# Patient Record
Sex: FEMALE | Race: WHITE | NOT HISPANIC OR LATINO | Employment: OTHER | URBAN - METROPOLITAN AREA
[De-identification: names, ages, dates, MRNs, and addresses within clinical notes are randomized per-mention and may not be internally consistent; named-entity substitution may affect disease eponyms.]

---

## 2017-01-16 ENCOUNTER — PROCEDURE VISIT (OUTPATIENT)
Dept: OPHTHALMOLOGY | Facility: CLINIC | Age: 77
End: 2017-01-16
Payer: MEDICARE

## 2017-01-16 VITALS — SYSTOLIC BLOOD PRESSURE: 144 MMHG | HEART RATE: 56 BPM | DIASTOLIC BLOOD PRESSURE: 72 MMHG

## 2017-01-16 DIAGNOSIS — H35.3110 NONEXUDATIVE AGE-RELATED MACULAR DEGENERATION OF RIGHT EYE: ICD-10-CM

## 2017-01-16 DIAGNOSIS — H35.3221 EXUDATIVE AGE-RELATED MACULAR DEGENERATION OF LEFT EYE WITH ACTIVE CHOROIDAL NEOVASCULARIZATION: Primary | ICD-10-CM

## 2017-01-16 DIAGNOSIS — H35.3220 EXUDATIVE AGE-RELATED MACULAR DEGENERATION OF LEFT EYE: ICD-10-CM

## 2017-01-16 PROCEDURE — 99499 UNLISTED E&M SERVICE: CPT | Mod: S$GLB,,, | Performed by: OPHTHALMOLOGY

## 2017-01-16 PROCEDURE — 67028 INJECTION EYE DRUG: CPT | Mod: LT,S$GLB,, | Performed by: OPHTHALMOLOGY

## 2017-01-16 PROCEDURE — 92014 COMPRE OPH EXAM EST PT 1/>: CPT | Mod: 25,S$GLB,, | Performed by: OPHTHALMOLOGY

## 2017-01-16 PROCEDURE — 92134 CPTRZ OPH DX IMG PST SGM RTA: CPT | Mod: S$GLB,,, | Performed by: OPHTHALMOLOGY

## 2017-01-16 RX ADMIN — Medication 1.25 MG: at 04:01

## 2017-01-16 NOTE — PATIENT INSTRUCTIONS

## 2017-01-16 NOTE — MR AVS SNAPSHOT
[facility]  [address]  [facility] [address]  Phone: [phone]  Fax: [phone]                  Serenity Epps   2017 2:50 PM   Procedure visit    Description:  Female : 1940   Provider:  NATHANIEL Lopez MD   Department:  [place] [facility]           Reason for Visit     Eye Problem           Diagnoses this Visit        Comments    Exudative age-related macular degeneration of left eye with active choroidal neovascularization    -  Primary     Exudative age-related macular degeneration of left eye         Nonexudative age-related macular degeneration of right eye                To Do List           Future Appointments        Provider Department Dept Phone    2/10/2017 8:00 AM Hua Andersen MD [facility] [phone]    2017 8:15 AM Aaron Hammonds DPM [facility] [phone]    2017 8:00 AM Estelle Mello MD [facility] [phone]      Goals (5 Years of Data)              Today    10/17/16    9/21/16    Blood Pressure < 140/90   144/72  123/57  122/74      Follow-Up and Disposition     Return in about 7 weeks (around 3/6/2017).      [facility]     [facility] Nurse Care [facility] - 24/7 Assistance  Registered nurses in the [facility] provide clinical advisement, health education, appointment booking, and other advisory services.  Call for this free service at [phone].             Medications           Message regarding Medications     Verify the changes and/or additions to your medication regime listed below are the same as discussed with your clinician today.  If any of these changes or additions are incorrect, please notify your healthcare provider.        These medications were administered today        Dose Freq    bevacizumab (AVASTIN) 2.5 mg/0.10 mL 1.25 mg 1.25 mg Clinic/HOD 1 time    Sig: Inject 0.05 mLs (1.25 mg total) into the eye one time.    Class: Normal     Route: Intraocular    bevacizumab (AVASTIN) 2.5 mg/0.10 mL 1.25 mg 1.25 mg Clinic/HOD 1 time    Sig: Inject 0.05 mLs (1.25 mg total) into the eye one time.    Class: Normal    Route: Intraocular           Verify that the below list of medications is an accurate representation of the medications you are currently taking.  If none reported, the list may be blank. If incorrect, please contact your healthcare provider. Carry this list with you in case of emergency.           Current Medications     aspirin (ECOTRIN) 81 MG EC tablet Take 81 mg by mouth once daily.      biotin 1 mg tablet Take 1,000 mcg by mouth once daily.    bisacodyl (DULCOLAX) 5 mg EC tablet Take 5 mg by mouth once daily.    BOOSTRIX TDAP 2.5-8-5 Lf-mcg-Lf/0.5mL Syrg injection     calcium carbonate (OS-BLAKE) 600 mg (1,500 mg) Tab Take 600 mg by mouth 2 (two) times daily with meals.    dorzolamide-timolol 2-0.5% (COSOPT) 22.3-6.8 mg/mL ophthalmic solution Place 1 drop into both eyes 2 (two) times daily.    FLUZONE HIGH-DOSE 2016-17, PF, 180 mcg/0.5 mL Syrg     gabapentin (NEURONTIN) 100 MG capsule Take 4 capsules (400 mg total) by mouth 3 (three) times daily.    lorazepam (ATIVAN) 0.5 MG tablet TAKE ONE TABLET BY MOUTH EVERY 12 HOURS AS NEEDED FOR ANXIETY    methylPREDNISolone (MEDROL DOSEPACK) 4 mg tablet use as directed    metoprolol succinate (TOPROL-XL) 25 MG 24 hr tablet Take 1 tablet (25 mg total) by mouth 2 (two) times daily.    naproxen sodium (ANAPROX) 220 MG tablet Take 220 mg by mouth 2 (two) times daily with meals.    VIT C/E/ZN/COPPR/LUTEIN/ZEAXAN (PRESERVISION AREDS 2 ORAL) Take by mouth.           Clinical Reference Information           Vital Signs - Last Recorded  Most recent update: 1/16/2017  2:57 PM by Madison Avila    BP Pulse                (!) 144/72 (BP Location: Right arm, Patient Position: Sitting, BP Method: Automatic) (!) 56          Blood Pressure          Most Recent Value    BP  (!)  144/72      Allergies as of  1/16/2017     Clindamycin      Immunizations Administered on Date of Encounter - 1/16/2017     None      Orders Placed During Today's Visit      Normal Orders This Visit    Posterior Segment OCT Retina-Both eyes     Prior Authorization Order     Prior Authorization Order     Future Labs/Procedures Expected by Expires    Posterior Segment OCT Retina-Both eyes  As directed 1/16/2018

## 2017-01-16 NOTE — PROGRESS NOTES
OCT - slight increase in SRF OS  Drusen OD      A/P    1. Wet AMD OS  S/p Avastin OS x 8, Eylea x 12    Persistent SRF OS - improving  With RPE tear OS    Avastin OS     Will likely need maintenance, alternating    7/15 - slight increase at 12 weeks keep 8-10    Continue alternating Avastin/Eylea    2. Dry AMD OD  AREDS/AG    3. PCIOL OU  CTR OS - but saw 20/30 with correction, in spite of sub RPE fibrosis      4. Glc Suspect OU   Good IOP control on Timolol    5. Floaters OU      7 weeks OCT      Risks, benefits, and alternatives to treatment discussed in detail with the patient.  The patient voiced understanding and wished to proceed with the procedure    Injection Procedure Note:  Diagnosis: Wet AMD OS    Topical Proparacaine and Betadine.  Inject Avastin OS at 6:00 @ 3.5-4mm posterior to limbus  Post Operative Dx: Same  Complications: None  Follow up as above.

## 2017-01-30 ENCOUNTER — PATIENT MESSAGE (OUTPATIENT)
Dept: OPHTHALMOLOGY | Facility: CLINIC | Age: 77
End: 2017-01-30

## 2017-02-10 ENCOUNTER — OFFICE VISIT (OUTPATIENT)
Dept: FAMILY MEDICINE | Facility: CLINIC | Age: 77
End: 2017-02-10
Payer: MEDICARE

## 2017-02-10 VITALS
SYSTOLIC BLOOD PRESSURE: 124 MMHG | BODY MASS INDEX: 24.8 KG/M2 | WEIGHT: 154.31 LBS | RESPIRATION RATE: 16 BRPM | DIASTOLIC BLOOD PRESSURE: 82 MMHG | HEIGHT: 66 IN | HEART RATE: 60 BPM

## 2017-02-10 DIAGNOSIS — L30.9 DERMATITIS: Primary | ICD-10-CM

## 2017-02-10 DIAGNOSIS — I10 ESSENTIAL HYPERTENSION: ICD-10-CM

## 2017-02-10 PROCEDURE — 1160F RVW MEDS BY RX/DR IN RCRD: CPT | Mod: S$GLB,,, | Performed by: FAMILY MEDICINE

## 2017-02-10 PROCEDURE — 1157F ADVNC CARE PLAN IN RCRD: CPT | Mod: S$GLB,,, | Performed by: FAMILY MEDICINE

## 2017-02-10 PROCEDURE — 99999 PR PBB SHADOW E&M-EST. PATIENT-LVL III: CPT | Mod: PBBFAC,,, | Performed by: FAMILY MEDICINE

## 2017-02-10 PROCEDURE — 99213 OFFICE O/P EST LOW 20 MIN: CPT | Mod: 25,S$GLB,, | Performed by: FAMILY MEDICINE

## 2017-02-10 PROCEDURE — 1159F MED LIST DOCD IN RCRD: CPT | Mod: S$GLB,,, | Performed by: FAMILY MEDICINE

## 2017-02-10 PROCEDURE — 3079F DIAST BP 80-89 MM HG: CPT | Mod: S$GLB,,, | Performed by: FAMILY MEDICINE

## 2017-02-10 PROCEDURE — 90732 PPSV23 VACC 2 YRS+ SUBQ/IM: CPT | Mod: S$GLB,,, | Performed by: FAMILY MEDICINE

## 2017-02-10 PROCEDURE — 3074F SYST BP LT 130 MM HG: CPT | Mod: S$GLB,,, | Performed by: FAMILY MEDICINE

## 2017-02-10 PROCEDURE — 1126F AMNT PAIN NOTED NONE PRSNT: CPT | Mod: S$GLB,,, | Performed by: FAMILY MEDICINE

## 2017-02-10 PROCEDURE — G0009 ADMIN PNEUMOCOCCAL VACCINE: HCPCS | Mod: S$GLB,,, | Performed by: FAMILY MEDICINE

## 2017-02-10 PROCEDURE — 99499 UNLISTED E&M SERVICE: CPT | Mod: S$GLB,,, | Performed by: FAMILY MEDICINE

## 2017-02-10 RX ORDER — TRIAMCINOLONE ACETONIDE 1 MG/G
CREAM TOPICAL 2 TIMES DAILY
Qty: 1 TUBE | Refills: 0 | Status: SHIPPED | OUTPATIENT
Start: 2017-02-10 | End: 2017-02-19 | Stop reason: SDUPTHER

## 2017-02-10 NOTE — MR AVS SNAPSHOT
[facility]  [address]  [place] [address]  Phone: [phone]  Fax: [phone]                  Serenity Epps   2/10/2017 8:00 AM   Office Visit    Description:  Female : 1940   Provider:  Hua Andersen MD   Department:  [facility]           Reason for Visit     Hypertension           Diagnoses this Visit        Comments    Dermatitis    -  Primary     Essential hypertension                To Do List           Future Appointments        Provider Department Dept Phone    3/6/2017 7:40 AM NATHANIEL Lopez MD [facility] [phone]    2017 8:15 AM Aaron Hammonds DPM [facility] [phone]    2017 8:00 AM Estelle Mello MD [facility] [phone]    2017 7:30 AM Hua Andersen MD [facility] [phone]      Goals (5 Years of Data)              Today    1/16/17    10/17/16    Blood Pressure < 140/90   124/82  144/72  123/57      Follow-Up and Disposition     Return in about 6 months (around 8/10/2017).       These Medications        Disp Refills Start End    triamcinolone acetonide 0.1% (KENALOG) 0.1 % cream 1 Tube 0 2/10/2017 2017    Apply topically 2 (two) times daily. - Topical (Top)    Pharmacy: [facility] [place]  [address]  Ph #: [phone]         [facility]     [facility] On Call Nurse Care Line -  Assistance  Registered nurses in the [facility] provide clinical advisement, health education, appointment booking, and other advisory services.  Call for this free service at [phone].             Medications           Message regarding Medications     Verify the changes and/or additions to your medication regime listed below are the same as discussed with your clinician today.  If any of these changes or additions are incorrect, please notify your healthcare provider.        START taking these  "NEW medications        Refills    triamcinolone acetonide 0.1% (KENALOG) 0.1 % cream 0    Sig: Apply topically 2 (two) times daily.    Class: Normal    Route: Topical (Top)      STOP taking these medications     FLUZONE HIGH-DOSE 2016-17, PF, 180 mcg/0.5 mL Syrg     BOOSTRIX TDAP 2.5-8-5 Lf-mcg-Lf/0.5mL Syrg injection            Verify that the below list of medications is an accurate representation of the medications you are currently taking.  If none reported, the list may be blank. If incorrect, please contact your healthcare provider. Carry this list with you in case of emergency.           Current Medications     aspirin (ECOTRIN) 81 MG EC tablet Take 81 mg by mouth once daily.      biotin 1 mg tablet Take 1,000 mcg by mouth once daily.    bisacodyl (DULCOLAX) 5 mg EC tablet Take 5 mg by mouth once daily.    calcium carbonate (OS-BLAKE) 600 mg (1,500 mg) Tab Take 600 mg by mouth 2 (two) times daily with meals.    dorzolamide-timolol 2-0.5% (COSOPT) 22.3-6.8 mg/mL ophthalmic solution Place 1 drop into both eyes 2 (two) times daily.    gabapentin (NEURONTIN) 100 MG capsule Take 4 capsules (400 mg total) by mouth 3 (three) times daily.    lorazepam (ATIVAN) 0.5 MG tablet TAKE ONE TABLET BY MOUTH EVERY 12 HOURS AS NEEDED FOR ANXIETY    methylPREDNISolone (MEDROL DOSEPACK) 4 mg tablet use as directed    metoprolol succinate (TOPROL-XL) 25 MG 24 hr tablet Take 1 tablet (25 mg total) by mouth 2 (two) times daily.    naproxen sodium (ANAPROX) 220 MG tablet Take 220 mg by mouth 2 (two) times daily with meals.    VIT C/E/ZN/COPPR/LUTEIN/ZEAXAN (PRESERVISION AREDS 2 ORAL) Take by mouth.    triamcinolone acetonide 0.1% (KENALOG) 0.1 % cream Apply topically 2 (two) times daily.           Clinical Reference Information           Your Vitals Were     BP Pulse Resp Height Weight BMI    124/82 (BP Location: Left arm, Patient Position: Sitting, BP Method: Manual) 60 16 5' 6" (1.676 m) 70 kg (154 lb 5.2 oz) 24.91 kg/m2      Blood " Pressure          Most Recent Value    BP  124/82      Allergies as of 2/10/2017     Clindamycin      Immunizations Administered on Date of Encounter - 2/10/2017     Name Date Dose VIS Date Route    Pneumococcal Polysaccharide - 23 Valent  Incomplete 0.5 mL 4/24/2015 Intramuscular      Orders Placed During Today's Visit      Normal Orders This Visit    Pneumococcal Polysaccharide Vaccine (23 Valent) (SQ/IM)       Language Assistance Services     ATTENTION: Language assistance services are available, free of charge. Please call [phone].      ATENCIÓN: Si habla laisha, tiene a collazo disposición servicios gratuitos de asistencia lingüística. Llame al [phone].     CHÚ Ý: N?u b?n nói Ti?ng Vi?t, có các d?ch v? h? tr? ngôn ng? mi?n phí dành cho b?n. G?i s? [phone].         [facility] complies with applicable Federal civil rights laws and does not discriminate on the basis of race, color, national origin, age, disability, or sex.

## 2017-02-10 NOTE — PROGRESS NOTES
Subjective:       Patient ID: Serenity Epps is a 76 y.o. female.    Chief Complaint: Hypertension (follow up; hm due: pneumovax)    Hypertension   This is a chronic problem. The problem is controlled. Pertinent negatives include no chest pain, headaches, orthopnea, palpitations, peripheral edema, PND or shortness of breath. Past treatments include beta blockers. The current treatment provides significant improvement. There are no compliance problems.      Review of Systems   Constitutional: Negative for activity change and unexpected weight change.   HENT: Negative for hearing loss, rhinorrhea and trouble swallowing.    Eyes: Negative for discharge and visual disturbance.   Respiratory: Negative for chest tightness, shortness of breath and wheezing.    Cardiovascular: Negative for chest pain, palpitations, orthopnea and PND.   Gastrointestinal: Negative for blood in stool, constipation, diarrhea and vomiting.   Endocrine: Negative for polydipsia and polyuria.   Genitourinary: Negative for difficulty urinating, dysuria, hematuria and menstrual problem.   Musculoskeletal: Negative for arthralgias and joint swelling.   Skin: Positive for rash.   Neurological: Negative for weakness and headaches.   Psychiatric/Behavioral: Negative for confusion and dysphoric mood.       Objective:      Physical Exam   Constitutional: She is oriented to person, place, and time. She appears well-developed and well-nourished.   Neurological: She is alert and oriented to person, place, and time.   Skin:   Rash on posterior neck with redness, pruritic   Vitals reviewed.      Assessment:       1. Dermatitis    2. Essential hypertension        Plan:       Dermatitis  -     triamcinolone acetonide 0.1% (KENALOG) 0.1 % cream; Apply topically 2 (two) times daily.  Dispense: 1 Tube; Refill: 0    Essential hypertension  - Continue current therapy  - Serial blood pressure monitoring  - Diet and exercise education.  - Return in about 6 months (around  8/10/2017).

## 2017-02-19 DIAGNOSIS — L30.9 DERMATITIS: ICD-10-CM

## 2017-02-20 RX ORDER — TRIAMCINOLONE ACETONIDE 1 MG/G
CREAM TOPICAL
Qty: 15 G | Refills: 3 | Status: SHIPPED | OUTPATIENT
Start: 2017-02-20 | End: 2017-03-14 | Stop reason: SDUPTHER

## 2017-02-20 RX ORDER — GABAPENTIN 100 MG/1
400 CAPSULE ORAL 3 TIMES DAILY
Qty: 180 CAPSULE | Refills: 2 | Status: SHIPPED | OUTPATIENT
Start: 2017-02-20 | End: 2017-04-07 | Stop reason: SDUPTHER

## 2017-03-06 ENCOUNTER — PROCEDURE VISIT (OUTPATIENT)
Dept: OPHTHALMOLOGY | Facility: CLINIC | Age: 77
End: 2017-03-06
Attending: OPHTHALMOLOGY
Payer: MEDICARE

## 2017-03-06 VITALS — DIASTOLIC BLOOD PRESSURE: 67 MMHG | SYSTOLIC BLOOD PRESSURE: 116 MMHG | HEART RATE: 55 BPM

## 2017-03-06 DIAGNOSIS — H35.3110 NONEXUDATIVE AGE-RELATED MACULAR DEGENERATION OF RIGHT EYE: ICD-10-CM

## 2017-03-06 DIAGNOSIS — H35.3221 EXUDATIVE AGE-RELATED MACULAR DEGENERATION OF LEFT EYE WITH ACTIVE CHOROIDAL NEOVASCULARIZATION: Primary | ICD-10-CM

## 2017-03-06 PROCEDURE — 67028 INJECTION EYE DRUG: CPT | Mod: LT,S$GLB,, | Performed by: OPHTHALMOLOGY

## 2017-03-06 PROCEDURE — 92134 CPTRZ OPH DX IMG PST SGM RTA: CPT | Mod: LT,S$GLB,, | Performed by: OPHTHALMOLOGY

## 2017-03-06 PROCEDURE — 92014 COMPRE OPH EXAM EST PT 1/>: CPT | Mod: 25,S$GLB,, | Performed by: OPHTHALMOLOGY

## 2017-03-06 PROCEDURE — 99499 UNLISTED E&M SERVICE: CPT | Mod: S$GLB,,, | Performed by: OPHTHALMOLOGY

## 2017-03-06 NOTE — MR AVS SNAPSHOT
[facility]  [address]  [facility] [address]  Phone: [phone]  Fax: [phone]                  Serenity Epps   3/6/2017 7:40 AM   Procedure visit    Description:  Female : 1940   Provider:  NATHANIEL Lopez MD   Department:  [place] [facility]           Reason for Visit     Eye Problem           Diagnoses this Visit        Comments    Exudative age-related macular degeneration of left eye with active choroidal neovascularization    -  Primary     Nonexudative age-related macular degeneration of right eye                To Do List           Future Appointments        Provider Department Dept Phone    2017 8:15 AM Aaron Hammonds DPM [facility] [phone]    2017 8:00 AM Estelle Mello MD [facility] [phone]    2017 7:30 AM Hua Andersen MD [facility] [phone]      Goals (5 Years of Data)              Today    2/10/17    1/16/17    Blood Pressure < 140/90   116/67  116/67  116/67      Follow-Up and Disposition     Return in about 6 weeks (around 2017).      [facility]     [facility] Nurse Care Line - 24/7 Assistance  Registered nurses in the [facility] provide clinical advisement, health education, appointment booking, and other advisory services.  Call for this free service at [phone].             Medications           Message regarding Medications     Verify the changes and/or additions to your medication regime listed below are the same as discussed with your clinician today.  If any of these changes or additions are incorrect, please notify your healthcare provider.        These medications were administered today        Dose Freq    aflibercept Soln 2 mg 2 mg Clinic/HOD 1 time    Si.05 mLs (2 mg total) by Intravitreal route one time.    Class: Normal    Route: Intravitreal           Verify that the below list of medications is an accurate  representation of the medications you are currently taking.  If none reported, the list may be blank. If incorrect, please contact your healthcare provider. Carry this list with you in case of emergency.           Current Medications     aspirin (ECOTRIN) 81 MG EC tablet Take 81 mg by mouth once daily.      biotin 1 mg tablet Take 1,000 mcg by mouth once daily.    bisacodyl (DULCOLAX) 5 mg EC tablet Take 5 mg by mouth once daily.    calcium carbonate (OS-BLAKE) 600 mg (1,500 mg) Tab Take 600 mg by mouth 2 (two) times daily with meals.    dorzolamide-timolol 2-0.5% (COSOPT) 22.3-6.8 mg/mL ophthalmic solution Place 1 drop into both eyes 2 (two) times daily.    gabapentin (NEURONTIN) 100 MG capsule Take 4 capsules (400 mg total) by mouth 3 (three) times daily.    lorazepam (ATIVAN) 0.5 MG tablet TAKE ONE TABLET BY MOUTH EVERY 12 HOURS AS NEEDED FOR ANXIETY    methylPREDNISolone (MEDROL DOSEPACK) 4 mg tablet use as directed    metoprolol succinate (TOPROL-XL) 25 MG 24 hr tablet Take 1 tablet (25 mg total) by mouth 2 (two) times daily.    naproxen sodium (ANAPROX) 220 MG tablet Take 220 mg by mouth 2 (two) times daily with meals.    triamcinolone acetonide 0.1% (KENALOG) 0.1 % cream APPLY TOPICALLY 2 TIMES DAILY    VIT C/E/ZN/COPPR/LUTEIN/ZEAXAN (PRESERVISION AREDS 2 ORAL) Take by mouth.           Clinical Reference Information           Your Vitals Were     BP Pulse                116/67 (BP Location: Left arm, Patient Position: Sitting, BP Method: Automatic) 55          Blood Pressure          Most Recent Value    BP  116/67      Allergies as of 3/6/2017     Clindamycin      Immunizations Administered on Date of Encounter - 3/6/2017     None      Orders Placed During Today's Visit      Normal Orders This Visit    Posterior Segment OCT Retina-Both eyes     Prior Authorization Order     Future Labs/Procedures Expected by Expires    Posterior Segment OCT Retina-Both eyes  As directed 3/6/2018      Language Assistance  Services     ATTENTION: Language assistance services are available, free of charge. Please call [phone].      ATENCIÓN: Si habla laisha, tiene a collazo disposición servicios gratuitos de asistencia lingüística. Llame al [phone].     CHÚ Ý: N?u b?n nói Ti?ng Vi?t, có các d?ch v? h? tr? ngôn ng? mi?n phí dành cho b?n. G?i s? [phone].         [facility] complies with applicable Federal civil rights laws and does not discriminate on the basis of race, color, national origin, age, disability, or sex.

## 2017-03-06 NOTE — PATIENT INSTRUCTIONS

## 2017-03-06 NOTE — PROGRESS NOTES
OCT - slight increase in SRF OS  Drusen OD      A/P    1. Wet AMD OS  S/p Avastin OS x 9, Eylea x 12    Persistent SRF OS - improving  With RPE tear OS    Eylea OS     Will likely need maintenance, alternating  Some increase 2 weeks after Avastin, may consider Eylea monotherapy if recurs    7/15 - slight increase at 12 weeks keep 8-10    Continue alternating Avastin/Eylea    2. Dry AMD OD  AREDS/AG    3. PCIOL OU  CTR OS - but saw 20/30 with correction, in spite of sub RPE fibrosis      4. Glc Suspect OU   Good IOP control on Timolol    5. Floaters OU      6-7 weeks OCT      Risks, benefits, and alternatives to treatment discussed in detail with the patient.  The patient voiced understanding and wished to proceed with the procedure    Injection Procedure Note:  Diagnosis: Wet AMD OS    Topical Proparacaine and Betadine.  Inject Eylea OS at 6:00 @ 3.5-4mm posterior to limbus  Post Operative Dx: Same  Complications: None  Follow up as above.

## 2017-03-14 DIAGNOSIS — L30.9 DERMATITIS: ICD-10-CM

## 2017-03-14 RX ORDER — TRIAMCINOLONE ACETONIDE 1 MG/G
CREAM TOPICAL 2 TIMES DAILY
Qty: 15 G | Refills: 3 | Status: SHIPPED | OUTPATIENT
Start: 2017-03-14 | End: 2017-07-06

## 2017-03-30 RX ORDER — LORAZEPAM 0.5 MG/1
TABLET ORAL
Qty: 40 TABLET | Refills: 0 | OUTPATIENT
Start: 2017-03-30

## 2017-03-30 RX ORDER — LORAZEPAM 0.5 MG/1
0.5 TABLET ORAL EVERY 12 HOURS PRN
Qty: 40 TABLET | Refills: 3 | Status: SHIPPED | OUTPATIENT
Start: 2017-03-30 | End: 2017-08-08 | Stop reason: SDUPTHER

## 2017-04-06 ENCOUNTER — OFFICE VISIT (OUTPATIENT)
Dept: PODIATRY | Facility: CLINIC | Age: 77
End: 2017-04-06
Payer: MEDICARE

## 2017-04-06 ENCOUNTER — PATIENT MESSAGE (OUTPATIENT)
Dept: PODIATRY | Facility: CLINIC | Age: 77
End: 2017-04-06

## 2017-04-06 VITALS — HEIGHT: 66 IN | WEIGHT: 154.75 LBS | BODY MASS INDEX: 24.87 KG/M2

## 2017-04-06 DIAGNOSIS — B35.1 ONYCHOMYCOSIS DUE TO DERMATOPHYTE: ICD-10-CM

## 2017-04-06 DIAGNOSIS — G60.9 IDIOPATHIC PERIPHERAL NEUROPATHY: Primary | ICD-10-CM

## 2017-04-06 DIAGNOSIS — M21.6X9 EQUINUS DEFORMITY OF FOOT: ICD-10-CM

## 2017-04-06 PROCEDURE — 1159F MED LIST DOCD IN RCRD: CPT | Mod: S$GLB,,,

## 2017-04-06 PROCEDURE — 99213 OFFICE O/P EST LOW 20 MIN: CPT | Mod: 25,S$GLB,,

## 2017-04-06 PROCEDURE — 11721 DEBRIDE NAIL 6 OR MORE: CPT | Mod: Q9,S$GLB,,

## 2017-04-06 PROCEDURE — 1160F RVW MEDS BY RX/DR IN RCRD: CPT | Mod: S$GLB,,,

## 2017-04-06 PROCEDURE — 99499 UNLISTED E&M SERVICE: CPT | Mod: S$GLB,,,

## 2017-04-06 PROCEDURE — 1125F AMNT PAIN NOTED PAIN PRSNT: CPT | Mod: S$GLB,,,

## 2017-04-06 PROCEDURE — 1157F ADVNC CARE PLAN IN RCRD: CPT | Mod: S$GLB,,,

## 2017-04-06 PROCEDURE — 99999 PR PBB SHADOW E&M-EST. PATIENT-LVL II: CPT | Mod: PBBFAC,,,

## 2017-04-06 NOTE — MR AVS SNAPSHOT
[place] [facility]  [address]  [facility] [address]  Phone: [phone]                  Serenity Epps   2017 8:15 AM   Office Visit    Description:  Female : 1940   Provider:  Aaron Hammonds DPM   Department:  [facility]           Reason for Visit     Follow-up                To Do List           Future Appointments        Provider Department Dept Phone    2017 7:50 AM NATHANIEL Lopez MD [facility] [phone]    2017 8:00 AM Estelle Mello MD [facility] [phone]    2017 8:30 AM Aaron Hammonds DPM [facility] [phone]    2017 7:30 AM Hua Andersen MD [facility] [phone]      Goals (5 Years of Data)              3/6/17    2/10/17    1/16/17    Blood Pressure < 140/90   116/67  116/67  116/67      [facility] On Call     [facility] On Call Nurse Care Line -  Assistance  Unless otherwise directed by your provider, please contact [facility], our nurse care line that is available for  assistance.     Registered nurses in the [facility] provide: appointment scheduling, clinical advisement, health education, and other advisory services.  Call: [phone] (toll free)               Medications           Message regarding Medications     Verify the changes and/or additions to your medication regime listed below are the same as discussed with your clinician today.  If any of these changes or additions are incorrect, please notify your healthcare provider.             Verify that the below list of medications is an accurate representation of the medications you are currently taking.  If none reported, the list may be blank. If incorrect, please contact your healthcare provider. Carry this list with you in case of emergency.           Current Medications     aspirin (ECOTRIN) 81 MG EC tablet Take 81 mg by mouth once daily.      biotin 1 mg tablet Take  "1,000 mcg by mouth once daily.    bisacodyl (DULCOLAX) 5 mg EC tablet Take 5 mg by mouth once daily.    calcium carbonate (OS-BLAKE) 600 mg (1,500 mg) Tab Take 600 mg by mouth 2 (two) times daily with meals.    dorzolamide-timolol 2-0.5% (COSOPT) 22.3-6.8 mg/mL ophthalmic solution Place 1 drop into both eyes 2 (two) times daily.    gabapentin (NEURONTIN) 100 MG capsule Take 4 capsules (400 mg total) by mouth 3 (three) times daily.    lorazepam (ATIVAN) 0.5 MG tablet Take 1 tablet (0.5 mg total) by mouth every 12 (twelve) hours as needed.    methylPREDNISolone (MEDROL DOSEPACK) 4 mg tablet use as directed    metoprolol succinate (TOPROL-XL) 25 MG 24 hr tablet Take 1 tablet (25 mg total) by mouth 2 (two) times daily.    naproxen sodium (ANAPROX) 220 MG tablet Take 220 mg by mouth 2 (two) times daily with meals.    triamcinolone acetonide 0.1% (KENALOG) 0.1 % cream Apply topically 2 (two) times daily.    VIT C/E/ZN/COPPR/LUTEIN/ZEAXAN (PRESERVISION AREDS 2 ORAL) Take by mouth.           Clinical Reference Information           Your Vitals Were     Height Weight BMI          5' 6" (1.676 m) 70.2 kg (154 lb 12.2 oz) 24.98 kg/m2        Allergies as of 4/6/2017     Clindamycin      Immunizations Administered on Date of Encounter - 4/6/2017     None      Language Assistance Services     ATTENTION: Language assistance services are available, free of charge. Please call [phone].      ATENCIÓN: Si mra interiano, tiene a collazo disposición servicios gratuitos de asistencia lingüística. Llame al [phone].     ROSS Ý: N?u b?n nói Ti?ng Vi?t, có các d?ch v? h? tr? ngôn ng? mi?n phí dành cho b?n. G?i s? [phone].         [place] [facility] complies with applicable Federal civil rights laws and does not discriminate on the basis of race, color, national origin, age, disability, or sex.        "

## 2017-04-07 RX ORDER — GABAPENTIN 100 MG/1
400 CAPSULE ORAL 3 TIMES DAILY
Qty: 180 CAPSULE | Refills: 2 | Status: SHIPPED | OUTPATIENT
Start: 2017-04-07 | End: 2017-06-26 | Stop reason: SDUPTHER

## 2017-04-07 NOTE — PROGRESS NOTES
Subjective:       Patient ID: Serenity Epps is a 76 y.o. female.    Chief Complaint: Follow-up (4 month f/u )    HPI  Serenity is a 76 y.o. female who presents to the clinic for evaluation and treatment of high risk feet. Serenity has a past medical history of Anxiety; Arthritis; Cataract - Both Eyes; CKD (chronic kidney disease), stage II (9/21/2016); DVT (deep venous thrombosis); Exudative age-related macular degeneration of left eye (2/20/2014); Glaucoma; Hypertension; Left foot pain; Macular degeneration; Osteopenia; Rhinitis, chronic; and Strabismus. The patient's chief complaint is long, thick toenails. This patient has documented high risk feet requiring routine maintenance secondary to peripheral neuropathy.    PCP: Hua Andersen MD    Date Last Seen by PCP: 2/10/17    Current shoe gear:  Affected Foot: Tennis shoes     Unaffected Foot: Tennis shoes      Review of Systems  ROS:  Constitution: Negative for chills, fever, weakness and malaise/fatigue.   HEENT: Negative for headaches.   Cardiovascular: Negative for chest pain and claudication.   Respiratory: Negative for cough and shortness of breath.   Musculoskeletal: Positive for foot pain.  Negative for muscle cramps and muscle weakness.   Gastrointestinal: Negative for nausea and vomiting.   Neurological: Positive for numbness and paresthesias.   Dermatological: Negative for wound.        Objective:      Physical Exam  Constitutional:   Patient is oriented to person, place, and time. Vital signs are normal. Appears well-developed and well-nourished.     Vascular:   Dorsalis pedis pulses are 2+ on the right side, and 2+ on the left side.   Posterior tibial pulses are 2+ on the right side, and 2+ on the left side.   - digital hair growth, capillary fill time to all toes <3 seconds, toes are cool to touch  + swelling feet and ankles    Skin/Dermatological:   Skin is thin, warm, shiny and atrophic. No cyanosis or clubbing. No rashes noted. No open wounds.   All ten  toenails yellow discolored, thickened 3 mm, elongated 3 mm with subungual debris and tenderness.  Porokeratosis -    Musculoskeletal:   Mild bunions, hammertoes and bunionettes observed.  Decreased range of motion of bilateral midtarsal, subtalar joints, ankle joint dorsiflexion is restricted bilaterally. Muscle strength to tibialis anterior, extensor hallucis longus, extensor digitorum longus, peroneal muscles, flexor hallucis/digotorum longus, posterior tibial and gastrosoleal complex is 5/5.    Neurological:   Positive deficits to sharp/dull, light touch or vibratory sensation bilateral feet           Assessment:       1. Idiopathic peripheral neuropathy    2. Equinus deformity of foot    3. Onychomycosis due to dermatophyte        Plan:       Shoe inspection. Foot Education. Patient instructed on proper foot hygeine. We discussed wearing proper shoe gear, daily foot inspections, never walking without protective shoe gear, never putting sharp instruments to feet.  We also discussed padding and shoes with high toe boxes for  foot deformities.    - With patient's permission, all ten toenails were aggressively reduced and debrided  to their soft tissue attachment mechanically with nail nipper, removing all offending nail and debris.   Patient relates relief following the procedure. Patient will continue to monitor the areas daily, inspect her feet, wear protective shoe gear when ambulatory, moisturizer to maintain skin integrity and follow in this office in approximately 4 months, sooner p.r.n.

## 2017-04-17 ENCOUNTER — PROCEDURE VISIT (OUTPATIENT)
Dept: OPHTHALMOLOGY | Facility: CLINIC | Age: 77
End: 2017-04-17
Payer: MEDICARE

## 2017-04-17 VITALS — SYSTOLIC BLOOD PRESSURE: 140 MMHG | DIASTOLIC BLOOD PRESSURE: 71 MMHG | HEART RATE: 61 BPM

## 2017-04-17 DIAGNOSIS — H35.3110 NONEXUDATIVE AGE-RELATED MACULAR DEGENERATION OF RIGHT EYE: ICD-10-CM

## 2017-04-17 DIAGNOSIS — H35.3221 EXUDATIVE AGE-RELATED MACULAR DEGENERATION OF LEFT EYE WITH ACTIVE CHOROIDAL NEOVASCULARIZATION: Primary | ICD-10-CM

## 2017-04-17 PROCEDURE — 99499 UNLISTED E&M SERVICE: CPT | Mod: S$GLB,,, | Performed by: OPHTHALMOLOGY

## 2017-04-17 PROCEDURE — 92134 CPTRZ OPH DX IMG PST SGM RTA: CPT | Mod: LT,S$GLB,, | Performed by: OPHTHALMOLOGY

## 2017-04-17 PROCEDURE — 67028 INJECTION EYE DRUG: CPT | Mod: LT,S$GLB,, | Performed by: OPHTHALMOLOGY

## 2017-04-17 PROCEDURE — 92014 COMPRE OPH EXAM EST PT 1/>: CPT | Mod: 25,S$GLB,, | Performed by: OPHTHALMOLOGY

## 2017-04-17 RX ORDER — AMOXICILLIN 500 MG/1
CAPSULE ORAL
COMMUNITY
Start: 2017-04-12 | End: 2017-07-06 | Stop reason: ALTCHOICE

## 2017-04-17 RX ADMIN — Medication 1.25 MG: at 09:04

## 2017-04-17 NOTE — PROGRESS NOTES
OCT - slight increase in SRF OS  Drusen OD      A/P    1. Wet AMD OS  S/p Avastin OS x 9, Eylea x 13    Persistent SRF OS - improving  With RPE tear OS    Avastin OS     Will likely need maintenance, alternating  Some increase 2 weeks after Avastin, may consider Eylea monotherapy if recurs    7/15 - slight increase at 12 weeks keep 8-10    Continue alternating Avastin/Eylea    2. Dry AMD OD  AREDS/AG    3. PCIOL OU  CTR OS - but saw 20/30 with correction, in spite of sub RPE fibrosis      4. Glc Suspect OU   Good IOP control on Timolol    5. Floaters OU      6-7 weeks OCT      Risks, benefits, and alternatives to treatment discussed in detail with the patient.  The patient voiced understanding and wished to proceed with the procedure    Injection Procedure Note:  Diagnosis: Wet AMD OS    Topical Proparacaine and Betadine.  Inject Avastin OS at 6:00 @ 3.5-4mm posterior to limbus  Post Operative Dx: Same  Complications: None  Follow up as above.

## 2017-04-17 NOTE — PATIENT INSTRUCTIONS

## 2017-04-17 NOTE — MR AVS SNAPSHOT
[facility]  [address]  [facility] [address]  Phone: [phone]  Fax: [phone]                  Serenity Epps   2017 7:50 AM   Procedure visit    Description:  Female : 1940   Provider:  NATHANIEL Lopez MD   Department:  [place] [facility]           Reason for Visit     Macular Degeneration           Diagnoses this Visit        Comments    Exudative age-related macular degeneration of left eye with active choroidal neovascularization    -  Primary     Nonexudative age-related macular degeneration of right eye                To Do List           Future Appointments        Provider Department Dept Phone    2017 8:00 AM Estelle Mello MD [facility] [phone]    2017 8:30 AM Aaron Hammonds DPM [facility] [phone]    2017 7:30 AM Hua Andersen MD [facility] [phone]      Goals (5 Years of Data)              Today    3/6/17    2/10/17    Blood Pressure < 140/90   140/71  140/71  140/71      Follow-Up and Disposition     Return in about 6 weeks (around 2017).      [facility]     [facility] Care [facility] -  Assistance  Unless otherwise directed by your provider, please contact [facility], our nurse care line that is available for  assistance.     Registered nurses in the [facility] provide: appointment scheduling, clinical advisement, health education, and other advisory services.  Call: [phone] (toll free)               Medications           Message regarding Medications     Verify the changes and/or additions to your medication regime listed below are the same as discussed with your clinician today.  If any of these changes or additions are incorrect, please notify your healthcare provider.        These medications were administered today        Dose Freq    bevacizumab (AVASTIN) 2.5 mg/0.10 mL 1.25 mg 1.25 mg Clinic/HOD 1 time     Sig: Inject 0.05 mLs (1.25 mg total) into the eye one time.    Class: Normal    Route: Intraocular           Verify that the below list of medications is an accurate representation of the medications you are currently taking.  If none reported, the list may be blank. If incorrect, please contact your healthcare provider. Carry this list with you in case of emergency.           Current Medications     amoxicillin (AMOXIL) 500 MG capsule     aspirin (ECOTRIN) 81 MG EC tablet Take 81 mg by mouth once daily.      biotin 1 mg tablet Take 1,000 mcg by mouth once daily.    bisacodyl (DULCOLAX) 5 mg EC tablet Take 5 mg by mouth once daily.    calcium carbonate (OS-BLAKE) 600 mg (1,500 mg) Tab Take 600 mg by mouth 2 (two) times daily with meals.    dorzolamide-timolol 2-0.5% (COSOPT) 22.3-6.8 mg/mL ophthalmic solution Place 1 drop into both eyes 2 (two) times daily.    gabapentin (NEURONTIN) 100 MG capsule Take 4 capsules (400 mg total) by mouth 3 (three) times daily.    lorazepam (ATIVAN) 0.5 MG tablet Take 1 tablet (0.5 mg total) by mouth every 12 (twelve) hours as needed.    methylPREDNISolone (MEDROL DOSEPACK) 4 mg tablet use as directed    metoprolol succinate (TOPROL-XL) 25 MG 24 hr tablet Take 1 tablet (25 mg total) by mouth 2 (two) times daily.    naproxen sodium (ANAPROX) 220 MG tablet Take 220 mg by mouth 2 (two) times daily with meals.    triamcinolone acetonide 0.1% (KENALOG) 0.1 % cream Apply topically 2 (two) times daily.    VIT C/E/ZN/COPPR/LUTEIN/ZEAXAN (PRESERVISION AREDS 2 ORAL) Take by mouth.           Clinical Reference Information           Your Vitals Were     BP Pulse                140/71 (BP Location: Right arm, Patient Position: Sitting, BP Method: Automatic) 61          Blood Pressure          Most Recent Value    BP  (!)  140/71      Allergies as of 4/17/2017     Clindamycin      Immunizations Administered on Date of Encounter - 4/17/2017     None      Orders Placed During Today's Visit      Normal  Orders This Visit    Posterior Segment OCT Retina-Both eyes     Prior Authorization Order     Future Labs/Procedures Expected by Expires    Posterior Segment OCT Retina-Both eyes  As directed 4/17/2018      Language Assistance Services     ATTENTION: Language assistance services are available, free of charge. Please call [phone].      ATENCIÓN: Si mar interiano, tiene a collazo disposición servicios gratuitos de asistencia lingüística. Llame al [phone].     CHÚ Ý: N?u b?n nói Ti?ng Vi?t, có các d?ch v? h? tr? ngôn ng? mi?n phí dành cho b?n. G?i s? [phone].         [facility] complies with applicable Federal civil rights laws and does not discriminate on the basis of race, color, national origin, age, disability, or sex.

## 2017-04-18 ENCOUNTER — OFFICE VISIT (OUTPATIENT)
Dept: OPHTHALMOLOGY | Facility: CLINIC | Age: 77
End: 2017-04-18
Payer: MEDICARE

## 2017-04-18 DIAGNOSIS — Z96.1 PSEUDOPHAKIA OF BOTH EYES: ICD-10-CM

## 2017-04-18 DIAGNOSIS — H35.3221 EXUDATIVE AGE-RELATED MACULAR DEGENERATION OF LEFT EYE WITH ACTIVE CHOROIDAL NEOVASCULARIZATION: ICD-10-CM

## 2017-04-18 DIAGNOSIS — H40.003 GLAUCOMA SUSPECT, BILATERAL: Primary | ICD-10-CM

## 2017-04-18 DIAGNOSIS — H35.3110 NONEXUDATIVE AGE-RELATED MACULAR DEGENERATION OF RIGHT EYE: ICD-10-CM

## 2017-04-18 PROCEDURE — 99499 UNLISTED E&M SERVICE: CPT | Mod: S$GLB,,, | Performed by: OPHTHALMOLOGY

## 2017-04-18 PROCEDURE — 99999 PR PBB SHADOW E&M-EST. PATIENT-LVL III: CPT | Mod: PBBFAC,,, | Performed by: OPHTHALMOLOGY

## 2017-04-18 PROCEDURE — 92012 INTRM OPH EXAM EST PATIENT: CPT | Mod: S$GLB,,, | Performed by: OPHTHALMOLOGY

## 2017-04-18 NOTE — PROGRESS NOTES
HPI     Glaucoma    Additional comments: 4 month iop ck           Comments   DLS: 12/13/16    Pt states can not see when she goes from light to dark or dark to light.   Saw Dr Lopez yesterday for avastin OS injection yesterday.    Gtts: Cosopt BID OU       Last edited by Cheryle Quintana on 4/18/2017  8:11 AM. (History)            Assessment /Plan     For exam results, see Encounter Report.    Glaucoma suspect, bilateral  -     [place] Retina Tomography (HRT) - OU - Both Eyes; Future  -     [place] Visual Field - OU - Extended - Both Eyes; Future    Exudative age-related macular degeneration of left eye with active choroidal neovascularization    Nonexudative age-related macular degeneration of right eye    Pseudophakia of both eyes            1. Glaucoma, suspect  IOP seems stable low/mid teens. Switched to Cosopt BID OU 7/8/15 when she appeared to have possible progression on clinical exam and last HRT OD. Last OS HRT with possible progression as well. Also had inferior NFL heme present on prior DFE OD post-op. CCT WNL. HVF;s now likely affected by ARMD. Continue Cosopt BID OU started 7/8/15.      F/u 4 months for IOP check with HVF and HRT and DFE.     Macular hemorrhage OS F/u Dr. Lopez as scheduled. Central elevation appears stable, has not gone down much   2. Pseudophakia OU s/p Phaco/PCIOL OS with CTR for zonular dehiscence and Triescence.   s/p Phaco/PCIOL OD. Doing well.   3. Blepharitis  Ok to resume lid hygiene, continue artificial tears prn.   4. Hyperopia with astigmatism and presbyopia  MRx given prior visit   5. Allergic conjunctivitis Discussed cool compresses/AT's/Zaditor on prior visit.

## 2017-04-18 NOTE — MR AVS SNAPSHOT
[facility]  [address]  [place] [address]  Phone: [phone]  Fax: [phone]                  Serenity Epps   2017 8:00 AM   Office Visit    Description:  Female : 1940   Provider:  Estelle Mello MD   Department:  [place] [facility]           Reason for Visit     Glaucoma           Diagnoses this Visit        Comments    Glaucoma suspect, bilateral    -  Primary     Exudative age-related macular degeneration of left eye with active choroidal neovascularization         Nonexudative age-related macular degeneration of right eye         Pseudophakia of both eyes                To Do List           Future Appointments        Provider Department Dept Phone    2017 7:40 AM NATHANIEL Lopez MD [facility] [phone]    2017 8:30 AM Aaron Hammonds DPM [facility] [phone]    2017 7:30 AM Hua Andersen MD [facility] [phone]      Goals (5 Years of Data)              4/17/17    3/6/17    2/10/17    Blood Pressure < 140/90   140/71  140/71  140/71      Follow-Up and Disposition     Return in about 4 months (around 2017) for HVF, HRT, DFE, IOP check.      [facility]     [facility] Care [facility] - 24/ Assistance  Unless otherwise directed by your provider, please contact [facility], our nurse care line that is available for  assistance.     Registered nurses in the [facility] provide: appointment scheduling, clinical advisement, health education, and other advisory services.  Call: [phone] (toll free)               Medications           Message regarding Medications     Verify the changes and/or additions to your medication regime listed below are the same as discussed with your clinician today.  If any of these changes or additions are incorrect, please notify your healthcare provider.             Verify that the below list of medications  is an accurate representation of the medications you are currently taking.  If none reported, the list may be blank. If incorrect, please contact your healthcare provider. Carry this list with you in case of emergency.           Current Medications     amoxicillin (AMOXIL) 500 MG capsule     aspirin (ECOTRIN) 81 MG EC tablet Take 81 mg by mouth once daily.      biotin 1 mg tablet Take 1,000 mcg by mouth once daily.    bisacodyl (DULCOLAX) 5 mg EC tablet Take 5 mg by mouth once daily.    calcium carbonate (OS-BLAKE) 600 mg (1,500 mg) Tab Take 600 mg by mouth 2 (two) times daily with meals.    dorzolamide-timolol 2-0.5% (COSOPT) 22.3-6.8 mg/mL ophthalmic solution Place 1 drop into both eyes 2 (two) times daily.    gabapentin (NEURONTIN) 100 MG capsule Take 4 capsules (400 mg total) by mouth 3 (three) times daily.    lorazepam (ATIVAN) 0.5 MG tablet Take 1 tablet (0.5 mg total) by mouth every 12 (twelve) hours as needed.    methylPREDNISolone (MEDROL DOSEPACK) 4 mg tablet use as directed    metoprolol succinate (TOPROL-XL) 25 MG 24 hr tablet Take 1 tablet (25 mg total) by mouth 2 (two) times daily.    naproxen sodium (ANAPROX) 220 MG tablet Take 220 mg by mouth 2 (two) times daily with meals.    triamcinolone acetonide 0.1% (KENALOG) 0.1 % cream Apply topically 2 (two) times daily.    VIT C/E/ZN/COPPR/LUTEIN/ZEAXAN (PRESERVISION AREDS 2 ORAL) Take by mouth.           Clinical Reference Information           Allergies as of 4/18/2017     Clindamycin      Immunizations Administered on Date of Encounter - 4/18/2017     None      Orders Placed During Today's Visit     Future Labs/Procedures Expected by Expires    [place] Retina Tomography (HRT) - OU - Both Eyes  As directed 4/18/2018    [place] Visual Field - OU - Extended - Both Eyes  As directed 4/18/2018      Instructions      The doctor plans to dilate your eyes at your next visit. This will cause you to be sensitive to bright light, and may blur your vision. The  effects may last a few hours. If you do not feel comfortable driving with your eyes dilated, please have someone drive you to your appointment.          Language Assistance Services     ATTENTION: Language assistance services are available, free of charge. Please call [phone].      ATENCIÓN: Si mar interiano, tiene a collazo disposición servicios gratuitos de asistencia lingüística. Llame al [phone].     CHÚ Ý: N?u b?n nói Ti?ng Vi?t, có các d?ch v? h? tr? ngôn ng? mi?n phí dành cho b?n. G?i s? [phone].         [facility] complies with applicable Federal civil rights laws and does not discriminate on the basis of race, color, national origin, age, disability, or sex.

## 2017-05-02 RX ORDER — METOPROLOL SUCCINATE 25 MG/1
25 TABLET, EXTENDED RELEASE ORAL 2 TIMES DAILY
Qty: 60 TABLET | Refills: 11 | Status: SHIPPED | OUTPATIENT
Start: 2017-05-02 | End: 2018-05-08 | Stop reason: SDUPTHER

## 2017-05-11 ENCOUNTER — PROCEDURE VISIT (OUTPATIENT)
Dept: OPHTHALMOLOGY | Facility: CLINIC | Age: 77
End: 2017-05-11
Payer: MEDICARE

## 2017-05-11 DIAGNOSIS — D23.10 PAPILLOMA OF EYELID, LEFT: Primary | ICD-10-CM

## 2017-05-11 PROCEDURE — 92012 INTRM OPH EXAM EST PATIENT: CPT | Mod: 25,S$GLB,, | Performed by: OPHTHALMOLOGY

## 2017-05-11 PROCEDURE — 67840 REMOVE EYELID LESION: CPT | Mod: E2,S$GLB,, | Performed by: OPHTHALMOLOGY

## 2017-05-11 RX ORDER — ERYTHROMYCIN 5 MG/G
OINTMENT OPHTHALMIC
Qty: 3.5 G | Refills: 0 | Status: SHIPPED | OUTPATIENT
Start: 2017-05-11 | End: 2017-05-11 | Stop reason: SDUPTHER

## 2017-05-11 RX ORDER — ERYTHROMYCIN 5 MG/G
OINTMENT OPHTHALMIC
Qty: 3.5 G | Refills: 0 | Status: SHIPPED | OUTPATIENT
Start: 2017-05-11 | End: 2017-07-06

## 2017-05-11 NOTE — PROGRESS NOTES
HPI     Eye Problem    Additional comments: proc skin tag removal OS//           Comments   proc skin tag removal OS//     taking pts va today.  va 20/400 previously Jessica 20/40       Last edited by Jane Velásquez on 5/11/2017  2:15 PM. (History)            Assessment /Plan     For exam results, see Encounter Report.    Papilloma of eyelid, left    Other orders  -     Discontinue: erythromycin (ROMYCIN) ophthalmic ointment; Apply to affected area left lower eyelid BID until healed.  Dispense: 3.5 g; Refill: 0  -     erythromycin (ROMYCIN) ophthalmic ointment; Apply to affected area left lower eyelid BID until healed.  Dispense: 3.5 g; Refill: 0        Removed today. EES BID.        1. Glaucoma, suspect  IOP seems stable low/mid teens. Switched to Cosopt BID OU 7/8/15 when she appeared to have possible progression on clinical exam and last HRT OD. Last OS HRT with possible progression as well. Also had inferior NFL heme present on prior DFE OD post-op. CCT WNL. HVF;s now likely affected by ARMD. Continue Cosopt BID OU started 7/8/15.      F/u as scheduled for IOP check with HVF and HRT and DFE.     Macular hemorrhage OS F/u Dr. Lopez as scheduled. Central elevation appears stable, has not gone down much   2. Pseudophakia OU s/p Phaco/PCIOL OS with CTR for zonular dehiscence and Triescence.   s/p Phaco/PCIOL OD. Doing well.   3. Blepharitis  Ok to resume lid hygiene, continue artificial tears prn.   4. Hyperopia with astigmatism and presbyopia  MRx given prior visit   5. Allergic conjunctivitis Discussed cool compresses/AT's/Zaditor on prior visit.

## 2017-05-11 NOTE — Clinical Note
Mrs. Epps is complaining of worsening metamorphopsia OS on Amsler grid and worsening of vision since her last injection, starting about a week after. Clinical exam looks stable, no pain, no signs of RD/etc. If you want to see her back any sooner, let me know.

## 2017-05-11 NOTE — MR AVS SNAPSHOT
[place] [facility] [address] [place] [address]  Phone: [phone]  Fax: [phone]                  Serenity Epps   2017 2:15 PM   Procedure visit    Description:  Female : 1940   Provider:  Estelle Mello MD   Department:  [place] [facility]           Reason for Visit     Eye Problem           Diagnoses this Visit        Comments    Papilloma of eyelid, left    -  Primary            To Do List           Future Appointments        Provider Department Dept Phone    2017 7:40 AM NATHANIEL Lopez MD [facility] [phone]    2017 8:30 AM Aaron Hammonds DPM [facility] [phone]    2017 7:30 AM Hua Andersen MD [facility] [phone]    2017 8:00 AM VISUAL GRIGSBY [facility] [phone]    2017 8:30 AM Estelle Mello MD [facility] [phone]      Goals (5 Years of Data)              4/17/17    3/6/17    2/10/17    Blood Pressure < 140/90   140/71  140/71  140/71      Follow-Up and Disposition     Return for as previously scheduled.       These Medications        Disp Refills Start End    erythromycin (ROMYCIN) ophthalmic ointment 3.5 g 0 2017     Apply to affected area left lower eyelid BID until healed.    Pharmacy: [facility] [place] - [address] Ph #: [phone]         [facility]     [facility] Nurse Care [facility] -  Assistance  Unless otherwise directed by your provider, please contact [facility], our nurse care line that is available for  assistance.     Registered nurses in the [facility] provide: appointment scheduling, clinical advisement, health education, and other advisory services.  Call: [phone] (toll free)               Medications           Message regarding Medications     Verify the changes and/or additions to your medication  regime listed below are the same as discussed with your clinician today.  If any of these changes or additions are incorrect, please notify your healthcare provider.        START taking these NEW medications        Refills    erythromycin (ROMYCIN) ophthalmic ointment 0    Sig: Apply to affected area left lower eyelid BID until healed.    Class: Normal           Verify that the below list of medications is an accurate representation of the medications you are currently taking.  If none reported, the list may be blank. If incorrect, please contact your healthcare provider. Carry this list with you in case of emergency.           Current Medications     amoxicillin (AMOXIL) 500 MG capsule     biotin 1 mg tablet Take 1,000 mcg by mouth once daily.    bisacodyl (DULCOLAX) 5 mg EC tablet Take 5 mg by mouth once daily.    calcium carbonate (OS-BLAKE) 600 mg (1,500 mg) Tab Take 600 mg by mouth 2 (two) times daily with meals.    dorzolamide-timolol 2-0.5% (COSOPT) 22.3-6.8 mg/mL ophthalmic solution Place 1 drop into both eyes 2 (two) times daily.    gabapentin (NEURONTIN) 100 MG capsule Take 4 capsules (400 mg total) by mouth 3 (three) times daily.    lorazepam (ATIVAN) 0.5 MG tablet Take 1 tablet (0.5 mg total) by mouth every 12 (twelve) hours as needed.    metoprolol succinate (TOPROL-XL) 25 MG 24 hr tablet Take 1 tablet (25 mg total) by mouth 2 (two) times daily.    naproxen sodium (ANAPROX) 220 MG tablet Take 220 mg by mouth 2 (two) times daily with meals.    triamcinolone acetonide 0.1% (KENALOG) 0.1 % cream Apply topically 2 (two) times daily.    VIT C/E/ZN/COPPR/LUTEIN/ZEAXAN (PRESERVISION AREDS 2 ORAL) Take by mouth.    aspirin (ECOTRIN) 81 MG EC tablet Take 81 mg by mouth once daily.      erythromycin (ROMYCIN) ophthalmic ointment Apply to affected area left lower eyelid BID until healed.    methylPREDNISolone (MEDROL DOSEPACK) 4 mg tablet use as directed           Clinical Reference Information           Allergies  as of 5/11/2017     Clindamycin      Immunizations Administered on Date of Encounter - 5/11/2017     None      Language Assistance Services     ATTENTION: Language assistance services are available, free of charge. Please call [phone].      ATENCIÓN: Si mar interiano, tiene a collazo disposición servicios gratuitos de asistencia lingüística. Llame al [phone].     CHÚ Ý: N?u b?n nói Ti?ng Vi?t, có các d?ch v? h? tr? ngôn ng? mi?n phí dành cho b?n. G?i s? [phone].         [facility] complies with applicable Federal civil rights laws and does not discriminate on the basis of race, color, national origin, age, disability, or sex.

## 2017-05-29 ENCOUNTER — PROCEDURE VISIT (OUTPATIENT)
Dept: OPHTHALMOLOGY | Facility: CLINIC | Age: 77
End: 2017-05-29
Payer: MEDICARE

## 2017-05-29 VITALS — SYSTOLIC BLOOD PRESSURE: 139 MMHG | HEART RATE: 60 BPM | DIASTOLIC BLOOD PRESSURE: 74 MMHG

## 2017-05-29 DIAGNOSIS — H35.3221 EXUDATIVE AGE-RELATED MACULAR DEGENERATION OF LEFT EYE WITH ACTIVE CHOROIDAL NEOVASCULARIZATION: Primary | ICD-10-CM

## 2017-05-29 DIAGNOSIS — H35.3110 NONEXUDATIVE AGE-RELATED MACULAR DEGENERATION OF RIGHT EYE: ICD-10-CM

## 2017-05-29 PROCEDURE — 99499 UNLISTED E&M SERVICE: CPT | Mod: S$GLB,,, | Performed by: OPHTHALMOLOGY

## 2017-05-29 PROCEDURE — 67028 INJECTION EYE DRUG: CPT | Mod: LT,S$GLB,, | Performed by: OPHTHALMOLOGY

## 2017-05-29 PROCEDURE — 92134 CPTRZ OPH DX IMG PST SGM RTA: CPT | Mod: S$GLB,,, | Performed by: OPHTHALMOLOGY

## 2017-05-29 PROCEDURE — 92014 COMPRE OPH EXAM EST PT 1/>: CPT | Mod: 25,S$GLB,, | Performed by: OPHTHALMOLOGY

## 2017-05-29 RX ADMIN — Medication 1.25 MG: at 08:05

## 2017-05-29 NOTE — PATIENT INSTRUCTIONS

## 2017-05-29 NOTE — PROGRESS NOTES
OCT - slight increase in SRF OS  Drusen OD      A/P    1. Wet AMD OS  S/p Avastin OS x 9, Eylea x 13    Persistent SRF OS - improving  With RPE tear OS  Central fibrosis with atrophy - poor central potential  Resume extension    Avastin OS      2. Dry AMD OD  AREDS/AG    3. PCIOL OU  CTR OS - but saw 20/30 with correction, in spite of sub RPE fibrosis      4. Glc Suspect OU   Good IOP control on Timolol    5. Floaters OU      8 weeks OCT      Risks, benefits, and alternatives to treatment discussed in detail with the patient.  The patient voiced understanding and wished to proceed with the procedure    Injection Procedure Note:  Diagnosis: Wet AMD OS    Topical Proparacaine and Betadine.  Inject Avastin OS at 6:00 @ 3.5-4mm posterior to limbus  Post Operative Dx: Same  Complications: None  Follow up as above.

## 2017-06-25 ENCOUNTER — PATIENT MESSAGE (OUTPATIENT)
Dept: PODIATRY | Facility: CLINIC | Age: 77
End: 2017-06-25

## 2017-06-26 RX ORDER — GABAPENTIN 100 MG/1
400 CAPSULE ORAL 3 TIMES DAILY
Qty: 180 CAPSULE | Refills: 2 | Status: SHIPPED | OUTPATIENT
Start: 2017-06-26 | End: 2017-08-14 | Stop reason: SDUPTHER

## 2017-07-03 ENCOUNTER — PATIENT MESSAGE (OUTPATIENT)
Dept: FAMILY MEDICINE | Facility: CLINIC | Age: 77
End: 2017-07-03

## 2017-07-03 DIAGNOSIS — Z13.220 ENCOUNTER FOR LIPID SCREENING FOR CARDIOVASCULAR DISEASE: ICD-10-CM

## 2017-07-03 DIAGNOSIS — Z13.6 ENCOUNTER FOR LIPID SCREENING FOR CARDIOVASCULAR DISEASE: ICD-10-CM

## 2017-07-03 DIAGNOSIS — Z00.00 ROUTINE HEALTH MAINTENANCE: Primary | ICD-10-CM

## 2017-07-05 ENCOUNTER — TELEPHONE (OUTPATIENT)
Dept: FAMILY MEDICINE | Facility: CLINIC | Age: 77
End: 2017-07-05

## 2017-07-06 ENCOUNTER — OFFICE VISIT (OUTPATIENT)
Dept: PODIATRY | Facility: CLINIC | Age: 77
End: 2017-07-06
Payer: MEDICARE

## 2017-07-06 VITALS — BODY MASS INDEX: 25.05 KG/M2 | WEIGHT: 155.88 LBS | HEIGHT: 66 IN

## 2017-07-06 DIAGNOSIS — M21.6X9 EQUINUS DEFORMITY OF FOOT: ICD-10-CM

## 2017-07-06 DIAGNOSIS — G60.9 IDIOPATHIC PERIPHERAL NEUROPATHY: Primary | ICD-10-CM

## 2017-07-06 DIAGNOSIS — B35.1 ONYCHOMYCOSIS DUE TO DERMATOPHYTE: ICD-10-CM

## 2017-07-06 DIAGNOSIS — N18.2 CKD (CHRONIC KIDNEY DISEASE), STAGE II: ICD-10-CM

## 2017-07-06 PROCEDURE — 99499 UNLISTED E&M SERVICE: CPT | Mod: S$GLB,,,

## 2017-07-06 PROCEDURE — 11721 DEBRIDE NAIL 6 OR MORE: CPT | Mod: Q9,S$GLB,,

## 2017-07-06 PROCEDURE — 99999 PR PBB SHADOW E&M-EST. PATIENT-LVL III: CPT | Mod: PBBFAC,,,

## 2017-07-06 NOTE — PROGRESS NOTES
Subjective:       Patient ID: Serenity Epps is a 76 y.o. female.    Chief Complaint: Follow-up (3 mo re-ck); Nail Care; Foot Problem (tingling - both feet); and Other (PCP:  Dr Andersen  2/10/17)    HPI  Serenity is a 76 y.o. female who presents to the clinic for evaluation and treatment of high risk feet. Serenity has a past medical history of Anxiety; Arthritis; Cataract - Both Eyes; CKD (chronic kidney disease), stage II (9/21/2016); DVT (deep venous thrombosis); Exudative age-related macular degeneration of left eye (2/20/2014); Glaucoma; Hypertension; Left foot pain; Macular degeneration; Osteopenia; Rhinitis, chronic; and Strabismus. The patient's chief complaint is long, thick toenails. This patient has documented high risk feet requiring routine maintenance secondary to peripheral neuropathy.  She states the neurontin has really helped her neuropathy pain.    PCP: Hua Andersen MD    Date Last Seen by PCP: 2/10/17    Current shoe gear:  Affected Foot: Tennis shoes     Unaffected Foot: Tennis shoes      Review of Systems  ROS:  Constitution: Negative for chills, fever, weakness and malaise/fatigue.   HEENT: Negative for headaches.   Cardiovascular: Negative for chest pain and claudication.   Respiratory: Negative for cough and shortness of breath.   Musculoskeletal: Positive for foot pain.  Negative for muscle cramps and muscle weakness.   Gastrointestinal: Negative for nausea and vomiting.   Neurological: Positive for numbness and paresthesias.   Dermatological: Negative for wound.        Objective:      Physical Exam  Constitutional:   Patient is oriented to person, place, and time. Vital signs are normal. Appears well-developed and well-nourished.     Vascular:   Dorsalis pedis pulses are 2+ on the right side, and 2+ on the left side.   Posterior tibial pulses are 2+ on the right side, and 2+ on the left side.   - digital hair growth, capillary fill time to all toes <3 seconds, toes are cool to touch  + swelling  feet and ankles    Skin/Dermatological:   Skin is thin, warm, shiny and atrophic. No cyanosis or clubbing. No rashes noted. No open wounds.   All ten toenails yellow discolored, thickened 3 mm, elongated 3 mm with subungual debris and tenderness.  Porokeratosis -    Musculoskeletal:   Mild bunions, hammertoes and bunionettes observed.  Decreased range of motion of bilateral midtarsal, subtalar joints, ankle joint dorsiflexion is restricted bilaterally. Muscle strength to tibialis anterior, extensor hallucis longus, extensor digitorum longus, peroneal muscles, flexor hallucis/digotorum longus, posterior tibial and gastrosoleal complex is 5/5.    Neurological:   Positive deficits to sharp/dull, light touch or vibratory sensation bilateral feet           Assessment:       1. Idiopathic peripheral neuropathy    2. CKD (chronic kidney disease), stage II    3. Onychomycosis due to dermatophyte    4. Equinus deformity of foot        Plan:       Shoe inspection. Foot Education. Patient instructed on proper foot hygeine. We discussed wearing proper shoe gear, daily foot inspections, never walking without protective shoe gear, never putting sharp instruments to feet.  We also discussed padding and shoes with high toe boxes for  foot deformities.    - With patient's permission, all ten toenails were aggressively reduced and debrided  to their soft tissue attachment mechanically with nail nipper, removing all offending nail and debris.   Patient relates relief following the procedure. Patient will continue to monitor the areas daily, inspect her feet, wear protective shoe gear when ambulatory, moisturizer to maintain skin integrity and follow in this office in approximately 4 months, sooner p.r.n.

## 2017-07-31 ENCOUNTER — PROCEDURE VISIT (OUTPATIENT)
Dept: OPHTHALMOLOGY | Facility: CLINIC | Age: 77
End: 2017-07-31
Payer: MEDICARE

## 2017-07-31 VITALS — SYSTOLIC BLOOD PRESSURE: 167 MMHG | DIASTOLIC BLOOD PRESSURE: 73 MMHG | HEART RATE: 58 BPM

## 2017-07-31 DIAGNOSIS — H35.3221 EXUDATIVE AGE-RELATED MACULAR DEGENERATION OF LEFT EYE WITH ACTIVE CHOROIDAL NEOVASCULARIZATION: Primary | ICD-10-CM

## 2017-07-31 DIAGNOSIS — H35.3110 NONEXUDATIVE AGE-RELATED MACULAR DEGENERATION OF RIGHT EYE: ICD-10-CM

## 2017-07-31 PROCEDURE — 92014 COMPRE OPH EXAM EST PT 1/>: CPT | Mod: 25,S$GLB,, | Performed by: OPHTHALMOLOGY

## 2017-07-31 PROCEDURE — 99499 UNLISTED E&M SERVICE: CPT | Mod: S$GLB,,, | Performed by: OPHTHALMOLOGY

## 2017-07-31 PROCEDURE — 67028 INJECTION EYE DRUG: CPT | Mod: LT,S$GLB,, | Performed by: OPHTHALMOLOGY

## 2017-07-31 PROCEDURE — 92134 CPTRZ OPH DX IMG PST SGM RTA: CPT | Mod: S$GLB,,, | Performed by: OPHTHALMOLOGY

## 2017-07-31 RX ORDER — NAPROXEN SODIUM 220 MG
220 CAPSULE ORAL
COMMUNITY
End: 2018-10-01

## 2017-07-31 NOTE — PROGRESS NOTES
HPI     8wk check // OCT // Avastin   DLS 05/29/2017 Dr. Lopez    Pt sts no change since last visit denies pain     (-)Flashes (-)Floaters  (-)Photophobia  (-)Glare    OCT - slight increase in SRF OS  Drusen OD      A/P    1. Wet AMD OS  S/p Avastin OS x 10, Eylea x 13    Persistent SRF OS - improving  With RPE tear OS  Central fibrosis with atrophy - poor central potential  Resume extension    Eylea OS      2. Dry AMD OD  AREDS/AG    3. PCIOL OU  CTR OS - but saw 20/30 with correction, in spite of sub RPE fibrosis      4. Glc Suspect OU   Good IOP control on Timolol    5. Floaters OU      10 weeks OCT      Risks, benefits, and alternatives to treatment discussed in detail with the patient.  The patient voiced understanding and wished to proceed with the procedure    Injection Procedure Note:  Diagnosis: Wet AMD OS    Topical Proparacaine and Betadine.  Inject Avastin OS at 6:00 @ 3.5-4mm posterior to limbus  Post Operative Dx: Same  Complications: None  Follow up as above.

## 2017-07-31 NOTE — PATIENT INSTRUCTIONS
POST INTRAVITREAL INJECTION PATIENT INSTRUCTIONS   Below are some guidelines for what to expect after your treatment and additional care instructions.   * you may experience mild discomfort in your eye after the treatment. Your eye usually feels better within 24 to 48 hours after the procedure.   * you have been given drops that numb the surface of your eye.   DO NOT rub or touch your eye, DO NOT wear contacts until numbness goes away.   * you may experience small spots that appear in your field of vision, these are usually caused by an air bubble or from the medication. It taked a few hours or days for these to go away.   * use of sunglasses will help reduce light sensitivity and glare.   * DO NOT swim or put sink water ( tap water ) or swin for at least 24 hours after the injection   * If you have a gritty, burning, irritating or stinging feeling in the injected eye. This may be a result of the antiseptic used. Use artifical tears or eye lubricant ( from over- the- counter from your local pharmacy ). If you have some at home already please check the expiration date, so not to use anything contaminated in your eye. A cool pack over your eye brow above the injected eye may also relieve discomfort.   Call us right away or go to the Emergency Department if you have a dramatic decrease in vision or extreme pain in the eye.   [facility] [phone]

## 2017-08-07 ENCOUNTER — LAB VISIT (OUTPATIENT)
Dept: LAB | Facility: HOSPITAL | Age: 77
End: 2017-08-07
Attending: FAMILY MEDICINE
Payer: MEDICARE

## 2017-08-07 DIAGNOSIS — M25.551 RIGHT HIP PAIN: ICD-10-CM

## 2017-08-07 DIAGNOSIS — M25.561 RIGHT KNEE PAIN, UNSPECIFIED CHRONICITY: Primary | ICD-10-CM

## 2017-08-07 DIAGNOSIS — Z00.00 ROUTINE HEALTH MAINTENANCE: ICD-10-CM

## 2017-08-07 DIAGNOSIS — Z13.6 ENCOUNTER FOR LIPID SCREENING FOR CARDIOVASCULAR DISEASE: ICD-10-CM

## 2017-08-07 DIAGNOSIS — Z13.220 ENCOUNTER FOR LIPID SCREENING FOR CARDIOVASCULAR DISEASE: ICD-10-CM

## 2017-08-07 LAB
ALBUMIN SERPL BCP-MCNC: 3.6 G/DL
ALP SERPL-CCNC: 63 U/L
ALT SERPL W/O P-5'-P-CCNC: 12 U/L
ANION GAP SERPL CALC-SCNC: 6 MMOL/L
AST SERPL-CCNC: 15 U/L
BILIRUB SERPL-MCNC: 0.6 MG/DL
BUN SERPL-MCNC: 17 MG/DL
CALCIUM SERPL-MCNC: 9.1 MG/DL
CHLORIDE SERPL-SCNC: 103 MMOL/L
CHOLEST/HDLC SERPL: 3 {RATIO}
CO2 SERPL-SCNC: 28 MMOL/L
CREAT SERPL-MCNC: 0.8 MG/DL
EST. GFR  (AFRICAN AMERICAN): >60 ML/MIN/1.73 M^2
EST. GFR  (NON AFRICAN AMERICAN): >60 ML/MIN/1.73 M^2
GLUCOSE SERPL-MCNC: 89 MG/DL
HDL/CHOLESTEROL RATIO: 33 %
HDLC SERPL-MCNC: 212 MG/DL
HDLC SERPL-MCNC: 70 MG/DL
LDLC SERPL CALC-MCNC: 105 MG/DL
NONHDLC SERPL-MCNC: 142 MG/DL
POTASSIUM SERPL-SCNC: 4.3 MMOL/L
PROT SERPL-MCNC: 6.8 G/DL
SODIUM SERPL-SCNC: 137 MMOL/L
TRIGL SERPL-MCNC: 185 MG/DL

## 2017-08-07 PROCEDURE — 36415 COLL VENOUS BLD VENIPUNCTURE: CPT | Mod: PO

## 2017-08-07 PROCEDURE — 80061 LIPID PANEL: CPT

## 2017-08-07 PROCEDURE — 80053 COMPREHEN METABOLIC PANEL: CPT

## 2017-08-09 ENCOUNTER — OFFICE VISIT (OUTPATIENT)
Dept: ORTHOPEDICS | Facility: CLINIC | Age: 77
End: 2017-08-09
Payer: MEDICARE

## 2017-08-09 ENCOUNTER — HOSPITAL ENCOUNTER (OUTPATIENT)
Dept: RADIOLOGY | Facility: HOSPITAL | Age: 77
Discharge: HOME | End: 2017-08-09
Attending: ORTHOPAEDIC SURGERY
Payer: MEDICARE

## 2017-08-09 VITALS — BODY MASS INDEX: 24.91 KG/M2 | HEIGHT: 66 IN | WEIGHT: 155 LBS

## 2017-08-09 DIAGNOSIS — M25.561 RIGHT KNEE PAIN, UNSPECIFIED CHRONICITY: ICD-10-CM

## 2017-08-09 DIAGNOSIS — M25.551 RIGHT HIP PAIN: ICD-10-CM

## 2017-08-09 DIAGNOSIS — M25.561 RIGHT KNEE PAIN, UNSPECIFIED CHRONICITY: Primary | ICD-10-CM

## 2017-08-09 DIAGNOSIS — M17.11 PRIMARY OSTEOARTHRITIS OF RIGHT KNEE: ICD-10-CM

## 2017-08-09 PROCEDURE — 1157F ADVNC CARE PLAN IN RCRD: CPT | Mod: S$GLB,,, | Performed by: ORTHOPAEDIC SURGERY

## 2017-08-09 PROCEDURE — 20610 DRAIN/INJ JOINT/BURSA W/O US: CPT | Mod: RT,S$GLB,, | Performed by: ORTHOPAEDIC SURGERY

## 2017-08-09 PROCEDURE — 99999 PR PBB SHADOW E&M-EST. PATIENT-LVL II: CPT | Mod: PBBFAC,,, | Performed by: ORTHOPAEDIC SURGERY

## 2017-08-09 PROCEDURE — 73560 X-RAY EXAM OF KNEE 1 OR 2: CPT | Mod: 26,59,LT, | Performed by: RADIOLOGY

## 2017-08-09 PROCEDURE — 99214 OFFICE O/P EST MOD 30 MIN: CPT | Mod: 25,S$GLB,, | Performed by: ORTHOPAEDIC SURGERY

## 2017-08-09 PROCEDURE — 3008F BODY MASS INDEX DOCD: CPT | Mod: S$GLB,,, | Performed by: ORTHOPAEDIC SURGERY

## 2017-08-09 PROCEDURE — 1159F MED LIST DOCD IN RCRD: CPT | Mod: S$GLB,,, | Performed by: ORTHOPAEDIC SURGERY

## 2017-08-09 PROCEDURE — 73562 X-RAY EXAM OF KNEE 3: CPT | Mod: 26,RT,, | Performed by: RADIOLOGY

## 2017-08-09 PROCEDURE — 1125F AMNT PAIN NOTED PAIN PRSNT: CPT | Mod: S$GLB,,, | Performed by: ORTHOPAEDIC SURGERY

## 2017-08-09 PROCEDURE — 73502 X-RAY EXAM HIP UNI 2-3 VIEWS: CPT | Mod: 26,RT,, | Performed by: RADIOLOGY

## 2017-08-09 RX ORDER — TRIAMCINOLONE ACETONIDE 40 MG/ML
40 INJECTION, SUSPENSION INTRA-ARTICULAR; INTRAMUSCULAR
Status: DISCONTINUED | OUTPATIENT
Start: 2017-08-09 | End: 2017-08-09 | Stop reason: HOSPADM

## 2017-08-09 RX ADMIN — TRIAMCINOLONE ACETONIDE 40 MG: 40 INJECTION, SUSPENSION INTRA-ARTICULAR; INTRAMUSCULAR at 08:08

## 2017-08-09 NOTE — PROGRESS NOTES
76 years old complaining of right-sided knee pain that has gotten progressively worse for the past 6 months time.  Has a history of a left-sided knee replacement 2015 which she is satisfied with.    Exam shows tenderness the joint line without signs of infection    X-rays show arthritic changes of the knee    Assessment right knee arthrosis    Plan Kenalog Injection, physical therapy    Further History  Aching pain  Worse with activity  Relieved with rest  No other associated symptoms  No other radiation    Further Exam  Alert and oriented  Pleasant  Contralateral limb has appropriate range of motion for age and condition  Contralateral limb has appropriate strength for age and condition  Contralateral limb has appropriate stability  for age and condition  No adenopathy  Pulses are appropriate for current condition  Skin is intact        Chief Complaint    Chief Complaint   Patient presents with    Right Knee - Pain    Right Hip - Pain    Lower Back - Pain       HPI  Serenity Epps is a 76 y.o.  female who presents with       Past Medical History  Past Medical History:   Diagnosis Date    Anxiety     Arthritis     Cataract - Both Eyes     resolved with surgery    CKD (chronic kidney disease), stage II 9/21/2016    DVT (deep venous thrombosis)     left leg, after childbirth    Exudative age-related macular degeneration of left eye 2/20/2014    Glaucoma     Hypertension     Left foot pain     chronic    Macular degeneration     bimonthly intraoccular injections    Osteopenia     Rhinitis, chronic     Strabismus     as child       Past Surgical History  Past Surgical History:   Procedure Laterality Date    APPENDECTOMY      age 38    CATARACT EXTRACTION W/ INTRAOCULAR LENS  IMPLANT, BILATERAL Bilateral 2016    COLONOSCOPY  9/26/2006  Shane    Diverticulosis.   Internal small hemorrhoids were found.     EYE SURGERY Bilateral     Phaco with IOL (cataract extraction), rigth:sn60wf 22.0 d//    FOOT  HARDWARE REMOVAL Left 06/01/2016    Dr. Hammonds    FOOT SURGERY Left 2014    ERG and open reduction tallo tarsal dislocation (hyprocure implant)    PARTIAL HYSTERECTOMY      age 38    PIP JOINT FUSION Right 06/01/2016    2nd-4th PIP joints of right foot; Dr. 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  Patulous lower esophageal sphincter.   Gastric mucosal atrophy.   PARAG Test  Negative.     VEIN LIGATION  1964    BLE       Medications  Current Outpatient Prescriptions   Medication Sig    aspirin (ECOTRIN) 81 MG EC tablet Take 81 mg by mouth once daily.      biotin 1 mg tablet Take 1,000 mcg by mouth once daily.    bisacodyl (DULCOLAX) 5 mg EC tablet Take 5 mg by mouth once daily.    calcium carbonate (OS-BLAKE) 600 mg (1,500 mg) Tab Take 600 mg by mouth 2 (two) times daily with meals.    dorzolamide-timolol 2-0.5% (COSOPT) 22.3-6.8 mg/mL ophthalmic solution Place 1 drop into both eyes 2 (two) times daily.    gabapentin (NEURONTIN) 100 MG capsule Take 4 capsules (400 mg total) by mouth 3 (three) times daily.    lorazepam (ATIVAN) 0.5 MG tablet Take 1 tablet (0.5 mg total) by mouth every 12 (twelve) hours as needed.    metoprolol succinate (TOPROL-XL) 25 MG 24 hr tablet Take 1 tablet (25 mg total) by mouth 2 (two) times daily.    naproxen sodium (ALEVE) 220 mg Cap Take by mouth.    VIT C/E/ZN/COPPR/LUTEIN/ZEAXAN (PRESERVISION AREDS 2 ORAL) Take by mouth.     Current Facility-Administered Medications   Medication    bevacizumab (AVASTIN) 2.5 mg/0.10 mL 1.25 mg       Allergies  Review of patient's allergies indicates:   Allergen Reactions    Clindamycin Diarrhea       Family History  Family History   Problem Relation Age of Onset    No Known Problems Brother     Breast cancer Daughter     Coronary artery disease Mother 78    Glaucoma Mother     No  Known Problems Maternal Grandmother     No Known Problems Maternal Grandfather     No Known Problems Paternal Grandmother     No Known Problems Paternal Grandfather     Glaucoma Brother     Ankylosing spondylitis Brother     Diabetes Brother     Crohn's disease Brother     Amblyopia Neg Hx     Blindness Neg Hx     Cataracts Neg Hx     Hypertension Neg Hx     Macular degeneration Neg Hx     Retinal detachment Neg Hx     Strabismus Neg Hx     Stroke Neg Hx     Thyroid disease Neg Hx        Social History  Social History     Social History    Marital status:      Spouse name: N/A    Number of children: N/A    Years of education: N/A     Occupational History    Not on file.     Social History Main Topics    Smoking status: Former Smoker     Packs/day: 2.00     Years: 35.00     Types: Cigarettes     Quit date: 1/1/2002    Smokeless tobacco: Never Used    Alcohol use 8.4 oz/week     14 Glasses of wine per week      Comment: 2-3 glasses per night    Drug use:      Types: Benzodiazepines    Sexual activity: Not on file     Other Topics Concern    Not on file     Social History Narrative    No narrative on file               Review of Systems     Constitutional: Negative    HENT: Negative  Eyes: Negative  Respiratory: Negative  Cardiovascular: Negative  Musculoskeletal: HPI  Skin: Negative  Neurological: Negative  Hematological: Negative  Endocrine: Negative                 Physical Exam    There were no vitals filed for this visit.  Body mass index is 25.02 kg/m².  Physical Examination:     General appearance -  well appearing, and in no distress  Mental status - awake  Neck - supple  Chest -  symmetric air entry  Heart - normal rate   Abdomen - soft      Assessment     1. Right knee pain, unspecified chronicity    2. Primary osteoarthritis of right knee    3. Right hip pain          Plan

## 2017-08-09 NOTE — PROCEDURES
Large Joint Aspiration/Injection  Date/Time: 8/9/2017 8:56 AM  Performed by: JOSUE YANCEY  Authorized by: JOSUE YANCEY.     Consent Done?:  Yes (Verbal)  Indications:  Pain  Timeout: Prior to procedure the correct patient, procedure, and site was verified      Location:  Knee  Site:  R knee  Prep: Patient was prepped and draped in usual sterile fashion    Ultrasonic Guidance for needle placement: No  Needle size:  21 G  Approach:  Anterolateral  Medications:  40 mg triamcinolone acetonide 40 mg/mL  Patient tolerance:  Patient tolerated the procedure well with no immediate complications

## 2017-08-10 RX ORDER — LORAZEPAM 0.5 MG/1
0.5 TABLET ORAL EVERY 12 HOURS PRN
Qty: 40 TABLET | Refills: 3 | Status: SHIPPED | OUTPATIENT
Start: 2017-08-10 | End: 2017-12-27 | Stop reason: SDUPTHER

## 2017-08-10 RX ORDER — LORAZEPAM 0.5 MG/1
TABLET ORAL
Qty: 40 TABLET | Refills: 3 | OUTPATIENT
Start: 2017-08-10

## 2017-08-14 ENCOUNTER — LAB VISIT (OUTPATIENT)
Dept: LAB | Facility: HOSPITAL | Age: 77
End: 2017-08-14
Attending: FAMILY MEDICINE
Payer: MEDICARE

## 2017-08-14 ENCOUNTER — OFFICE VISIT (OUTPATIENT)
Dept: FAMILY MEDICINE | Facility: CLINIC | Age: 77
End: 2017-08-14
Payer: MEDICARE

## 2017-08-14 VITALS
SYSTOLIC BLOOD PRESSURE: 130 MMHG | WEIGHT: 152.75 LBS | BODY MASS INDEX: 24.55 KG/M2 | RESPIRATION RATE: 16 BRPM | HEIGHT: 66 IN | DIASTOLIC BLOOD PRESSURE: 82 MMHG | HEART RATE: 66 BPM

## 2017-08-14 DIAGNOSIS — H35.30 MACULAR DEGENERATION: ICD-10-CM

## 2017-08-14 DIAGNOSIS — M17.11 PRIMARY OSTEOARTHRITIS OF RIGHT KNEE: ICD-10-CM

## 2017-08-14 DIAGNOSIS — Z00.00 ROUTINE HEALTH MAINTENANCE: Primary | ICD-10-CM

## 2017-08-14 DIAGNOSIS — I10 ESSENTIAL HYPERTENSION: ICD-10-CM

## 2017-08-14 DIAGNOSIS — F32.1 MODERATE SINGLE CURRENT EPISODE OF MAJOR DEPRESSIVE DISORDER: ICD-10-CM

## 2017-08-14 LAB
CRP SERPL-MCNC: 1 MG/L
RHEUMATOID FACT SERPL-ACNC: <10 IU/ML

## 2017-08-14 PROCEDURE — 99999 PR PBB SHADOW E&M-EST. PATIENT-LVL III: CPT | Mod: PBBFAC,,, | Performed by: FAMILY MEDICINE

## 2017-08-14 PROCEDURE — 86235 NUCLEAR ANTIGEN ANTIBODY: CPT | Mod: 59

## 2017-08-14 PROCEDURE — 86431 RHEUMATOID FACTOR QUANT: CPT

## 2017-08-14 PROCEDURE — 36415 COLL VENOUS BLD VENIPUNCTURE: CPT | Mod: PO

## 2017-08-14 PROCEDURE — 86039 ANTINUCLEAR ANTIBODIES (ANA): CPT

## 2017-08-14 PROCEDURE — 86038 ANTINUCLEAR ANTIBODIES: CPT

## 2017-08-14 PROCEDURE — 86140 C-REACTIVE PROTEIN: CPT

## 2017-08-14 PROCEDURE — 99397 PER PM REEVAL EST PAT 65+ YR: CPT | Mod: S$GLB,,, | Performed by: FAMILY MEDICINE

## 2017-08-14 RX ORDER — GABAPENTIN 400 MG/1
400 CAPSULE ORAL 3 TIMES DAILY
Qty: 90 CAPSULE | Refills: 11 | Status: SHIPPED | OUTPATIENT
Start: 2017-08-14 | End: 2018-08-27 | Stop reason: SDUPTHER

## 2017-08-14 RX ORDER — DULOXETIN HYDROCHLORIDE 30 MG/1
30 CAPSULE, DELAYED RELEASE ORAL DAILY
Qty: 30 CAPSULE | Refills: 11 | Status: SHIPPED | OUTPATIENT
Start: 2017-08-14 | End: 2017-09-18 | Stop reason: SDUPTHER

## 2017-08-14 NOTE — PROGRESS NOTES
GILA Epps is a 76 y.o. female with multiple medical diagnoses as listed in the medical history and problem list that presents for Hypertension (follow up; hm due: flu)  .      Hypertension   Pertinent negatives include no chest pain, headaches, neck pain or palpitations.       PAST MEDICAL HISTORY:  Past Medical History:   Diagnosis Date    Anxiety     Arthritis     Cataract - Both Eyes     resolved with surgery    CKD (chronic kidney disease), stage II 9/21/2016    DVT (deep venous thrombosis)     left leg, after childbirth    Exudative age-related macular degeneration of left eye 2/20/2014    Glaucoma     Hypertension     Left foot pain     chronic    Macular degeneration     bimonthly intraoccular injections    Osteopenia     Rhinitis, chronic     Strabismus     as child       PAST SURGICAL HISTORY:  Past Surgical History:   Procedure Laterality Date    APPENDECTOMY      age 38    CATARACT EXTRACTION W/ INTRAOCULAR LENS  IMPLANT, BILATERAL Bilateral 2016    COLONOSCOPY  9/26/2006  [facility]ane    Diverticulosis.   Internal small hemorrhoids were found.     EYE SURGERY Bilateral     Phaco with IOL (cataract extraction), rigth:sn60wf 22.0 d//    FOOT HARDWARE REMOVAL Left 06/01/2016    Dr. Hammonds    FOOT SURGERY Left 2014    ERG and open reduction tallo tarsal dislocation (hyprocure implant)    PARTIAL HYSTERECTOMY      age 38    PIP JOINT FUSION Right 06/01/2016    2nd-4th PIP joints of right foot; Dr. 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  Patulous lower esophageal sphincter.   Gastric mucosal atrophy.   PARAG Test  Negative.     VEIN LIGATION  1964    BLE       SOCIAL HISTORY:  Social History     Social History    Marital status:      Spouse name: N/A    Number of children: N/A    Years of education: N/A     Occupational  History    Not on file.     Social History Main Topics    Smoking status: Former Smoker     Packs/day: 2.00     Years: 35.00     Types: Cigarettes     Quit date: 1/1/2002    Smokeless tobacco: Never Used    Alcohol use 8.4 oz/week     14 Glasses of wine per week      Comment: 2-3 glasses per night    Drug use:      Types: Benzodiazepines    Sexual activity: Not on file     Other Topics Concern    Not on file     Social History Narrative    No narrative on file       FAMILY HISTORY:  Family History   Problem Relation Age of Onset    No Known Problems Brother     Breast cancer Daughter     Coronary artery disease Mother 78    Glaucoma Mother     No Known Problems Maternal Grandmother     No Known Problems Maternal Grandfather     No Known Problems Paternal Grandmother     No Known Problems Paternal Grandfather     Glaucoma Brother     Ankylosing spondylitis Brother     Diabetes Brother     Crohn's disease Brother     Amblyopia Neg Hx     Blindness Neg Hx     Cataracts Neg Hx     Hypertension Neg Hx     Macular degeneration Neg Hx     Retinal detachment Neg Hx     Strabismus Neg Hx     Stroke Neg Hx     Thyroid disease Neg Hx        ALLERGIES AND MEDICATIONS: updated and reviewed.  Review of patient's allergies indicates:   Allergen Reactions    Clindamycin Diarrhea     Current Outpatient Prescriptions   Medication Sig Dispense Refill    aspirin (ECOTRIN) 81 MG EC tablet Take 81 mg by mouth once daily.        biotin 1 mg tablet Take 1,000 mcg by mouth once daily.      bisacodyl (DULCOLAX) 5 mg EC tablet Take 5 mg by mouth once daily.      calcium carbonate (OS-BLAKE) 600 mg (1,500 mg) Tab Take 600 mg by mouth 2 (two) times daily with meals.      dorzolamide-timolol 2-0.5% (COSOPT) 22.3-6.8 mg/mL ophthalmic solution Place 1 drop into both eyes 2 (two) times daily. 30 mL 3    gabapentin (NEURONTIN) 400 MG capsule Take 1 capsule (400 mg total) by mouth 3 (three) times daily. 90 capsule 11  "   lorazepam (ATIVAN) 0.5 MG tablet Take 1 tablet (0.5 mg total) by mouth every 12 (twelve) hours as needed. 40 tablet 3    metoprolol succinate (TOPROL-XL) 25 MG 24 hr tablet Take 1 tablet (25 mg total) by mouth 2 (two) times daily. 60 tablet 11    naproxen sodium (ALEVE) 220 mg Cap Take by mouth.      VIT C/E/ZN/COPPR/LUTEIN/ZEAXAN (PRESERVISION AREDS 2 ORAL) Take by mouth.      duloxetine (CYMBALTA) 30 MG capsule Take 1 capsule (30 mg total) by mouth once daily. 30 capsule 11     Current Facility-Administered Medications   Medication Dose Route Frequency Provider Last Rate Last Dose    bevacizumab (AVASTIN) 2.5 mg/0.10 mL 1.25 mg  1.25 mg Intraocular 1 time in Clinic/HOD NATHANIEL Lopez MD           ROS  Review of Systems   Constitutional: Positive for activity change. Negative for unexpected weight change.   HENT: Negative for hearing loss, rhinorrhea and trouble swallowing.    Eyes: Positive for visual disturbance (macular degeneration, seeing Dr. Ryan). Negative for discharge.   Respiratory: Negative for chest tightness and wheezing.    Cardiovascular: Negative for chest pain and palpitations.   Gastrointestinal: Positive for constipation. Negative for blood in stool, diarrhea and vomiting.   Endocrine: Negative for polydipsia and polyuria.   Genitourinary: Negative for difficulty urinating, dysuria, hematuria and menstrual problem.   Musculoskeletal: Positive for arthralgias (in right knee) and joint swelling. Negative for neck pain.   Neurological: Positive for weakness. Negative for headaches.   Psychiatric/Behavioral: Positive for dysphoric mood (related to multiple issues). Negative for confusion.       Physical Exam  Vitals:    08/14/17 0725   BP: 130/82   Pulse: 66   Resp: 16    Body mass index is 24.66 kg/m².  Weight: 69.3 kg (152 lb 12.5 oz)   Height: 5' 6" (167.6 cm)     Physical Exam   Constitutional: She is oriented to person, place, and time. She appears well-developed and " well-nourished.   HENT:   Head: Normocephalic and atraumatic.   Right Ear: External ear normal.   Left Ear: External ear normal.   Nose: Nose normal.   Mouth/Throat: Oropharynx is clear and moist.   Eyes: Conjunctivae and EOM are normal. Pupils are equal, round, and reactive to light. No scleral icterus.   Neck: Normal range of motion. Neck supple. No JVD present. No thyromegaly present.   Cardiovascular: Normal rate, regular rhythm and normal heart sounds.  Exam reveals no gallop and no friction rub.    No murmur heard.  Pulmonary/Chest: Effort normal and breath sounds normal. She has no wheezes. She has no rales.   Abdominal: Soft. Bowel sounds are normal. She exhibits no distension and no mass. There is no tenderness. There is no rebound and no guarding.   Musculoskeletal: Normal range of motion. She exhibits no edema.   Lymphadenopathy:     She has no cervical adenopathy.   Neurological: She is alert and oriented to person, place, and time. She has normal strength. No cranial nerve deficit or sensory deficit.   Skin: Skin is warm and dry. No rash noted.   Vitals reviewed.    Results for orders placed or performed in visit on 08/07/17   Lipid panel   Result Value Ref Range    Cholesterol 212 (H) 120 - 199 mg/dL    Triglycerides 185 (H) 30 - 150 mg/dL    HDL 70 40 - 75 mg/dL    LDL Cholesterol 105.0 63.0 - 159.0 mg/dL    HDL/Chol Ratio 33.0 20.0 - 50.0 %    Total Cholesterol/HDL Ratio 3.0 2.0 - 5.0    Non-HDL Cholesterol 142 mg/dL   Comprehensive metabolic panel   Result Value Ref Range    Sodium 137 136 - 145 mmol/L    Potassium 4.3 3.5 - 5.1 mmol/L    Chloride 103 95 - 110 mmol/L    CO2 28 23 - 29 mmol/L    Glucose 89 70 - 110 mg/dL    BUN, Bld 17 8 - 23 mg/dL    Creatinine 0.8 0.5 - 1.4 mg/dL    Calcium 9.1 8.7 - 10.5 mg/dL    Total Protein 6.8 6.0 - 8.4 g/dL    Albumin 3.6 3.5 - 5.2 g/dL    Total Bilirubin 0.6 0.1 - 1.0 mg/dL    Alkaline Phosphatase 63 55 - 135 U/L    AST 15 10 - 40 U/L    ALT 12 10 - 44 U/L     Anion Gap 6 (L) 8 - 16 mmol/L    eGFR if African American >60.0 >60 mL/min/1.73 m^2    eGFR if non African American >60.0 >60 mL/min/1.73 m^2       Health Maintenance       Date Due Completion Date    Influenza Vaccine 08/01/2017 9/21/2016    DEXA SCAN 08/16/2019 8/16/2016    Lipid Panel 08/07/2022 8/7/2017    TETANUS VACCINE 09/21/2026 9/21/2016          Assessment & Plan    Routine health maintenance  - Health maintenance reviewed  - Diet and exercise education.    Essential hypertension  - Continue current therapy  - Serial blood pressure monitoring  - Diet and exercise education.    Macular degeneration  - Continue current therapy  - Continue Ophthalmology    Primary osteoarthritis of right knee  -     DEANDRE; Future; Expected date: 08/14/2017  -     C-reactive protein; Future; Expected date: 08/14/2017  -     Rheumatoid factor; Future; Expected date: 08/14/2017    Moderate single current episode of major depressive disorder  -     duloxetine (CYMBALTA) 30 MG capsule; Take 1 capsule (30 mg total) by mouth once daily.  Dispense: 30 capsule; Refill: 11  - Return in about 4 weeks (around 9/11/2017).    Other orders  -     gabapentin (NEURONTIN) 400 MG capsule; Take 1 capsule (400 mg total) by mouth 3 (three) times daily.  Dispense: 90 capsule; Refill: 11        Return in about 4 weeks (around 9/11/2017).

## 2017-08-14 NOTE — PATIENT INSTRUCTIONS
For constipation, stop Dulcolax.  Start Colace 100mg twice a day  Add fiber supplement, Benefiber, 1tbsp in 8oz of water daily  For persistent constipation.  OK to take prune juice and if still needed Miralax.

## 2017-08-15 LAB
ANA SER QL IF: POSITIVE
ANA TITR SER IF: NORMAL {TITER}

## 2017-08-17 LAB
ANTI SM ANTIBODY: 0.69 EU
ANTI SM/RNP ANTIBODY: 0.84 EU
ANTI-SM INTERPRETATION: NEGATIVE
ANTI-SM/RNP INTERPRETATION: NEGATIVE
ANTI-SSA ANTIBODY: 1.74 EU
ANTI-SSA INTERPRETATION: NEGATIVE
ANTI-SSB ANTIBODY: 0.15 EU
ANTI-SSB INTERPRETATION: NEGATIVE
DSDNA AB SER-ACNC: NORMAL [IU]/ML

## 2017-08-22 ENCOUNTER — OFFICE VISIT (OUTPATIENT)
Dept: OPHTHALMOLOGY | Facility: CLINIC | Age: 77
End: 2017-08-22
Attending: OPHTHALMOLOGY
Payer: MEDICARE

## 2017-08-22 DIAGNOSIS — H40.003 GLAUCOMA SUSPECT, BILATERAL: ICD-10-CM

## 2017-08-22 DIAGNOSIS — H40.003 GLAUCOMA SUSPECT, BILATERAL: Primary | ICD-10-CM

## 2017-08-22 DIAGNOSIS — D23.10 PAPILLOMA OF EYELID, LEFT: ICD-10-CM

## 2017-08-22 DIAGNOSIS — H52.7 REFRACTIVE ERROR: ICD-10-CM

## 2017-08-22 DIAGNOSIS — Z96.1 PSEUDOPHAKIA OF BOTH EYES: ICD-10-CM

## 2017-08-22 DIAGNOSIS — H35.3110 NONEXUDATIVE AGE-RELATED MACULAR DEGENERATION OF RIGHT EYE: ICD-10-CM

## 2017-08-22 DIAGNOSIS — H35.3221 EXUDATIVE AGE-RELATED MACULAR DEGENERATION OF LEFT EYE WITH ACTIVE CHOROIDAL NEOVASCULARIZATION: ICD-10-CM

## 2017-08-22 PROCEDURE — 92014 COMPRE OPH EXAM EST PT 1/>: CPT | Mod: S$GLB,,, | Performed by: OPHTHALMOLOGY

## 2017-08-22 PROCEDURE — 99999 PR PBB SHADOW E&M-EST. PATIENT-LVL III: CPT | Mod: PBBFAC,,, | Performed by: OPHTHALMOLOGY

## 2017-08-22 PROCEDURE — 92083 EXTENDED VISUAL FIELD XM: CPT | Mod: S$GLB,,, | Performed by: OPHTHALMOLOGY

## 2017-08-22 PROCEDURE — 92134 CPTRZ OPH DX IMG PST SGM RTA: CPT | Mod: S$GLB,,, | Performed by: OPHTHALMOLOGY

## 2017-08-22 RX ORDER — BRIMONIDINE TARTRATE 2 MG/ML
1 SOLUTION/ DROPS OPHTHALMIC 2 TIMES DAILY
Qty: 10 ML | Refills: 11 | Status: SHIPPED | OUTPATIENT
Start: 2017-08-22 | End: 2018-10-07 | Stop reason: SDUPTHER

## 2017-08-22 NOTE — PROGRESS NOTES
HPI     Glaucoma    Additional comments: 4 month iop ch// vf review with dilated HRT//    cosopt BID OU.//           Comments   4 month iop ch// vf review with dilated HRT//  cosopt BID OU.//   pt states va decreasing OS last time during Jessica visit       Last edited by Jane Velásquez on 8/22/2017  9:23 AM. (History)        ROS     Positive for: Cardiovascular (HTN - controlleld), Eyes (denies history of   surgery or trauma; injections OS for wet ARMD)    Negative for: Gastrointestinal, Neurological (denies   seizure/tremor/restless legs), Genitourinary (denies flomax),   Musculoskeletal, HENT, Endocrine (denies DM), Respiratory (denies SOB),   Allergic/Imm    Last edited by Estelle Mello MD on 8/22/2017  9:07 AM.   (History)        Assessment /Plan     For exam results, see Encounter Report.    Glaucoma suspect, bilateral  -     Posterior Segment OCT Optic Nerve- Both eyes; Future    Exudative age-related macular degeneration of left eye with active choroidal neovascularization    Nonexudative age-related macular degeneration of right eye    Papilloma of eyelid, left    Pseudophakia of both eyes    Refractive error    Other orders  -     brimonidine 0.2% (ALPHAGAN) 0.2 % Drop; Place 1 drop into both eyes 2 (two) times daily.  Dispense: 10 mL; Refill: 11            1. Glaucoma, suspect  IOP seems stable low/mid teens. Switched to Cosopt BID OU 7/8/15 when she appeared to have possible progression on clinical exam and HRT OD. Today's HRT with possible progression, seems to fluctuate OU. Also had inferior NFL heme present on prior DFE OD post-op. CCT WNL. HVF's now likely affected by ARMD. Continue Cosopt BID OU started 7/8/15. Add Brimonidine BID OU today 8/22/17    F/u 3 months for IOP check with OCT optic nerve.    Macular hemorrhage OS F/u Dr. Lopez as scheduled. Central elevation appears stable, has not gone down much   2. Pseudophakia OU s/p Phaco/PCIOL OS with CTR for zonular dehiscence  and Triescence.   s/p Phaco/PCIOL OD. Doing well.   3. Blepharitis  Ok to resume lid hygiene, continue artificial tears prn.   4. Hyperopia with astigmatism and presbyopia  MRx given prior visit   5. Allergic conjunctivitis Discussed cool compresses/AT's/Zaditor on prior visit.

## 2017-08-29 ENCOUNTER — OFFICE VISIT (OUTPATIENT)
Dept: FAMILY MEDICINE | Facility: CLINIC | Age: 77
End: 2017-08-29
Payer: MEDICARE

## 2017-08-29 VITALS
WEIGHT: 154.13 LBS | SYSTOLIC BLOOD PRESSURE: 147 MMHG | BODY MASS INDEX: 24.77 KG/M2 | HEIGHT: 66 IN | HEART RATE: 58 BPM | DIASTOLIC BLOOD PRESSURE: 66 MMHG

## 2017-08-29 DIAGNOSIS — I51.89 VENTRICULAR DIASTOLIC DYSFUNCTION DETERMINED BY ECHOCARDIOGRAPHY: ICD-10-CM

## 2017-08-29 DIAGNOSIS — H40.003 GLAUCOMA SUSPECT OF BOTH EYES: ICD-10-CM

## 2017-08-29 DIAGNOSIS — M62.462 GASTROCNEMIUS EQUINUS, LEFT: ICD-10-CM

## 2017-08-29 DIAGNOSIS — D21.9 FIBROMA: ICD-10-CM

## 2017-08-29 DIAGNOSIS — M21.6X9 EQUINUS DEFORMITY OF FOOT: ICD-10-CM

## 2017-08-29 DIAGNOSIS — Z00.00 ENCOUNTER FOR PREVENTIVE HEALTH EXAMINATION: Primary | ICD-10-CM

## 2017-08-29 DIAGNOSIS — M17.10 PRIMARY OSTEOARTHRITIS OF KNEE, UNSPECIFIED LATERALITY: ICD-10-CM

## 2017-08-29 DIAGNOSIS — Z96.652 STATUS POST TOTAL LEFT KNEE REPLACEMENT: ICD-10-CM

## 2017-08-29 DIAGNOSIS — G60.9 IDIOPATHIC PERIPHERAL NEUROPATHY: ICD-10-CM

## 2017-08-29 DIAGNOSIS — G57.82 NEUROPATHY OF LEFT SURAL NERVE: ICD-10-CM

## 2017-08-29 DIAGNOSIS — B35.1 ONYCHOMYCOSIS DUE TO DERMATOPHYTE: ICD-10-CM

## 2017-08-29 DIAGNOSIS — H52.4 HYPEROPIA WITH ASTIGMATISM AND PRESBYOPIA, UNSPECIFIED LATERALITY: ICD-10-CM

## 2017-08-29 DIAGNOSIS — M85.80 OSTEOPENIA, UNSPECIFIED LOCATION: ICD-10-CM

## 2017-08-29 DIAGNOSIS — I10 ESSENTIAL HYPERTENSION: ICD-10-CM

## 2017-08-29 DIAGNOSIS — H52.209 HYPEROPIA WITH ASTIGMATISM AND PRESBYOPIA, UNSPECIFIED LATERALITY: ICD-10-CM

## 2017-08-29 DIAGNOSIS — H52.00 HYPEROPIA WITH ASTIGMATISM AND PRESBYOPIA, UNSPECIFIED LATERALITY: ICD-10-CM

## 2017-08-29 DIAGNOSIS — N18.2 CKD (CHRONIC KIDNEY DISEASE), STAGE II: ICD-10-CM

## 2017-08-29 DIAGNOSIS — H35.3221 EXUDATIVE AGE-RELATED MACULAR DEGENERATION OF LEFT EYE WITH ACTIVE CHOROIDAL NEOVASCULARIZATION: ICD-10-CM

## 2017-08-29 DIAGNOSIS — H35.3110 NONEXUDATIVE AGE-RELATED MACULAR DEGENERATION OF RIGHT EYE: ICD-10-CM

## 2017-08-29 DIAGNOSIS — F41.9 ANXIETY: ICD-10-CM

## 2017-08-29 PROCEDURE — G0439 PPPS, SUBSEQ VISIT: HCPCS | Mod: S$GLB,,, | Performed by: NURSE PRACTITIONER

## 2017-08-29 PROCEDURE — 99499 UNLISTED E&M SERVICE: CPT | Mod: S$GLB,,, | Performed by: NURSE PRACTITIONER

## 2017-08-29 PROCEDURE — 99999 PR PBB SHADOW E&M-EST. PATIENT-LVL IV: CPT | Mod: PBBFAC,,, | Performed by: NURSE PRACTITIONER

## 2017-08-29 NOTE — PROGRESS NOTES
"Serenity Epps presented for a  Medicare AWV and comprehensive Health Risk Assessment today. The following components were reviewed and updated:    · Medical history  · Family History  · Social history  · Allergies and Current Medications  · Health Risk Assessment  · Health Maintenance  · Care Team     Review of Systems   Constitutional: Negative for fever and malaise/fatigue.   Respiratory: Negative for cough, shortness of breath and wheezing.    Gastrointestinal: Negative for abdominal pain, blood in stool, constipation, diarrhea, nausea and vomiting.   Skin: Negative for rash.   Neurological: Negative for dizziness, weakness and headaches.     ** See Completed Assessments for Annual Wellness Visit within the encounter summary.**     The following assessments were completed:  · Living Situation  · CAGE  · Depression Screening  · Timed Get Up and Go  · Whisper Test  · Cognitive Function Screening      · Nutrition Screening  · ADL Screening  · PAQ Screening    Vitals:    08/29/17 0802   BP: (!) 147/66   BP Location: Left arm   Patient Position: Sitting   BP Method: Medium (Automatic)   Pulse: (!) 58   Weight: 69.9 kg (154 lb 1.6 oz)   Height: 5' 6" (1.676 m)     Body mass index is 24.87 kg/m².  Physical Exam   Constitutional: She is oriented to person, place, and time. She appears well-nourished.   Cardiovascular: Normal rate, regular rhythm, normal heart sounds and intact distal pulses.    Pulmonary/Chest: Effort normal and breath sounds normal.   Neurological: She is alert and oriented to person, place, and time.   Skin: Skin is warm and dry.   Vitals reviewed.        Diagnoses and health risks identified today and associated recommendations/orders:    1. Encounter for preventive health examination  Reviewed and discussed health maintenance.     2. Hyperopia with astigmatism and presbyopia, unspecified laterality  Stable- continue specialty follow up  (Dr. Mello/Dr. Beckham)    3. Glaucoma suspect of both " eyes  Stable- continue specialty follow up  (Dr. Mello/Dr. Beckham)    4. Status post total left knee replacement  Stable- follow up with DR. Mane prn    5. Primary osteoarthritis of knee, unspecified laterality  Continue [facility] and follow up with ortho prn. (Dr. aMne)    6. Fibroma  Stable- continue follow up with PCP routinely    7. Nonexudative age-related macular degeneration of right eye  Stable- continue specialty follow up  (Dr. Mello/Dr. Beckham)    8. Exudative age-related macular degeneration of left eye with active choroidal neovascularization  Stable- continue specialty follow up  (Dr. Mello/Dr. Beckham)    9. Osteopenia, unspecified location  Stable- continue  Current treatment and follow up with PCP routinely    10. Essential hypertension  Stable- continue  Current treatment and follow up with PCP routinely    11. Gastrocnemius equinus, left  Stable- continue  Current treatment and follow up with PCP routinely    12. Onychomycosis due to dermatophyte  Stable- continue podiatry follow up as planned    13. Equinus deformity of foot  Stable- continue podiatry follow up as planned    14. Anxiety  Stable- continue  Current treatment and follow up with PCP routinely  Continue Cymbalta as prescribed    15. 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  Stable- continue  Current treatment and follow up with PCP routinely    16. Neuropathy of left sural nerve  Stable- continue podiatry follow up as planned    17. CKD (chronic kidney disease), stage II  Stable- continue  Current treatment and follow up with PCP routinely    18. Idiopathic peripheral neuropathy  Stable- continue podiatry follow up as planned      Provided Serenity with a 5-10 year written screening schedule and personal prevention plan. Recommendations were developed using the USPSTF age appropriate recommendations. Education, counseling, and referrals were provided as needed. After Visit Summary printed and given to  patient which includes a list of additional screenings\tests needed.    Janeth Ordonez, NP

## 2017-08-29 NOTE — PATIENT INSTRUCTIONS
Counseling and Referral of Other Preventative  (Italic type indicates deductible and co-insurance are waived)    Patient Name: Serenity Epps  Today's Date: 8/29/2017      SERVICE LIMITATIONS RECOMMENDATION    Vaccines    · Pneumococcal (once after 65)    · Influenza (annually)    · Hepatitis B (if medium/high risk)    · Prevnar 13      Hepatitis B medium/high risk factors:       - End-stage renal disease       - Hemophiliacs who received Factor VII or         IX concentrates       - Clients of institutions for the mentally             retarded       - Persons who live in the same house as          a HepB carrier       - Homosexual men       - Illicit injectable drug abusers     Pneumococcal: Done, no repeat necessary     Influenza: Recommended to patient     Hepatitis B: N/A     Prevnar 13: Done, no repeat necessary    Mammogram (biennial age 50-74)  Annually (age 40 or over)  N/A    Pap (up to age 70 and after 70 if unknown history or abnormal study last 10 years)    N/A     The USPSTF recommends against screening for cervical cancer in women who have had a hysterectomy with removal of the cervix and who do not have a history of a high-grade precancerous lesion (cervical intraepithelial neoplasia [HAILE] grade 2 or 3) or cervical cancer.     Colorectal cancer screening (to age 75)    · Fecal occult blood test (annual)  · Flexible sigmoidoscopy (5y)  · Screening colonoscopy (10y)  · Barium enema   Last done 2012, recommend to repeat every 7-8 years or as needed  .    Diabetes self-management training (no USPSTF recommendations)  Requires referral by treating physician for patient with diabetes or renal disease. 10 hours of initial DSMT sessions of no less than 30 minutes each in a continuous 12-month period. 2 hours of follow-up DSMT in subsequent years.  N/A    Bone mass measurements (age 65 & older, biennial)  Requires diagnosis related to osteoporosis or estrogen deficiency. Biennial benefit unless patient has history  of long-term glucocorticoid  Last done 8/2016, recommend to repeat every 3 years  2019    Glaucoma screening (no USPSTF recommendation)  Diabetes mellitus, family history   , age 50 or over    American, age 65 or over  Done this year, repeat every year    Medical nutrition therapy for diabetes or renal disease (no recommended schedule)  Requires referral by treating physician for patient with diabetes or renal disease or kidney transplant within the past 3 years.  Can be provided in same year as diabetes self-management training (DSMT), and CMS recommends medical nutrition therapy take place after DSMT. Up to 3 hours for initial year and 2 hours in subsequent years.  N/A    Cardiovascular screening blood tests (every 5 years)  · Fasting lipid panel  Order as a panel if possible  Done this year, repeat every year    Diabetes screening tests (at least every 3 years, Medicare covers annually or at 6-month intervals for prediabetic patients)  · Fasting blood sugar (FBS) or glucose tolerance test (GTT)  Patient must be diagnosed with one of the following:       - Hypertension       - Dyslipidemia       - Obesity (BMI 30kg/m2)       - Previous elevated impaired FBS or GTT       ... or any two of the following:       - Overweight (BMI 25 but <30)       - Family history of diabetes       - Age 65 or older       - History of gestational diabetes or birth of baby weighing more than 9 pounds  Done this year, repeat every year    HIV screening (annually for increased risk patients)  · HIV-1 and HIV-2 by EIA, or JUVE, rapid antibody test or oral mucosa transudate  Patients must be at increased risk for HIV infection per USPSTF guidelines or pregnant. Tests covered annually for patient at increased risk or as requested by the patient. Pregnant patients may receive up to 3 tests during pregnancy.  Risks discussed, screening is not recommended    Smoking cessation counseling (up to 8 sessions per year)   Patients must be asymptomatic of tobacco-related conditions to receive as a preventative service.  Non-smoker    Subsequent annual wellness visit  At least 12 months since last AWV  Return in one year     The following information is provided to all patients.  This information is to help you find resources for any of the problems found today that may be affecting your health:                Living healthy guide: www.[email or web address].[place].[facility]      Understanding Diabetes: www.diabetes.org      Eating healthy: www.cdc.gov/healthyweight      CDC home safety checklist: www.cdc.gov/steadi/patient.html      Agency on Aging: www.goea.[place].[facility]      Alcoholics anonymous (AA): www.aa.org      Physical Activity: www.divina.nih.gov/bq4yajp      Tobacco use: www.quitwithusla.org

## 2017-09-13 ENCOUNTER — PATIENT MESSAGE (OUTPATIENT)
Dept: ORTHOPEDICS | Facility: CLINIC | Age: 77
End: 2017-09-13

## 2017-09-18 ENCOUNTER — OFFICE VISIT (OUTPATIENT)
Dept: FAMILY MEDICINE | Facility: CLINIC | Age: 77
End: 2017-09-18
Payer: MEDICARE

## 2017-09-18 VITALS
WEIGHT: 155.44 LBS | DIASTOLIC BLOOD PRESSURE: 82 MMHG | SYSTOLIC BLOOD PRESSURE: 126 MMHG | HEIGHT: 66 IN | BODY MASS INDEX: 24.98 KG/M2 | HEART RATE: 64 BPM | RESPIRATION RATE: 16 BRPM

## 2017-09-18 DIAGNOSIS — F32.1 MODERATE SINGLE CURRENT EPISODE OF MAJOR DEPRESSIVE DISORDER: Primary | ICD-10-CM

## 2017-09-18 PROCEDURE — 90662 IIV NO PRSV INCREASED AG IM: CPT | Mod: S$GLB,,, | Performed by: FAMILY MEDICINE

## 2017-09-18 PROCEDURE — 99499 UNLISTED E&M SERVICE: CPT | Mod: S$GLB,,, | Performed by: FAMILY MEDICINE

## 2017-09-18 PROCEDURE — 3079F DIAST BP 80-89 MM HG: CPT | Mod: S$GLB,,, | Performed by: FAMILY MEDICINE

## 2017-09-18 PROCEDURE — 99213 OFFICE O/P EST LOW 20 MIN: CPT | Mod: S$GLB,,, | Performed by: FAMILY MEDICINE

## 2017-09-18 PROCEDURE — 1157F ADVNC CARE PLAN IN RCRD: CPT | Mod: S$GLB,,, | Performed by: FAMILY MEDICINE

## 2017-09-18 PROCEDURE — 3074F SYST BP LT 130 MM HG: CPT | Mod: S$GLB,,, | Performed by: FAMILY MEDICINE

## 2017-09-18 PROCEDURE — 1126F AMNT PAIN NOTED NONE PRSNT: CPT | Mod: S$GLB,,, | Performed by: FAMILY MEDICINE

## 2017-09-18 PROCEDURE — G0008 ADMIN INFLUENZA VIRUS VAC: HCPCS | Mod: S$GLB,,, | Performed by: FAMILY MEDICINE

## 2017-09-18 PROCEDURE — 3008F BODY MASS INDEX DOCD: CPT | Mod: S$GLB,,, | Performed by: FAMILY MEDICINE

## 2017-09-18 PROCEDURE — 99999 PR PBB SHADOW E&M-EST. PATIENT-LVL III: CPT | Mod: PBBFAC,,, | Performed by: FAMILY MEDICINE

## 2017-09-18 PROCEDURE — 1159F MED LIST DOCD IN RCRD: CPT | Mod: S$GLB,,, | Performed by: FAMILY MEDICINE

## 2017-09-18 RX ORDER — DULOXETIN HYDROCHLORIDE 60 MG/1
60 CAPSULE, DELAYED RELEASE ORAL DAILY
Qty: 30 CAPSULE | Refills: 11 | Status: SHIPPED | OUTPATIENT
Start: 2017-09-18 | End: 2017-11-21

## 2017-09-18 NOTE — PROGRESS NOTES
Subjective:       Patient ID: Serenity Epps is a 77 y.o. female    Chief Complaint: Hypertension (follow up; hm due: flu)    HPI  Here today for review of depression.  Recently started on Cymbalta due to a history of neuropathy and chronic pain secondary to arthritis.  She reports overall improvement.  Still with fatigue and drowsiness    Review of Systems   Constitutional: Negative for activity change and unexpected weight change.   HENT: Negative for hearing loss, rhinorrhea and trouble swallowing.    Eyes: Positive for visual disturbance. Negative for discharge.   Respiratory: Negative for chest tightness and wheezing.    Cardiovascular: Negative for chest pain and palpitations.   Gastrointestinal: Negative for blood in stool, constipation, diarrhea and vomiting.   Endocrine: Negative for polydipsia and polyuria.   Genitourinary: Negative for difficulty urinating, dysuria, hematuria and menstrual problem.   Musculoskeletal: Positive for arthralgias. Negative for joint swelling and neck pain.   Neurological: Negative for weakness and headaches.   Psychiatric/Behavioral: Negative for confusion and dysphoric mood.         Objective:   Physical Exam   Constitutional: She is oriented to person, place, and time. She appears well-developed and well-nourished.   Neurological: She is alert and oriented to person, place, and time.   Vitals reviewed.  MSE:  Normal mood, normal affect.  No suicidal or homicidal ideation.  No visual or auditory hallucinations.       Assessment:       1. Moderate single current episode of major depressive disorder  duloxetine (CYMBALTA) 60 MG capsule         Plan:       Moderate single current episode of major depressive disorder  -     INCREASE duloxetine (CYMBALTA) 60 MG capsule; Take 1 capsule (60 mg total) by mouth once daily.  Dispense: 30 capsule; Refill: 11  - Return in about 3 months (around 12/18/2017).

## 2017-10-02 ENCOUNTER — PROCEDURE VISIT (OUTPATIENT)
Dept: OPHTHALMOLOGY | Facility: CLINIC | Age: 77
End: 2017-10-02
Payer: MEDICARE

## 2017-10-02 VITALS — DIASTOLIC BLOOD PRESSURE: 74 MMHG | SYSTOLIC BLOOD PRESSURE: 139 MMHG

## 2017-10-02 DIAGNOSIS — H35.3112 NONEXUDATIVE AGE-RELATED MACULAR DEGENERATION, RIGHT EYE, INTERMEDIATE DRY STAGE: ICD-10-CM

## 2017-10-02 DIAGNOSIS — H35.3221 EXUDATIVE AGE-RELATED MACULAR DEGENERATION OF LEFT EYE WITH ACTIVE CHOROIDAL NEOVASCULARIZATION: Primary | ICD-10-CM

## 2017-10-02 PROCEDURE — 92134 CPTRZ OPH DX IMG PST SGM RTA: CPT | Mod: S$GLB,,, | Performed by: OPHTHALMOLOGY

## 2017-10-02 PROCEDURE — 67028 INJECTION EYE DRUG: CPT | Mod: LT,S$GLB,, | Performed by: OPHTHALMOLOGY

## 2017-10-02 PROCEDURE — 92014 COMPRE OPH EXAM EST PT 1/>: CPT | Mod: 25,S$GLB,, | Performed by: OPHTHALMOLOGY

## 2017-10-02 PROCEDURE — 99499 UNLISTED E&M SERVICE: CPT | Mod: S$GLB,,, | Performed by: OPHTHALMOLOGY

## 2017-10-02 RX ADMIN — Medication 1.25 MG: at 09:10

## 2017-10-02 NOTE — PATIENT INSTRUCTIONS

## 2017-10-02 NOTE — PROGRESS NOTES
HPI     Eye Problem    Additional comments: 10 week check           Comments   DLS 7/31/17- Vision OS seems to have gotten worse x last visit      HPI     8wk check // OCT // Avastin   DLS 05/29/2017 Dr. Lopez    Pt sts no change since last visit denies pain     (-)Flashes (-)Floaters  (-)Photophobia  (-)Glare    OCT - slight increase in SRF OS  Drusen OD      A/P    1. Wet AMD OS  S/p Avastin OS x 10, Eylea x 14    Persistent SRF OS - improving  With RPE tear OS  Central fibrosis with atrophy - poor central potential  10/17 - increased SRH - will keep at 8 weeks for now    Avastin OS      2. Dry AMD OD  AREDS/AG    3. PCIOL OU  CTR OS - but saw 20/30 with correction, in spite of sub RPE fibrosis      4. Glc Suspect OU   Good IOP control on Timolol    5. Floaters OU      8 weeks OCT      Risks, benefits, and alternatives to treatment discussed in detail with the patient.  The patient voiced understanding and wished to proceed with the procedure    Injection Procedure Note:  Diagnosis: Wet AMD OS    Topical Proparacaine and Betadine.  Inject Avastin OS at 6:00 @ 3.5-4mm posterior to limbus  Post Operative Dx: Same  Complications: None  Follow up as above.

## 2017-11-21 ENCOUNTER — OFFICE VISIT (OUTPATIENT)
Dept: OPHTHALMOLOGY | Facility: CLINIC | Age: 77
End: 2017-11-21
Payer: MEDICARE

## 2017-11-21 DIAGNOSIS — H40.003 GLAUCOMA SUSPECT, BILATERAL: ICD-10-CM

## 2017-11-21 DIAGNOSIS — Z96.1 PSEUDOPHAKIA OF BOTH EYES: ICD-10-CM

## 2017-11-21 DIAGNOSIS — H52.7 REFRACTIVE ERROR: ICD-10-CM

## 2017-11-21 DIAGNOSIS — H35.3221 EXUDATIVE AGE-RELATED MACULAR DEGENERATION OF LEFT EYE WITH ACTIVE CHOROIDAL NEOVASCULARIZATION: ICD-10-CM

## 2017-11-21 DIAGNOSIS — H40.1234 BILATERAL LOW-TENSION GLAUCOMA, INDETERMINATE STAGE: Primary | ICD-10-CM

## 2017-11-21 PROCEDURE — 92133 CPTRZD OPH DX IMG PST SGM ON: CPT | Mod: S$GLB,,, | Performed by: OPHTHALMOLOGY

## 2017-11-21 PROCEDURE — 99499 UNLISTED E&M SERVICE: CPT | Mod: S$GLB,,, | Performed by: OPHTHALMOLOGY

## 2017-11-21 PROCEDURE — 99999 PR PBB SHADOW E&M-EST. PATIENT-LVL III: CPT | Mod: PBBFAC,,, | Performed by: OPHTHALMOLOGY

## 2017-11-21 PROCEDURE — 92012 INTRM OPH EXAM EST PATIENT: CPT | Mod: S$GLB,,, | Performed by: OPHTHALMOLOGY

## 2017-11-21 RX ORDER — DORZOLAMIDE HYDROCHLORIDE AND TIMOLOL MALEATE 20; 5 MG/ML; MG/ML
1 SOLUTION/ DROPS OPHTHALMIC 2 TIMES DAILY
Qty: 30 ML | Refills: 3 | Status: SHIPPED | OUTPATIENT
Start: 2017-11-21 | End: 2018-08-17 | Stop reason: SDUPTHER

## 2017-11-21 RX ORDER — DULOXETIN HYDROCHLORIDE 30 MG/1
1 CAPSULE, DELAYED RELEASE ORAL DAILY
COMMUNITY
Start: 2017-10-25 | End: 2018-05-07 | Stop reason: ALTCHOICE

## 2017-11-21 NOTE — PROGRESS NOTES
HPI     Glaucoma    Additional comments: IOP ck with oct nerve           Comments   DLS: 10/2/17 by Dr Lopez    Pt states feels vision has decreased OS > OD since last visit. Has very   hard time going from dark to light or light to dark.     Gtts: Brimonidine BID, Dorzolamide-timolol BID OU       Last edited by Cheryle Quintana on 11/21/2017  8:18 AM. (History)            Assessment /Plan     For exam results, see Encounter Report.    Bilateral low-tension glaucoma, indeterminate stage  -     Posterior Segment OCT Optic Nerve- Both eyes    Exudative age-related macular degeneration of left eye with active choroidal neovascularization    Glaucoma suspect, bilateral    Pseudophakia of both eyes    Refractive error    Other orders  -     dorzolamide-timolol 2-0.5% (COSOPT) 22.3-6.8 mg/mL ophthalmic solution; Place 1 drop into both eyes 2 (two) times daily.  Dispense: 30 mL; Refill: 3            1. Low tension Glaucoma  IOP seems stable low/mid teens, reports good compliance with drops, small NFL hemorrhages OU today despite low IOP. Switched to Cosopt BID OU 7/8/15 when she appeared to have possible progression on clinical exam and HRT OD. Baseline OCT nerve done today - thinning OD>OS. Last HRT with possible progression, seems to fluctuate OU. Also had inferior NFL heme present on prior DFE OD post-op. CCT WNL. HVF's now likely affected by ARMD. Continue Cosopt BID OU started 7/8/15. Added Brimonidine BID OU today 8/22/17    F/u 3 months for IOP check and repeat HVF.    Macular hemorrhage OS F/u Dr. Lopez as scheduled. Central elevation appears stable, has not gone down much   2. Pseudophakia OU s/p Phaco/PCIOL OS with CTR for zonular dehiscence and Triescence.   s/p Phaco/PCIOL OD. Doing well.   3. Blepharitis  Ok to resume lid hygiene, continue artificial tears prn.   4. Hyperopia with astigmatism and presbyopia  MRx given prior visit   5. Allergic conjunctivitis Discussed cool compresses/AT's/Zaditor on  prior visit.     Refractive error - tried MRx from last visit in trial frames, denies subjective improvement.

## 2017-12-04 ENCOUNTER — CLINICAL SUPPORT (OUTPATIENT)
Dept: OPHTHALMOLOGY | Facility: CLINIC | Age: 77
End: 2017-12-04
Payer: MEDICARE

## 2017-12-04 VITALS — HEART RATE: 60 BPM | SYSTOLIC BLOOD PRESSURE: 133 MMHG | DIASTOLIC BLOOD PRESSURE: 79 MMHG

## 2017-12-04 DIAGNOSIS — H40.003 GLAUCOMA SUSPECT OF BOTH EYES: ICD-10-CM

## 2017-12-04 DIAGNOSIS — H35.3112 NONEXUDATIVE AGE-RELATED MACULAR DEGENERATION, RIGHT EYE, INTERMEDIATE DRY STAGE: ICD-10-CM

## 2017-12-04 DIAGNOSIS — H35.3221 EXUDATIVE AGE-RELATED MACULAR DEGENERATION OF LEFT EYE WITH ACTIVE CHOROIDAL NEOVASCULARIZATION: Primary | ICD-10-CM

## 2017-12-04 PROCEDURE — 92134 CPTRZ OPH DX IMG PST SGM RTA: CPT | Mod: S$GLB,,, | Performed by: OPHTHALMOLOGY

## 2017-12-04 PROCEDURE — 92014 COMPRE OPH EXAM EST PT 1/>: CPT | Mod: 25,S$GLB,, | Performed by: OPHTHALMOLOGY

## 2017-12-04 PROCEDURE — 99999 PR PBB SHADOW E&M-EST. PATIENT-LVL IV: CPT | Mod: PBBFAC,,, | Performed by: OPHTHALMOLOGY

## 2017-12-04 PROCEDURE — 67028 INJECTION EYE DRUG: CPT | Mod: LT,S$GLB,, | Performed by: OPHTHALMOLOGY

## 2017-12-04 PROCEDURE — 99499 UNLISTED E&M SERVICE: CPT | Mod: S$GLB,,, | Performed by: OPHTHALMOLOGY

## 2017-12-04 RX ADMIN — Medication 1.25 MG: at 09:12

## 2017-12-04 NOTE — PATIENT INSTRUCTIONS

## 2017-12-04 NOTE — PROGRESS NOTES
HPI     DLS 10/2/17- Pt states she saw Dr. Mello for decreased VA 2 wks ago.    VA just keeps getting worse.       HPI     Eye Problem    Additional comments: 10 week check           Comments   DLS 7/31/17- Vision OS seems to have gotten worse x last visit      HPI     8wk check // OCT // Avastin   DLS 05/29/2017 Dr. Lopez    Pt sts no change since last visit denies pain     (-)Flashes (-)Floaters  (-)Photophobia  (-)Glare    OCT - slight increase in SRF OS  Drusen OD      A/P    1. Wet AMD OS  S/p Avastin OS x 11, Eylea x 14    Persistent SRF OS - improving  With RPE tear OS  Central fibrosis with atrophy - poor central potential  10/17 - increased SRH - will keep at 8 weeks for now    Avastin OS    2. Dry AMD OD  AREDS/AG    3. PCIOL OU  CTR OS - but saw 20/30 with correction, in spite of sub RPE fibrosis      4. Glc Suspect OU   Good IOP control on Timolol    5. Floaters OU      10 weeks OCT      Risks, benefits, and alternatives to treatment discussed in detail with the patient.  The patient voiced understanding and wished to proceed with the procedure    Injection Procedure Note:  Diagnosis: Wet AMD OS    Topical Proparacaine and Betadine.  Inject Avastin OS at 6:00 @ 3.5-4mm posterior to limbus  Post Operative Dx: Same  Complications: None  Follow up as above.

## 2017-12-29 RX ORDER — LORAZEPAM 0.5 MG/1
TABLET ORAL
Qty: 40 TABLET | Refills: 0 | Status: ON HOLD | OUTPATIENT
Start: 2017-12-29 | End: 2018-05-17

## 2018-01-12 ENCOUNTER — OFFICE VISIT (OUTPATIENT)
Dept: FAMILY MEDICINE | Facility: CLINIC | Age: 78
End: 2018-01-12
Payer: MEDICARE

## 2018-01-12 VITALS
DIASTOLIC BLOOD PRESSURE: 94 MMHG | BODY MASS INDEX: 25.26 KG/M2 | HEART RATE: 66 BPM | SYSTOLIC BLOOD PRESSURE: 138 MMHG | RESPIRATION RATE: 18 BRPM | HEIGHT: 66 IN | WEIGHT: 157.19 LBS

## 2018-01-12 DIAGNOSIS — F32.1 MAJOR DEPRESSIVE DISORDER, SINGLE EPISODE, MODERATE: Primary | ICD-10-CM

## 2018-01-12 DIAGNOSIS — L85.3 XEROSIS CUTIS: ICD-10-CM

## 2018-01-12 PROCEDURE — 99499 UNLISTED E&M SERVICE: CPT | Mod: S$GLB,,, | Performed by: FAMILY MEDICINE

## 2018-01-12 PROCEDURE — 99999 PR PBB SHADOW E&M-EST. PATIENT-LVL III: CPT | Mod: PBBFAC,,, | Performed by: FAMILY MEDICINE

## 2018-01-12 PROCEDURE — 99213 OFFICE O/P EST LOW 20 MIN: CPT | Mod: S$GLB,,, | Performed by: FAMILY MEDICINE

## 2018-01-12 NOTE — PROGRESS NOTES
Subjective:       Patient ID: Serenity Epps is a 77 y.o. female    Chief Complaint: Rash (on back and on R knee )    HPI  Here today to review recent change in medication for depression.  Symptoms improved and she has been able to wean her Ativan.  She has a pruritic rash on her back.    Review of Systems   Constitutional: Negative for activity change and unexpected weight change.   HENT: Positive for rhinorrhea. Negative for hearing loss and trouble swallowing.    Eyes: Positive for visual disturbance. Negative for discharge.   Respiratory: Negative for chest tightness and wheezing.    Cardiovascular: Negative for chest pain and palpitations.   Gastrointestinal: Positive for diarrhea. Negative for blood in stool, constipation and vomiting.   Endocrine: Negative for polydipsia and polyuria.   Genitourinary: Negative for difficulty urinating, dysuria, hematuria and menstrual problem.   Musculoskeletal: Positive for arthralgias. Negative for joint swelling and neck pain.   Neurological: Negative for weakness and headaches.   Psychiatric/Behavioral: Negative for confusion and dysphoric mood.        Objective:   Physical Exam   Constitutional: She is oriented to person, place, and time. She appears well-developed and well-nourished.   Neurological: She is alert and oriented to person, place, and time.   Vitals reviewed.  SKIN:  Dryness and cracking on the back and leg     Assessment:       1. Major depressive disorder, single episode, moderate     2. Xerosis cutis          Plan:       Major depressive disorder, single episode, moderate  - Continue current therapy    Xerosis cutis  - Moisturizing cream

## 2018-02-06 ENCOUNTER — PATIENT MESSAGE (OUTPATIENT)
Dept: FAMILY MEDICINE | Facility: CLINIC | Age: 78
End: 2018-02-06

## 2018-02-06 DIAGNOSIS — R19.7 DIARRHEA, UNSPECIFIED TYPE: Primary | ICD-10-CM

## 2018-02-09 ENCOUNTER — LAB VISIT (OUTPATIENT)
Dept: LAB | Facility: HOSPITAL | Age: 78
End: 2018-02-09
Attending: FAMILY MEDICINE
Payer: MEDICARE

## 2018-02-09 DIAGNOSIS — R19.7 DIARRHEA, UNSPECIFIED TYPE: ICD-10-CM

## 2018-02-09 LAB
C DIFF GDH STL QL: NEGATIVE
C DIFF TOX A+B STL QL IA: NEGATIVE
WBC #/AREA STL HPF: NORMAL /[HPF]

## 2018-02-09 PROCEDURE — 89055 LEUKOCYTE ASSESSMENT FECAL: CPT

## 2018-02-09 PROCEDURE — 87045 FECES CULTURE AEROBIC BACT: CPT

## 2018-02-09 PROCEDURE — 87449 NOS EACH ORGANISM AG IA: CPT

## 2018-02-09 PROCEDURE — 87046 STOOL CULTR AEROBIC BACT EA: CPT

## 2018-02-09 PROCEDURE — 87427 SHIGA-LIKE TOXIN AG IA: CPT

## 2018-02-09 PROCEDURE — 87209 SMEAR COMPLEX STAIN: CPT

## 2018-02-12 LAB
BACTERIA STL CULT: NORMAL
E COLI SXT1 STL QL IA: NEGATIVE
E COLI SXT2 STL QL IA: NEGATIVE
O+P STL TRI STN: NORMAL

## 2018-02-13 ENCOUNTER — PATIENT MESSAGE (OUTPATIENT)
Dept: FAMILY MEDICINE | Facility: CLINIC | Age: 78
End: 2018-02-13

## 2018-02-13 DIAGNOSIS — R19.7 DIARRHEA, UNSPECIFIED TYPE: Primary | ICD-10-CM

## 2018-02-15 ENCOUNTER — PROCEDURE VISIT (OUTPATIENT)
Dept: OPHTHALMOLOGY | Facility: CLINIC | Age: 78
End: 2018-02-15
Payer: MEDICARE

## 2018-02-15 VITALS — SYSTOLIC BLOOD PRESSURE: 153 MMHG | HEART RATE: 59 BPM | DIASTOLIC BLOOD PRESSURE: 78 MMHG

## 2018-02-15 DIAGNOSIS — H35.3221 EXUDATIVE AGE-RELATED MACULAR DEGENERATION OF LEFT EYE WITH ACTIVE CHOROIDAL NEOVASCULARIZATION: Primary | ICD-10-CM

## 2018-02-15 DIAGNOSIS — H40.003 GLAUCOMA SUSPECT OF BOTH EYES: ICD-10-CM

## 2018-02-15 DIAGNOSIS — H35.3112 NONEXUDATIVE AGE-RELATED MACULAR DEGENERATION, RIGHT EYE, INTERMEDIATE DRY STAGE: ICD-10-CM

## 2018-02-15 PROCEDURE — 92134 CPTRZ OPH DX IMG PST SGM RTA: CPT | Mod: S$GLB,,, | Performed by: OPHTHALMOLOGY

## 2018-02-15 PROCEDURE — 99499 UNLISTED E&M SERVICE: CPT | Mod: S$GLB,,, | Performed by: OPHTHALMOLOGY

## 2018-02-15 PROCEDURE — 67028 INJECTION EYE DRUG: CPT | Mod: LT,S$GLB,, | Performed by: OPHTHALMOLOGY

## 2018-02-15 RX ADMIN — Medication 1.25 MG: at 10:02

## 2018-02-15 NOTE — PATIENT INSTRUCTIONS

## 2018-02-15 NOTE — PROGRESS NOTES
HPI     Macular Degeneration    Additional comments: 10 wk chk           Comments   DLS: 12/4/2017    HPI     DLS 10/2/17- Pt states she saw Dr. Mello for decreased VA 2 wks ago.    VA just keeps getting worse.       HPI     Eye Problem    Additional comments: 10 week check           Comments   DLS 7/31/17- Vision OS seems to have gotten worse x last visit      HPI     8wk check // OCT // Avastin   DLS 05/29/2017 Dr. Lopez    Pt sts no change since last visit denies pain     (-)Flashes (-)Floaters  (-)Photophobia  (-)Glare    OCT - slight increase in SRF OS  Drusen OD      A/P    1. Wet AMD OS  S/p Avastin OS x 12, Eylea x 14    Persistent SRF OS - improving  With RPE tear OS  Central fibrosis with atrophy - poor central potential  10/17 - increased SRH - will keep at 8 weeks for now    Avastin OS    2. Dry AMD OD  AREDS/AG    3. PCIOL OU  CTR OS - but saw 20/30 with correction, in spite of sub RPE fibrosis      4. Glc Suspect OU   Good IOP control on Timolol    5. Floaters OU      8-9 weeks OCT      Risks, benefits, and alternatives to treatment discussed in detail with the patient.  The patient voiced understanding and wished to proceed with the procedure    Injection Procedure Note:  Diagnosis: Wet AMD OS    Topical Proparacaine and Betadine.  Inject Avastin OS at 6:00 @ 3.5-4mm posterior to limbus  Post Operative Dx: Same  Complications: None  Follow up as above.

## 2018-02-20 ENCOUNTER — PATIENT MESSAGE (OUTPATIENT)
Dept: FAMILY MEDICINE | Facility: CLINIC | Age: 78
End: 2018-02-20

## 2018-02-26 ENCOUNTER — PATIENT MESSAGE (OUTPATIENT)
Dept: FAMILY MEDICINE | Facility: CLINIC | Age: 78
End: 2018-02-26

## 2018-02-27 ENCOUNTER — OFFICE VISIT (OUTPATIENT)
Dept: OPHTHALMOLOGY | Facility: CLINIC | Age: 78
End: 2018-02-27
Payer: MEDICARE

## 2018-02-27 ENCOUNTER — CLINICAL SUPPORT (OUTPATIENT)
Dept: OPHTHALMOLOGY | Facility: CLINIC | Age: 78
End: 2018-02-27
Payer: MEDICARE

## 2018-02-27 DIAGNOSIS — Z96.1 PSEUDOPHAKIA OF BOTH EYES: ICD-10-CM

## 2018-02-27 DIAGNOSIS — H52.7 REFRACTIVE ERROR: ICD-10-CM

## 2018-02-27 DIAGNOSIS — H40.1234 BILATERAL LOW-TENSION GLAUCOMA, INDETERMINATE STAGE: ICD-10-CM

## 2018-02-27 DIAGNOSIS — H35.3112 NONEXUDATIVE AGE-RELATED MACULAR DEGENERATION, RIGHT EYE, INTERMEDIATE DRY STAGE: ICD-10-CM

## 2018-02-27 DIAGNOSIS — H40.1234 BILATERAL LOW-TENSION GLAUCOMA, INDETERMINATE STAGE: Primary | ICD-10-CM

## 2018-02-27 DIAGNOSIS — H35.3221 EXUDATIVE AGE-RELATED MACULAR DEGENERATION OF LEFT EYE WITH ACTIVE CHOROIDAL NEOVASCULARIZATION: ICD-10-CM

## 2018-02-27 PROCEDURE — 99499 UNLISTED E&M SERVICE: CPT | Mod: S$GLB,,, | Performed by: OPHTHALMOLOGY

## 2018-02-27 PROCEDURE — 99999 PR PBB SHADOW E&M-EST. PATIENT-LVL III: CPT | Mod: PBBFAC,,, | Performed by: OPHTHALMOLOGY

## 2018-02-27 PROCEDURE — 92012 INTRM OPH EXAM EST PATIENT: CPT | Mod: S$GLB,,, | Performed by: OPHTHALMOLOGY

## 2018-02-27 NOTE — PROGRESS NOTES
HPI     Glaucoma    Additional comments: 3 month iop ck with hvf review           Comments   DLS: 11/21/17    Pt states has not noticed any changes since last visit.     Gtts: Brimonidine BID, Dorozolamide-timolol BID OU    Agree with above.        Last edited by Estelle Mello MD on 2/27/2018 10:27 AM.   (History)            Assessment /Plan     For exam results, see Encounter Report.    Bilateral low-tension glaucoma, indeterminate stage    Exudative age-related macular degeneration of left eye with active choroidal neovascularization    Nonexudative age-related macular degeneration, right eye, intermediate dry stage    Pseudophakia of both eyes    Refractive error          1. Low tension Glaucoma  IOP seems stable low/mid teens, reports good compliance with drops, small NFL hemorrhages OS today despite low IOP. Switched to Cosopt BID OU 7/8/15 when she appeared to have possible progression on clinical exam and HRT OD. Baseline OCT nerve done 11/21/17 - thinning OD>OS. Last HRT with possible progression, seems to fluctuate OU. Also had inferior NFL heme present on prior DFE OD post-op. CCT WNL. HVF's now likely affected by ARMD - possible progression OD? Continue Cosopt BID OU started 7/8/15. Added Brimonidine BID OU today 8/22/17.    F/u 4 months for IOP check and DFE/HRT.    Macular hemorrhage OS F/u Dr. Lopez as scheduled.    2. Pseudophakia OU s/p Phaco/PCIOL OS with CTR for zonular dehiscence and Triescence.   s/p Phaco/PCIOL OD. Doing well.   3. Blepharitis  Ok to resume lid hygiene, continue artificial tears prn.   4. Hyperopia with astigmatism and presbyopia  MRx given prior visit   5. Allergic conjunctivitis Discussed cool compresses/AT's/Zaditor on prior visit.

## 2018-02-28 ENCOUNTER — HOSPITAL ENCOUNTER (OUTPATIENT)
Dept: RADIOLOGY | Facility: HOSPITAL | Age: 78
Discharge: HOME | End: 2018-02-28
Attending: NURSE PRACTITIONER
Payer: MEDICARE

## 2018-02-28 ENCOUNTER — INITIAL CONSULT (OUTPATIENT)
Dept: GASTROENTEROLOGY | Facility: CLINIC | Age: 78
End: 2018-02-28
Payer: MEDICARE

## 2018-02-28 VITALS
HEIGHT: 65 IN | HEART RATE: 60 BPM | WEIGHT: 153 LBS | BODY MASS INDEX: 25.49 KG/M2 | DIASTOLIC BLOOD PRESSURE: 70 MMHG | SYSTOLIC BLOOD PRESSURE: 170 MMHG

## 2018-02-28 DIAGNOSIS — R19.7 DIARRHEA, UNSPECIFIED TYPE: ICD-10-CM

## 2018-02-28 DIAGNOSIS — R19.7 DIARRHEA, UNSPECIFIED TYPE: Primary | ICD-10-CM

## 2018-02-28 DIAGNOSIS — Z87.19 HISTORY OF IBS: ICD-10-CM

## 2018-02-28 PROCEDURE — 3008F BODY MASS INDEX DOCD: CPT | Mod: S$GLB,,, | Performed by: NURSE PRACTITIONER

## 2018-02-28 PROCEDURE — 1159F MED LIST DOCD IN RCRD: CPT | Mod: S$GLB,,, | Performed by: NURSE PRACTITIONER

## 2018-02-28 PROCEDURE — 99999 PR PBB SHADOW E&M-EST. PATIENT-LVL IV: CPT | Mod: PBBFAC,,, | Performed by: NURSE PRACTITIONER

## 2018-02-28 PROCEDURE — 1126F AMNT PAIN NOTED NONE PRSNT: CPT | Mod: S$GLB,,, | Performed by: NURSE PRACTITIONER

## 2018-02-28 PROCEDURE — 99204 OFFICE O/P NEW MOD 45 MIN: CPT | Mod: S$GLB,,, | Performed by: NURSE PRACTITIONER

## 2018-02-28 PROCEDURE — 74019 RADEX ABDOMEN 2 VIEWS: CPT | Mod: 26,,, | Performed by: RADIOLOGY

## 2018-02-28 PROCEDURE — 74019 RADEX ABDOMEN 2 VIEWS: CPT | Mod: TC,FY,PO

## 2018-02-28 RX ORDER — DICYCLOMINE HYDROCHLORIDE 10 MG/1
10 CAPSULE ORAL
Qty: 120 CAPSULE | Refills: 0 | Status: SHIPPED | OUTPATIENT
Start: 2018-02-28 | End: 2018-04-27

## 2018-02-28 NOTE — PROGRESS NOTES
Subjective:       Patient ID: Serenity Epps is a 77 y.o. female Body mass index is 25.46 kg/m².    Chief Complaint: Diarrhea    This patient is new to me.  Referring Provider:  Dr. Andersen for diarrhea  Established patient of Dr. Lynn (last in 2012).    Patient is here with , whom assisted with history.      Diarrhea    The current episode started more than 1 month ago (started early 1/2018). Episode frequency: 4-8 times a day. The problem has been gradually improving. Diarrhea characteristics: watery at first, now soft fluffy pieces. The patient states that diarrhea awakens (at when it first started) her from sleep. Associated symptoms include bloating (occasional). Pertinent negatives include no abdominal pain (reports generalized abdominal discomfort, notice it after bowel movements, described as mild; denies pain), chills, coughing, fever, increased  flatus (occasional flatulence), vomiting or weight loss. Associated symptoms comments: Fecal urgency: has improved. Nothing aggravates the symptoms. Risk factors include ill contacts (ill contacts-  had diarrhea virus prior to her symptoms; denies recent antibiotic/hospitalization, foreign travel, or suspect food intake). Her past medical history is significant for irritable bowel syndrome. There is no history of inflammatory bowel disease.     Review of Systems   Constitutional: Negative for appetite change, chills, fatigue, fever, unexpected weight change and weight loss.   HENT: Negative for sore throat and trouble swallowing.    Respiratory: Negative for cough, choking and shortness of breath.    Cardiovascular: Negative for chest pain.   Gastrointestinal: Positive for bloating (occasional), constipation (history of chronic constipation; was taking colace daily for a year, denies currently) and diarrhea. Negative for abdominal pain (reports generalized abdominal discomfort, notice it after bowel movements, described as mild; denies pain), anal  bleeding, blood in stool, flatus (occasional flatulence), nausea, rectal pain and vomiting.   Neurological: Negative for weakness.       Past Medical History:   Diagnosis Date    Anxiety     Arthritis     Cataract - Both Eyes     OU done//    CKD (chronic kidney disease), stage II 9/21/2016    Diverticulosis     DVT (deep venous thrombosis)     left leg, after childbirth    DVT (deep venous thrombosis)     DVT (deep venous thrombosis)     Exudative age-related macular degeneration of left eye 2/20/2014    Glaucoma     Hypertension     Irritable bowel syndrome     Left foot pain     chronic    Macular degeneration     bimonthly intraoccular injections    Osteopenia     Rhinitis, chronic     Strabismus     as child     Past Surgical History:   Procedure Laterality Date    APPENDECTOMY      age 38    CATARACT EXTRACTION      OU done//    CATARACT EXTRACTION W/ INTRAOCULAR LENS  IMPLANT, BILATERAL Bilateral 2016    COLONOSCOPY  9/26/2006  [facility]    Diverticulosis.   Internal small hemorrhoids were found.     COLONOSCOPY  10/03/2012    Dr. Lynn, repeat in 7-8 years for surveillance    EYE SURGERY Bilateral     Phaco with IOL (cataract extraction), rigth:[id number] 22.0 d//    FOOT HARDWARE REMOVAL Left 06/01/2016    Dr. Hammonds    FOOT SURGERY Left 2014    ERG and open reduction tallo tarsal dislocation (hyprocure implant)    PARTIAL HYSTERECTOMY      age 38    PIP JOINT FUSION Right 06/01/2016    2nd-4th PIP joints of right foot; Dr. 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  Patulous lower esophageal sphincter.   Gastric mucosal atrophy.   PARAG Test  Negative.     VEIN LIGATION  1964    BLE     Family History   Problem Relation Age of Onset    No Known Problems Brother     Breast cancer Daughter     Cancer Daughter      breast    Coronary  artery disease Mother 78    Glaucoma Mother     Diverticulitis Father     No Known Problems Maternal Grandmother     No Known Problems Maternal Grandfather     No Known Problems Paternal Grandmother     No Known Problems Paternal Grandfather     Glaucoma Brother     Ankylosing spondylitis Brother     Diabetes Brother     Macular degeneration Brother       twin brother wet mac//    Cancer Brother      prostate    Crohn's disease Brother     Amblyopia Neg Hx     Blindness Neg Hx     Cataracts Neg Hx     Hypertension Neg Hx     Strabismus Neg Hx     Stroke Neg Hx     Thyroid disease Neg Hx     Retinal detachment Neg Hx     Celiac disease Neg Hx      Wt Readings from Last 10 Encounters:   02/28/18 69.4 kg (153 lb)   01/12/18 71.3 kg (157 lb 3 oz)   09/18/17 70.5 kg (155 lb 6.8 oz)   08/29/17 69.9 kg (154 lb 1.6 oz)   08/14/17 69.3 kg (152 lb 12.5 oz)   08/09/17 70.3 kg (155 lb)   07/06/17 70.7 kg (155 lb 13.8 oz)   04/06/17 70.2 kg (154 lb 12.2 oz)   02/10/17 70 kg (154 lb 5.2 oz)   09/21/16 69.9 kg (154 lb 1.6 oz)     Lab Results   Component Value Date    WBC 7.57 04/20/2016    HGB 14.0 04/20/2016    HCT 43.3 04/20/2016    MCV 88 04/20/2016     04/20/2016     CMP  Sodium   Date Value Ref Range Status   08/07/2017 137 136 - 145 mmol/L Final     Potassium   Date Value Ref Range Status   08/07/2017 4.3 3.5 - 5.1 mmol/L Final     Chloride   Date Value Ref Range Status   08/07/2017 103 95 - 110 mmol/L Final     CO2   Date Value Ref Range Status   08/07/2017 28 23 - 29 mmol/L Final     Glucose   Date Value Ref Range Status   08/07/2017 89 70 - 110 mg/dL Final     BUN, Bld   Date Value Ref Range Status   08/07/2017 17 8 - 23 mg/dL Final     Creatinine   Date Value Ref Range Status   08/07/2017 0.8 0.5 - 1.4 mg/dL Final     Calcium   Date Value Ref Range Status   08/07/2017 9.1 8.7 - 10.5 mg/dL Final     Total Protein   Date Value Ref Range Status   08/07/2017 6.8 6.0 - 8.4 g/dL Final     Albumin    Date Value Ref Range Status   08/07/2017 3.6 3.5 - 5.2 g/dL Final     Total Bilirubin   Date Value Ref Range Status   08/07/2017 0.6 0.1 - 1.0 mg/dL Final     Comment:     For infants and newborns, interpretation of results should be based  on gestational age, weight and in agreement with clinical  observations.  Premature Infant recommended reference ranges:  Up to 24 hours.............<8.0 mg/dL  Up to 48 hours............<12.0 mg/dL  3-5 days..................<15.0 mg/dL  6-29 days.................<15.0 mg/dL       Alkaline Phosphatase   Date Value Ref Range Status   08/07/2017 63 55 - 135 U/L Final     AST   Date Value Ref Range Status   08/07/2017 15 10 - 40 U/L Final     ALT   Date Value Ref Range Status   08/07/2017 12 10 - 44 U/L Final     Anion Gap   Date Value Ref Range Status   08/07/2017 6 (L) 8 - 16 mmol/L Final     eGFR if    Date Value Ref Range Status   08/07/2017 >60.0 >60 mL/min/1.73 m^2 Final     eGFR if non    Date Value Ref Range Status   08/07/2017 >60.0 >60 mL/min/1.73 m^2 Final     Comment:     Calculation used to obtain the estimated glomerular filtration  rate (eGFR) is the CKD-EPI equation. Since race is unknown   in our information system, the eGFR values for   -American and Non--American patients are given   for each creatinine result.       Lab Results   Component Value Date    TSH 1.230 09/14/2015     Lab Results   Component Value Date    CRP 1.0 08/14/2017     Lab Visit on 02/09/2018   Component Date Value Ref Range Status    Stool Exam-Ova,Cysts,Parasites 02/09/2018 No ova or parasites seen   Final    Stool WBC 02/09/2018 No neutrophils seen  No neutrophils seen Final    Stool Culture 02/09/2018 No Salmonella,Shigella,Vibrio,Campylobacter,Yersinia isolated.   Final    C. diff Antigen 02/09/2018 Negative  Negative Final    C difficile Toxins A+B, EIA 02/09/2018 Negative  Negative Final    Shiga Toxin 1 E.coli 02/09/2018 Negative    "Final    Shiga Toxin 2 E.coli 02/09/2018 Negative   Final     Reviewed prior medical records including endoscopy history (see surgical history).    10/3/12 Colonoscopy was reviewed and procedure report states:   " Findings:       The perianal and digital rectal examinations were normal. Pertinent        negatives include normal sphincter tone and no palpable rectal        lesions. Internal hemorrhoids were found during retroflexion and        were small. No additional abnormalities were found on retroflexion.        The rectum and recto-sigmoid colon appeared normal. Many        small-mouthed diverticula were found in the sigmoid colon. There was        mild spasm in the sigmoid colon. The sigmoid colon was mildly        tortuous. The left colon and transverse colon were mildly redundant.        The exam was otherwise without abnormality.. The terminal ileum        appeared normal.  Impression:          - Diverticulosis in the sigmoid colon.                       - Mild colonic spasm consistent with irritable bowel                        syndrome.                       - Internal hemorrhoids.                       - Tortuous colon.                       - Redundant colon.                       - The examination was otherwise normal.                       - The examined portion of the ileum was normal.  Recommendation:      - Discharge patient to home.                       - High fiber diet.                       - Repeat colonoscopy in 7-8 years for screening                        purposes. ".     Objective:      Physical Exam   Constitutional: She is oriented to person, place, and time. She appears well-developed and well-nourished. No distress.   HENT:   Mouth/Throat: Oropharynx is clear and moist and mucous membranes are normal. No oral lesions. No oropharyngeal exudate.   Eyes: Conjunctivae are normal. Pupils are equal, round, and reactive to light. No scleral icterus.   Cardiovascular: Normal rate.  "   Pulmonary/Chest: Effort normal and breath sounds normal. No respiratory distress. She has no wheezes.   Abdominal: Soft. Normal appearance and bowel sounds are normal. She exhibits no distension, no abdominal bruit and no mass. There is no hepatosplenomegaly. There is no tenderness. There is no rigidity, no rebound, no guarding, no tenderness at McBurney's point and negative Silva's sign. No hernia.   Neurological: She is alert and oriented to person, place, and time.   Skin: Skin is warm and dry. No rash noted. She is not diaphoretic. No erythema. No pallor.   Non-jaundiced   Psychiatric: She has a normal mood and affect. Her behavior is normal. Judgment and thought content normal.   Nursing note and vitals reviewed.      Assessment:       1. Diarrhea, unspecified type    2. History of IBS        Plan:       Diarrhea, unspecified type  -     X-Ray Abdomen Flat And Erect; Future; Expected date: 02/28/2018  -     IgA; Future; Expected date: 02/28/2018  -     Tissue transglutaminase, IgA; Future; Expected date: 02/28/2018  -  START   dicyclomine (BENTYL) 10 MG capsule; Take 1 capsule (10 mg total) by mouth before meals and at bedtime as needed (abdominal cramping/pain).  Dispense: 120 capsule; Refill: 0  - schedule Colonoscopy, discussed procedure with the patient, patient verbalized understanding  - recommended OTC probiotic, such as Culturelle, as directed  - avoid lactose  - recommended increase fiber in diet, especially soluble fiber since this can help bulk up the stool consistency and may help to slow down how fast the stool goes through the colon and can prevent diarrhea    History of IBS  -  START   dicyclomine (BENTYL) 10 MG capsule; Take 1 capsule (10 mg total) by mouth before meals and at bedtime as needed (abdominal cramping/pain).  Dispense: 120 capsule; Refill: 0  - discussed diagnosis with patient in depth, reviewed and gave IBS educational information in AVS, patient verbalized understanding  -  recommended OTC probiotic, such as Align or Culturelle, as directed  - avoid lactose  - drink adequate water intake  -smaller, more frequent meals  -avoid trigger foods    Follow-up in about 1 month (around 3/28/2018), or if symptoms worsen or fail to improve.      If no improvement in symptoms or symptoms worsen, call/follow-up at clinic or go to ER.

## 2018-02-28 NOTE — LETTER
February 28, 2018      Hua Andersen MD  [address] [facility] [address] [place] [address]           [facility]  [address] [facility] [address] [place] [address]  Phone: [phone]          Patient: Serenity Epps   MR Number: [id number]   YOB: 1940   Date of Visit: 2/28/2018       Dear Dr. Hua Andersen:    Thank you for referring Serenity Epps to me for evaluation. Attached you will find relevant portions of my assessment and plan of care.    If you have questions, please do not hesitate to call me. I look forward to following Serenity Epps along with you.    Sincerely,    Arlin Campbell, [facility]    Enclosure  CC:  No Recipients    If you would like to receive this communication electronically, please contact [email or web address] or [phone] to request more information on [facility] access.    For providers and/or their staff who would like to refer a patient to [facility], please contact us through our one-stop-shop provider referral line, [facility], at [phone].    If you feel you have received this communication in error or would no longer like to receive these types of communications, please e-mail [email or web address]

## 2018-02-28 NOTE — PATIENT INSTRUCTIONS
Uncertain Causes of Diarrhea (Adult)    Diarrhea is when stools are loose and watery. This can be caused by:  · Viral infections  · Bacterial infections  · Food poisoning  · Parasites  · Irritable bowel syndrome (IBS)  · Inflammatory bowel diseases such as ulcerative colitis, Crohn's disease, and celiac disease  · Food intolerance, such as to lactose, the sugar found in milk and milk products  · Reaction to medicines like antibiotics, laxatives, cancer drugs, and antacids  Along with diarrhea, you may also have:  · Abdominal pain and cramping  · Nausea and vomiting  · Loss of bowel control  · Fever and chills  · Bloody stools  In some cases, antibiotics may help to treat diarrhea. You may have a stool sample test. This is done to see what is causing your diarrhea, and if antibiotics will help treat it. The results of a stool sample test may take up to 2 days. The healthcare provider may not give you antibiotics until he or she has the stool test results.  Diarrhea can cause dehydration. This is the loss of too much water and other fluids from the body. When this occurs, body fluid must be replaced. This can be done with oral rehydration solutions. Oral rehydration solutions are available at drugstores and grocery stores without a prescription.  Home care  Follow all instructions given by your healthcare provider. Rest at home for the next 24 hours, or until you feel better. Avoid caffeine, tobacco, and alcohol. These can make diarrhea, cramping, and pain worse.  If taking medicines:  · Dont take over-the-counter diarrhea or nausea medicines unless your healthcare provider tells you to.  · You may use acetaminophen or NSAID medicines like ibuprofen or naproxen to reduce pain and fever. Dont use these if you have chronic liver or kidney disease, or ever had a stomach ulcer or gastrointestinal bleeding. Don't use NSAID medicines if you are already taking one for another condition (like arthritis) or are on daily  aspirin therapy (such as for heart disease or after a stroke). Talk with your healthcare provider first.  · If antibiotics were prescribed, be sure you take them until they are finished. Dont stop taking them even when you feel better. Antibiotics must be taken as a full course.  To prevent the spread of illness:  · Remember that washing with soap and water and using alcohol-based  is the best way to prevent the spread of infection.  · Clean the toilet after each use.  · Wash your hands before eating.  · Wash your hands before and after preparing food. Keep in mind that people with diarrhea or vomiting should not prepare food for others.  · Wash your hands after using cutting boards, countertops, and knives that have been in contact with raw foods.  · Wash and then peel fruits and vegetables.  · Keep uncooked meats away from cooked and ready-to-eat foods.  · Use a food thermometer when cooking. Cook poultry to at least 165°F (74°C). Cook ground meat (beef, veal, pork, lamb) to at least 160°F (71°C). Cook fresh beef, veal, lamb, and pork to at least 145°F (63°C).  · Dont eat raw or undercooked eggs (poached or dorene side up), poultry, meat, or unpasteurized milk and juices.  Food and drinks  The main goal while treating vomiting or diarrhea is to prevent dehydration. This is done by taking small amounts of liquids often.  · Keep in mind that liquids are more important than food right now.  · Drink only small amounts of liquids at a time.  · Dont force yourself to eat, especially if you are having cramping, vomiting, or diarrhea. Dont eat large amounts at a time, even if you are hungry.  · If you eat, avoid fatty, greasy, spicy, or fried foods.  · Dont eat dairy foods or drink milk if you have diarrhea. These can make diarrhea worse.  During the first 24 hours you can try:  · Oral rehydration solutions. Do not use sports drinks. They have too much sugar and not enough electrolytes.  · Soft drinks without  caffeine  · Ginger ale  · Water (plain or flavored)  · Decaf tea or coffee  · Clear broth, consommé, or bouillon  · Gelatin, popsicles, or frozen fruit juice bars  The second 24 hours, if you are feeling better, you can add:  · Hot cereal, plain toast, bread, rolls, or crackers  · Plain noodles, rice, mashed potatoes, chicken noodle soup, or rice soup  · Unsweetened canned fruit (no pineapple)  · Bananas  As you recover:  · Limit fat intake to less than 15 grams per day. Dont eat margarine, butter, oils, mayonnaise, sauces, gravies, fried foods, peanut butter, meat, poultry, or fish.  · Limit fiber. Dont eat raw or cooked vegetables, fresh fruits except bananas, or bran cereals.  · Limit caffeine and chocolate.  · Limit dairy.  · Dont use spices or seasonings except salt.  · Go back to your normal diet over time, as you feel better and your symptoms improve.  · If the symptoms come back, go back to a simple diet or clear liquids.  Follow-up care  Follow up with your healthcare provider, or as advised. If a stool sample was taken or cultures were done, call the healthcare provider for the results as instructed.  Call 911  Call 911 if you have any of these symptoms:  · Trouble breathing  · Confusion  · Extreme drowsiness or trouble walking  · Loss of consciousness  · Rapid heart rate  · Chest pain  · Stiff neck  · Seizure  When to seek medical advice  Call your healthcare provider right away if any of these occur:  · Abdominal pain that gets worse  · Constant lower right abdominal pain  · Continued vomiting and inability to keep liquids down  · Diarrhea more than 5 times a day  · Blood in vomit or stool  · Dark urine or no urine for 8 hours, dry mouth and tongue, tiredness, weakness, or dizziness  · Drowsiness  · New rash  · You dont get better in 2 to 3 days  · Fever of 100.4°F (38°C) or higher that doesnt get lower with medicine  Date Last Reviewed: 1/3/2016  © 6453-2067 [employer]. [address] [place] [address]  [place] [address]. All rights reserved. This information is not intended as a substitute for professional medical care. Always follow your healthcare professional's instructions.        Irritable Bowel Syndrome    Irritable bowel syndrome (IBS) is a disorder of the intestines. It is not a disease, but a group of symptoms caused by changes in the way the intestines work. It is fairly common, but the cause is not well understood.  Symptoms of IBS include:  · Abdominal pain, discomfort, and cramping  · Diarrhea  · Constipation or dry, hard stools  · Mucous stool  · Bloating  · Feeling of incomplete bowel movements  It usually results in one of 3 patterns of symptoms:  · Chronic abdominal pain and constipation  · Recurring episodes of diarrhea, with or without pain  · Alternating diarrhea and constipation  Home care  The goal of treatment is to control and relieve your symptoms, so you can lead a full and active life. There is no cure for IBS. But it can be managed.  Diet  Your diet did not cause your IBS, but it can affect it. No one diet works for everyone. Finding the best foods for you may take trial and error. Keep a food log to help find what foods made your symptoms worse. Below are some tips that may help you.  · Eat more slowly. Eat smaller amounts at a time, but more often. Remember, you can always eat more, but cannot eat less once youve eaten too much.  · High-fiber foods are complicated. While they may help relieve constipation, they can make your bloating, cramping, gas, and diarrhea worse.  · Eat less sugar.  · Try cutting out dairy products. Sometimes this helps.  · Try cutting out foods that are high in fat and fatty meats.  · You can control bloating or passing excess gas. Be careful with gassy vegetables and fruits like beans, cabbage, broccoli, and cauliflower.  · Be careful with carbonated beverages and fruit juices. They can make your bloating and diarrhea worse.  · Caffeine, alcohol,  and stimulants can make symptoms worse. These include coffee, tea, sodas, energy drinks, and chocolate.  Lifestyle  · Look for factors that seem to worsen your symptoms. These include stress and emotions.   · Although stress does not cause IBS, it may trigger flare-ups. Counseling can help you learn to handle stress. So can self-help measures like exercise, yoga, and meditation.  · Depression can occur along with IBS. Your healthcare provider may prescribe antidepressant medicine. This may help with diarrhea, constipation, and cramping, as well as with symptoms of depression.  · Smoking doesn't cause IBS, but can make the symptoms worse.  Medicines  Your healthcare provider may prescribe medicines. Take them as directed. For acute flare-ups of your illness, your provider may give you prescription medicines.  · Check with your healthcare provider before taking any medicines for diarrhea.  · Avoid anti-inflammatory medicines like ibuprofen or naproxen.  · Consider nutritional supplements. This is especially true if your diarrhea is prolonged, or you aren't eating or are losing weight  Follow-up care  Follow up with your healthcare provider, or as advised. If a stool sample was taken or cultures were done, you will be told if your treatment needs to change. You can call as directed for the results.  When to seek medical advice  Call your healthcare provider right away if any of these occur:  · Abdominal pain gets worse  · Constant abdominal pain moves to the right-lower abdomen  · You can't keep liquids down because of vomiting  · You have severe diarrhea  · You have blood (red or black color) or mucus in your stool  · You feel very weak or dizzy, faint, or have extreme thirst  · You have a fever of 100.4ºF (38.0ºC) or higher, or as directed by your healthcare provider  Date Last Reviewed: 8/31/2015  © 5068-1379 [employer]. [address] [place] [address], [place] [address]. All rights reserved. This information  is not intended as a substitute for professional medical care. Always follow your healthcare professional's instructions.

## 2018-03-09 ENCOUNTER — TELEPHONE (OUTPATIENT)
Dept: OPHTHALMOLOGY | Facility: CLINIC | Age: 78
End: 2018-03-09

## 2018-03-09 ENCOUNTER — PATIENT MESSAGE (OUTPATIENT)
Dept: OPHTHALMOLOGY | Facility: CLINIC | Age: 78
End: 2018-03-09

## 2018-03-11 ENCOUNTER — PATIENT MESSAGE (OUTPATIENT)
Dept: OPHTHALMOLOGY | Facility: CLINIC | Age: 78
End: 2018-03-11

## 2018-03-12 ENCOUNTER — TELEPHONE (OUTPATIENT)
Dept: FAMILY MEDICINE | Facility: CLINIC | Age: 78
End: 2018-03-12

## 2018-03-12 NOTE — TELEPHONE ENCOUNTER
Received incoming call from patient's .  states that patient has been throwing up a lot and she appears to have some bright red blood in her bile.  is concerned and states he does not want to lose her.  states patient started throwing up around 1:00 this afternoon, and it happened suddenly. Pt does not have anything to take for nausea. Please advise. Thanks.

## 2018-04-16 ENCOUNTER — PATIENT MESSAGE (OUTPATIENT)
Dept: FAMILY MEDICINE | Facility: CLINIC | Age: 78
End: 2018-04-16

## 2018-04-16 ENCOUNTER — PROCEDURE VISIT (OUTPATIENT)
Dept: OPHTHALMOLOGY | Facility: CLINIC | Age: 78
End: 2018-04-16
Payer: MEDICARE

## 2018-04-16 VITALS — HEART RATE: 54 BPM | SYSTOLIC BLOOD PRESSURE: 148 MMHG | DIASTOLIC BLOOD PRESSURE: 80 MMHG

## 2018-04-16 DIAGNOSIS — H35.3221 EXUDATIVE AGE-RELATED MACULAR DEGENERATION OF LEFT EYE WITH ACTIVE CHOROIDAL NEOVASCULARIZATION: Primary | ICD-10-CM

## 2018-04-16 DIAGNOSIS — H35.3112 NONEXUDATIVE AGE-RELATED MACULAR DEGENERATION, RIGHT EYE, INTERMEDIATE DRY STAGE: ICD-10-CM

## 2018-04-16 PROCEDURE — 92014 COMPRE OPH EXAM EST PT 1/>: CPT | Mod: 25,S$GLB,, | Performed by: OPHTHALMOLOGY

## 2018-04-16 PROCEDURE — 99499 UNLISTED E&M SERVICE: CPT | Mod: S$PBB,,, | Performed by: OPHTHALMOLOGY

## 2018-04-16 PROCEDURE — 67028 INJECTION EYE DRUG: CPT | Mod: LT,S$GLB,, | Performed by: OPHTHALMOLOGY

## 2018-04-16 PROCEDURE — 92134 CPTRZ OPH DX IMG PST SGM RTA: CPT | Mod: S$GLB,,, | Performed by: OPHTHALMOLOGY

## 2018-04-16 RX ADMIN — Medication 1.25 MG: at 09:04

## 2018-04-16 NOTE — PROGRESS NOTES
HPI     Macular Degeneration    Additional comments: 2 mon AMD chk            OCT - slight increase in SRF OS  Drusen OD      A/P    1. Wet AMD OS  S/p Avastin OS x 13, Eylea x 14    Persistent SRF OS - improving  With RPE tear OS  Central fibrosis with atrophy - poor central potential  10/17 - increased SRH - will keep at 8 weeks for now    Avastin OS    2. Dry AMD OD  AREDS/AG    3. PCIOL OU  CTR OS - but saw 20/30 with correction, in spite of sub RPE fibrosis      4. Glc Suspect OU   Good IOP control on Timolol    5. Floaters OU      10 weeks OCT      Risks, benefits, and alternatives to treatment discussed in detail with the patient.  The patient voiced understanding and wished to proceed with the procedure    Injection Procedure Note:  Diagnosis: Wet AMD OS    Topical Proparacaine and Betadine.  Inject Avastin OS at 6:00 @ 3.5-4mm posterior to limbus  Post Operative Dx: Same  Complications: None  Follow up as above.

## 2018-04-16 NOTE — PATIENT INSTRUCTIONS

## 2018-04-17 ENCOUNTER — PATIENT MESSAGE (OUTPATIENT)
Dept: FAMILY MEDICINE | Facility: CLINIC | Age: 78
End: 2018-04-17

## 2018-04-18 ENCOUNTER — TELEPHONE (OUTPATIENT)
Dept: DERMATOLOGY | Facility: CLINIC | Age: 78
End: 2018-04-18

## 2018-04-18 ENCOUNTER — PATIENT MESSAGE (OUTPATIENT)
Dept: DERMATOLOGY | Facility: CLINIC | Age: 78
End: 2018-04-18

## 2018-04-18 ENCOUNTER — OFFICE VISIT (OUTPATIENT)
Dept: DERMATOLOGY | Facility: CLINIC | Age: 78
End: 2018-04-18
Payer: MEDICARE

## 2018-04-18 VITALS — RESPIRATION RATE: 16 BRPM | BODY MASS INDEX: 24.66 KG/M2 | WEIGHT: 148 LBS | HEIGHT: 65 IN

## 2018-04-18 DIAGNOSIS — L85.3 XEROSIS CUTIS: Primary | ICD-10-CM

## 2018-04-18 DIAGNOSIS — L29.9 PRURITUS: ICD-10-CM

## 2018-04-18 PROCEDURE — 99203 OFFICE O/P NEW LOW 30 MIN: CPT | Mod: S$GLB,,, | Performed by: DERMATOLOGY

## 2018-04-18 PROCEDURE — 99999 PR PBB SHADOW E&M-EST. PATIENT-LVL III: CPT | Mod: PBBFAC,,, | Performed by: DERMATOLOGY

## 2018-04-18 RX ORDER — MOMETASONE FUROATE 1 MG/G
CREAM TOPICAL
Qty: 50 G | Refills: 1 | Status: SHIPPED | OUTPATIENT
Start: 2018-04-18 | End: 2018-12-31

## 2018-04-18 NOTE — PROGRESS NOTES
Subjective:       Patient ID:  Serenity Epps is a 77 y.o. female who presents for   Chief Complaint   Patient presents with    Rash     back , arm,  Right knee & abd     Initial visit for rash   Was seen by Dr Andersen 1-12-18 for  pruritic rash on her back - Xerosis cutis-   Moisturizing cream and tx HC- improved.   Itching began on posterior scalp- Improved with head and shoulders . Now appears on neck , shoulders, abd , arm & Right knee. Begins as red & firey , small bumps & itchy. Takes very hot showers.  Pt treated w/ topical benadryl , vinegar & OTC lotions  Pt denies any new medications prior to rash   Pt began Duloxetine in Jan 2018 - after the rash    No one else in house hold has rash .  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     No phx skin ca   Past Medical History:  No date: Anxiety  No date: Arthritis  No date: Cataract - Both Eyes      Comment: OU done// 9/21/2016: CKD (chronic kidney disease), stage II  No date: Diverticulosis  No date: DVT (deep venous thrombosis)      Comment: left leg, after childbirth  No date: DVT (deep venous thrombosis)  No date: DVT (deep venous thrombosis)  2/20/2014: Exudative age-related macular degeneration of *  No date: Glaucoma  No date: Hypertension  No date: Irritable bowel syndrome  No date: Left foot pain      Comment: chronic  No date: Macular degeneration      Comment: bimonthly intraoccular injections  No date: Osteopenia  No date: Rhinitis, chronic  No date: Strabismus      Comment: as child          Review of Systems   Skin: Positive for itching, rash and dry skin. Negative for daily sunscreen use, activity-related sunscreen use, sensitivity to antibiotic ointment, sensitivity to bandage adhesive and tendency to form keloidal scars.   Hematologic/Lymphatic: Bruises/bleeds easily.        Objective:    Physical Exam   Constitutional: She appears well-developed and well-nourished. No distress.   HENT:   Mouth/Throat: Lips normal.    Eyes: Lids are normal.  No  conjunctival no injection.   Cardiovascular: There is no local extremity swelling and no dependent edema.     Neurological: She is alert and oriented to person, place, and time. She is not disoriented.   Psychiatric: She has a normal mood and affect.   Skin:   Areas Examined (abnormalities noted in diagram):   Head / Face Inspection Performed  Neck Inspection Performed  Chest / Axilla Inspection Performed  Abdomen Inspection Performed  Back Inspection Performed  RUE Inspected  LUE Inspection Performed  RLE Inspected  LLE Inspection Performed              Diagram Legend     Erythematous scaling macule/papule c/w actinic keratosis       Vascular papule c/w angioma      Pigmented verrucoid papule/plaque c/w seborrheic keratosis      Yellow umbilicated papule c/w sebaceous hyperplasia      Irregularly shaped tan macule c/w lentigo     1-2 mm smooth white papules consistent with Milia      Movable subcutaneous cyst with punctum c/w epidermal inclusion cyst      Subcutaneous movable cyst c/w pilar cyst      Firm pink to brown papule c/w dermatofibroma      Pedunculated fleshy papule(s) c/w skin tag(s)      Evenly pigmented macule c/w junctional nevus     Mildly variegated pigmented, slightly irregular-bordered macule c/w mildly atypical nevus      Flesh colored to evenly pigmented papule c/w intradermal nevus       Pink pearly papule/plaque c/w basal cell carcinoma      Erythematous hyperkeratotic cursted plaque c/w SCC      Surgical scar with no sign of skin cancer recurrence      Open and closed comedones      Inflammatory papules and pustules      Verrucoid papule consistent consistent with wart     Erythematous eczematous patches and plaques     Dystrophic onycholytic nail with subungual debris c/w onychomycosis     Umbilicated papule    Erythematous-base heme-crusted tan verrucoid plaque consistent with inflamed seborrheic keratosis     Erythematous Silvery Scaling Plaque c/w Psoriasis     See  annotation      Assessment / Plan:        Xerosis cutis  Discussed the following dry skin tips:    Avoid hot baths and showers, keep showers or baths brief (10-15 min), use a mild soap or cleanser, pat skin dry after showering and apply a moisturizer within 3 minutes of getting out of bath (cetaphil cream, ceraVe, aquaphor) and reapply moisturizers 2-4 times/day.   CeraVe cream bid   -     mometasone 0.1% (ELOCON) 0.1 % cream; aaa bid prn itching, redness, avoid chronic use  Dispense: 50 g; Refill: 1    Pruritus  -     mometasone 0.1% (ELOCON) 0.1 % cream; aaa bid prn itching, redness, avoid chronic use  Dispense: 50 g; Refill: 1  discussed avoiding chronic use and to use only on affected areas- risk atrophy, striae, ecchymoses, hypopigmentation               Follow-up in about 2 weeks (around 5/2/2018).

## 2018-04-18 NOTE — TELEPHONE ENCOUNTER
----- Message from Surinder Smallwood sent at 4/18/2018  3:57 PM CDT -----  Contact: pt  Pt is calling to see If her prescription was sent over to the pharmacy yet  Call Back#[phone] or   Thanks

## 2018-04-27 ENCOUNTER — OFFICE VISIT (OUTPATIENT)
Dept: FAMILY MEDICINE | Facility: CLINIC | Age: 78
End: 2018-04-27
Payer: MEDICARE

## 2018-04-27 VITALS
SYSTOLIC BLOOD PRESSURE: 144 MMHG | HEART RATE: 62 BPM | OXYGEN SATURATION: 97 % | HEIGHT: 65 IN | WEIGHT: 152.31 LBS | DIASTOLIC BLOOD PRESSURE: 82 MMHG | BODY MASS INDEX: 25.38 KG/M2

## 2018-04-27 DIAGNOSIS — Z00.00 ENCOUNTER FOR PREVENTIVE HEALTH EXAMINATION: Primary | ICD-10-CM

## 2018-04-27 DIAGNOSIS — F41.9 ANXIETY: ICD-10-CM

## 2018-04-27 DIAGNOSIS — I10 ESSENTIAL HYPERTENSION: ICD-10-CM

## 2018-04-27 DIAGNOSIS — F33.1 MODERATE EPISODE OF RECURRENT MAJOR DEPRESSIVE DISORDER: ICD-10-CM

## 2018-04-27 DIAGNOSIS — I51.89 VENTRICULAR DIASTOLIC DYSFUNCTION DETERMINED BY ECHOCARDIOGRAPHY: ICD-10-CM

## 2018-04-27 DIAGNOSIS — M85.80 OSTEOPENIA, UNSPECIFIED LOCATION: ICD-10-CM

## 2018-04-27 PROBLEM — F33.9 RECURRENT MAJOR DEPRESSIVE DISORDER: Status: ACTIVE | Noted: 2018-04-27

## 2018-04-27 PROCEDURE — G0439 PPPS, SUBSEQ VISIT: HCPCS | Mod: S$GLB,,, | Performed by: NURSE PRACTITIONER

## 2018-04-27 PROCEDURE — 3077F SYST BP >= 140 MM HG: CPT | Mod: CPTII,S$GLB,, | Performed by: NURSE PRACTITIONER

## 2018-04-27 PROCEDURE — 99999 PR PBB SHADOW E&M-EST. PATIENT-LVL V: CPT | Mod: PBBFAC,,, | Performed by: NURSE PRACTITIONER

## 2018-04-27 PROCEDURE — 3079F DIAST BP 80-89 MM HG: CPT | Mod: CPTII,S$GLB,, | Performed by: NURSE PRACTITIONER

## 2018-04-27 PROCEDURE — 99499 UNLISTED E&M SERVICE: CPT | Mod: S$PBB,,, | Performed by: NURSE PRACTITIONER

## 2018-04-27 NOTE — PROGRESS NOTES
"Serenity Epps presented for a  Medicare AWV and comprehensive Health Risk Assessment today. The following components were reviewed and updated:    · Medical history  · Family History  · Social history  · Allergies and Current Medications  · Health Risk Assessment  · Health Maintenance  · Care Team     Review of Systems   Constitutional: Negative for chills, fever, malaise/fatigue and weight loss.   Respiratory: Negative for cough, shortness of breath and wheezing.    Cardiovascular: Negative for chest pain.   Gastrointestinal: Positive for diarrhea (colonoscopy scheduled). Negative for abdominal pain, blood in stool, constipation, nausea and vomiting.   Skin: Negative for rash.   Neurological: Negative for dizziness, weakness and headaches.     ** See Completed Assessments for Annual Wellness Visit within the encounter summary.**     The following assessments were completed:  · Living Situation  · CAGE  · Depression Screening  · Timed Get Up and Go  · Whisper Test  · Cognitive Function Screening      · Nutrition Screening  · ADL Screening  · PAQ Screening    Vitals:    04/27/18 0805   BP: (!) 144/82   BP Location: Left arm   Patient Position: Sitting   BP Method: Medium (Manual)   Pulse: 62   SpO2: 97%   Weight: 69.1 kg (152 lb 5.4 oz)   Height: 5' 5" (1.651 m)     Body mass index is 25.35 kg/m².  Physical Exam   Constitutional: She is oriented to person, place, and time. She appears well-nourished.   Cardiovascular: Normal rate, regular rhythm, normal heart sounds and intact distal pulses.    Pulmonary/Chest: Effort normal and breath sounds normal. She has no wheezes. She has no rales.   Neurological: She is alert and oriented to person, place, and time.   Skin: Skin is warm and dry. No rash noted.   Vitals reviewed.        Diagnoses and health risks identified today and associated recommendations/orders:    1. Encounter for preventive health examination  Reviewed and discussed health maintenance.      2. " Anxiety  Stable- continue current treatment and follow up routinely with PCP     3. Moderate episode of recurrent major depressive disorder  Stable- continue current treatment and follow up routinely with PCP     4. Essential hypertension  Stable- continue current treatment and follow up routinely with PCP     5. 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 Osteopenia, unspecified location  Stable- continue current treatment and follow up routinely with PCP     I offered to discuss end of life issues, including information on how to make advance directives that the patient could use to name someone who would make medical decisions on their behalf if they became too ill to make themselves.    _X_Patient declined. Already on file  ___Patient is interested, I provided paper work and offered to discuss.    Provided Serenity with a 5-10 year written screening schedule and personal prevention plan. Recommendations were developed using the USPSTF age appropriate recommendations. Education, counseling, and referrals were provided as needed. After Visit Summary printed and given to patient which includes a list of additional screenings\tests needed.    Janeth Ordonez NP

## 2018-04-27 NOTE — PATIENT INSTRUCTIONS
Counseling and Referral of Other Preventative  (Italic type indicates deductible and co-insurance are waived)    Patient Name: Serenity Epps  Today's Date: 4/27/2018    Health Maintenance       Date Due Completion Date    DEXA SCAN 08/16/2019 8/16/2016    Lipid Panel 08/07/2022 8/7/2017    TETANUS VACCINE 09/21/2026 9/21/2016        No orders of the defined types were placed in this encounter.    The following information is provided to all patients.  This information is to help you find resources for any of the problems found today that may be affecting your health:                Living healthy guide: www.[facility].[place].[facility]      Understanding Diabetes: www.diabetes.org      Eating healthy: www.cdc.gov/healthyweight      CDC home safety checklist: www.cdc.gov/steadi/patient.html      Agency on Aging: www.goea.[place].[facility]      Alcoholics anonymous (AA): www.aa.org      Physical Activity: www.divina.nih.gov/vz2dbkm      Tobacco use: www.quitwithusla.org

## 2018-05-02 ENCOUNTER — PATIENT MESSAGE (OUTPATIENT)
Dept: DERMATOLOGY | Facility: CLINIC | Age: 78
End: 2018-05-02

## 2018-05-02 ENCOUNTER — ANESTHESIA EVENT (OUTPATIENT)
Dept: ENDOSCOPY | Facility: HOSPITAL | Age: 78
End: 2018-05-02
Payer: MEDICARE

## 2018-05-02 ENCOUNTER — SURGERY (OUTPATIENT)
Age: 78
End: 2018-05-02

## 2018-05-02 ENCOUNTER — ANESTHESIA (OUTPATIENT)
Dept: ENDOSCOPY | Facility: HOSPITAL | Age: 78
End: 2018-05-02
Payer: MEDICARE

## 2018-05-02 ENCOUNTER — HOSPITAL ENCOUNTER (OUTPATIENT)
Facility: HOSPITAL | Age: 78
Discharge: HOME | End: 2018-05-02
Attending: INTERNAL MEDICINE | Admitting: INTERNAL MEDICINE
Payer: MEDICARE

## 2018-05-02 VITALS
SYSTOLIC BLOOD PRESSURE: 153 MMHG | HEART RATE: 52 BPM | HEIGHT: 65 IN | RESPIRATION RATE: 16 BRPM | TEMPERATURE: 98 F | DIASTOLIC BLOOD PRESSURE: 72 MMHG | BODY MASS INDEX: 25.33 KG/M2 | OXYGEN SATURATION: 94 % | WEIGHT: 152 LBS

## 2018-05-02 DIAGNOSIS — R19.7 DIARRHEA: ICD-10-CM

## 2018-05-02 PROCEDURE — 63600175 PHARM REV CODE 636 W HCPCS: Mod: PO | Performed by: NURSE ANESTHETIST, CERTIFIED REGISTERED

## 2018-05-02 PROCEDURE — 87449 NOS EACH ORGANISM AG IA: CPT

## 2018-05-02 PROCEDURE — 87209 SMEAR COMPLEX STAIN: CPT

## 2018-05-02 PROCEDURE — 88305 TISSUE EXAM BY PATHOLOGIST: CPT | Performed by: PATHOLOGY

## 2018-05-02 PROCEDURE — 87046 STOOL CULTR AEROBIC BACT EA: CPT

## 2018-05-02 PROCEDURE — 45380 COLONOSCOPY AND BIOPSY: CPT | Mod: PO | Performed by: INTERNAL MEDICINE

## 2018-05-02 PROCEDURE — 87045 FECES CULTURE AEROBIC BACT: CPT

## 2018-05-02 PROCEDURE — 45380 COLONOSCOPY AND BIOPSY: CPT | Mod: ,,, | Performed by: INTERNAL MEDICINE

## 2018-05-02 PROCEDURE — 27201012 HC FORCEPS, HOT/COLD, DISP: Mod: PO | Performed by: INTERNAL MEDICINE

## 2018-05-02 PROCEDURE — 37000008 HC ANESTHESIA 1ST 15 MINUTES: Mod: PO | Performed by: INTERNAL MEDICINE

## 2018-05-02 PROCEDURE — 88305 TISSUE EXAM BY PATHOLOGIST: CPT | Mod: 26,,, | Performed by: PATHOLOGY

## 2018-05-02 PROCEDURE — 87427 SHIGA-LIKE TOXIN AG IA: CPT | Mod: 59

## 2018-05-02 PROCEDURE — 25000003 PHARM REV CODE 250: Mod: PO | Performed by: ANESTHESIOLOGY

## 2018-05-02 PROCEDURE — D9220A PRA ANESTHESIA: Mod: CRNA,,, | Performed by: NURSE ANESTHETIST, CERTIFIED REGISTERED

## 2018-05-02 PROCEDURE — D9220A PRA ANESTHESIA: Mod: ANES,,, | Performed by: ANESTHESIOLOGY

## 2018-05-02 PROCEDURE — 37000009 HC ANESTHESIA EA ADD 15 MINS: Mod: PO | Performed by: INTERNAL MEDICINE

## 2018-05-02 RX ORDER — SODIUM CHLORIDE, SODIUM LACTATE, POTASSIUM CHLORIDE, CALCIUM CHLORIDE 600; 310; 30; 20 MG/100ML; MG/100ML; MG/100ML; MG/100ML
INJECTION, SOLUTION INTRAVENOUS CONTINUOUS
Status: DISCONTINUED | OUTPATIENT
Start: 2018-05-02 | End: 2018-05-02 | Stop reason: HOSPADM

## 2018-05-02 RX ORDER — LIDOCAINE HYDROCHLORIDE 10 MG/ML
1 INJECTION, SOLUTION EPIDURAL; INFILTRATION; INTRACAUDAL; PERINEURAL ONCE
Status: COMPLETED | OUTPATIENT
Start: 2018-05-02 | End: 2018-05-02

## 2018-05-02 RX ORDER — DICYCLOMINE HYDROCHLORIDE 10 MG/1
10 CAPSULE ORAL
Qty: 120 CAPSULE | Refills: 11 | Status: SHIPPED | OUTPATIENT
Start: 2018-05-02 | End: 2018-08-30 | Stop reason: ALTCHOICE

## 2018-05-02 RX ORDER — PROPOFOL 10 MG/ML
VIAL (ML) INTRAVENOUS
Status: DISCONTINUED | OUTPATIENT
Start: 2018-05-02 | End: 2018-05-02

## 2018-05-02 RX ORDER — LIDOCAINE HCL/PF 100 MG/5ML
SYRINGE (ML) INTRAVENOUS
Status: DISCONTINUED | OUTPATIENT
Start: 2018-05-02 | End: 2018-05-02

## 2018-05-02 RX ADMIN — PROPOFOL 30 MG: 10 INJECTION, EMULSION INTRAVENOUS at 08:05

## 2018-05-02 RX ADMIN — LIDOCAINE HYDROCHLORIDE: 10 INJECTION, SOLUTION EPIDURAL; INFILTRATION; INTRACAUDAL; PERINEURAL at 07:05

## 2018-05-02 RX ADMIN — LIDOCAINE HYDROCHLORIDE 100 MG: 20 INJECTION, SOLUTION INTRAVENOUS at 08:05

## 2018-05-02 RX ADMIN — SODIUM CHLORIDE, SODIUM LACTATE, POTASSIUM CHLORIDE, CALCIUM CHLORIDE: 600; 310; 30; 20 INJECTION, SOLUTION INTRAVENOUS at 07:05

## 2018-05-02 NOTE — ANESTHESIA PREPROCEDURE EVALUATION
05/02/2018  Serenity Epps is a 77 y.o., female.    Anesthesia Evaluation      I have reviewed the Medications.     Review of Systems  Anesthesia Hx:  No problems with previous Anesthesia   Social:  Non-Smoker, No Alcohol Use    Hematology/Oncology:        Hematology Comments: Hx DVT   Cardiovascular:   Hypertension    Pulmonary:  Pulmonary Normal    Renal/:   Chronic Renal Disease, CRI    Hepatic/GI:  Hepatic/GI Normal    Neurological:  Neurology Normal    Endocrine:  Endocrine Normal    Psych:   anxiety depression          Physical Exam  General:  Well nourished    Airway/Jaw/Neck:  Airway Findings: Mouth Opening: Normal General Airway Assessment: Adult  Mallampati: II  Jaw/Neck Findings:  Neck ROM: Extension Decreased, Mild       Chest/Lungs:  Chest/Lungs Findings: Clear to auscultation, Normal Respiratory Rate     Heart/Vascular:  Heart Findings: Rate: Normal  Rhythm: Regular Rhythm, Occasional Prematures  Sounds: Normal  Heart murmur: negative Vascular Findings: Normal (No carotid bruits.)       Mental Status:  Mental Status Findings:  Cooperative, Alert and Oriented         Anesthesia Plan  Type of Anesthesia, risks & benefits discussed:  Anesthesia Type:  general  Patient's Preference:   Intra-op Monitoring Plan:   Intra-op Monitoring Plan Comments:   Post Op Pain Control Plan:   Post Op Pain Control Plan Comments:   Induction:   IV  Beta Blocker:  Patient is on a Beta-Blocker and has received one dose within the past 24 hours (No further documentation required).       Informed Consent: Patient understands risks and agrees with Anesthesia plan.  Questions answered. Anesthesia consent signed with patient.  ASA Score: 2     Day of Surgery Review of History & Physical:        Anesthesia Plan Notes: Propofol general.        Ready For Surgery From Anesthesia Perspective.

## 2018-05-02 NOTE — TRANSFER OF CARE
"Anesthesia Transfer of Care Note    Patient: Serenity Epps    Procedure(s) Performed: Procedure(s) (LRB):  COLONOSCOPY (N/A)    Patient location: PACU    Anesthesia Type: general    Transport from OR: Transported from OR on room air with adequate spontaneous ventilation    Post pain: adequate analgesia    Post assessment: no apparent anesthetic complications and tolerated procedure well    Post vital signs: stable    Level of consciousness: awake and alert    Nausea/Vomiting: no nausea/vomiting    Complications: none    Transfer of care protocol was followed      Last vitals:   Visit Vitals  BP (!) 188/88 (BP Location: Right arm, Patient Position: Lying)   Pulse (!) 59   Temp 36.6 °C (97.9 °F) (Skin)   Resp 18   Ht 5' 5" (1.651 m)   Wt 68.9 kg (152 lb)   SpO2 98%   BMI 25.29 kg/m²     "

## 2018-05-02 NOTE — PROVATION PATIENT INSTRUCTIONS
Discharge Summary/Instructions for after Colonoscopy with   Biopsy/Polypectomy  Serenity Epps    Wednesday, May 02, 2018  Toby Lynn MD  RESTRICTIONS ON ACTIVITY:  - Do not drive a car or operate machinery until the day after the procedure.      - The following day: return to full activity including work.  - For  3 days: No heavy lifting, straining or running.  - Diet: You can have solid foods, but no gassy foods (i.e. beans, broccoli,   cabbage, etc).  TREATMENT FOR COMMON SIDE EFFECTS:  - Mild abdominal pain and bloating or excessive gas: rest, eat lightly and   use a heating pad.  SYMPTOMS TO WATCH FOR AND REPORT TO YOUR PHYSICIAN:  1. Severe abdominal pain.  2. Fever within 24 hours after a procedure.  3. A large amount of rectal bleeding. (A small amount of blood from the   rectum is not serious, especially if hemorrhoids are present.  3.  Because air was put into your colon during the procedure, expelling   large amounts of air from your rectum is normal.  4.  You may not have a bowel movement for 1-3 days because of the   colonoscopy prep.  This is normal.  5.  Call immediately if you notice any of the following:   Chills and/or fever over 101   Persistent vomiting   Severe abdominal pain, other than gas cramps   Severe chest pain   Black, tarry stools   Any bleeding - exceeding one tablespoon  Your doctor recommends these additional instructions:  Eat a high fiber diet.   Take a fiber supplement, for example Citrucel, Fibercon, Konsyl or   Metamucil.   Continue your present medications.   Take Bentyl (dicyclomine) 10-20 mg by mouth four times per day 30 minutes   before meals.   Your physician has indicated that a repeat colonoscopy is not recommended.   Take a PROBIOTIC, such as ALIGN or CULTURELLE or SAMY-Q (all   non-prescription), every day for a month.   And repeat this at least 5-6   times a year.  Call the G[facility] clinic in 2 weeks for reports (if you haven't heard from us   sooner)   [phone].  None  If you have any questions or problems, please call your physician.  EMERGENCY PHONE NUMBER: [phone]  LAB RESULTS: Call in two (2) weeks for lab results, [phone]  ___________________________________________  Nurse Signature  ___________________________________________  Patient/Designated Responsible Party Signature  Toby Lynn MD  5/2/2018 9:14:05 AM  This report has been verified and signed electronically.

## 2018-05-02 NOTE — PLAN OF CARE
Vital signs stable. All questions answered. Denies recent fever or illness. Patient states ready for planned procedure. to bedside.

## 2018-05-02 NOTE — BRIEF OP NOTE
Discharge Note  Short Stay      SUMMARY     Admit Date: 5/2/2018    Attending Physician: Toby Lynn Jr., MD     Discharge Physician: Toby Lynn Jr., MD    Discharge Date: 5/2/2018 9:16 AM    Final Diagnosis: Diarrhea, unspecified type [R19.7]  Irritable bowel syndrome, unspecified type [K58.9]  Impression:          - Diverticulosis in the sigmoid colon and in the                        distal descending colon.                       - Mild colonic spasm consistent with irritable bowel                        syndrome.                       - Non-bleeding internal hemorrhoids.                       - The examination was otherwise normal.                       - The examined portion of the ileum was normal.                       - The rectum and recto-sigmoid colon are normal.                        Fluid aspiration performed.                       Biopsied.  Recommendation:      - Discharge patient to home.                       - Await pathology and culture results.                       - High fiber diet.                       - Use fiber, for example Citrucel, Fibercon, Konsyl                        or Metamucil.                       - Take a PROBIOTIC, such as ALIGN or CULTURELLE or                        SAMY-Q (all non-prescription), every day for a                        month.                       - Continue present medications.                       - Use Bentyl (dicyclomine) 10 mg PO QID 30 min AC.                       - Call the G[facility] clinic in 2 weeks for reports (if                        you haven't heard from us sooner) [phone].                       - Repeat colonoscopy is not recommended.                       - Patient has a contact number available for                        emergencies. The signs and symptoms of potential                        delayed complications were discussed with the                        patient. Return to normal activities tomorrow.                         Written discharge instructions were provided to the                        patient.                       - Return to normal activities tomorrow.  Toby Lynn MD  5/2/2018  Disposition: HOME OR SELF CARE    Patient Instructions:   Current Discharge Medication List      START taking these medications    Details   dicyclomine (BENTYL) 10 MG capsule Take 1 capsule (10 mg total) by mouth 4 (four) times daily before meals and nightly. 1 or 2, ac & hs, prn to ease cramps and diarrhea.  Qty: 120 capsule, Refills: 11         CONTINUE these medications which have NOT CHANGED    Details   biotin 1 mg tablet Take 1,000 mcg by mouth once daily.      brimonidine 0.2% (ALPHAGAN) 0.2 % Drop Place 1 drop into both eyes 2 (two) times daily.  Qty: 10 mL, Refills: 11      calcium carbonate (OS-BLAKE) 600 mg (1,500 mg) Tab Take 600 mg by mouth 2 (two) times daily with meals.      dorzolamide-timolol 2-0.5% (COSOPT) 22.3-6.8 mg/mL ophthalmic solution Place 1 drop into both eyes 2 (two) times daily.  Qty: 30 mL, Refills: 3    Comments: 90 day supply      DULoxetine (CYMBALTA) 30 MG capsule Take 1 capsule by mouth once daily.      gabapentin (NEURONTIN) 400 MG capsule Take 1 capsule (400 mg total) by mouth 3 (three) times daily.  Qty: 90 capsule, Refills: 11      LORazepam (ATIVAN) 0.5 MG tablet TAKE ONE TABLET BY MOUTH EVERY 12 HOURS AS NEEDED  Qty: 40 tablet, Refills: 0      metoprolol succinate (TOPROL-XL) 25 MG 24 hr tablet Take 1 tablet (25 mg total) by mouth 2 (two) times daily.  Qty: 60 tablet, Refills: 11      mometasone 0.1% (ELOCON) 0.1 % cream aaa bid prn itching, redness, avoid chronic use  Qty: 50 g, Refills: 1    Associated Diagnoses: Xerosis cutis; Pruritus      ondansetron (ZOFRAN) 4 MG tablet Take 1 tablet (4 mg total) by mouth every 6 (six) hours.  Qty: 12 tablet, Refills: 0      VIT C/E/ZN/COPPR/LUTEIN/ZEAXAN (PRESERVISION AREDS 2 ORAL) Take by mouth.      aspirin (ECOTRIN) 81 MG EC tablet Take 81 mg by mouth  once daily.        naproxen sodium (ALEVE) 220 mg Cap Take 220 mg by mouth as needed.              Discharge Procedure Orders (must include Diet, Follow-up, Activity)    Follow Up:  Follow up with PCP as per your routine.  Please follow a high fiber diet.  Activity as tolerated.    No driving day of procedure.    PROBIOTICS:  Now that your colon is so cleaned out, now is a good time for a round of PROBIOTICS.   Take a Probiotic product such as Align or Culturelle or Alva-Q, every day for a month.                  (The products listed are non-prescription, but you may need to ask the pharmacist for their location.)  Repeat this at least 5-6 times a year.

## 2018-05-02 NOTE — ANESTHESIA POSTPROCEDURE EVALUATION
"Anesthesia Post Evaluation    Patient: Serenity Epps    Procedure(s) Performed: Procedure(s) (LRB):  COLONOSCOPY (N/A)    Final Anesthesia Type: general  Patient location during evaluation: PACU  Patient participation: Yes- Able to Participate  Level of consciousness: awake and alert  Post-procedure vital signs: reviewed and stable  Pain management: adequate  Airway patency: patent  PONV status at discharge: No PONV  Anesthetic complications: no      Cardiovascular status: hemodynamically stable and blood pressure returned to baseline  Respiratory status: unassisted, spontaneous ventilation and room air  Hydration status: euvolemic  Follow-up not needed.        Visit Vitals  BP (!) 153/72 (BP Location: Left arm, Patient Position: Lying)   Pulse (!) 52   Temp 36.7 °C (98 °F) (Skin)   Resp 16   Ht 5' 5" (1.651 m)   Wt 68.9 kg (152 lb)   SpO2 (!) 94%   BMI 25.29 kg/m²       Pain/Kassy Score: Pain Assessment Performed: Yes (5/2/2018  9:10 AM)  Presence of Pain: denies (5/2/2018  9:10 AM)  Kassy Score: 10 (5/2/2018  9:10 AM)      "

## 2018-05-02 NOTE — H&P
History & Physical - Short Stay  Gastroenterology      SUBJECTIVE:     Procedure: Colonoscopy    Chief Complaint/Indication for Procedure: Diarrhea.    History of Present Illness:  Initial consult     2/28/2018  [place] - Gastroenterology   [facility] Wilson   Gastroenterology   Diarrhea, unspecified type +1 more   Dx   Diarrhea; Referred by Hua Andersen MD   Reason for Visit    Progress Notes        Subjective:       Patient ID: Serenity Epps is a 77 y.o. female Body mass index is 25.46 kg/m².     Chief Complaint: Diarrhea     This patient is new to me.  Referring Provider:  Dr. Andersen for diarrhea  Established patient of Dr. Lynn (last in 2012).     Patient is here with , whom assisted with history.     Diarrhea    The current episode started more than 1 month ago (started early 1/2018). Episode frequency: 4-8 times a day. The problem has been gradually improving. Diarrhea characteristics: watery at first, now soft fluffy pieces. The patient states that diarrhea awakens (at when it first started) her from sleep. Associated symptoms include bloating (occasional). Pertinent negatives include no abdominal pain (reports generalized abdominal discomfort, notice it after bowel movements, described as mild; denies pain), chills, coughing, fever, increased  flatus (occasional flatulence), vomiting or weight loss. Associated symptoms comments: Fecal urgency: has improved. Nothing aggravates the symptoms. Risk factors include ill contacts (ill contacts-  had diarrhea virus prior to her symptoms; denies recent antibiotic/hospitalization, foreign travel, or suspect food intake). Her past medical history is significant for irritable bowel syndrome. There is no history of inflammatory bowel disease.     Gastrointestinal: Positive for bloating (occasional), constipation (history of chronic constipation; was taking colace daily for a year, denies currently) and diarrhea. Negative for abdominal pain  "(reports generalized abdominal discomfort, notice it after bowel movements, described as mild; denies pain), anal bleeding, blood in stool, flatus (occasional flatulence), nausea, rectal pain and vomiting.     10/3/12 Colonoscopy was reviewed and procedure report states:   " Findings:       The perianal and digital rectal examinations were normal. Pertinent        negatives include normal sphincter tone and no palpable rectal        lesions. Internal hemorrhoids were found during retroflexion and        were small. No additional abnormalities were found on retroflexion.        The rectum and recto-sigmoid colon appeared normal. Many        small-mouthed diverticula were found in the sigmoid colon. There was        mild spasm in the sigmoid colon. The sigmoid colon was mildly        tortuous. The left colon and transverse colon were mildly redundant.        The exam was otherwise without abnormality.. The terminal ileum        appeared normal.  Impression:          - Diverticulosis in the sigmoid colon.                       - Mild colonic spasm consistent with irritable bowel                        syndrome.                       - Internal hemorrhoids.                       - Tortuous colon.                       - Redundant colon.                       - The examination was otherwise normal.                       - The examined portion of the ileum was normal.  Recommendation:      - Discharge patient to home.                       - High fiber diet.                       - Repeat colonoscopy in 7-8 years for screening                        purposes. ".   Assessment:       1. Diarrhea, unspecified type    2. History of IBS        Plan:       Diarrhea, unspecified type  -     X-Ray Abdomen Flat And Erect; Future; Expected date: 02/28/2018  -     IgA; Future; Expected date: 02/28/2018  -     Tissue transglutaminase, IgA; Future; Expected date: 02/28/2018  -  START   dicyclomine (BENTYL) 10 MG capsule; Take 1 " capsule (10 mg total) by mouth before meals and at bedtime as needed (abdominal cramping/pain).  Dispense: 120 capsule; Refill: 0  - schedule Colonoscopy, discussed procedure with the patient, patient verbalized understanding  - recommended OTC probiotic, such as Culturelle, as directed  - avoid lactose  - recommended increase fiber in diet, especially soluble fiber since this can help bulk up the stool consistency and may help to slow down how fast the stool goes through the colon and can prevent diarrhea     History of IBS  -  START   dicyclomine (BENTYL) 10 MG capsule; Take 1 capsule (10 mg total) by mouth before meals and at bedtime as needed (abdominal cramping/pain).  Dispense: 120 capsule; Refill: 0  - discussed diagnosis with patient in depth, reviewed and gave IBS educational information in AVS, patient verbalized understanding  - recommended OTC probiotic, such as Align or Culturelle, as directed  - avoid lactose  - drink adequate water intake  -smaller, more frequent meals  -avoid trigger foods     Follow-up in about 1 month (around 3/28/2018), or if symptoms worsen or fail to improve.      If no improvement in symptoms or symptoms worsen, call/follow-up at clinic or go to ER.                Facility-Administered Medications Prior to Admission   Medication    bevacizumab (AVASTIN) 2.5 mg/0.10 mL 1.25 mg     PTA Medications   Medication Sig    biotin 1 mg tablet Take 1,000 mcg by mouth once daily.    brimonidine 0.2% (ALPHAGAN) 0.2 % Drop Place 1 drop into both eyes 2 (two) times daily.    calcium carbonate (OS-BLAKE) 600 mg (1,500 mg) Tab Take 600 mg by mouth 2 (two) times daily with meals.    dorzolamide-timolol 2-0.5% (COSOPT) 22.3-6.8 mg/mL ophthalmic solution Place 1 drop into both eyes 2 (two) times daily.    DULoxetine (CYMBALTA) 30 MG capsule Take 1 capsule by mouth once daily.    gabapentin (NEURONTIN) 400 MG capsule Take 1 capsule (400 mg total) by mouth 3 (three) times daily.    LORazepam  (ATIVAN) 0.5 MG tablet TAKE ONE TABLET BY MOUTH EVERY 12 HOURS AS NEEDED    metoprolol succinate (TOPROL-XL) 25 MG 24 hr tablet Take 1 tablet (25 mg total) by mouth 2 (two) times daily.    mometasone 0.1% (ELOCON) 0.1 % cream aaa bid prn itching, redness, avoid chronic use    ondansetron (ZOFRAN) 4 MG tablet Take 1 tablet (4 mg total) by mouth every 6 (six) hours.    VIT C/E/ZN/COPPR/LUTEIN/ZEAXAN (PRESERVISION AREDS 2 ORAL) Take by mouth.    aspirin (ECOTRIN) 81 MG EC tablet Take 81 mg by mouth once daily.      naproxen sodium (ALEVE) 220 mg Cap Take 220 mg by mouth as needed.        Review of patient's allergies indicates:   Allergen Reactions    Clindamycin Diarrhea        Past Medical History:   Diagnosis Date    Anxiety     Arthritis     Cataract - Both Eyes     OU done//    CKD (chronic kidney disease), stage II 09/21/2016    pt denies    Diarrhea     Diverticulosis     DVT (deep venous thrombosis)     left leg, after childbirth    DVT (deep venous thrombosis)     DVT (deep venous thrombosis)     Exudative age-related macular degeneration of left eye 2/20/2014    Glaucoma     Hypertension     Irritable bowel syndrome     Left foot pain     chronic    Macular degeneration     bimonthly intraoccular injections    Osteopenia     Recurrent major depressive disorder 4/27/2018    Rhinitis, chronic     Strabismus     as child     Past Surgical History:   Procedure Laterality Date    APPENDECTOMY      age 38    CATARACT EXTRACTION      OU done//    CATARACT EXTRACTION W/ INTRAOCULAR LENS  IMPLANT, BILATERAL Bilateral 2016    COLONOSCOPY  9/26/2006  [facility]    Diverticulosis.   Internal small hemorrhoids were found.     COLONOSCOPY  10/03/2012    Dr. Lynn, repeat in 7-8 years for surveillance    EYE SURGERY Bilateral     Phaco with IOL (cataract extraction), rigth:[id number] 22.0 d//    FOOT HARDWARE REMOVAL Left 06/01/2016    Dr. Hammonds    FOOT SURGERY Left 2014    ERG and open reduction  "tallo tarsal dislocation (hyprocure implant)    JOINT REPLACEMENT      PARTIAL HYSTERECTOMY      age 38, ovaries conserved    PIP JOINT FUSION Right 06/01/2016    2nd-4th PIP joints of right foot; Dr. 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  Patulous lower esophageal sphincter.   Gastric mucosal atrophy.   PARAG Test  Negative.     VEIN LIGATION  1964    BLE     Family History   Problem Relation Age of Onset    No Known Problems Brother     Breast cancer Daughter     Cancer Daughter      breast    Coronary artery disease Mother 78    Glaucoma Mother     Diverticulitis Father     No Known Problems Maternal Grandmother     No Known Problems Maternal Grandfather     No Known Problems Paternal Grandmother     No Known Problems Paternal Grandfather     Glaucoma Brother     Ankylosing spondylitis Brother     Diabetes Brother     Macular degeneration Brother       twin brother wet mac//    Cancer Brother      prostate    Crohn's disease Brother     Amblyopia Neg Hx     Blindness Neg Hx     Cataracts Neg Hx     Hypertension Neg Hx     Strabismus Neg Hx     Stroke Neg Hx     Thyroid disease Neg Hx     Retinal detachment Neg Hx     Celiac disease Neg Hx      Social History   Substance Use Topics    Smoking status: Former Smoker     Packs/day: 2.00     Years: 35.00     Types: Cigarettes     Quit date: 1/1/2002    Smokeless tobacco: Never Used    Alcohol use 8.4 oz/week     14 Glasses of wine per week      Comment: 2-3 glasses per night         OBJECTIVE:     Vital Signs (Most Recent)  Temp: 97.9 °F (36.6 °C) (05/02/18 0745)  Pulse: (!) 59 (05/02/18 0745)  Resp: 18 (05/02/18 0745)  BP: (!) 188/88 (05/02/18 0745)  SpO2: 98 % (05/02/18 0745)    Physical Exam:         :  Ht 5' 5" (1.651 m)    Wt 69.4 kg (153 lb)    BMI 25.46 kg/m²                    "                                    GENERAL:  Comfortable, in no acute distress.                                 HEENT EXAM:  Nonicteric.  No adenopathy.  Oropharynx is clear.               NECK:  Supple.                                                               LUNGS:  Clear.                                                               CARDIAC:  Regular rate and rhythm.  S1, S2.  No murmur.                      ABDOMEN:  Soft, positive bowel sounds, nontender.  No hepatosplenomegaly or masses.  No rebound or guarding.                                             EXTREMITIES:  No edema.     MENTAL STATUS:  Alert and oriented.    ASSESSMENT/PLAN:     Assessment: Diarrhea.    Plan: Colonoscopy    Anesthesia Plan:   MAC / General Anaesthesia    ASA Grade: ASA 2 - Patient with mild systemic disease with no functional limitations    MALLAMPATI SCORE: I (soft palate, uvula, fauces, and tonsillar pillars visible)

## 2018-05-02 NOTE — DISCHARGE INSTRUCTIONS
Recovery After Procedural Sedation (Adult)  You have been given medicine by vein to make you sleep during your surgery. This may have included both a pain medicine and sleeping medicine. Most of the effects have worn off. But you may still have some drowsiness for the next 6 to 8 hours.  Home care  Follow these guidelines when you get home:  · For the next 8 hours, you should be watched by a responsible adult. This person should make sure your condition is not getting worse.  · Don't drink any alcohol for the next 24 hours.  · Don't drive, operate dangerous machinery, or make important business or personal decisions during the next 24 hours.  Note: Your healthcare provider may tell you not to take any medicine by mouth for pain or sleep in the next 4 hours. These medicines may react with the medicines you were given in the hospital. This could cause a much stronger response than usual.  Follow-up care  Follow up with your healthcare provider if you are not alert and back to your usual level of activity within 12 hours.  When to seek medical advice  Call your healthcare provider right away if any of these occur:  · Drowsiness gets worse  · Weakness or dizziness gets worse  · Repeated vomiting  · You can't be awakened   Date Last Reviewed: 10/18/2016  © 0959-1705 [employer]. [address] [place] [address], [place] [address]. All rights reserved. This information is not intended as a substitute for professional medical care. Always follow your healthcare professional's instructions.      PROBIOTICS:  Now that your colon is so cleaned out, now is a good time for a round of PROBIOTICS.   Take a Probiotic product such as Align or Culturelle or Alva-Q, every day for a month.                  (The products listed are non-prescription, but you may need to ask the pharmacist for their location.)  Repeat this at least 5-6 times a year.        High-Fiber Diet  Fiber is in fruits, vegetables, cereals, and grains. Fiber  passes through your body undigested. A high-fiber diet helps food move through your intestinal tract. The added bulk is helpful in preventing constipation. In people with diverticulosis, fiber helps clean out the pouches along the colon wall. It also prevents new pouches from forming. A high-fiber diet reduces the risk of colon cancer. It also lowers blood cholesterol and prevents high blood sugar in people with diabetes.    The fiber-rich foods listed below should be part of your diet. If you are not used to high-fiber foods, start with 1 or 2 foods from this list. Every 3 to 4 days add a new one to your diet. Do this until you are eating 4 high-fiber foods per day. This should give you 20 to 35 grams of fiber a day. It is also important to drink a lot of water when you are on this diet. You should have 6 to 8 glasses of water a day. Water makes the fiber swell and increases the benefit.  Foods high in dietary fiber  The following foods are high in dietary fiber:  · Breads. Breads made with 100% whole-wheat flour; puja, wheat, or rye crackers; whole-grain tortillas, bran muffins.  · Cereals. Whole-grain and bran cereals with bran (shredded wheat, wheat flakes, raisin bran, corn bran); oatmeal, rolled oats, granola, and brown rice.  · Fruits. Fresh fruits and their edible skins (pears, prunes, raisins, berries, apples, and apricots); bananas, citrus fruit, mangoes, pineapple; and prune juice.  · Nuts. Any nuts and seeds.  · Vegetables. Best served raw or lightly cooked. All types, especially: green peas, celery, eggplant, potatoes, spinach, broccoli, [place]vert sprouts, winter squash, carrots, cauliflower, soybeans, lentils, and fresh and dried beans of all kinds.  · Other. Popcorn, any spices.  Date Last Reviewed: 8/1/2016  © 3257-0864 [employer]. [address] [place] [address], [place] [address]. All rights reserved. This information is not intended as a substitute for professional medical care. Always  follow your healthcare professional's instructions.

## 2018-05-03 LAB
C DIFF GDH STL QL: NEGATIVE
C DIFF TOX A+B STL QL IA: NEGATIVE

## 2018-05-04 LAB
E COLI SXT1 STL QL IA: NEGATIVE
E COLI SXT2 STL QL IA: NEGATIVE
O+P STL TRI STN: NORMAL

## 2018-05-07 ENCOUNTER — OFFICE VISIT (OUTPATIENT)
Dept: FAMILY MEDICINE | Facility: CLINIC | Age: 78
End: 2018-05-07
Payer: MEDICARE

## 2018-05-07 VITALS
SYSTOLIC BLOOD PRESSURE: 130 MMHG | HEIGHT: 65 IN | WEIGHT: 150.81 LBS | RESPIRATION RATE: 16 BRPM | DIASTOLIC BLOOD PRESSURE: 82 MMHG | BODY MASS INDEX: 25.13 KG/M2 | HEART RATE: 60 BPM

## 2018-05-07 DIAGNOSIS — F33.1 MODERATE EPISODE OF RECURRENT MAJOR DEPRESSIVE DISORDER: Primary | ICD-10-CM

## 2018-05-07 DIAGNOSIS — K58.0 IRRITABLE BOWEL SYNDROME WITH DIARRHEA: ICD-10-CM

## 2018-05-07 LAB — BACTERIA STL CULT: NORMAL

## 2018-05-07 PROCEDURE — 99999 PR PBB SHADOW E&M-EST. PATIENT-LVL III: CPT | Mod: PBBFAC,,, | Performed by: FAMILY MEDICINE

## 2018-05-07 PROCEDURE — 99214 OFFICE O/P EST MOD 30 MIN: CPT | Mod: S$GLB,,, | Performed by: FAMILY MEDICINE

## 2018-05-07 PROCEDURE — 3075F SYST BP GE 130 - 139MM HG: CPT | Mod: CPTII,S$GLB,, | Performed by: FAMILY MEDICINE

## 2018-05-07 PROCEDURE — 99499 UNLISTED E&M SERVICE: CPT | Mod: S$PBB,,, | Performed by: FAMILY MEDICINE

## 2018-05-07 PROCEDURE — 3079F DIAST BP 80-89 MM HG: CPT | Mod: CPTII,S$GLB,, | Performed by: FAMILY MEDICINE

## 2018-05-07 RX ORDER — DULOXETIN HYDROCHLORIDE 60 MG/1
60 CAPSULE, DELAYED RELEASE ORAL DAILY
Qty: 90 CAPSULE | Refills: 3 | Status: SHIPPED | OUTPATIENT
Start: 2018-05-07 | End: 2018-06-06

## 2018-05-07 NOTE — PROGRESS NOTES
Subjective:       Patient ID: Serenity Epps is a 77 y.o. female    Chief Complaint: Depression (follow up)    HPI  Here today for interval evaluation  Reports that her depression is improved  She continues with diarrhea.  Colonic spasm noted on her colonoscopy.  Started on Bentyl    Review of Systems   Constitutional: Negative for activity change and unexpected weight change.   HENT: Negative for hearing loss, rhinorrhea and trouble swallowing.    Eyes: Negative for discharge and visual disturbance.   Respiratory: Negative for chest tightness and wheezing.    Cardiovascular: Negative for chest pain and palpitations.   Gastrointestinal: Positive for diarrhea. Negative for blood in stool, constipation and vomiting.   Endocrine: Negative for polydipsia and polyuria.   Genitourinary: Negative for difficulty urinating, dysuria, hematuria and menstrual problem.   Musculoskeletal: Positive for arthralgias and joint swelling. Negative for neck pain.   Neurological: Negative for weakness and headaches.   Psychiatric/Behavioral: Positive for dysphoric mood. Negative for confusion.        Objective:   Physical Exam   Constitutional: She is oriented to person, place, and time. She appears well-developed and well-nourished.   Neurological: She is alert and oriented to person, place, and time.   Vitals reviewed.  SKIN:  Rash persists, seeing Dermatology tomorrow     Assessment:       1. Moderate episode of recurrent major depressive disorder     2. Irritable bowel syndrome with diarrhea          Plan:       Moderate episode of recurrent major depressive disorder  - INCREASE Cymbalta to 60mg QD  - Follow-up in about 6 months (around 11/7/2018).    Irritable bowel syndrome with diarrhea  - Continue current therapy  - Continue probiotics and fiber

## 2018-05-08 ENCOUNTER — OFFICE VISIT (OUTPATIENT)
Dept: DERMATOLOGY | Facility: CLINIC | Age: 78
End: 2018-05-08
Payer: MEDICARE

## 2018-05-08 VITALS — WEIGHT: 150 LBS | HEIGHT: 65 IN | BODY MASS INDEX: 24.99 KG/M2

## 2018-05-08 DIAGNOSIS — L30.8 PRURITIC DERMATITIS: ICD-10-CM

## 2018-05-08 DIAGNOSIS — L98.9 DISEASE OF SKIN AND SUBCUTANEOUS TISSUE: Primary | ICD-10-CM

## 2018-05-08 PROCEDURE — 99999 PR PBB SHADOW E&M-EST. PATIENT-LVL III: CPT | Mod: PBBFAC,,, | Performed by: DERMATOLOGY

## 2018-05-08 PROCEDURE — 11100 PR BIOPSY OF SKIN LESION: CPT | Mod: S$GLB,,, | Performed by: DERMATOLOGY

## 2018-05-08 PROCEDURE — 88312 SPECIAL STAINS GROUP 1: CPT | Mod: 26,,, | Performed by: PATHOLOGY

## 2018-05-08 PROCEDURE — 99213 OFFICE O/P EST LOW 20 MIN: CPT | Mod: 25,S$GLB,, | Performed by: DERMATOLOGY

## 2018-05-08 PROCEDURE — 88305 TISSUE EXAM BY PATHOLOGIST: CPT | Performed by: PATHOLOGY

## 2018-05-08 RX ORDER — IVERMECTIN 3 MG/1
TABLET ORAL
Qty: 12 TABLET | Refills: 0 | Status: SHIPPED | OUTPATIENT
Start: 2018-05-08 | End: 2018-10-30

## 2018-05-08 NOTE — PROGRESS NOTES
Subjective:       Patient ID:  Serenity Epps is a 77 y.o. female who presents for   Chief Complaint   Patient presents with    Rash     Patient here for follow up pruritic rash on her back - Xerosis cutis- using mometasone 0.1% (ELOCON) 0.1 % cream; aaa bid prn itching, redness, has not helped. Knee. Flank. Arms.      Itching began on posterior scalp- Improved with head and shoulders . Now appears on neck , shoulders, abd , arm & Right knee. Begins as red & firey , small bumps & itchy. Takes very hot showers.  Pt treated w/ topical benadryl , vinegar & OTC lotions  Pt denies any new medications prior to rash   Pt began Duloxetine in Jan 2018 - after the rash  No one else in house hold has rash .  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      No phx skin ca   Past Medical History:  No date: Anxiety  No date: Arthritis  No date: Cataract - Both Eyes      Comment: OU done// 9/21/2016: CKD (chronic kidney disease), stage II  No date: Diverticulosis  No date: DVT (deep venous thrombosis)      Comment: left leg, after childbirth  No date: DVT (deep venous thrombosis)  No date: DVT (deep venous thrombosis)  2/20/2014: Exudative age-related macular degeneration of *  No date: Glaucoma  No date: Hypertension  No date: Irritable bowel syndrome  No date: Left foot pain      Comment: chronic  No date: Macular degeneration      Comment: bimonthly intraoccular injections  No date: Osteopenia  No date: Rhinitis, chronic  No date: Strabismus      Comment: as child                  not affected  Itching worse at night    Review of Systems   Skin: Positive for itching and rash. Negative for daily sunscreen use.        Objective:    Physical Exam   Constitutional: She appears well-developed and well-nourished. No distress.   HENT:   Mouth/Throat: Lips normal.    Eyes: Lids are normal.  No conjunctival no injection.   Cardiovascular: There is no local extremity swelling and no dependent edema.     Neurological: She is alert and  oriented to person, place, and time. She is not disoriented.   Psychiatric: She has a normal mood and affect.   Skin:   Areas Examined (abnormalities noted in diagram):   Scalp / Hair Palpated and Inspected  Head / Face Inspection Performed  Neck Inspection Performed  Chest / Axilla Inspection Performed  Abdomen Inspection Performed  Back Inspection Performed  RUE Inspected  LUE Inspection Performed  RLE Inspected  LLE Inspection Performed              Diagram Legend     Erythematous scaling macule/papule c/w actinic keratosis       Vascular papule c/w angioma      Pigmented verrucoid papule/plaque c/w seborrheic keratosis      Yellow umbilicated papule c/w sebaceous hyperplasia      Irregularly shaped tan macule c/w lentigo     1-2 mm smooth white papules consistent with Milia      Movable subcutaneous cyst with punctum c/w epidermal inclusion cyst      Subcutaneous movable cyst c/w pilar cyst      Firm pink to brown papule c/w dermatofibroma      Pedunculated fleshy papule(s) c/w skin tag(s)      Evenly pigmented macule c/w junctional nevus     Mildly variegated pigmented, slightly irregular-bordered macule c/w mildly atypical nevus      Flesh colored to evenly pigmented papule c/w intradermal nevus       Pink pearly papule/plaque c/w basal cell carcinoma      Erythematous hyperkeratotic cursted plaque c/w SCC      Surgical scar with no sign of skin cancer recurrence      Open and closed comedones      Inflammatory papules and pustules      Verrucoid papule consistent consistent with wart     Erythematous eczematous patches and plaques     Dystrophic onycholytic nail with subungual debris c/w onychomycosis     Umbilicated papule    Erythematous-base heme-crusted tan verrucoid plaque consistent with inflamed seborrheic keratosis     Erythematous Silvery Scaling Plaque c/w Psoriasis     See annotation      Assessment / Plan:      Pathology Orders:     Normal Orders This Visit    Tissue Specimen To Pathology,  Dermatology     Questions:    Directional Terms:  Other(comment)    Clinical information:  allergic contact vs arthropod vs other    Specific Site:  left arm        Disease of skin and subcutaneous tissue  -     Tissue Specimen To Pathology, Dermatology    Punch biopsy procedure note:  Punch biopsy performed after verbal consent obtained. Area marked and prepped with alcohol. Approximately 1cc of 1% lidocaine with epinephrine injected. 4 mm disposable punch used to remove lesion. Hemostasis obtained and biopsy site closed with 1 - 2 nylon sutures. Wound care instructions reviewed with patient and handout given.      Pruritic dermatitis  -     ivermectin (STROMECTOL) 3 mg Tab; Take 6 pills together then repeat in 2 weeks  Dispense: 12 tablet; Refill: 0  Wash bedding/clothes in hot water. Any non-washables need to be placed in a closed plastic bag for at least 3 days.  Stop dove- pt given safe list today               Follow-up in about 2 weeks (around 5/22/2018) for suture removal and path results.

## 2018-05-09 RX ORDER — METOPROLOL SUCCINATE 25 MG/1
TABLET, EXTENDED RELEASE ORAL
Qty: 60 TABLET | Refills: 11 | Status: SHIPPED | OUTPATIENT
Start: 2018-05-09 | End: 2019-04-30 | Stop reason: SDUPTHER

## 2018-05-14 PROBLEM — S72.002A CLOSED FRACTURE OF LEFT HIP: Status: ACTIVE | Noted: 2018-05-14

## 2018-05-14 PROBLEM — S72.002A CLOSED LEFT HIP FRACTURE: Status: ACTIVE | Noted: 2018-05-14

## 2018-05-17 PROBLEM — D62 ACUTE BLOOD LOSS ANEMIA: Status: ACTIVE | Noted: 2018-05-17

## 2018-05-18 ENCOUNTER — TELEPHONE (OUTPATIENT)
Dept: ORTHOPEDICS | Facility: CLINIC | Age: 78
End: 2018-05-18

## 2018-05-21 DIAGNOSIS — Z98.890 S/P ORIF (OPEN REDUCTION INTERNAL FIXATION) FRACTURE: Primary | ICD-10-CM

## 2018-05-21 DIAGNOSIS — Z87.81 S/P ORIF (OPEN REDUCTION INTERNAL FIXATION) FRACTURE: Primary | ICD-10-CM

## 2018-05-21 RX ORDER — HYDROCODONE BITARTRATE AND ACETAMINOPHEN 5; 325 MG/1; MG/1
1 TABLET ORAL EVERY 6 HOURS PRN
Qty: 32 TABLET | Refills: 0
Start: 2018-05-21 | End: 2018-06-15 | Stop reason: SDUPTHER

## 2018-05-21 NOTE — TELEPHONE ENCOUNTER
----- Message from Candelario Ortiz sent at 5/21/2018 11:05 AM CDT -----  Contact: [facility] [employer], Princess  Princess want to speak with a nurse regarding patient having pain and discuss refilling pain medication please call back at [phone]

## 2018-05-22 ENCOUNTER — OFFICE VISIT (OUTPATIENT)
Dept: DERMATOLOGY | Facility: CLINIC | Age: 78
End: 2018-05-22
Payer: MEDICARE

## 2018-05-22 VITALS — BODY MASS INDEX: 25.66 KG/M2 | HEIGHT: 65 IN | WEIGHT: 154 LBS

## 2018-05-22 DIAGNOSIS — L29.9 PRURITUS: Primary | ICD-10-CM

## 2018-05-22 DIAGNOSIS — L85.3 XEROSIS CUTIS: ICD-10-CM

## 2018-05-22 PROCEDURE — 99999 PR PBB SHADOW E&M-EST. PATIENT-LVL II: CPT | Mod: PBBFAC,,, | Performed by: DERMATOLOGY

## 2018-05-22 PROCEDURE — 99214 OFFICE O/P EST MOD 30 MIN: CPT | Mod: S$GLB,,, | Performed by: DERMATOLOGY

## 2018-05-22 NOTE — PROGRESS NOTES
Subjective:       Patient ID:  Serenity Epps is a 77 y.o. female who presents for   Chief Complaint   Patient presents with    Results    Rash     Patient here for follow up Pruritic dermatitis  -     ivermectin (STROMECTOL) 3 mg Tab; Take 6 pills together then repeat in 2 weeks  Dispense: 12 tablet; Refill: 0  Wash bedding/clothes in hot water. Any non-washables need to be placed in a closed plastic bag for at least 3 days.  Stop dove- pt given safe list today     FINAL PATHOLOGIC DIAGNOSIS   [facility] DIAGNOSIS:   LEFT ARM:   Minimal perivascular dermatitis.   See note.   Note: This pattern is nonspecific. A drug reaction with viral exanthema might be considered. No fungal organisms   were identified on PAS stained sections.   Red Fontenot M.D., ABHISHEK      pruritic rash on her back - Xerosis cutis- using mometasone 0.1% (ELOCON) 0.1 % cream; aaa bid prn itching, redness, has not helped. Knee. Flank. Arms.      Itching began on posterior scalp- Improved with head and shoulders . Now appears on neck , shoulders, abd , arm & Right knee. Begins as red & firey , small bumps & itchy. Takes very hot showers.  Pt treated w/ topical benadryl , vinegar & OTC lotions  Pt denies any new medications prior to rash   Pt began Duloxetine in Jan 2018 - after the rash  No one else in house hold has rash .  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      No phx skin ca   Past Medical History:  No date: Anxiety  No date: Arthritis  No date: Cataract - Both Eyes      Comment: OU done//  9/21/2016: CKD (chronic kidney disease), stage II  No date: Diverticulosis  No date: DVT (deep venous thrombosis)      Comment: left leg, after childbirth  No date: DVT (deep venous thrombosis)  No date: DVT (deep venous thrombosis)  2/20/2014: Exudative age-related macular degeneration of *  No date: Glaucoma  No date: Hypertension  No date: Irritable bowel syndrome  No date: Left foot pain      Comment: chronic  No date: Macular degeneration       Comment: bimonthly intraoccular injections  No date: Osteopenia  No date: Rhinitis, chronic  No date: Strabismus      Comment: as child      not affected  Itching worse at night                          Review of Systems     Objective:    Physical Exam       Diagram Legend     Erythematous scaling macule/papule c/w actinic keratosis       Vascular papule c/w angioma      Pigmented verrucoid papule/plaque c/w seborrheic keratosis      Yellow umbilicated papule c/w sebaceous hyperplasia      Irregularly shaped tan macule c/w lentigo     1-2 mm smooth white papules consistent with Milia      Movable subcutaneous cyst with punctum c/w epidermal inclusion cyst      Subcutaneous movable cyst c/w pilar cyst      Firm pink to brown papule c/w dermatofibroma      Pedunculated fleshy papule(s) c/w skin tag(s)      Evenly pigmented macule c/w junctional nevus     Mildly variegated pigmented, slightly irregular-bordered macule c/w mildly atypical nevus      Flesh colored to evenly pigmented papule c/w intradermal nevus       Pink pearly papule/plaque c/w basal cell carcinoma      Erythematous hyperkeratotic cursted plaque c/w SCC      Surgical scar with no sign of skin cancer recurrence      Open and closed comedones      Inflammatory papules and pustules      Verrucoid papule consistent consistent with wart     Erythematous eczematous patches and plaques     Dystrophic onycholytic nail with subungual debris c/w onychomycosis     Umbilicated papule    Erythematous-base heme-crusted tan verrucoid plaque consistent with inflamed seborrheic keratosis     Erythematous Silvery Scaling Plaque c/w Psoriasis     See annotation      Assessment / Plan:        There are no diagnoses linked to this encounter.         No Follow-up on file.

## 2018-05-28 ENCOUNTER — HOSPITAL ENCOUNTER (OUTPATIENT)
Dept: RADIOLOGY | Facility: HOSPITAL | Age: 78
Discharge: HOME | End: 2018-05-28
Attending: ORTHOPAEDIC SURGERY
Payer: MEDICARE

## 2018-05-28 ENCOUNTER — OFFICE VISIT (OUTPATIENT)
Dept: ORTHOPEDICS | Facility: CLINIC | Age: 78
End: 2018-05-28
Payer: MEDICARE

## 2018-05-28 VITALS — WEIGHT: 154 LBS | BODY MASS INDEX: 25.66 KG/M2 | HEIGHT: 65 IN

## 2018-05-28 DIAGNOSIS — Z87.81 S/P ORIF (OPEN REDUCTION INTERNAL FIXATION) FRACTURE: ICD-10-CM

## 2018-05-28 DIAGNOSIS — Z98.890 S/P ORIF (OPEN REDUCTION INTERNAL FIXATION) FRACTURE: ICD-10-CM

## 2018-05-28 DIAGNOSIS — Z98.890 S/P ORIF (OPEN REDUCTION INTERNAL FIXATION) FRACTURE: Primary | ICD-10-CM

## 2018-05-28 DIAGNOSIS — Z87.81 S/P ORIF (OPEN REDUCTION INTERNAL FIXATION) FRACTURE: Primary | ICD-10-CM

## 2018-05-28 DIAGNOSIS — T81.72XA COMPLICATION OF VEIN FOLLOWING PROCEDURE, NOT ELSEWHERE CLASSIFIED, INITIAL ENCOUNTER: ICD-10-CM

## 2018-05-28 PROCEDURE — 99024 POSTOP FOLLOW-UP VISIT: CPT | Mod: S$GLB,,, | Performed by: ORTHOPAEDIC SURGERY

## 2018-05-28 PROCEDURE — 93971 EXTREMITY STUDY: CPT | Mod: 26,,, | Performed by: RADIOLOGY

## 2018-05-28 PROCEDURE — 93971 EXTREMITY STUDY: CPT | Mod: TC,PO

## 2018-05-28 PROCEDURE — 99999 PR PBB SHADOW E&M-EST. PATIENT-LVL II: CPT | Mod: PBBFAC,,, | Performed by: ORTHOPAEDIC SURGERY

## 2018-05-28 NOTE — PROGRESS NOTES
HISTORY OF PRESENT ILLNESS:  Two weeks out from fixation of her proximal femoral   fracture, doing well.  Sutures removed.  No signs of infection.  She does have   some swelling and ecchymosis in her leg.  She reports a history of a DVT in the   past.  She is on Eliquis currently.  We are going to check an ultrasound of her   lower extremity to rule out a DVT.  If positive, we will treat accordingly.    Otherwise, we will continue with therapy and see her back in four weeks' time as   a postoperative visit with x-rays of her left hip.      JUAN/VERONICA  dd: 05/28/2018 13:10:46 (CDT)  td: 05/29/2018 00:59:13 (CDT)  Doc ID   #[id number]  Job ID #[id number]    CC:

## 2018-06-05 ENCOUNTER — TELEPHONE (OUTPATIENT)
Dept: ORTHOPEDICS | Facility: CLINIC | Age: 78
End: 2018-06-05

## 2018-06-05 NOTE — TELEPHONE ENCOUNTER
Returned pt call. Informed pt prescription will be ready for  tomorrow 6/6/18. Pt verbalized understanding    ----- Message from Araceli Chau sent at 6/5/2018  1:18 PM CDT -----  Contact: Self  Calling to ask for a refill on her Hydrocodone.  Please call.

## 2018-06-06 ENCOUNTER — TELEPHONE (OUTPATIENT)
Dept: ORTHOPEDICS | Facility: CLINIC | Age: 78
End: 2018-06-06

## 2018-06-06 NOTE — TELEPHONE ENCOUNTER
Returned patients call, informed patient her prescription is ready to be picked up.       ----- Message from Araceli Chau sent at 6/6/2018 12:03 PM CDT -----  Contact: Self  Patient left a voice mail message today at 10;00 wanting to know if Dr. Mane has a prescription for her  Please call.

## 2018-06-08 ENCOUNTER — TELEPHONE (OUTPATIENT)
Dept: ORTHOPEDICS | Facility: CLINIC | Age: 78
End: 2018-06-08

## 2018-06-08 NOTE — TELEPHONE ENCOUNTER
----- Message from Ariel Whitten sent at 6/8/2018  8:54 AM CDT -----  Type: Needs Medical Advice    Who Called:  [facility] home health nurse- Stu (please be specific):  NA  How long has patient had these symptoms:  NA   Pharmacy name and phone #:  NA  Best Call Back Number: [phone]  Additional Information: The nurse called asking if the patient have any restrictions for bending at the waist

## 2018-06-08 NOTE — TELEPHONE ENCOUNTER
Spoke to Monica with Home Health! Informed her the patient can bend at the waist! Monica verbalized understanding.

## 2018-06-14 ENCOUNTER — TELEPHONE (OUTPATIENT)
Dept: ORTHOPEDICS | Facility: CLINIC | Age: 78
End: 2018-06-14

## 2018-06-14 NOTE — TELEPHONE ENCOUNTER
Talked with nurse from [facility] and let nurse know that prescription will be ready at 1pm.

## 2018-06-14 NOTE — TELEPHONE ENCOUNTER
----- Message from Stephanie Butt sent at 6/14/2018  9:53 AM CDT -----  Contact: Monica de leon/ [email or web address]   Monica de leon/ [email or web address] calling to speak to the Nurse. She is wanting to let the office know patient will be out of pain medication(HYDROcodone-acetaminophen (NORCO) 5-325 mg per tablet) on Saturday and she is needing a refill. Please advise. Please call Nurse when script is ready for pickup.   Call back    Thanks!

## 2018-06-15 DIAGNOSIS — Z87.81 S/P ORIF (OPEN REDUCTION INTERNAL FIXATION) FRACTURE: Primary | ICD-10-CM

## 2018-06-15 DIAGNOSIS — Z87.81 S/P ORIF (OPEN REDUCTION INTERNAL FIXATION) FRACTURE: ICD-10-CM

## 2018-06-15 DIAGNOSIS — Z98.890 S/P ORIF (OPEN REDUCTION INTERNAL FIXATION) FRACTURE: Primary | ICD-10-CM

## 2018-06-15 DIAGNOSIS — Z98.890 S/P ORIF (OPEN REDUCTION INTERNAL FIXATION) FRACTURE: ICD-10-CM

## 2018-06-18 RX ORDER — HYDROCODONE BITARTRATE AND ACETAMINOPHEN 5; 325 MG/1; MG/1
1 TABLET ORAL EVERY 6 HOURS PRN
Qty: 22 TABLET | Refills: 0
Start: 2018-06-18 | End: 2018-06-25

## 2018-06-19 DIAGNOSIS — Z98.890 S/P ORIF (OPEN REDUCTION INTERNAL FIXATION) FRACTURE: Primary | ICD-10-CM

## 2018-06-19 DIAGNOSIS — Z87.81 S/P ORIF (OPEN REDUCTION INTERNAL FIXATION) FRACTURE: Primary | ICD-10-CM

## 2018-06-25 ENCOUNTER — OFFICE VISIT (OUTPATIENT)
Dept: ORTHOPEDICS | Facility: CLINIC | Age: 78
End: 2018-06-25
Payer: MEDICARE

## 2018-06-25 ENCOUNTER — HOSPITAL ENCOUNTER (OUTPATIENT)
Dept: RADIOLOGY | Facility: HOSPITAL | Age: 78
Discharge: HOME | End: 2018-06-25
Attending: ORTHOPAEDIC SURGERY
Payer: MEDICARE

## 2018-06-25 VITALS — HEIGHT: 65 IN | WEIGHT: 154 LBS | BODY MASS INDEX: 25.66 KG/M2

## 2018-06-25 DIAGNOSIS — Z87.81 S/P ORIF (OPEN REDUCTION INTERNAL FIXATION) FRACTURE: Primary | ICD-10-CM

## 2018-06-25 DIAGNOSIS — Z98.890 S/P ORIF (OPEN REDUCTION INTERNAL FIXATION) FRACTURE: ICD-10-CM

## 2018-06-25 DIAGNOSIS — Z98.890 S/P ORIF (OPEN REDUCTION INTERNAL FIXATION) FRACTURE: Primary | ICD-10-CM

## 2018-06-25 DIAGNOSIS — Z87.81 S/P ORIF (OPEN REDUCTION INTERNAL FIXATION) FRACTURE: ICD-10-CM

## 2018-06-25 PROCEDURE — 73502 X-RAY EXAM HIP UNI 2-3 VIEWS: CPT | Mod: TC,PO

## 2018-06-25 PROCEDURE — 73502 X-RAY EXAM HIP UNI 2-3 VIEWS: CPT | Mod: 26,,, | Performed by: RADIOLOGY

## 2018-06-25 PROCEDURE — 99999 PR PBB SHADOW E&M-EST. PATIENT-LVL II: CPT | Mod: PBBFAC,,, | Performed by: ORTHOPAEDIC SURGERY

## 2018-06-25 PROCEDURE — 99024 POSTOP FOLLOW-UP VISIT: CPT | Mod: S$GLB,,, | Performed by: ORTHOPAEDIC SURGERY

## 2018-06-25 RX ORDER — HYDROCODONE BITARTRATE AND ACETAMINOPHEN 5; 325 MG/1; MG/1
1 TABLET ORAL EVERY 6 HOURS PRN
Qty: 27 TABLET | Refills: 0
Start: 2018-06-25 | End: 2018-08-30

## 2018-06-25 NOTE — PROGRESS NOTES
HISTORY OF PRESENT ILLNESS:  6 weeks out from ORIF of a subtrochanteric femur   fracture.  She is doing well.  Wounds look good.    PLAN:  Continue weightbearing to tolerance.  She has a walker.  She will   continue with therapy.  She is requesting more pain medicine, we will give this   to her on a 1-time basis.  We will check her back here in about six weeks' time   as a final postoperative visit.      JUAN/VERONICA  dd: 06/25/2018 08:27:01 (CDT)  td: 06/26/2018 06:25:47 (NICKT)  Doc ID   #[id number]  Job ID #[id number]    CC:

## 2018-06-26 ENCOUNTER — OFFICE VISIT (OUTPATIENT)
Dept: OPHTHALMOLOGY | Facility: CLINIC | Age: 78
End: 2018-06-26
Payer: MEDICARE

## 2018-06-26 DIAGNOSIS — H35.3112 NONEXUDATIVE AGE-RELATED MACULAR DEGENERATION, RIGHT EYE, INTERMEDIATE DRY STAGE: ICD-10-CM

## 2018-06-26 DIAGNOSIS — Z96.1 PSEUDOPHAKIA OF BOTH EYES: ICD-10-CM

## 2018-06-26 DIAGNOSIS — H35.3221 EXUDATIVE AGE-RELATED MACULAR DEGENERATION OF LEFT EYE WITH ACTIVE CHOROIDAL NEOVASCULARIZATION: ICD-10-CM

## 2018-06-26 DIAGNOSIS — H52.7 REFRACTIVE ERROR: ICD-10-CM

## 2018-06-26 DIAGNOSIS — H40.1233 LOW-TENSION GLAUCOMA OF BOTH EYES, SEVERE STAGE: Primary | ICD-10-CM

## 2018-06-26 PROCEDURE — 99999 PR PBB SHADOW E&M-EST. PATIENT-LVL III: CPT | Mod: PBBFAC,,, | Performed by: OPHTHALMOLOGY

## 2018-06-26 PROCEDURE — 92133 CPTRZD OPH DX IMG PST SGM ON: CPT | Mod: S$GLB,,, | Performed by: OPHTHALMOLOGY

## 2018-06-26 PROCEDURE — 99499 UNLISTED E&M SERVICE: CPT | Mod: S$PBB,,, | Performed by: OPHTHALMOLOGY

## 2018-06-26 PROCEDURE — 92014 COMPRE OPH EXAM EST PT 1/>: CPT | Mod: S$GLB,,, | Performed by: OPHTHALMOLOGY

## 2018-06-26 NOTE — PROGRESS NOTES
HPI     Glaucoma    Additional comments: 4 month iop ck with DFE and HRT           Comments   DLS: 4/16/18    Pt states maybe a little change OS since last visit.     Gtts: Brimonidine BID, Cosopt BID OU       Last edited by Cheryle Quintana on 6/26/2018  8:17 AM. (History)        ROS     Positive for: Cardiovascular (HTN - controlleld), Eyes (denies history of   surgery or trauma; injections OS for wet ARMD)    Negative for: Gastrointestinal, Neurological (denies   seizure/tremor/restless legs), Genitourinary (denies flomax),   Musculoskeletal, HENT, Endocrine (denies DM), Respiratory (denies SOB),   Allergic/Imm    Last edited by Estelle Mello MD on 6/26/2018 10:05 AM.   (History)        Assessment /Plan     For exam results, see Encounter Report.    Low-tension glaucoma of both eyes, severe stage  -     [facility] Retina Tomography (HRT) - OU - Both Eyes; Standing    Exudative age-related macular degeneration of left eye with active choroidal neovascularization    Nonexudative age-related macular degeneration, right eye, intermediate dry stage    Pseudophakia of both eyes    Refractive error            1. Low tension Glaucoma  IOP seems stable low/mid teens, reports good compliance with drops, prior small NFL hemorrhages OS not present today.   Switched to Cosopt BID OU 7/8/15 when she appeared to have possible progression on clinical exam and HRT OD.   Baseline OCT nerve done 11/21/17 - thinning OD>OS. Today's HRT appears within range of priors, as does clinical exam.   CCT WNL.   HVF's now likely affected by ARMD - possible progression OD? Continue Cosopt BID OU started 7/8/15. Added Brimonidine BID OU today 8/22/17.    F/u 4 months for IOP check and OCT nerve.    Macular hemorrhage OS F/u Dr. Lopez as scheduled.    2. Pseudophakia OU s/p Phaco/PCIOL OS with CTR for zonular dehiscence and Triescence.   s/p Phaco/PCIOL OD. Doing well.   3. Blepharitis  Ok to resume lid hygiene, continue artificial  tears prn.   4. Hyperopia with astigmatism and presbyopia  MRx given prior visit   5. Allergic conjunctivitis Discussed cool compresses/AT's/Zaditor on prior visit.

## 2018-07-02 ENCOUNTER — PROCEDURE VISIT (OUTPATIENT)
Dept: OPHTHALMOLOGY | Facility: CLINIC | Age: 78
End: 2018-07-02
Payer: MEDICARE

## 2018-07-02 VITALS — DIASTOLIC BLOOD PRESSURE: 55 MMHG | HEART RATE: 59 BPM | SYSTOLIC BLOOD PRESSURE: 105 MMHG

## 2018-07-02 DIAGNOSIS — H35.3221 EXUDATIVE AGE-RELATED MACULAR DEGENERATION OF LEFT EYE WITH ACTIVE CHOROIDAL NEOVASCULARIZATION: Primary | ICD-10-CM

## 2018-07-02 DIAGNOSIS — H35.3112 NONEXUDATIVE AGE-RELATED MACULAR DEGENERATION, RIGHT EYE, INTERMEDIATE DRY STAGE: ICD-10-CM

## 2018-07-02 PROCEDURE — 67028 INJECTION EYE DRUG: CPT | Mod: LT,S$GLB,, | Performed by: OPHTHALMOLOGY

## 2018-07-02 PROCEDURE — 92014 COMPRE OPH EXAM EST PT 1/>: CPT | Mod: 25,S$GLB,, | Performed by: OPHTHALMOLOGY

## 2018-07-02 RX ADMIN — Medication 1.25 MG: at 09:07

## 2018-07-02 NOTE — PROGRESS NOTES
HPI     DLS  4/16/18  Pt states seems like vision is not as good as it used to   be.     HPI     Macular Degeneration    Additional comments: 2 mon AMD chk            OCT - slight increase in SRF OS  Drusen OD      A/P    1. Wet AMD OS  S/p Avastin OS x 14, Eylea x 14    Persistent SRF OS - improving  With RPE tear OS  Central fibrosis with atrophy - poor central potential  10/17 - increased SRH - will keep at 8 weeks for now    Avastin OS    2. Dry AMD OD  AREDS/AG    3. PCIOL OU  CTR OS - but saw 20/30 with correction, in spite of sub RPE fibrosis      4. Glc Suspect OU   Good IOP control on Timolol    5. Floaters OU      12 weeks OCT      Risks, benefits, and alternatives to treatment discussed in detail with the patient.  The patient voiced understanding and wished to proceed with the procedure    Injection Procedure Note:  Diagnosis: Wet AMD OS    Topical Proparacaine and Betadine.  Inject Avastin OS at 6:00 @ 3.5-4mm posterior to limbus  Post Operative Dx: Same  Complications: None  Follow up as above.

## 2018-07-02 NOTE — PATIENT INSTRUCTIONS

## 2018-07-31 DIAGNOSIS — Z87.81 S/P ORIF (OPEN REDUCTION INTERNAL FIXATION) FRACTURE: Primary | ICD-10-CM

## 2018-07-31 DIAGNOSIS — Z98.890 S/P ORIF (OPEN REDUCTION INTERNAL FIXATION) FRACTURE: Primary | ICD-10-CM

## 2018-08-06 ENCOUNTER — HOSPITAL ENCOUNTER (OUTPATIENT)
Dept: RADIOLOGY | Facility: HOSPITAL | Age: 78
Discharge: HOME | End: 2018-08-06
Attending: ORTHOPAEDIC SURGERY
Payer: MEDICARE

## 2018-08-06 ENCOUNTER — OFFICE VISIT (OUTPATIENT)
Dept: ORTHOPEDICS | Facility: CLINIC | Age: 78
End: 2018-08-06
Payer: MEDICARE

## 2018-08-06 VITALS — BODY MASS INDEX: 25.66 KG/M2 | WEIGHT: 154 LBS | HEIGHT: 65 IN

## 2018-08-06 DIAGNOSIS — Z87.81 S/P ORIF (OPEN REDUCTION INTERNAL FIXATION) FRACTURE: Primary | ICD-10-CM

## 2018-08-06 DIAGNOSIS — Z98.890 S/P ORIF (OPEN REDUCTION INTERNAL FIXATION) FRACTURE: ICD-10-CM

## 2018-08-06 DIAGNOSIS — Z98.890 S/P ORIF (OPEN REDUCTION INTERNAL FIXATION) FRACTURE: Primary | ICD-10-CM

## 2018-08-06 DIAGNOSIS — Z87.81 S/P ORIF (OPEN REDUCTION INTERNAL FIXATION) FRACTURE: ICD-10-CM

## 2018-08-06 PROCEDURE — 99024 POSTOP FOLLOW-UP VISIT: CPT | Mod: S$GLB,,, | Performed by: ORTHOPAEDIC SURGERY

## 2018-08-06 PROCEDURE — 73502 X-RAY EXAM HIP UNI 2-3 VIEWS: CPT | Mod: 26,,, | Performed by: RADIOLOGY

## 2018-08-06 PROCEDURE — 73502 X-RAY EXAM HIP UNI 2-3 VIEWS: CPT | Mod: TC,PO

## 2018-08-06 PROCEDURE — 99999 PR PBB SHADOW E&M-EST. PATIENT-LVL II: CPT | Mod: PBBFAC,,, | Performed by: ORTHOPAEDIC SURGERY

## 2018-08-06 NOTE — PROGRESS NOTES
HISTORY OF PRESENT ILLNESS:  Ms. Epps is three months out from fixation of her   subtrochanteric femur fracture, doing well.  Wounds look good.  She has been   weightbearing to tolerance.  X-rays look good.    PLAN:  Activities to tolerance.  Follow up as needed.      JUAN/VERONICA  dd: 08/06/2018 08:36:09 (CDT)  td: 08/07/2018 00:53:59 (CDT)  Doc ID   #[id number]  Job ID #[id number]    CC:

## 2018-08-13 ENCOUNTER — PATIENT MESSAGE (OUTPATIENT)
Dept: FAMILY MEDICINE | Facility: CLINIC | Age: 78
End: 2018-08-13

## 2018-08-17 RX ORDER — DORZOLAMIDE HYDROCHLORIDE AND TIMOLOL MALEATE 20; 5 MG/ML; MG/ML
SOLUTION/ DROPS OPHTHALMIC
Qty: 10 ML | Refills: 11 | Status: SHIPPED | OUTPATIENT
Start: 2018-08-17 | End: 2019-10-20 | Stop reason: SDUPTHER

## 2018-08-24 ENCOUNTER — TELEPHONE (OUTPATIENT)
Dept: GASTROENTEROLOGY | Facility: CLINIC | Age: 78
End: 2018-08-24

## 2018-08-24 NOTE — TELEPHONE ENCOUNTER
----- Message from Candelario Ortiz sent at 8/24/2018  2:53 PM CDT -----  Contact: self   Patient want to speak with a nurse regarding scheduling appointment right away for diarrhea, no appointment until October please call back at [phone] (home)

## 2018-08-27 RX ORDER — GABAPENTIN 400 MG/1
CAPSULE ORAL
Qty: 90 CAPSULE | Refills: 9 | Status: SHIPPED | OUTPATIENT
Start: 2018-08-27 | End: 2019-07-29 | Stop reason: SDUPTHER

## 2018-08-30 ENCOUNTER — OFFICE VISIT (OUTPATIENT)
Dept: GASTROENTEROLOGY | Facility: CLINIC | Age: 78
End: 2018-08-30
Payer: MEDICARE

## 2018-08-30 ENCOUNTER — LAB VISIT (OUTPATIENT)
Dept: LAB | Facility: HOSPITAL | Age: 78
End: 2018-08-30
Attending: NURSE PRACTITIONER
Payer: MEDICARE

## 2018-08-30 VITALS
HEART RATE: 66 BPM | HEIGHT: 65 IN | BODY MASS INDEX: 23.21 KG/M2 | DIASTOLIC BLOOD PRESSURE: 72 MMHG | SYSTOLIC BLOOD PRESSURE: 161 MMHG | WEIGHT: 139.31 LBS

## 2018-08-30 DIAGNOSIS — K52.832 LYMPHOCYTIC COLITIS: Primary | ICD-10-CM

## 2018-08-30 DIAGNOSIS — R63.4 WEIGHT LOSS: ICD-10-CM

## 2018-08-30 DIAGNOSIS — K52.832 LYMPHOCYTIC COLITIS: ICD-10-CM

## 2018-08-30 DIAGNOSIS — Z87.19 HISTORY OF IBS: ICD-10-CM

## 2018-08-30 LAB
BASOPHILS # BLD AUTO: 0.04 K/UL
BASOPHILS NFR BLD: 0.4 %
DIFFERENTIAL METHOD: ABNORMAL
EOSINOPHIL # BLD AUTO: 0.2 K/UL
EOSINOPHIL NFR BLD: 2 %
ERYTHROCYTE [DISTWIDTH] IN BLOOD BY AUTOMATED COUNT: 15.9 %
HCT VFR BLD AUTO: 40.9 %
HGB BLD-MCNC: 13.1 G/DL
IMM GRANULOCYTES # BLD AUTO: 0.05 K/UL
IMM GRANULOCYTES NFR BLD AUTO: 0.6 %
LYMPHOCYTES # BLD AUTO: 1.3 K/UL
LYMPHOCYTES NFR BLD: 14.1 %
MCH RBC QN AUTO: 27.2 PG
MCHC RBC AUTO-ENTMCNC: 32 G/DL
MCV RBC AUTO: 85 FL
MONOCYTES # BLD AUTO: 1 K/UL
MONOCYTES NFR BLD: 10.9 %
NEUTROPHILS # BLD AUTO: 6.5 K/UL
NEUTROPHILS NFR BLD: 72 %
NRBC BLD-RTO: 0 /100 WBC
PLATELET # BLD AUTO: 284 K/UL
PMV BLD AUTO: 11.1 FL
RBC # BLD AUTO: 4.81 M/UL
WBC # BLD AUTO: 8.99 K/UL

## 2018-08-30 PROCEDURE — 85025 COMPLETE CBC W/AUTO DIFF WBC: CPT

## 2018-08-30 PROCEDURE — 3078F DIAST BP <80 MM HG: CPT | Mod: CPTII,,, | Performed by: NURSE PRACTITIONER

## 2018-08-30 PROCEDURE — 3077F SYST BP >= 140 MM HG: CPT | Mod: CPTII,,, | Performed by: NURSE PRACTITIONER

## 2018-08-30 PROCEDURE — 1101F PT FALLS ASSESS-DOCD LE1/YR: CPT | Mod: CPTII,,, | Performed by: NURSE PRACTITIONER

## 2018-08-30 PROCEDURE — 99214 OFFICE O/P EST MOD 30 MIN: CPT | Mod: S$PBB,,, | Performed by: NURSE PRACTITIONER

## 2018-08-30 PROCEDURE — 99999 PR PBB SHADOW E&M-EST. PATIENT-LVL IV: CPT | Mod: PBBFAC,,, | Performed by: NURSE PRACTITIONER

## 2018-08-30 PROCEDURE — 99214 OFFICE O/P EST MOD 30 MIN: CPT | Mod: PBBFAC,PO | Performed by: NURSE PRACTITIONER

## 2018-08-30 RX ORDER — DULOXETIN HYDROCHLORIDE 60 MG/1
60 CAPSULE, DELAYED RELEASE ORAL DAILY
COMMUNITY
End: 2018-11-14

## 2018-08-30 RX ORDER — CHOLESTYRAMINE 4 G/9G
4 POWDER, FOR SUSPENSION ORAL 2 TIMES DAILY
Qty: 120 G | Refills: 2 | Status: SHIPPED | OUTPATIENT
Start: 2018-08-30 | End: 2018-10-01

## 2018-08-30 NOTE — PROGRESS NOTES
Subjective:       Patient ID: Serenity Epps is a 77 y.o. female Body mass index is 23.19 kg/m².    Chief Complaint: Diarrhea    Established patient of Dr. Lynn & myself.    Patient is here with , whom assisted with history.      Diarrhea    This is a chronic problem. The current episode started more than 1 month ago (started early 1/2018). Episode frequency: 5-6 times a day. The problem has been unchanged. Diarrhea characteristics: watery - soft fluffy pieces. The patient states that diarrhea awakens (rarely (occurred after a spicy meal)) her from sleep. Associated symptoms include abdominal pain (reports generalized abdominal discomfort, notice it after bowel movements, described as mild; denies pain, bentyl helps it), bloating (occasional) and weight loss (lost about 15 lbs since 5/2018; denies trying to lose weight; was in the hospital for a broken leg recently). Pertinent negatives include no chills, coughing, fever, increased  flatus (occasional flatulence) or vomiting. Associated symptoms comments: Fecal urgency. Nothing aggravates the symptoms. Risk factors: denies recent antibiotic/hospitalization, foreign travel, or suspect food intake. Treatments tried: bentyl 10 mg qid prn (takes 2-3 a day), probiotic daily, benefiber 2 times a day. The treatment provided mild relief. Her past medical history is significant for irritable bowel syndrome. There is no history of inflammatory bowel disease.     Review of Systems   Constitutional: Positive for weight loss (lost about 15 lbs since 5/2018; denies trying to lose weight; was in the hospital for a broken leg recently). Negative for appetite change, chills, fatigue, fever and unexpected weight change.        Reports taking naproxen 1-2 times a day for bodyaches   HENT: Negative for sore throat and trouble swallowing.    Respiratory: Negative for cough, choking and shortness of breath.    Cardiovascular: Negative for chest pain.   Gastrointestinal: Positive for  abdominal pain (reports generalized abdominal discomfort, notice it after bowel movements, described as mild; denies pain, bentyl helps it), bloating (occasional), constipation (history of chronic constipation; was taking colace daily for a year, denies currently) and diarrhea. Negative for anal bleeding, blood in stool, flatus (occasional flatulence), nausea, rectal pain and vomiting.   Neurological: Negative for weakness.       Past Medical History:   Diagnosis Date    Anxiety     Arthritis     Cataract - Both Eyes     OU done//    CKD (chronic kidney disease), stage II 09/21/2016    pt denies    Diarrhea     Diverticulosis     DVT (deep venous thrombosis)     left leg, after childbirth    Exudative age-related macular degeneration of left eye 2/20/2014    Glaucoma     Hypertension     Irritable bowel syndrome     Left foot pain     chronic    Lymphocytic colitis     Macular degeneration     bimonthly intraoccular injections    Osteopenia     Recurrent major depressive disorder 4/27/2018    Rhinitis, chronic     Strabismus     as child     Past Surgical History:   Procedure Laterality Date    APPENDECTOMY      age 38    AUGMENTATION-FOOT Left 6/20/2014    Performed by [facility] at [facility] OR    CATARACT EXTRACTION      OU done//    CATARACT EXTRACTION W/ INTRAOCULAR LENS  IMPLANT, BILATERAL Bilateral 2016    cataract surgery Left 4/13/2016    Performed by Estelle Mello MD at [facility] OR    COLONOSCOPY  9/26/2006  Shane    Diverticulosis.   Internal small hemorrhoids were found.     COLONOSCOPY  10/03/2012    Dr. Lynn, repeat in 7-8 years for surveillance    COLONOSCOPY N/A 5/2/2018    COLONOSCOPY  05/02/2018    Procedure: COLONOSCOPY;  Surgeon: Toby Lynn Jr., MD;  Location: [facility];  Service: Endoscopy;  Laterality: N/A;    COLONOSCOPY N/A 5/2/2018    Performed by Toby Lynn Jr., MD at [facility] ENDO    COLONOSCOPY N/A 10/3/2012    Performed by Toby UNDERWOOD  Shane Rodriguez MD at [facility] ENDO    ENDOSCOPIC GASTROC RESECTION Left 6/20/2014    [facility] by [facility] at [facility] OR    EYE SURGERY Bilateral     Phaco with IOL (cataract extraction), rigth:[id number] 22.0 d//    FOOT HARDWARE REMOVAL Left 06/01/2016    Dr. Hammonds    FOOT SURGERY Left 2014    ERG and open reduction tallo tarsal dislocation (hyprocure implant)    FUSION - Right 2,3,4 PIPJ fusion ( 28285 x 3) Right 6/1/2016    Performed by Aaron Hammonds D[facility] at [facility] OR    IM NAILING OF FEMUR TROCHANTERIC (TFN) Left 5/15/2018    [facility]it [facility]    JOINT REPLACEMENT      OPEN REDUCTION TALO TARSAL DISLOCATION Left 6/20/2014    Performed by [facility]jas [facility] at [facility] OR    PARTIAL HYSTERECTOMY      age 38, ovaries conserved    phaco/PCIOL Right 1/13/2016    Performed by Estelle Mello MD at [facility] OR    PIP JOINT FUSION Right 06/01/2016    2nd-4th PIP joints of right foot; Dr. Hammonds    RELEASE-METATARSAL PHALANGEAL JOINT (MPJ) - Right 2,3,4 (28270 x 3) Right 6/1/2016    Performed by Aaron Hammonds D[facility] at [facility] OR    REMOVAL-HARDWARE-FOOT - Left  (Hyprocure) Left 6/1/2016    Performed by Aaron Hammonds DPM at [facility] OR    TONSILLECTOMY      as a child    TOTAL KNEE ARTHROPLASTY Left 09/2015    UPPER GASTROINTESTINAL ENDOSCOPY  9/26/2006  Shane    Erythema in the lower third of the esophagus (NERD).  Patulous lower esophageal sphincter.   Gastric mucosal atrophy.   PARAG Test  Negative.     VEIN LIGATION  1964    BLE     Family History   Problem Relation Age of Onset    No Known Problems Brother     Breast cancer Daughter     Cancer Daughter         breast    Coronary artery disease Mother 78    Glaucoma Mother     Diverticulitis Father     No Known Problems Maternal Grandmother     No Known Problems Maternal Grandfather     No Known Problems Paternal Grandmother     No Known Problems Paternal Grandfather     Glaucoma Brother     Ankylosing  spondylitis Brother     Diabetes Brother     Macular degeneration Brother          twin brother wet mac//    Cancer Brother         prostate    Crohn's disease Brother     Amblyopia Neg Hx     Blindness Neg Hx     Cataracts Neg Hx     Hypertension Neg Hx     Strabismus Neg Hx     Stroke Neg Hx     Thyroid disease Neg Hx     Retinal detachment Neg Hx     Celiac disease Neg Hx      Wt Readings from Last 10 Encounters:   08/30/18 63.2 kg (139 lb 5.3 oz)   08/06/18 69.9 kg (154 lb)   06/25/18 69.9 kg (154 lb)   05/28/18 69.9 kg (154 lb)   05/22/18 69.9 kg (154 lb)   05/14/18 70.3 kg (154 lb 15.7 oz)   05/08/18 68 kg (150 lb)   05/07/18 68.4 kg (150 lb 12.7 oz)   04/27/18 68.9 kg (152 lb)   04/27/18 69.1 kg (152 lb 5.4 oz)     Lab Results   Component Value Date    WBC 8.90 05/17/2018    HGB 9.0 (L) 05/17/2018    HCT 27.3 (L) 05/17/2018    MCV 90 05/17/2018     05/17/2018     CMP  Sodium   Date Value Ref Range Status   05/16/2018 133 (L) 136 - 145 mmol/L Final     Potassium   Date Value Ref Range Status   05/16/2018 3.7 3.5 - 5.1 mmol/L Final     Chloride   Date Value Ref Range Status   05/16/2018 98 95 - 110 mmol/L Final     CO2   Date Value Ref Range Status   05/16/2018 29 22 - 31 mmol/L Final     Glucose   Date Value Ref Range Status   05/16/2018 107 70 - 110 mg/dL Final     Comment:     The ADA recommends the following guidelines for fasting glucose:  Normal:       less than 100 mg/dL  Prediabetes:  100 mg/dL to 125 mg/dL  Diabetes:     126 mg/dL or higher       BUN, Bld   Date Value Ref Range Status   05/16/2018 10 7 - 18 mg/dL Final     Creatinine   Date Value Ref Range Status   05/16/2018 0.68 0.50 - 1.40 mg/dL Final     Calcium   Date Value Ref Range Status   05/16/2018 7.8 (L) 8.4 - 10.2 mg/dL Final     Total Protein   Date Value Ref Range Status   05/14/2018 7.4 6.0 - 8.4 g/dL Final     Albumin   Date Value Ref Range Status   05/14/2018 4.4 3.5 - 5.2 g/dL Final     Total Bilirubin   Date  "Value Ref Range Status   05/14/2018 0.5 0.2 - 1.3 mg/dL Final     Alkaline Phosphatase   Date Value Ref Range Status   05/14/2018 56 38 - 145 U/L Final     AST   Date Value Ref Range Status   05/14/2018 30 14 - 36 U/L Final     ALT   Date Value Ref Range Status   05/14/2018 27 10 - 44 U/L Final     Anion Gap   Date Value Ref Range Status   05/16/2018 6 (L) 8 - 16 mmol/L Final     eGFR if    Date Value Ref Range Status   05/16/2018 >60 >60 mL/min/1.73 m^2 Final     eGFR if non    Date Value Ref Range Status   05/16/2018 >60 >60 mL/min/1.73 m^2 Final     Comment:     Calculation used to obtain the estimated glomerular filtration  rate (eGFR) is the CKD-EPI equation.        Lab Results   Component Value Date    TSH 1.230 09/14/2015     Lab Results   Component Value Date    CRP 1.0 08/14/2017     Reviewed prior medical records including 5/2/18 stool studies (WNL); 2/28/18 abdominal x-ray and celiac serology (WNL); & endoscopy history (see surgical history).    5/2/18 Colonoscopy was reviewed and procedure report states:   "Findings:       The perianal and digital rectal examinations were normal. Pertinent        negatives include normal sphincter tone and no palpable rectal        lesions.       Non-bleeding internal hemorrhoids were found during retroflexion.        The hemorrhoids were small and Grade I (internal hemorrhoids that do        not prolapse).       No additional abnormalities were found on retroflexion.       The rectum and recto-sigmoid colon appeared normal. Fluid aspiration        was performed through the scope suction channel. Sample(s) were sent        for bacterial cultures, Clostridium difficile and ova and parasites.       Multiple small and large-mouthed diverticula were found in the        sigmoid colon and distal descending colon.       There was mild spasm in the sigmoid colon (IBS).       The exam was otherwise without abnormality.       Random biopsies for " histology were taken with a cold forceps from        the cecum / right colon, and rectum / rectosigmoid colon for        evaluation of microscopic colitis.       The terminal ileum appeared normal.  Impression:          - Diverticulosis in the sigmoid colon and in the                        distal descending colon.                       - Mild colonic spasm consistent with irritable bowel                        syndrome.                       - Non-bleeding internal hemorrhoids.                       - The examination was otherwise normal.                       - The examined portion of the ileum was normal.                       - The rectum and recto-sigmoid colon are normal.                        Fluid aspiration performed.                       Biopsied.  Recommendation:      - Discharge patient to home.                       - Await pathology and culture results.                       - High fiber diet.                       - Use fiber, for example Citrucel, Fibercon, Konsyl                        or Metamucil.                       - Take a PROBIOTIC, such as ALIGN or CULTURELLE or                        SAMY-Q (all non-prescription), every day for a                        month.                       - Continue present medications.                       - Use Bentyl (dicyclomine) 10 mg PO QID 30 min AC.                       - Call the [facility] clinic in 2 weeks for reports (if                        you haven't heard from us sooner) [phone].                       - Repeat colonoscopy is not recommended.                       - Patient has a contact number available for                        emergencies. The signs and symptoms of potential                        delayed complications were discussed with the                        patient. Return to normal activities tomorrow.                        Written discharge instructions were provided to the                        patient.                       -  "Return to normal activities tomorrow. ".   Biopsy:  "FINAL PATHOLOGIC DIAGNOSIS  1. Colon, random cecum and right (biopsy):  -Lymphocytic colitis  2. Colon, rectum and rectosigmoid (biopsy):  -Lymphocytic colitis"  Objective:      Physical Exam   Constitutional: She is oriented to person, place, and time. She appears well-developed and well-nourished. No distress.   HENT:   Mouth/Throat: Oropharynx is clear and moist and mucous membranes are normal. No oral lesions. No oropharyngeal exudate.   Eyes: Conjunctivae are normal. Pupils are equal, round, and reactive to light. No scleral icterus.   Cardiovascular: Normal rate.   Pulmonary/Chest: Effort normal and breath sounds normal. No respiratory distress. She has no wheezes.   Abdominal: Soft. Normal appearance and bowel sounds are normal. She exhibits no distension, no abdominal bruit and no mass. There is no hepatosplenomegaly. There is no tenderness. There is no rigidity, no rebound, no guarding, no tenderness at McBurney's point and negative Silva's sign. No hernia.   Neurological: She is alert and oriented to person, place, and time.   Skin: Skin is warm and dry. No rash noted. She is not diaphoretic. No erythema. No pallor.   Non-jaundiced   Psychiatric: She has a normal mood and affect. Her behavior is normal. Judgment and thought content normal.   Nursing note and vitals reviewed.      Assessment:       1. Lymphocytic colitis    2. History of IBS    3. Weight loss        Plan:       Lymphocytic colitis  -  START   cholestyramine, with sugar, 4 gram Powd; Take 4 g by mouth 2 (two) times daily. Take before a meal.  Dispense: 120 g; Refill: 2  -     Comprehensive metabolic panel; Future; Expected date: 08/30/2018  -     CBC auto differential; Future; Expected date: 08/30/2018  - discussed diagnosis with patient, printed and reviewed microscopic colitis patient education handout from up-to-date, patient verbalized understanding  - recommend to avoid caffeine, " lactose, sorbitol and NSAIDs    - discussed about different treatment options for it (such as loperamide, cholestyramine, flagyl, bismuth, or 5-ASA), patient verbalized understanding and would like to try cholestyramine    History of IBS  -  START   cholestyramine, with sugar, 4 gram Powd; Take 4 g by mouth 2 (two) times daily. Take before a meal.  Dispense: 120 g; Refill: 2  -  DISCONTINUE   dicyclomine (BENTYL) 10 MG capsule- THERAPY COMPLETED  - recommended OTC probiotic, such as Culturelle, as directed  - CONTINUE BENEFIBER AS DIRECTED  - avoid lactose  - recommended increase fiber in diet, especially soluble fiber since this can help bulk up the stool consistency and may help to slow down how fast the stool goes through the colon and can prevent diarrhea    Weight loss  - encouraged PO intake and daily calorie counts to ensure adequate nutrition is taken in, recommend at least 1,800-2,000 calories a day  - recommend nutritional drinks, such as Boost, Ensure or Glucerna, to supplement nutrition needs  - Possible CT scan/EGD pending results of testing and if symptoms persist    Follow-up in about 1 month (around 9/30/2018), or if symptoms worsen or fail to improve.      If no improvement in symptoms or symptoms worsen, call/follow-up at clinic or go to ER.

## 2018-09-17 ENCOUNTER — PROCEDURE VISIT (OUTPATIENT)
Dept: OPHTHALMOLOGY | Facility: CLINIC | Age: 78
End: 2018-09-17
Payer: MEDICARE

## 2018-09-17 VITALS — DIASTOLIC BLOOD PRESSURE: 67 MMHG | HEART RATE: 52 BPM | SYSTOLIC BLOOD PRESSURE: 118 MMHG

## 2018-09-17 DIAGNOSIS — H35.3221 EXUDATIVE AGE-RELATED MACULAR DEGENERATION OF LEFT EYE WITH ACTIVE CHOROIDAL NEOVASCULARIZATION: Primary | ICD-10-CM

## 2018-09-17 DIAGNOSIS — H35.3112 NONEXUDATIVE AGE-RELATED MACULAR DEGENERATION, RIGHT EYE, INTERMEDIATE DRY STAGE: ICD-10-CM

## 2018-09-17 PROCEDURE — 92226 PR SPECIAL EYE EXAM, SUBSEQUENT: CPT | Mod: 50,S$PBB,, | Performed by: OPHTHALMOLOGY

## 2018-09-17 PROCEDURE — 92014 COMPRE OPH EXAM EST PT 1/>: CPT | Mod: S$PBB,,, | Performed by: OPHTHALMOLOGY

## 2018-09-17 PROCEDURE — 92134 CPTRZ OPH DX IMG PST SGM RTA: CPT | Mod: PBBFAC,PO | Performed by: OPHTHALMOLOGY

## 2018-09-17 PROCEDURE — 92226 PR SPECIAL EYE EXAM, SUBSEQUENT: CPT | Mod: 50,PBBFAC,PO | Performed by: OPHTHALMOLOGY

## 2018-09-17 NOTE — PROGRESS NOTES
HPI     DLS  7/2/18  Pt states she has noticed spots/flash in OS after injection.         OCT - slight increase in SRF OS  Drusen OD      A/P    1. Wet AMD OS  S/p Avastin OS x 15, Eylea x 14    Persistent SRF OS - improving  With RPE tear OS  Central fibrosis with atrophy - poor central potential  10/17 - increased SRH - will keep at 8 weeks for now  9/18 - stable cicatrix - try observation      2. Dry AMD OD  AREDS/AG    3. PCIOL OU  CTR OS - but saw 20/30 with correction, in spite of sub RPE fibrosis      4. Glc Suspect OU   Good IOP control on Timolol    5. Floaters OU      12 weeks OCT

## 2018-09-23 ENCOUNTER — PATIENT MESSAGE (OUTPATIENT)
Dept: GASTROENTEROLOGY | Facility: CLINIC | Age: 78
End: 2018-09-23

## 2018-10-01 ENCOUNTER — OFFICE VISIT (OUTPATIENT)
Dept: GASTROENTEROLOGY | Facility: CLINIC | Age: 78
End: 2018-10-01
Payer: MEDICARE

## 2018-10-01 VITALS
BODY MASS INDEX: 21.71 KG/M2 | DIASTOLIC BLOOD PRESSURE: 75 MMHG | HEART RATE: 59 BPM | HEIGHT: 65 IN | WEIGHT: 130.31 LBS | SYSTOLIC BLOOD PRESSURE: 153 MMHG

## 2018-10-01 DIAGNOSIS — K52.9 CHRONIC DIARRHEA: ICD-10-CM

## 2018-10-01 DIAGNOSIS — K52.832 LYMPHOCYTIC COLITIS: Primary | ICD-10-CM

## 2018-10-01 DIAGNOSIS — R63.4 UNINTENTIONAL WEIGHT LOSS: ICD-10-CM

## 2018-10-01 DIAGNOSIS — R10.9 ABDOMINAL CRAMPING: ICD-10-CM

## 2018-10-01 PROCEDURE — 1101F PT FALLS ASSESS-DOCD LE1/YR: CPT | Mod: CPTII,,, | Performed by: NURSE PRACTITIONER

## 2018-10-01 PROCEDURE — 99214 OFFICE O/P EST MOD 30 MIN: CPT | Mod: PBBFAC,PO | Performed by: NURSE PRACTITIONER

## 2018-10-01 PROCEDURE — 3077F SYST BP >= 140 MM HG: CPT | Mod: CPTII,,, | Performed by: NURSE PRACTITIONER

## 2018-10-01 PROCEDURE — 99214 OFFICE O/P EST MOD 30 MIN: CPT | Mod: S$PBB,,, | Performed by: NURSE PRACTITIONER

## 2018-10-01 PROCEDURE — 3078F DIAST BP <80 MM HG: CPT | Mod: CPTII,,, | Performed by: NURSE PRACTITIONER

## 2018-10-01 PROCEDURE — 99999 PR PBB SHADOW E&M-EST. PATIENT-LVL IV: CPT | Mod: PBBFAC,,, | Performed by: NURSE PRACTITIONER

## 2018-10-01 RX ORDER — COLESTIPOL HYDROCHLORIDE 5 G/5G
5 GRANULE, FOR SUSPENSION ORAL 2 TIMES DAILY
COMMUNITY
End: 2018-10-01 | Stop reason: CLARIF

## 2018-10-01 NOTE — PROGRESS NOTES
Subjective:       Patient ID: Serenity Epps is a 78 y.o. female Body mass index is 21.68 kg/m².    Chief Complaint: Follow-up (still diarrhea)    Established patient of Dr. Lynn & myself.    Patient is here with , whom assisted with history.      Diarrhea    This is a chronic problem. The current episode started more than 1 month ago (started early 1/2018). Episode frequency: 7-8 times a day. The problem has been gradually worsening. Diarrhea characteristics: watery. The patient states that diarrhea awakens (rarely) her from sleep. Associated symptoms include abdominal pain (reports generalized abdominal discomfort, notice it after bowel movements, described as mild; denies pain, significantly improved; denies currently), bloating (occasional) and weight loss (lost about 9 lbs since our last visit; denies trying to lose weight). Pertinent negatives include no chills, coughing, fever, increased  flatus (occasional flatulence) or vomiting. Associated symptoms comments: Fecal urgency. Nothing aggravates the symptoms. Risk factors: denies recent antibiotic/hospitalization, foreign travel, or suspect food intake. Treatments tried: cholestyramine 4 grams bid, probiotic daily, benefiber 2 times a day; PAST: bentyl 10 mg. The treatment provided mild relief. Her past medical history is significant for irritable bowel syndrome. There is no history of inflammatory bowel disease.     Review of Systems   Constitutional: Positive for appetite change (decreased; afraid to eat due to diarrhea; eating 3 small meals a day; denies any nutritional drinks), fatigue and weight loss (lost about 9 lbs since our last visit; denies trying to lose weight). Negative for chills, fever and unexpected weight change.        Stopped naproxen on 9/29/18 (was taking it 3 times a day for bodyaches for the past 6-8 weeks)   HENT: Negative for sore throat and trouble swallowing.    Respiratory: Negative for cough, choking and shortness of breath.     Cardiovascular: Negative for chest pain.   Gastrointestinal: Positive for abdominal pain (reports generalized abdominal discomfort, notice it after bowel movements, described as mild; denies pain, significantly improved; denies currently), bloating (occasional), constipation (history of chronic constipation; was taking colace daily for a year, denies currently) and diarrhea. Negative for anal bleeding, blood in stool, flatus (occasional flatulence), nausea, rectal pain and vomiting.   Neurological: Negative for weakness.       Past Medical History:   Diagnosis Date    Anxiety     Arthritis     Cataract - Both Eyes     OU done//    CKD (chronic kidney disease), stage II 09/21/2016    pt denies    Diarrhea     Diverticulosis     DVT (deep venous thrombosis)     left leg, after childbirth    Exudative age-related macular degeneration of left eye 2/20/2014    Glaucoma     Hypertension     Irritable bowel syndrome     Left foot pain     chronic    Lymphocytic colitis     Macular degeneration     bimonthly intraoccular injections    Osteopenia     Recurrent major depressive disorder 4/27/2018    Rhinitis, chronic     Strabismus     as child     Past Surgical History:   Procedure Laterality Date    APPENDECTOMY      age 38    AUGMENTATION-FOOT Left 6/20/2014    Performed by [facility] at [facility] OR    CATARACT EXTRACTION      OU done//    CATARACT EXTRACTION W/ INTRAOCULAR LENS  IMPLANT, BILATERAL Bilateral 2016    cataract surgery Left 4/13/2016    Performed by Estelle Mello MD at [facility] OR    COLONOSCOPY  9/26/2006  Shane    Diverticulosis.   Internal small hemorrhoids were found.     COLONOSCOPY  10/03/2012    Dr. Lynn, repeat in 7-8 years for surveillance    COLONOSCOPY N/A 5/2/2018    COLONOSCOPY  05/02/2018    Procedure: COLONOSCOPY;  Surgeon: Toby Lynn Jr., MD;  Location: [facility];  Service: Endoscopy;  Laterality: N/A;    COLONOSCOPY N/A 5/2/2018    Performed  by Toby Lynn Jr., MD at [facility] END[facility]    COLONOSCOPY N/A 10/3/2012    Performed by Toby Lynn Jr., MD at [facility]    ENDOSCOPIC GASTROC RESECTION Left 6/20/2014    Performed by Aaron Borjas DPM at [facility] OR    EYE SURGERY Bilateral     Phaco with IOL (cataract extraction), rigth:[id number] 22.0 d//    FOOT HARDWARE REMOVAL Left 06/01/2016    Dr. Hammonds    FOOT SURGERY Left 2014    ERG and open reduction tallo tarsal dislocation (hyprocure implant)    FUSION - Right 2,3,4 PIPJ fusion ( 28285 x 3) Right 6/1/2016    Performed by Aaron Hammonds DPM at [facility] OR    IM NAILING OF FEMUR TROCHANTERIC (TFN) Left 5/15/2018    Performed by Kamaljit Mane MD at [facility] OR    JOINT REPLACEMENT      OPEN REDUCTION TALO TARSAL DISLOCATION Left 6/20/2014    Performed by Aaron Borjas DPM at [facility] OR    PARTIAL HYSTERECTOMY      age 38, ovaries conserved    phaco/PCIOL Right 1/13/2016    Performed by Estelle Mello MD at [facility] OR    PIP JOINT FUSION Right 06/01/2016    2nd-4th PIP joints of right foot; Dr. Hammonds    RELEASE-METATARSAL PHALANGEAL JOINT (MPJ) - Right 2,3,4 (28270 x 3) Right 6/1/2016    Performed by Aaron Hammonds DPM at [facility] OR    REMOVAL-HARDWARE-FOOT - Left  (Hyprocure) Left 6/1/2016    Performed by Aaron Hammonds DPM at [facility] OR    TONSILLECTOMY      as a child    TOTAL KNEE ARTHROPLASTY Left 09/2015    UPPER GASTROINTESTINAL ENDOSCOPY  9/26/2006  Shane    Erythema in the lower third of the esophagus (NERD).  Patulous lower esophageal sphincter.   Gastric mucosal atrophy.   PARAG Test  Negative.     VEIN LIGATION  1964    BLE     Family History   Problem Relation Age of Onset    No Known Problems Brother     Breast cancer Daughter     Cancer Daughter         breast    Coronary artery disease Mother 78    Glaucoma Mother     Diverticulitis Father     No Known Problems Maternal Grandmother     No Known Problems Maternal Grandfather     No Known Problems  Paternal Grandmother     No Known Problems Paternal Grandfather     Glaucoma Brother     Ankylosing spondylitis Brother     Diabetes Brother     Macular degeneration Brother          twin brother wet mac//    Cancer Brother         prostate    Crohn's disease Brother     Amblyopia Neg Hx     Blindness Neg Hx     Cataracts Neg Hx     Hypertension Neg Hx     Strabismus Neg Hx     Stroke Neg Hx     Thyroid disease Neg Hx     Retinal detachment Neg Hx     Celiac disease Neg Hx      Wt Readings from Last 10 Encounters:   10/01/18 59.1 kg (130 lb 4.7 oz)   08/30/18 63.2 kg (139 lb 5.3 oz)   08/06/18 69.9 kg (154 lb)   06/25/18 69.9 kg (154 lb)   05/28/18 69.9 kg (154 lb)   05/22/18 69.9 kg (154 lb)   05/14/18 70.3 kg (154 lb 15.7 oz)   05/08/18 68 kg (150 lb)   05/07/18 68.4 kg (150 lb 12.7 oz)   04/27/18 68.9 kg (152 lb)     Lab Results   Component Value Date    WBC 8.99 08/30/2018    HGB 13.1 08/30/2018    HCT 40.9 08/30/2018    MCV 85 08/30/2018     08/30/2018     CMP  Sodium   Date Value Ref Range Status   08/30/2018 134 (L) 136 - 145 mmol/L Final     Potassium   Date Value Ref Range Status   08/30/2018 3.7 3.5 - 5.1 mmol/L Final     Chloride   Date Value Ref Range Status   08/30/2018 97 95 - 110 mmol/L Final     CO2   Date Value Ref Range Status   08/30/2018 27 23 - 29 mmol/L Final     Glucose   Date Value Ref Range Status   08/30/2018 103 70 - 110 mg/dL Final     BUN, Bld   Date Value Ref Range Status   08/30/2018 11 8 - 23 mg/dL Final     Creatinine   Date Value Ref Range Status   08/30/2018 0.6 0.5 - 1.4 mg/dL Final     Calcium   Date Value Ref Range Status   08/30/2018 9.5 8.7 - 10.5 mg/dL Final     Total Protein   Date Value Ref Range Status   08/30/2018 7.4 6.0 - 8.4 g/dL Final     Albumin   Date Value Ref Range Status   08/30/2018 4.1 3.5 - 5.2 g/dL Final     Total Bilirubin   Date Value Ref Range Status   08/30/2018 1.3 (H) 0.1 - 1.0 mg/dL Final     Comment:     For infants and  "newborns, interpretation of results should be based  on gestational age, weight and in agreement with clinical  observations.  Premature Infant recommended reference ranges:  Up to 24 hours.............<8.0 mg/dL  Up to 48 hours............<12.0 mg/dL  3-5 days..................<15.0 mg/dL  6-29 days.................<15.0 mg/dL       Alkaline Phosphatase   Date Value Ref Range Status   08/30/2018 114 55 - 135 U/L Final     AST   Date Value Ref Range Status   08/30/2018 19 10 - 40 U/L Final     ALT   Date Value Ref Range Status   08/30/2018 11 10 - 44 U/L Final     Anion Gap   Date Value Ref Range Status   08/30/2018 10 8 - 16 mmol/L Final     eGFR if    Date Value Ref Range Status   08/30/2018 >60.0 >60 mL/min/1.73 m^2 Final     eGFR if non    Date Value Ref Range Status   08/30/2018 >60.0 >60 mL/min/1.73 m^2 Final     Comment:     Calculation used to obtain the estimated glomerular filtration  rate (eGFR) is the CKD-EPI equation.        Lab Results   Component Value Date    TSH 1.230 09/14/2015     Lab Results   Component Value Date    CRP 1.0 08/14/2017     Reviewed prior medical records including 5/2/18 stool studies (WNL); 2/28/18 abdominal x-ray and celiac serology (WNL); & endoscopy history (see surgical history).    5/2/18 Colonoscopy was reviewed and procedure report states:   "Findings:       The perianal and digital rectal examinations were normal. Pertinent        negatives include normal sphincter tone and no palpable rectal        lesions.       Non-bleeding internal hemorrhoids were found during retroflexion.        The hemorrhoids were small and Grade I (internal hemorrhoids that do        not prolapse).       No additional abnormalities were found on retroflexion.       The rectum and recto-sigmoid colon appeared normal. Fluid aspiration        was performed through the scope suction channel. Sample(s) were sent        for bacterial cultures, Clostridium difficile and " ova and parasites.       Multiple small and large-mouthed diverticula were found in the        sigmoid colon and distal descending colon.       There was mild spasm in the sigmoid colon (IBS).       The exam was otherwise without abnormality.       Random biopsies for histology were taken with a cold forceps from        the cecum / right colon, and rectum / rectosigmoid colon for        evaluation of microscopic colitis.       The terminal ileum appeared normal.  Impression:          - Diverticulosis in the sigmoid colon and in the                        distal descending colon.                       - Mild colonic spasm consistent with irritable bowel                        syndrome.                       - Non-bleeding internal hemorrhoids.                       - The examination was otherwise normal.                       - The examined portion of the ileum was normal.                       - The rectum and recto-sigmoid colon are normal.                        Fluid aspiration performed.                       Biopsied.  Recommendation:      - Discharge patient to home.                       - Await pathology and culture results.                       - High fiber diet.                       - Use fiber, for example Citrucel, Fibercon, Konsyl                        or Metamucil.                       - Take a PROBIOTIC, such as ALIGN or CULTURELLE or                        SAMY-Q (all non-prescription), every day for a                        month.                       - Continue present medications.                       - Use Bentyl (dicyclomine) 10 mg PO QID 30 min AC.                       - Call the [facility].I. clinic in 2 weeks for reports (if                        you haven't heard from us sooner) [phone].                       - Repeat colonoscopy is not recommended.                       - Patient has a contact number available for                        emergencies. The signs and symptoms of potential       "                  delayed complications were discussed with the                        patient. Return to normal activities tomorrow.                        Written discharge instructions were provided to the                        patient.                       - Return to normal activities tomorrow. ".   Biopsy:  "FINAL PATHOLOGIC DIAGNOSIS  1. Colon, random cecum and right (biopsy):  -Lymphocytic colitis  2. Colon, rectum and rectosigmoid (biopsy):  -Lymphocytic colitis"  Objective:      Physical Exam   Constitutional: She is oriented to person, place, and time. She appears well-developed and well-nourished. No distress.   HENT:   Mouth/Throat: Oropharynx is clear and moist and mucous membranes are normal. No oral lesions. No oropharyngeal exudate.   Eyes: Conjunctivae are normal. Pupils are equal, round, and reactive to light. No scleral icterus.   Cardiovascular: Normal rate.   Pulmonary/Chest: Effort normal and breath sounds normal. No respiratory distress. She has no wheezes.   Abdominal: Soft. Normal appearance and bowel sounds are normal. She exhibits no distension, no abdominal bruit and no mass. There is no hepatosplenomegaly. There is no tenderness. There is no rigidity, no rebound, no guarding, no tenderness at McBurney's point and negative Silva's sign. No hernia.   Neurological: She is alert and oriented to person, place, and time.   Skin: Skin is warm and dry. No rash noted. She is not diaphoretic. No erythema. No pallor.   Non-jaundiced   Psychiatric: She has a normal mood and affect. Her behavior is normal. Judgment and thought content normal.   Nursing note and vitals reviewed.      Assessment:       1. Lymphocytic colitis    2. Chronic diarrhea    3. Abdominal cramping    4. Unintentional weight loss        Plan:       Lymphocytic colitis, Abdominal cramping, & Chronic diarrhea  - DISCONTINUE CHOLESTYRAMINE DUE TO INEFFECTIVE THERAPY  -   START  bismuth subsalicylate 262 mg Tab; Take 3 tablets " by mouth 3 (three) times daily.  Dispense: 270 tablet; Refill: 1  -     Creatinine, serum; Future; Expected date: 10/01/2018  - Reviewed diagnosis with patient, patient verbalized understanding  - recommend to avoid caffeine, lactose, sorbitol and NSAIDs    - discussed about different treatment options for it (such as loperamide, cholestyramine, flagyl, bismuth, budesonide, or 5-ASA), patient verbalized understanding and would like to try pepto  - recommended OTC probiotic, such as Culturelle, as directed  - CONTINUE BENEFIBER AS DIRECTED  - avoid lactose  - recommended increase fiber in diet, especially soluble fiber since this can help bulk up the stool consistency and may help to slow down how fast the stool goes through the colon and can prevent diarrhea    Unintentional weight loss  -     CT Abdomen Pelvis With Contrast; Future; Expected date: 10/01/2018  -     Creatinine, serum; Future; Expected date: 10/01/2018  - encouraged PO intake and daily calorie counts to ensure adequate nutrition is taken in, recommend at least 1,800-2,000 calories a day  - recommend nutritional drinks, such as Boost, Ensure or Glucerna, to supplement nutrition needs  - Possible EGD pending results of testing and if symptoms persist    Follow-up in about 1 month (around 11/1/2018), or if symptoms worsen or fail to improve.    If no improvement in symptoms or symptoms worsen, call/follow-up at clinic or go to ER.

## 2018-10-08 ENCOUNTER — HOSPITAL ENCOUNTER (OUTPATIENT)
Dept: RADIOLOGY | Facility: HOSPITAL | Age: 78
Discharge: HOME | End: 2018-10-08
Attending: NURSE PRACTITIONER
Payer: MEDICARE

## 2018-10-08 DIAGNOSIS — R63.4 UNINTENTIONAL WEIGHT LOSS: ICD-10-CM

## 2018-10-08 PROCEDURE — 74177 CT ABD & PELVIS W/CONTRAST: CPT | Mod: 26,,, | Performed by: RADIOLOGY

## 2018-10-08 PROCEDURE — 74177 CT ABD & PELVIS W/CONTRAST: CPT | Mod: TC,PO

## 2018-10-08 PROCEDURE — 25500020 PHARM REV CODE 255: Mod: PO | Performed by: NURSE PRACTITIONER

## 2018-10-08 RX ORDER — BRIMONIDINE TARTRATE 2 MG/ML
SOLUTION/ DROPS OPHTHALMIC
Qty: 30 ML | Refills: 1 | Status: SHIPPED | OUTPATIENT
Start: 2018-10-08 | End: 2019-05-29 | Stop reason: SDUPTHER

## 2018-10-08 RX ADMIN — IOHEXOL 75 ML: 350 INJECTION, SOLUTION INTRAVENOUS at 08:10

## 2018-10-08 RX ADMIN — IOHEXOL 30 ML: 350 INJECTION, SOLUTION INTRAVENOUS at 08:10

## 2018-10-10 ENCOUNTER — TELEPHONE (OUTPATIENT)
Dept: GASTROENTEROLOGY | Facility: CLINIC | Age: 78
End: 2018-10-10

## 2018-10-10 DIAGNOSIS — R63.4 UNINTENTIONAL WEIGHT LOSS: ICD-10-CM

## 2018-10-10 DIAGNOSIS — E27.8 ADRENAL MASS, LEFT: ICD-10-CM

## 2018-10-10 DIAGNOSIS — F40.240 CLAUSTROPHOBIA: ICD-10-CM

## 2018-10-10 DIAGNOSIS — R93.5 ABNORMAL CT SCAN, PELVIS: Primary | ICD-10-CM

## 2018-10-10 DIAGNOSIS — K76.89 LIVER CYST: ICD-10-CM

## 2018-10-10 RX ORDER — ALPRAZOLAM 0.25 MG/1
0.25 TABLET ORAL
Qty: 2 TABLET | Refills: 0 | Status: SHIPPED | OUTPATIENT
Start: 2018-10-10 | End: 2018-11-14 | Stop reason: ALTCHOICE

## 2018-10-10 NOTE — TELEPHONE ENCOUNTER
Discussed case with Dr. Marks. He recommends closed MRI rather than open if patient can tolerate due to better imaging results. He also recommended sending in a prescription of xanax to take prior to imaging. I sent it to your pharmacy on file. Please advise patient to not take it along with ativan since they act similarly. Please let her know the MRI typically lasts about 20-30 minutes.  Please call patient to assist in scheduling imaging studies as ordered.  Thanks  JAZMÍN

## 2018-10-10 NOTE — TELEPHONE ENCOUNTER
"I called the patient to discuss below results and recommendations. Patient and  on the phone:    Radiology report of the CT scan showed "Mass in the region of the left adrenal gland adjacent to the pancreas the of 1.6 cm.  This most likely relates to an adrenal nodule or an adrenal adenoma rather than a pancreatic neoplastic process or aggressive adrenal mass.  A 2nd possible left adrenal adenoma is noted as well.  An MRI adrenal mass protocol evaluation with coverage of the pancreas may be of use to exclude a pancreatic mass and help confirm this to relate to an adrenal adenoma and exclude a more aggressive process." Recommend MRI to further evaluate these findings and consult with endocrinology. Possible EUS if further imaging favors pancreatic lesion.  Patient reports she is slightly claustrophobic and wondering if open mri is an option for it.    "Hepatic hypodensities without obvious evidence of enhancement suggestive of cysts with 1 too small to accurately categorize but statistically favored relate to a cyst." Cyst are typically benign. Can confirm with ultrasound if patient desires.    "Ovarian hypodensity on the left nonspecific on this examination but statistically favored relate to a cyst.  Female pelvis ultrasound is suggested" this is not a typically finding in a post-menopausal patient. I ordered the pelvic ultrasound and patient also needs to see GYN for further evaluation and management of this finding.    "Degenerative changes within the spine with central canal stenosis and with an age indeterminate compression fracture at L1": Recommend follow-up with Primary Care Provider/ortho (Dr. Mane) for continued evaluation and management.    Emphysematous changes and band like changes noted to the lungs: Recommend follow-up with Primary Care Provider/pulmonology for continued evaluation and management.    I addressed all of the patient and her 's questions/concerns. Patient and  " both verbalized understanding and agreed with management plan. [facility]  Thanks,  Arlin ECHAVARRIA FNP-C

## 2018-10-16 ENCOUNTER — HOSPITAL ENCOUNTER (OUTPATIENT)
Dept: RADIOLOGY | Facility: HOSPITAL | Age: 78
Discharge: HOME | End: 2018-10-16
Attending: NURSE PRACTITIONER
Payer: MEDICARE

## 2018-10-16 DIAGNOSIS — K76.89 LIVER CYST: ICD-10-CM

## 2018-10-16 DIAGNOSIS — R93.5 ABNORMAL CT SCAN, PELVIS: ICD-10-CM

## 2018-10-16 PROCEDURE — 76856 US EXAM PELVIC COMPLETE: CPT | Mod: 26,,, | Performed by: RADIOLOGY

## 2018-10-16 PROCEDURE — 76830 TRANSVAGINAL US NON-OB: CPT | Mod: 26,,, | Performed by: RADIOLOGY

## 2018-10-16 PROCEDURE — 76705 ECHO EXAM OF ABDOMEN: CPT | Mod: 26,,, | Performed by: RADIOLOGY

## 2018-10-16 PROCEDURE — 76856 US EXAM PELVIC COMPLETE: CPT | Mod: TC,PO

## 2018-10-16 PROCEDURE — 76705 ECHO EXAM OF ABDOMEN: CPT | Mod: TC,PO

## 2018-10-16 PROCEDURE — 76830 TRANSVAGINAL US NON-OB: CPT | Mod: TC,PO

## 2018-10-17 ENCOUNTER — TELEPHONE (OUTPATIENT)
Dept: GASTROENTEROLOGY | Facility: CLINIC | Age: 78
End: 2018-10-17

## 2018-10-17 DIAGNOSIS — K86.2 PANCREATIC CYST: Primary | ICD-10-CM

## 2018-10-18 ENCOUNTER — PATIENT MESSAGE (OUTPATIENT)
Dept: GASTROENTEROLOGY | Facility: CLINIC | Age: 78
End: 2018-10-18

## 2018-10-18 NOTE — TELEPHONE ENCOUNTER
Please call to inform & review the results with the patient- radiology report of the right upper abdomen ultrasound showed a cyst in the pancreas. Recommend further evaluation with MRI as scheduled and repeat ultrasound in 6 months to monitor it.    Other findings showed liver and right kidney cysts (liver and kidney cysts are typically benign): Recommend follow-up with Primary Care Provider for continued evaluation and management of these findings.    The radiology report of the pelvis ultrasound showed bilateral ovarian cysts: patient needs to follow-up with GYN for continued evaluation and management of these findings (referral was palced with prior imaging study).    Continue with previous recommendations. If no improvement in symptoms or symptoms worsen, call/follow-up at clinic or go to ER.  Please release results to patient's mychart once you have discussed results and recommendations with patient.  Thanks,  Arlin LONG

## 2018-10-26 ENCOUNTER — PATIENT MESSAGE (OUTPATIENT)
Dept: GASTROENTEROLOGY | Facility: CLINIC | Age: 78
End: 2018-10-26

## 2018-10-26 ENCOUNTER — TELEPHONE (OUTPATIENT)
Dept: GASTROENTEROLOGY | Facility: CLINIC | Age: 78
End: 2018-10-26

## 2018-10-26 NOTE — TELEPHONE ENCOUNTER
"Please call to inform & review the results with the patient- radiology report of the MRI abdomen showed "Two benign left adrenal adenomas": Recommend follow-up with Primary Care Provider/endocrinology for continued evaluation and management.    "Multiple simple hepatic cysts" :Recommend follow-up with Primary Care Provider for continued evaluation and management of this finding.    "Normal pancreatic parenchyma with no pancreatic mass."  Otherwise unremarkable findings.    Continue with previous recommendations. If no improvement in symptoms or symptoms worsen, call/follow-up at clinic or go to ER.  Please release results to patient's mychart once you have discussed results and recommendations with patient.  Thanks,  Arlin POWELLP-C    "

## 2018-10-29 ENCOUNTER — PATIENT MESSAGE (OUTPATIENT)
Dept: GASTROENTEROLOGY | Facility: CLINIC | Age: 78
End: 2018-10-29

## 2018-10-29 NOTE — PROGRESS NOTES
"        Assessment /Plan     For exam results, see Encounter Report.    Exudative age-related macular degeneration of left eye with active choroidal neovascularization    Nonexudative age-related macular degeneration, right eye, intermediate dry stage    Low-tension glaucoma of both eyes, severe stage    Pseudophakia of both eyes    Refractive error            1. Low tension Glaucoma  Reports progressive "dimming" of vision OD, despite good IOP control.  IOP seems stable low/mid teens, reports good compliance with drops, prior small NFL hemorrhages OS not present on last DFE.   Switched to Cosopt BID OU 7/8/15 when she appeared to have possible progression on clinical exam and HRT OD.   Baseline OCT nerve done 11/21/17 - thinning OD>OS. Repeated today 10/30/18 stable  Last HRT 6/26/18 appears within range of priors, as does clinical exam.   CCT WNL.   HVF's now likely affected by ARMD - possible progression OD? Continue Cosopt BID OU started 7/8/15. Added Brimonidine BID OU on 8/22/17.  Added Latanoprost OU on 10/30/18.  Brother with glaucoma with "stitch"    F/u 4 months for IOP check and 24-2 HVF.    Macular hemorrhage OS F/u Dr. Lopez as scheduled.    2. Pseudophakia OU s/p Phaco/PCIOL OS with CTR for zonular dehiscence and Triescence.   s/p Phaco/PCIOL OD. Doing well.   3. Blepharitis  Ok to resume lid hygiene, continue artificial tears prn.   4. Hyperopia with astigmatism and presbyopia  MRx given prior visit   5. Allergic conjunctivitis Discussed cool compresses/AT's/Zaditor on prior visit.                        "

## 2018-10-30 ENCOUNTER — OFFICE VISIT (OUTPATIENT)
Dept: OPHTHALMOLOGY | Facility: CLINIC | Age: 78
End: 2018-10-30
Payer: MEDICARE

## 2018-10-30 DIAGNOSIS — H40.1233 LOW-TENSION GLAUCOMA OF BOTH EYES, SEVERE STAGE: ICD-10-CM

## 2018-10-30 DIAGNOSIS — H35.3112 NONEXUDATIVE AGE-RELATED MACULAR DEGENERATION, RIGHT EYE, INTERMEDIATE DRY STAGE: ICD-10-CM

## 2018-10-30 DIAGNOSIS — H40.1234 BILATERAL LOW-TENSION GLAUCOMA, INDETERMINATE STAGE: ICD-10-CM

## 2018-10-30 DIAGNOSIS — H52.7 REFRACTIVE ERROR: ICD-10-CM

## 2018-10-30 DIAGNOSIS — Z96.1 PSEUDOPHAKIA OF BOTH EYES: ICD-10-CM

## 2018-10-30 DIAGNOSIS — H35.3221 EXUDATIVE AGE-RELATED MACULAR DEGENERATION OF LEFT EYE WITH ACTIVE CHOROIDAL NEOVASCULARIZATION: Primary | ICD-10-CM

## 2018-10-30 PROCEDURE — 99999 PR PBB SHADOW E&M-EST. PATIENT-LVL III: CPT | Mod: PBBFAC,,, | Performed by: OPHTHALMOLOGY

## 2018-10-30 PROCEDURE — 92012 INTRM OPH EXAM EST PATIENT: CPT | Mod: S$PBB,,, | Performed by: OPHTHALMOLOGY

## 2018-10-30 PROCEDURE — 92133 CPTRZD OPH DX IMG PST SGM ON: CPT | Mod: PBBFAC,PO | Performed by: OPHTHALMOLOGY

## 2018-10-30 PROCEDURE — 99213 OFFICE O/P EST LOW 20 MIN: CPT | Mod: PBBFAC,PO | Performed by: OPHTHALMOLOGY

## 2018-10-30 RX ORDER — LATANOPROST 50 UG/ML
1 SOLUTION/ DROPS OPHTHALMIC NIGHTLY
Qty: 1 BOTTLE | Refills: 11 | Status: SHIPPED | OUTPATIENT
Start: 2018-10-30 | End: 2019-03-15 | Stop reason: SDUPTHER

## 2018-10-31 ENCOUNTER — PATIENT MESSAGE (OUTPATIENT)
Dept: FAMILY MEDICINE | Facility: CLINIC | Age: 78
End: 2018-10-31

## 2018-10-31 ENCOUNTER — PATIENT MESSAGE (OUTPATIENT)
Dept: GASTROENTEROLOGY | Facility: CLINIC | Age: 78
End: 2018-10-31

## 2018-11-13 ENCOUNTER — TELEPHONE (OUTPATIENT)
Dept: GASTROENTEROLOGY | Facility: CLINIC | Age: 78
End: 2018-11-13

## 2018-11-13 ENCOUNTER — PATIENT MESSAGE (OUTPATIENT)
Dept: GASTROENTEROLOGY | Facility: CLINIC | Age: 78
End: 2018-11-13

## 2018-11-13 DIAGNOSIS — K52.832 LYMPHOCYTIC COLITIS: Primary | ICD-10-CM

## 2018-11-13 RX ORDER — BALSALAZIDE DISODIUM 750 MG/1
2250 CAPSULE ORAL 3 TIMES DAILY
Qty: 270 CAPSULE | Refills: 0 | Status: SHIPPED | OUTPATIENT
Start: 2018-11-13 | End: 2019-03-19 | Stop reason: SDUPTHER

## 2018-11-13 NOTE — TELEPHONE ENCOUNTER
Dr. Lynn recommended to continue the bismuth and add on balsalazide 2.25 gm 3 times a day.  If symptoms persist after this, Dr. Lynn recommends lomotil at least nightly and PRN during the day.  I reordered the bismuth since it fell off patient medication list and I sent in prescription for balsalazide.  Recommend patient to follow-up with Dr. Lynn for further evaluation and management. Patient has appointment with him set for 12/10/18.  Please inform patient of above recommendations.  KTP

## 2018-11-14 ENCOUNTER — OFFICE VISIT (OUTPATIENT)
Dept: FAMILY MEDICINE | Facility: CLINIC | Age: 78
End: 2018-11-14
Payer: MEDICARE

## 2018-11-14 VITALS
HEART RATE: 57 BPM | OXYGEN SATURATION: 94 % | SYSTOLIC BLOOD PRESSURE: 132 MMHG | BODY MASS INDEX: 21.52 KG/M2 | DIASTOLIC BLOOD PRESSURE: 64 MMHG | WEIGHT: 129.19 LBS | HEIGHT: 65 IN

## 2018-11-14 DIAGNOSIS — K52.832 LYMPHOCYTIC COLITIS: Primary | ICD-10-CM

## 2018-11-14 PROCEDURE — G0008 ADMIN INFLUENZA VIRUS VAC: HCPCS | Mod: S$GLB,,, | Performed by: FAMILY MEDICINE

## 2018-11-14 PROCEDURE — 3075F SYST BP GE 130 - 139MM HG: CPT | Mod: CPTII,S$GLB,, | Performed by: FAMILY MEDICINE

## 2018-11-14 PROCEDURE — 90662 IIV NO PRSV INCREASED AG IM: CPT | Mod: S$GLB,,, | Performed by: FAMILY MEDICINE

## 2018-11-14 PROCEDURE — 99999 PR PBB SHADOW E&M-EST. PATIENT-LVL III: CPT | Mod: PBBFAC,,, | Performed by: FAMILY MEDICINE

## 2018-11-14 PROCEDURE — 3078F DIAST BP <80 MM HG: CPT | Mod: CPTII,S$GLB,, | Performed by: FAMILY MEDICINE

## 2018-11-14 PROCEDURE — 1101F PT FALLS ASSESS-DOCD LE1/YR: CPT | Mod: CPTII,S$GLB,, | Performed by: FAMILY MEDICINE

## 2018-11-14 PROCEDURE — 99214 OFFICE O/P EST MOD 30 MIN: CPT | Mod: 25,S$GLB,, | Performed by: FAMILY MEDICINE

## 2018-11-14 NOTE — PROGRESS NOTES
Subjective:       Patient ID: Serenity Epps is a 78 y.o. female    Chief Complaint: Follow-up (6 month f/u)    HPI  Here today for interval evaluation  Since our last visit she underwent left hip ORIF s/p fall and fracture.  She has progressed well with PT and is now walking with the assistance of a walker/cane.  She continues with diarrhea.  Biopsy shows a microscopic colitis, lymphocytic type.      Review of Systems   Constitutional: Positive for activity change and unexpected weight change.   HENT: Negative for hearing loss, rhinorrhea and trouble swallowing.    Eyes: Negative for discharge and visual disturbance.   Respiratory: Negative for chest tightness and wheezing.    Cardiovascular: Negative for chest pain and palpitations.   Gastrointestinal: Positive for diarrhea. Negative for blood in stool, constipation and vomiting.   Endocrine: Negative for polydipsia and polyuria.   Genitourinary: Negative for difficulty urinating, dysuria, hematuria and menstrual problem.   Musculoskeletal: Positive for arthralgias. Negative for joint swelling and neck pain.   Neurological: Negative for weakness and headaches.   Psychiatric/Behavioral: Positive for dysphoric mood. Negative for confusion.        Objective:   Physical Exam   Constitutional: She is oriented to person, place, and time. She appears well-developed and well-nourished.   Neurological: She is alert and oriented to person, place, and time.   Vitals reviewed.       Assessment:       1. Lymphocytic colitis          Plan:       Lymphocytic colitis  - Continue GI  - Continue current therapy  - Reviewed recent imaging  - STOP Cymbalta due to association of SSRIs and microscopic colitis.

## 2018-12-10 ENCOUNTER — OFFICE VISIT (OUTPATIENT)
Dept: GASTROENTEROLOGY | Facility: CLINIC | Age: 78
End: 2018-12-10
Payer: MEDICARE

## 2018-12-10 VITALS
SYSTOLIC BLOOD PRESSURE: 159 MMHG | WEIGHT: 130.06 LBS | HEART RATE: 64 BPM | DIASTOLIC BLOOD PRESSURE: 86 MMHG | HEIGHT: 65 IN | BODY MASS INDEX: 21.67 KG/M2

## 2018-12-10 DIAGNOSIS — R19.7 DIARRHEA, UNSPECIFIED TYPE: Primary | ICD-10-CM

## 2018-12-10 DIAGNOSIS — K52.832 LYMPHOCYTIC COLITIS: ICD-10-CM

## 2018-12-10 PROCEDURE — 3077F SYST BP >= 140 MM HG: CPT | Mod: CPTII,HCWC,S$GLB, | Performed by: INTERNAL MEDICINE

## 2018-12-10 PROCEDURE — 3288F FALL RISK ASSESSMENT DOCD: CPT | Mod: CPTII,HCWC,S$GLB, | Performed by: INTERNAL MEDICINE

## 2018-12-10 PROCEDURE — 99999 PR PBB SHADOW E&M-EST. PATIENT-LVL III: CPT | Mod: PBBFAC,HCWC,, | Performed by: INTERNAL MEDICINE

## 2018-12-10 PROCEDURE — 99213 OFFICE O/P EST LOW 20 MIN: CPT | Mod: HCWC,S$GLB,, | Performed by: INTERNAL MEDICINE

## 2018-12-10 PROCEDURE — 3079F DIAST BP 80-89 MM HG: CPT | Mod: CPTII,HCWC,S$GLB, | Performed by: INTERNAL MEDICINE

## 2018-12-10 PROCEDURE — 1100F PTFALLS ASSESS-DOCD GE2>/YR: CPT | Mod: CPTII,HCWC,S$GLB, | Performed by: INTERNAL MEDICINE

## 2018-12-11 ENCOUNTER — PATIENT MESSAGE (OUTPATIENT)
Dept: GASTROENTEROLOGY | Facility: CLINIC | Age: 78
End: 2018-12-11

## 2018-12-11 NOTE — PROGRESS NOTES
Subjective:       Patient ID: Serenity Epps is a 78 y.o. female.    Chief Complaint: Follow-up (diarrhea seems to be better off of the cymbalta)    Ms. Epps returns for f/u of her diarrhea, prob due to Lymphocytic colitis.  She was already on Bismuth.  The NP added trial of colazal (which she hasn't started yet).  But her last visit with Dr. Andersen led to suspicion of Cymbalta, and he d/c'd that.  Within a few days, her BM freq dropped more than half.  It still loose, but not cramping like before.  And this past weekend, was going only 2-3 X / d.      Review of Systems   Constitutional: Negative for appetite change, fever and unexpected weight change.   HENT: Negative.  Negative for mouth sores, sore throat and trouble swallowing.    Eyes: Negative.    Respiratory: Negative.  Negative for cough and choking.    Cardiovascular: Negative.  Negative for chest pain and leg swelling.   Gastrointestinal: Positive for diarrhea (Frequency definitely decreased.). Negative for abdominal distention, abdominal pain, anal bleeding, blood in stool, constipation, nausea and vomiting.   Genitourinary: Negative.    Musculoskeletal: Negative.    Skin: Negative.  Negative for color change and rash.   Neurological: Negative.    Hematological: Negative for adenopathy.   Psychiatric/Behavioral: Negative.        Objective:      Physical Exam   Constitutional: She is oriented to person, place, and time. She appears well-developed and well-nourished.   HENT:   Mouth/Throat: Oropharynx is clear and moist. No oropharyngeal exudate.   Eyes: No scleral icterus.   Neck: No tracheal deviation present. No thyromegaly present.   Cardiovascular: Normal rate, regular rhythm and normal heart sounds.   Pulmonary/Chest: Effort normal and breath sounds normal.   Abdominal: Soft. Bowel sounds are normal. She exhibits no distension and no mass. There is no tenderness. There is no guarding.   Lymphadenopathy:     She has no cervical adenopathy.   Neurological:  She is alert and oriented to person, place, and time.   Skin: Skin is warm and dry. No rash noted.   Psychiatric: She has a normal mood and affect.   Nursing note and vitals reviewed.      ASSESSMENT:    Diarrhea, unspecified type    Lymphocytic colitis          Plan:        1. Cont Bismuth same.   2. Do take the Colazal.   3. RTC 2 months.

## 2018-12-31 ENCOUNTER — PROCEDURE VISIT (OUTPATIENT)
Dept: OPHTHALMOLOGY | Facility: CLINIC | Age: 78
End: 2018-12-31
Payer: MEDICARE

## 2018-12-31 VITALS — HEART RATE: 58 BPM | DIASTOLIC BLOOD PRESSURE: 69 MMHG | SYSTOLIC BLOOD PRESSURE: 113 MMHG

## 2018-12-31 DIAGNOSIS — H35.3221 EXUDATIVE AGE-RELATED MACULAR DEGENERATION OF LEFT EYE WITH ACTIVE CHOROIDAL NEOVASCULARIZATION: ICD-10-CM

## 2018-12-31 DIAGNOSIS — H35.3112 NONEXUDATIVE AGE-RELATED MACULAR DEGENERATION, RIGHT EYE, INTERMEDIATE DRY STAGE: Primary | ICD-10-CM

## 2018-12-31 PROCEDURE — 92134 CPTRZ OPH DX IMG PST SGM RTA: CPT | Mod: S$GLB,,, | Performed by: OPHTHALMOLOGY

## 2018-12-31 PROCEDURE — 92014 COMPRE OPH EXAM EST PT 1/>: CPT | Mod: S$GLB,,, | Performed by: OPHTHALMOLOGY

## 2018-12-31 PROCEDURE — 92226 PR SPECIAL EYE EXAM, SUBSEQUENT: CPT | Mod: 50,S$GLB,, | Performed by: OPHTHALMOLOGY

## 2018-12-31 RX ORDER — DEXTROMETHORPHAN HYDROBROMIDE, GUAIFENESIN 5; 100 MG/5ML; MG/5ML
LIQUID ORAL
COMMUNITY
Start: 2018-10-15 | End: 2019-02-12

## 2018-12-31 NOTE — PROGRESS NOTES
HPI     DLS  7/2/18 by Dr. Lopez     Pt states that she still have the little like flash in the eye can't see out of.  No Pain or double vision.    Eye Med(s) - Latanoprost QHS - OU            Cosopt BID OU               Brimonidine BID OU       HPI     DLS  7/2/18  Pt states she has noticed spots/flash in OS after injection.         OCT - slight increase in SRF OS  Drusen OD      A/P    1. Wet AMD OS  S/p Avastin OS x 15, Eylea x 14    Persistent SRF OS - improving  With RPE tear OS  Central fibrosis with atrophy - poor central potential  10/17 - increased SRH - will keep at 8 weeks for now  9/18 - stable cicatrix - try observation  12/18 - no activity      2. Dry AMD OD  AREDS/AG    3. PCIOL OU  CTR OS - but saw 20/30 with correction, in spite of sub RPE fibrosis      4. Glc Suspect OU   Good IOP control on Timolol    5. Floaters OU          4  Months OCT

## 2019-01-15 ENCOUNTER — PATIENT MESSAGE (OUTPATIENT)
Dept: ORTHOPEDICS | Facility: CLINIC | Age: 79
End: 2019-01-15

## 2019-01-15 DIAGNOSIS — M25.562 PAIN IN BOTH KNEES, UNSPECIFIED CHRONICITY: Primary | ICD-10-CM

## 2019-01-15 DIAGNOSIS — M25.561 PAIN IN BOTH KNEES, UNSPECIFIED CHRONICITY: Primary | ICD-10-CM

## 2019-01-15 DIAGNOSIS — Z98.890 S/P ORIF (OPEN REDUCTION INTERNAL FIXATION) FRACTURE: ICD-10-CM

## 2019-01-15 DIAGNOSIS — Z87.81 S/P ORIF (OPEN REDUCTION INTERNAL FIXATION) FRACTURE: ICD-10-CM

## 2019-01-21 ENCOUNTER — OFFICE VISIT (OUTPATIENT)
Dept: ORTHOPEDICS | Facility: CLINIC | Age: 79
End: 2019-01-21
Payer: MEDICARE

## 2019-01-21 ENCOUNTER — HOSPITAL ENCOUNTER (OUTPATIENT)
Dept: RADIOLOGY | Facility: HOSPITAL | Age: 79
Discharge: HOME | End: 2019-01-21
Attending: ORTHOPAEDIC SURGERY
Payer: MEDICARE

## 2019-01-21 VITALS — BODY MASS INDEX: 21.66 KG/M2 | HEIGHT: 65 IN | WEIGHT: 130 LBS

## 2019-01-21 DIAGNOSIS — Z87.81 S/P ORIF (OPEN REDUCTION INTERNAL FIXATION) FRACTURE: ICD-10-CM

## 2019-01-21 DIAGNOSIS — M25.561 PAIN IN BOTH KNEES, UNSPECIFIED CHRONICITY: ICD-10-CM

## 2019-01-21 DIAGNOSIS — Z98.890 S/P ORIF (OPEN REDUCTION INTERNAL FIXATION) FRACTURE: ICD-10-CM

## 2019-01-21 DIAGNOSIS — M25.551 RIGHT HIP PAIN: ICD-10-CM

## 2019-01-21 DIAGNOSIS — M25.562 PAIN IN BOTH KNEES, UNSPECIFIED CHRONICITY: ICD-10-CM

## 2019-01-21 DIAGNOSIS — M17.11 PRIMARY OSTEOARTHRITIS OF RIGHT KNEE: Primary | ICD-10-CM

## 2019-01-21 DIAGNOSIS — M25.561 RIGHT KNEE PAIN, UNSPECIFIED CHRONICITY: ICD-10-CM

## 2019-01-21 PROCEDURE — 73562 X-RAY EXAM OF KNEE 3: CPT | Mod: 26,50,, | Performed by: RADIOLOGY

## 2019-01-21 PROCEDURE — 99214 OFFICE O/P EST MOD 30 MIN: CPT | Mod: 25,S$GLB,, | Performed by: ORTHOPAEDIC SURGERY

## 2019-01-21 PROCEDURE — 1100F PTFALLS ASSESS-DOCD GE2>/YR: CPT | Mod: CPTII,S$GLB,, | Performed by: ORTHOPAEDIC SURGERY

## 2019-01-21 PROCEDURE — 73562 XR KNEE ORTHO BILAT: ICD-10-PCS | Mod: 26,50,, | Performed by: RADIOLOGY

## 2019-01-21 PROCEDURE — 20610 DRAIN/INJ JOINT/BURSA W/O US: CPT | Mod: RT,S$GLB,, | Performed by: ORTHOPAEDIC SURGERY

## 2019-01-21 PROCEDURE — 99999 PR PBB SHADOW E&M-EST. PATIENT-LVL III: ICD-10-PCS | Mod: PBBFAC,,, | Performed by: ORTHOPAEDIC SURGERY

## 2019-01-21 PROCEDURE — 3288F FALL RISK ASSESSMENT DOCD: CPT | Mod: CPTII,S$GLB,, | Performed by: ORTHOPAEDIC SURGERY

## 2019-01-21 PROCEDURE — 99214 PR OFFICE/OUTPT VISIT, EST, LEVL IV, 30-39 MIN: ICD-10-PCS | Mod: 25,S$GLB,, | Performed by: ORTHOPAEDIC SURGERY

## 2019-01-21 PROCEDURE — 73521 XR HIPS BILATERAL 2 VIEW INCL AP PELVIS: ICD-10-PCS | Mod: 26,,, | Performed by: RADIOLOGY

## 2019-01-21 PROCEDURE — 99999 PR PBB SHADOW E&M-EST. PATIENT-LVL III: CPT | Mod: PBBFAC,,, | Performed by: ORTHOPAEDIC SURGERY

## 2019-01-21 PROCEDURE — 73521 X-RAY EXAM HIPS BI 2 VIEWS: CPT | Mod: 26,,, | Performed by: RADIOLOGY

## 2019-01-21 PROCEDURE — 73562 X-RAY EXAM OF KNEE 3: CPT | Mod: TC,50,PO

## 2019-01-21 PROCEDURE — 1100F PR PT FALLS ASSESS DOC 2+ FALLS/FALL W/INJURY/YR: ICD-10-PCS | Mod: CPTII,S$GLB,, | Performed by: ORTHOPAEDIC SURGERY

## 2019-01-21 PROCEDURE — 3288F PR FALLS RISK ASSESSMENT DOCUMENTED: ICD-10-PCS | Mod: CPTII,S$GLB,, | Performed by: ORTHOPAEDIC SURGERY

## 2019-01-21 PROCEDURE — 73521 X-RAY EXAM HIPS BI 2 VIEWS: CPT | Mod: TC,PO

## 2019-01-21 PROCEDURE — 20610 LARGE JOINT ASPIRATION/INJECTION: R KNEE: ICD-10-PCS | Mod: RT,S$GLB,, | Performed by: ORTHOPAEDIC SURGERY

## 2019-01-21 RX ORDER — TRIAMCINOLONE ACETONIDE 40 MG/ML
40 INJECTION, SUSPENSION INTRA-ARTICULAR; INTRAMUSCULAR
Status: DISCONTINUED | OUTPATIENT
Start: 2019-01-21 | End: 2019-01-21 | Stop reason: HOSPADM

## 2019-01-21 RX ADMIN — TRIAMCINOLONE ACETONIDE 40 MG: 40 INJECTION, SUSPENSION INTRA-ARTICULAR; INTRAMUSCULAR at 09:01

## 2019-01-21 NOTE — PROCEDURES
Large Joint Aspiration/Injection: R knee  Date/Time: 1/21/2019 9:05 AM  Performed by: Kamaljit Mane MD  Authorized by: Kamaljit Mane MD     Consent Done?:  Yes (Verbal)  Indications:  Pain  Timeout: Prior to procedure the correct patient, procedure, and site was verified      Location:  Knee  Site:  R knee  Prep: Patient was prepped and draped in usual sterile fashion    Ultrasonic Guidance for needle placement: No  Needle size:  21 G  Approach:  Anterolateral  Medications:  40 mg triamcinolone acetonide 40 mg/mL  Patient tolerance:  Patient tolerated the procedure well with no immediate complications

## 2019-01-21 NOTE — PROGRESS NOTES
An 78 years old, right knee pain, been present now for a few months' time, oral   medications provide partial relief.  Exam today shows tenderness at the joint   line without signs of infections or instability. Skin is intact.  Compartments   are soft.    X-rays show valgus knee with arthritic changes.    ASSESSMENT:  Right knee arthrosis.    PLAN:  Kenalog injection to the right knee, strengthening over time.  Follow up   as needed.      PBB/HN  dd: 01/21/2019 09:32:20 (CST)  td: 01/22/2019 04:23:02 (CST)  Doc ID   #[id number]  Job ID #[id number]    CC:     Further History  Aching pain  Worse with activity  Relieved with rest  No other associated symptoms  No other radiation    Further Exam  Alert and oriented  Pleasant  Contralateral limb has appropriate range of motion for age and condition  Contralateral limb has appropriate strength for age and condition  Contralateral limb has appropriate stability  for age and condition  No adenopathy  Pulses are appropriate for current condition  Skin is intact        Chief Complaint    Chief Complaint   Patient presents with    Right Knee - Pain    Right Hip - Pain       HPI  Serenity Epps is a 78 y.o.  female who presents with       Past Medical History  Past Medical History:   Diagnosis Date    Anxiety     Arthritis     Cataract - Both Eyes     OU done//    CKD (chronic kidney disease), stage II 09/21/2016    pt denies    Diarrhea     Diverticulosis     DVT (deep venous thrombosis)     left leg, after childbirth    Exudative age-related macular degeneration of left eye 2/20/2014    Glaucoma     Hypertension     Irritable bowel syndrome     Left foot pain     chronic    Lymphocytic colitis     Macular degeneration     bimonthly intraoccular injections    Osteopenia     Recurrent major depressive disorder 4/27/2018    Rhinitis, chronic     Strabismus     as child       Past Surgical History  Past Surgical History:   Procedure Laterality Date    APPENDECTOMY       age 38    AUGMENTATION-FOOT Left 6/20/2014    Performed by [facility] at [facility] OR    CATARACT EXTRACTION      OU done//    CATARACT EXTRACTION W/ INTRAOCULAR LENS  IMPLANT, BILATERAL Bilateral 2016    cataract surgery Left 4/13/2016    Performed by Estelle Mello MD at [facility] OR    COLONOSCOPY  9/26/2006  [facility]ane    Diverticulosis.   Internal small hemorrhoids were found.     COLONOSCOPY  10/03/2012    Dr. Lynn, repeat in 7-8 years for surveillance    COLONOSCOPY  05/02/2018    Procedure: COLONOSCOPY;  Surgeon: Toby Lynn Jr., MD;  Location: [facility] ENDO;  Service: Endoscopy;  Laterality: N/A;    COLONOSCOPY N/A 5/2/2018    Performed by Toby Lynn Jr., MD at [facility] END[facility]    COLONOSCOPY N/A 10/3/2012    Performed by Toby Lynn Jr., MD at [facility]    ENDOSCOPIC GASTROC RESECTION Left 6/20/2014    Performed by [facility]jas D[facility] at [facility] OR    EYE SURGERY Bilateral     Phaco with IOL (cataract extraction), rigth:[id number] 22.0 d//    FOOT HARDWARE REMOVAL Left 06/01/2016    Dr. Hammonds    FOOT SURGERY Left 2014    ERG and open reduction tallo tarsal dislocation (hyprocure implant)    FUSION - Right 2,3,4 PIPJ fusion ( 28285 x 3) Right 6/1/2016    Performed by Aaron Hammonds D[facility] at [facility] OR    IM NAILING OF FEMUR TROCHANTERIC (TFN) Left 5/15/2018    [facility]    JOINT REPLACEMENT      OPEN REDUCTION TALO TARSAL DISLOCATION Left 6/20/2014    [facility]formed by [facility]jas [facility] at [facility] OR    PARTIAL HYSTERECTOMY      age 38, ovaries conserved    phaco/PCIOL Right 1/13/2016    Performed by Estelle Mello MD at [facility] OR    PIP JOINT FUSION Right 06/01/2016    2nd-4th PIP joints of right foot; Dr. Hammonds    RELEASE-METATARSAL PHALANGEAL JOINT (MPJ) - Right 2,3,4 (28270 x 3) Right 6/1/2016    Performed by Aaron Hammonds DPM at [facility] OR    REMOVAL-HARDWARE-FOOT - Left  (Hyprocure) Left 6/1/2016    Performed by Aaron ROBERTO  TORIN Hammonds at [facility] OR    TONSILLECTOMY      as a child    TOTAL KNEE ARTHROPLASTY Left 09/2015    UPPER GASTROINTESTINAL ENDOSCOPY  9/26/2006  Sahne    Erythema in the lower third of the esophagus (NERD).  Patulous lower esophageal sphincter.   Gastric mucosal atrophy.   PARAG Test  Negative.     VEIN LIGATION  1964    BLE       Medications  Current Outpatient Medications   Medication Sig    acetaminophen (TYLENOL 8 HOUR) 650 MG TbSR     balsalazide (COLAZAL) 750 mg capsule Take 3 capsules (2,250 mg total) by mouth 3 (three) times daily.    BENEFIBER, WHEAT DEXTRIN, ORAL Take by mouth 2 (two) times daily.    biotin 1 mg tablet Take 1,000 mcg by mouth every evening.     brimonidine 0.2% (ALPHAGAN) 0.2 % Drop INSTILL ONE DROP INTO EACH EYE TWICE DAILY    calcium carbonate (OS-BLAKE) 600 mg (1,500 mg) Tab Take 600 mg by mouth 2 (two) times daily with meals.    dorzolamide-timolol 2-0.5% (COSOPT) 22.3-6.8 mg/mL ophthalmic solution INSTILL ONE DROP INTO EACH EYE TWICE DAILY    gabapentin (NEURONTIN) 400 MG capsule TAKE ONE CAPSULE BY MOUTH THREE TIMES DAILY    Lactobacillus rhamnosus GG (CULTURELLE) 10 billion cell capsule Take 1 capsule by mouth once daily.    latanoprost 0.005 % ophthalmic solution Place 1 drop into both eyes every evening.    metoprolol succinate (TOPROL-XL) 25 MG 24 hr tablet TAKE ONE TABLET BY MOUTH TWICE DAILY    VIT C/E/ZN/COPPR/LUTEIN/ZEAXAN (PRESERVISION AREDS 2 ORAL) Take 1 capsule by mouth 2 (two) times daily.      Current Facility-Administered Medications   Medication    bevacizumab (AVASTIN) 2.5 mg/0.10 mL 1.25 mg       Allergies  Review of patient's allergies indicates:   Allergen Reactions    Clindamycin Diarrhea       Family History  Family History   Problem Relation Age of Onset    No Known Problems Brother     Breast cancer Daughter     Cancer Daughter         breast    Coronary artery disease Mother 78    Glaucoma Mother     Diverticulitis Father     No Known  Problems Maternal Grandmother     No Known Problems Maternal Grandfather     No Known Problems Paternal Grandmother     No Known Problems Paternal Grandfather     Glaucoma Brother     Ankylosing spondylitis Brother     Diabetes Brother     Macular degeneration Brother          twin brother wet mac//    Cancer Brother         prostate    Crohn's disease Brother     Amblyopia Neg Hx     Blindness Neg Hx     Cataracts Neg Hx     Hypertension Neg Hx     Strabismus Neg Hx     Stroke Neg Hx     Thyroid disease Neg Hx     Retinal detachment Neg Hx     Celiac disease Neg Hx        Social History  Social History     Socioeconomic History    Marital status:      Spouse name: Not on file    Number of children: Not on file    Years of education: Not on file    Highest education level: Not on file   Social Needs    Financial resource strain: Not on file    Food insecurity - worry: Not on file    Food insecurity - inability: Not on file    Transportation needs - medical: Not on file    Transportation needs - non-medical: Not on file   Occupational History    Not on file   Tobacco Use    Smoking status: Former Smoker     Packs/day: 2.00     Years: 35.00     Pack years: 70.00     Types: Cigarettes     Last attempt to quit: 2002     Years since quittin.0    Smokeless tobacco: Never Used   Substance and Sexual Activity    Alcohol use: Yes     Alcohol/week: 8.4 oz     Types: 14 Glasses of wine per week     Comment: 2-3 glasses per night    Drug use: Yes     Types: Benzodiazepines    Sexual activity: Not on file   Other Topics Concern    Not on file   Social History Narrative    Not on file               Review of Systems     Constitutional: Negative    HENT: Negative  Eyes: Negative  Respiratory: Negative  Cardiovascular: Negative  Musculoskeletal: HPI  Skin: Negative  Neurological: Negative  Hematological: Negative  Endocrine: Negative                 Physical Exam    There were no  vitals filed for this visit.  Body mass index is 21.63 kg/m².  Physical Examination:     General appearance -  well appearing, and in no distress  Mental status - awake  Neck - supple  Chest -  symmetric air entry  Heart - normal rate   Abdomen - soft      Assessment     1. Primary osteoarthritis of right knee    2. Right hip pain    3. Right knee pain, unspecified chronicity          Plan

## 2019-01-22 ENCOUNTER — PATIENT MESSAGE (OUTPATIENT)
Dept: ORTHOPEDICS | Facility: CLINIC | Age: 79
End: 2019-01-22

## 2019-02-12 ENCOUNTER — OFFICE VISIT (OUTPATIENT)
Dept: GASTROENTEROLOGY | Facility: CLINIC | Age: 79
End: 2019-02-12
Payer: MEDICARE

## 2019-02-12 VITALS
WEIGHT: 132.06 LBS | SYSTOLIC BLOOD PRESSURE: 151 MMHG | DIASTOLIC BLOOD PRESSURE: 87 MMHG | HEART RATE: 67 BPM | BODY MASS INDEX: 22 KG/M2 | HEIGHT: 65 IN

## 2019-02-12 DIAGNOSIS — K52.832 LYMPHOCYTIC COLITIS: Primary | Chronic | ICD-10-CM

## 2019-02-12 DIAGNOSIS — R19.7 DIARRHEA, UNSPECIFIED TYPE: ICD-10-CM

## 2019-02-12 PROCEDURE — 99999 PR PBB SHADOW E&M-EST. PATIENT-LVL III: CPT | Mod: PBBFAC,HCNC,, | Performed by: INTERNAL MEDICINE

## 2019-02-12 PROCEDURE — 1100F PR PT FALLS ASSESS DOC 2+ FALLS/FALL W/INJURY/YR: ICD-10-PCS | Mod: HCNC,CPTII,S$GLB, | Performed by: INTERNAL MEDICINE

## 2019-02-12 PROCEDURE — 3079F DIAST BP 80-89 MM HG: CPT | Mod: HCNC,CPTII,S$GLB, | Performed by: INTERNAL MEDICINE

## 2019-02-12 PROCEDURE — 3079F PR MOST RECENT DIASTOLIC BLOOD PRESSURE 80-89 MM HG: ICD-10-PCS | Mod: HCNC,CPTII,S$GLB, | Performed by: INTERNAL MEDICINE

## 2019-02-12 PROCEDURE — 3288F PR FALLS RISK ASSESSMENT DOCUMENTED: ICD-10-PCS | Mod: HCNC,CPTII,S$GLB, | Performed by: INTERNAL MEDICINE

## 2019-02-12 PROCEDURE — 3077F SYST BP >= 140 MM HG: CPT | Mod: HCNC,CPTII,S$GLB, | Performed by: INTERNAL MEDICINE

## 2019-02-12 PROCEDURE — 99213 PR OFFICE/OUTPT VISIT, EST, LEVL III, 20-29 MIN: ICD-10-PCS | Mod: HCNC,S$GLB,, | Performed by: INTERNAL MEDICINE

## 2019-02-12 PROCEDURE — 3077F PR MOST RECENT SYSTOLIC BLOOD PRESSURE >= 140 MM HG: ICD-10-PCS | Mod: HCNC,CPTII,S$GLB, | Performed by: INTERNAL MEDICINE

## 2019-02-12 PROCEDURE — 99999 PR PBB SHADOW E&M-EST. PATIENT-LVL III: ICD-10-PCS | Mod: PBBFAC,HCNC,, | Performed by: INTERNAL MEDICINE

## 2019-02-12 PROCEDURE — 3288F FALL RISK ASSESSMENT DOCD: CPT | Mod: HCNC,CPTII,S$GLB, | Performed by: INTERNAL MEDICINE

## 2019-02-12 PROCEDURE — 1100F PTFALLS ASSESS-DOCD GE2>/YR: CPT | Mod: HCNC,CPTII,S$GLB, | Performed by: INTERNAL MEDICINE

## 2019-02-12 PROCEDURE — 99213 OFFICE O/P EST LOW 20 MIN: CPT | Mod: HCNC,S$GLB,, | Performed by: INTERNAL MEDICINE

## 2019-02-12 RX ORDER — NAPROXEN SODIUM 220 MG
TABLET ORAL
COMMUNITY
End: 2019-03-01 | Stop reason: ALTCHOICE

## 2019-02-19 ENCOUNTER — OFFICE VISIT (OUTPATIENT)
Dept: FAMILY MEDICINE | Facility: CLINIC | Age: 79
End: 2019-02-19
Payer: MEDICARE

## 2019-02-19 VITALS
BODY MASS INDEX: 22.03 KG/M2 | HEART RATE: 62 BPM | WEIGHT: 132.25 LBS | SYSTOLIC BLOOD PRESSURE: 130 MMHG | OXYGEN SATURATION: 96 % | DIASTOLIC BLOOD PRESSURE: 78 MMHG | HEIGHT: 65 IN

## 2019-02-19 DIAGNOSIS — Z00.00 ENCOUNTER FOR PREVENTIVE HEALTH EXAMINATION: Primary | ICD-10-CM

## 2019-02-19 DIAGNOSIS — H35.3221 EXUDATIVE AGE-RELATED MACULAR DEGENERATION OF LEFT EYE WITH ACTIVE CHOROIDAL NEOVASCULARIZATION: ICD-10-CM

## 2019-02-19 DIAGNOSIS — F33.1 MODERATE EPISODE OF RECURRENT MAJOR DEPRESSIVE DISORDER: ICD-10-CM

## 2019-02-19 DIAGNOSIS — I10 ESSENTIAL HYPERTENSION: ICD-10-CM

## 2019-02-19 DIAGNOSIS — F41.9 ANXIETY: ICD-10-CM

## 2019-02-19 PROBLEM — D62 ACUTE BLOOD LOSS ANEMIA: Status: RESOLVED | Noted: 2018-05-17 | Resolved: 2019-02-19

## 2019-02-19 PROBLEM — S72.002A CLOSED FRACTURE OF LEFT HIP: Status: RESOLVED | Noted: 2018-05-14 | Resolved: 2019-02-19

## 2019-02-19 PROBLEM — R19.7 DIARRHEA: Status: RESOLVED | Noted: 2018-05-02 | Resolved: 2019-02-19

## 2019-02-19 PROCEDURE — 3075F SYST BP GE 130 - 139MM HG: CPT | Mod: HCNC,CPTII,S$GLB, | Performed by: NURSE PRACTITIONER

## 2019-02-19 PROCEDURE — 3078F DIAST BP <80 MM HG: CPT | Mod: HCNC,CPTII,S$GLB, | Performed by: NURSE PRACTITIONER

## 2019-02-19 PROCEDURE — 3075F PR MOST RECENT SYSTOLIC BLOOD PRESS GE 130-139MM HG: ICD-10-PCS | Mod: HCNC,CPTII,S$GLB, | Performed by: NURSE PRACTITIONER

## 2019-02-19 PROCEDURE — 99499 UNLISTED E&M SERVICE: CPT | Mod: HCNC,S$GLB,, | Performed by: NURSE PRACTITIONER

## 2019-02-19 PROCEDURE — 99999 PR PBB SHADOW E&M-EST. PATIENT-LVL V: CPT | Mod: PBBFAC,HCNC,, | Performed by: NURSE PRACTITIONER

## 2019-02-19 PROCEDURE — G0439 PPPS, SUBSEQ VISIT: HCPCS | Mod: HCNC,S$GLB,, | Performed by: NURSE PRACTITIONER

## 2019-02-19 PROCEDURE — 3078F PR MOST RECENT DIASTOLIC BLOOD PRESSURE < 80 MM HG: ICD-10-PCS | Mod: HCNC,CPTII,S$GLB, | Performed by: NURSE PRACTITIONER

## 2019-02-19 PROCEDURE — G0439 PR MEDICARE ANNUAL WELLNESS SUBSEQUENT VISIT: ICD-10-PCS | Mod: HCNC,S$GLB,, | Performed by: NURSE PRACTITIONER

## 2019-02-19 PROCEDURE — 99999 PR PBB SHADOW E&M-EST. PATIENT-LVL V: ICD-10-PCS | Mod: PBBFAC,HCNC,, | Performed by: NURSE PRACTITIONER

## 2019-02-19 PROCEDURE — 99499 RISK ADDL DX/OHS AUDIT: ICD-10-PCS | Mod: HCNC,S$GLB,, | Performed by: NURSE PRACTITIONER

## 2019-02-19 NOTE — PATIENT INSTRUCTIONS
Counseling and Referral of Other Preventative  (Italic type indicates deductible and co-insurance are waived)    Patient Name: Serenity Epps  Today's Date: 2/19/2019    Health Maintenance       Date Due Completion Date    DEXA SCAN 08/16/2019 8/16/2016    Lipid Panel 08/07/2022 8/7/2017    TETANUS VACCINE 09/21/2026 9/21/2016        No orders of the defined types were placed in this encounter.    The following information is provided to all patients.  This information is to help you find resources for any of the problems found today that may be affecting your health:                Living healthy guide: www.[facility]      Understanding Diabetes: www.diabetes.org      Eating healthy: www.cdc.gov/healthyweight      CDC home safety checklist: www.cdc.gov/steadi/patient.html      Agency on Aging: www.goea.[place].[facility]      Alcoholics anonymous (AA): www.aa.org      Physical Activity: www.divina.nih.gov/zi8xsuq      Tobacco use: www.quitwithusla.org

## 2019-02-19 NOTE — PROGRESS NOTES
"Serenity Epps presented for a  Medicare AWV and comprehensive Health Risk Assessment today. The following components were reviewed and updated:    · Medical history  · Family History  · Social history  · Allergies and Current Medications  · Health Risk Assessment  · Health Maintenance  · Care Team     ** See Completed Assessments for Annual Wellness Visit within the encounter summary.**     The following assessments were completed:  · Living Situation  · CAGE  · Depression Screening  · Timed Get Up and Go  · Whisper Test  · Cognitive Function Screening      · Nutrition Screening  · ADL Screening  · PAQ Screening    Vitals:    02/19/19 0758   BP: 130/78   BP Location: Left arm   Patient Position: Sitting   BP Method: Medium (Automatic)   Pulse: 62   SpO2: 96%   Weight: 60 kg (132 lb 4.4 oz)   Height: 5' 5" (1.651 m)     Body mass index is 22.01 kg/m².  Physical Exam   Constitutional: She is oriented to person, place, and time. She appears well-nourished.   Cardiovascular: Normal rate, regular rhythm, normal heart sounds and intact distal pulses.   Pulmonary/Chest: Effort normal and breath sounds normal.   Neurological: She is alert and oriented to person, place, and time.   Skin: Skin is warm and dry.   Vitals reviewed.        Diagnoses and health risks identified today and associated recommendations/orders:    1. Encounter for preventive health examination  Reviewed and discussed health maintenance.      2. Moderate episode of recurrent major depressive disorder  Stable- continue current treatment and follow up routinely with PCP   OFF ALL MEDS AND DOING WELL    3. Anxiety  Stable- continue current treatment and follow up routinely with PCP   OFF ALL MEDS AND DOING WELL    4. Exudative age-related macular degeneration of left eye with active choroidal neovascularization  Stable- continue current treatment and follow up routinely with PCP     5. Essential hypertension  Stable- continue current treatment and follow up " routinely with PCP     Provided Serenity with a 5-10 year written screening schedule and personal prevention plan. Recommendations were developed using the USPSTF age appropriate recommendations. Education, counseling, and referrals were provided as needed. After Visit Summary printed and given to patient which includes a list of additional screenings\tests needed.    Janeth Ordonez NP

## 2019-03-01 ENCOUNTER — OFFICE VISIT (OUTPATIENT)
Dept: FAMILY MEDICINE | Facility: CLINIC | Age: 79
End: 2019-03-01
Payer: MEDICARE

## 2019-03-01 VITALS
DIASTOLIC BLOOD PRESSURE: 80 MMHG | SYSTOLIC BLOOD PRESSURE: 122 MMHG | BODY MASS INDEX: 22.15 KG/M2 | HEART RATE: 68 BPM | WEIGHT: 132.94 LBS | HEIGHT: 65 IN

## 2019-03-01 DIAGNOSIS — K52.832 LYMPHOCYTIC COLITIS: Primary | ICD-10-CM

## 2019-03-01 DIAGNOSIS — F32.1 MAJOR DEPRESSIVE DISORDER, SINGLE EPISODE, MODERATE: ICD-10-CM

## 2019-03-01 PROCEDURE — 3074F SYST BP LT 130 MM HG: CPT | Mod: HCNC,CPTII,S$GLB, | Performed by: FAMILY MEDICINE

## 2019-03-01 PROCEDURE — 99214 PR OFFICE/OUTPT VISIT, EST, LEVL IV, 30-39 MIN: ICD-10-PCS | Mod: HCNC,S$GLB,, | Performed by: FAMILY MEDICINE

## 2019-03-01 PROCEDURE — 99999 PR PBB SHADOW E&M-EST. PATIENT-LVL III: CPT | Mod: PBBFAC,HCNC,, | Performed by: FAMILY MEDICINE

## 2019-03-01 PROCEDURE — 3288F FALL RISK ASSESSMENT DOCD: CPT | Mod: HCNC,CPTII,S$GLB, | Performed by: FAMILY MEDICINE

## 2019-03-01 PROCEDURE — 99214 OFFICE O/P EST MOD 30 MIN: CPT | Mod: HCNC,S$GLB,, | Performed by: FAMILY MEDICINE

## 2019-03-01 PROCEDURE — 1100F PR PT FALLS ASSESS DOC 2+ FALLS/FALL W/INJURY/YR: ICD-10-PCS | Mod: HCNC,CPTII,S$GLB, | Performed by: FAMILY MEDICINE

## 2019-03-01 PROCEDURE — 3074F PR MOST RECENT SYSTOLIC BLOOD PRESSURE < 130 MM HG: ICD-10-PCS | Mod: HCNC,CPTII,S$GLB, | Performed by: FAMILY MEDICINE

## 2019-03-01 PROCEDURE — 3079F DIAST BP 80-89 MM HG: CPT | Mod: HCNC,CPTII,S$GLB, | Performed by: FAMILY MEDICINE

## 2019-03-01 PROCEDURE — 3288F PR FALLS RISK ASSESSMENT DOCUMENTED: ICD-10-PCS | Mod: HCNC,CPTII,S$GLB, | Performed by: FAMILY MEDICINE

## 2019-03-01 PROCEDURE — 99999 PR PBB SHADOW E&M-EST. PATIENT-LVL III: ICD-10-PCS | Mod: PBBFAC,HCNC,, | Performed by: FAMILY MEDICINE

## 2019-03-01 PROCEDURE — 1100F PTFALLS ASSESS-DOCD GE2>/YR: CPT | Mod: HCNC,CPTII,S$GLB, | Performed by: FAMILY MEDICINE

## 2019-03-01 PROCEDURE — 3079F PR MOST RECENT DIASTOLIC BLOOD PRESSURE 80-89 MM HG: ICD-10-PCS | Mod: HCNC,CPTII,S$GLB, | Performed by: FAMILY MEDICINE

## 2019-03-01 RX ORDER — MELOXICAM 7.5 MG/1
7.5 TABLET ORAL DAILY
Qty: 30 TABLET | Refills: 3 | Status: SHIPPED | OUTPATIENT
Start: 2019-03-01 | End: 2019-08-05 | Stop reason: SDUPTHER

## 2019-03-01 RX ORDER — MIRTAZAPINE 15 MG/1
15 TABLET, FILM COATED ORAL NIGHTLY
Qty: 30 TABLET | Refills: 11 | Status: SHIPPED | OUTPATIENT
Start: 2019-03-01 | End: 2020-04-13 | Stop reason: SDUPTHER

## 2019-03-01 NOTE — PROGRESS NOTES
Subjective:       Patient ID: Serenity Epps is a 78 y.o. female    Chief Complaint: Depression (follow up)    HPI  Here today for interval evaluation  Her lymphocytic colitis is improved, but still with 2 episodes of loose stools per day.  Still with insomnia    Review of Systems   Constitutional: Negative for activity change and unexpected weight change.   HENT: Negative for hearing loss, rhinorrhea and trouble swallowing.    Eyes: Negative for discharge and visual disturbance.   Respiratory: Negative for chest tightness and wheezing.    Cardiovascular: Negative for chest pain and palpitations.   Gastrointestinal: Negative for blood in stool, constipation, diarrhea and vomiting.   Endocrine: Negative for polydipsia and polyuria.   Genitourinary: Negative for difficulty urinating, dysuria, hematuria and menstrual problem.   Musculoskeletal: Negative for arthralgias, joint swelling and neck pain.   Neurological: Negative for weakness and headaches.   Psychiatric/Behavioral: Negative for confusion and dysphoric mood.        Objective:   Physical Exam   Constitutional: She is oriented to person, place, and time. She appears well-developed and well-nourished.   Neurological: She is alert and oriented to person, place, and time.   Vitals reviewed.       Assessment:       1. Lymphocytic colitis     2. Major depressive disorder, single episode, moderate          Plan:       Lymphocytic colitis  - Continue current therapy  - Continue GI    Major depressive disorder, single episode, moderate  -     ADD mirtazapine (REMERON) 15 MG tablet; Take 1 tablet (15 mg total) by mouth every evening.  Dispense: 30 tablet; Refill: 11  - Continue to avoid benzodiazepines due to fall risk    Other orders  -     meloxicam (MOBIC) 7.5 MG tablet; Take 1 tablet (7.5 mg total) by mouth once daily. for 14 days  Dispense: 30 tablet; Refill: 3

## 2019-03-15 ENCOUNTER — OFFICE VISIT (OUTPATIENT)
Dept: OPHTHALMOLOGY | Facility: CLINIC | Age: 79
End: 2019-03-15
Payer: MEDICARE

## 2019-03-15 ENCOUNTER — CLINICAL SUPPORT (OUTPATIENT)
Dept: OPHTHALMOLOGY | Facility: CLINIC | Age: 79
End: 2019-03-15
Payer: MEDICARE

## 2019-03-15 DIAGNOSIS — H40.1234 BILATERAL LOW-TENSION GLAUCOMA, INDETERMINATE STAGE: Primary | ICD-10-CM

## 2019-03-15 DIAGNOSIS — Z96.1 PSEUDOPHAKIA OF BOTH EYES: ICD-10-CM

## 2019-03-15 DIAGNOSIS — H40.1233 LOW-TENSION GLAUCOMA OF BOTH EYES, SEVERE STAGE: ICD-10-CM

## 2019-03-15 DIAGNOSIS — H35.3112 NONEXUDATIVE AGE-RELATED MACULAR DEGENERATION, RIGHT EYE, INTERMEDIATE DRY STAGE: ICD-10-CM

## 2019-03-15 DIAGNOSIS — H35.3221 EXUDATIVE AGE-RELATED MACULAR DEGENERATION OF LEFT EYE WITH ACTIVE CHOROIDAL NEOVASCULARIZATION: ICD-10-CM

## 2019-03-15 DIAGNOSIS — H52.7 REFRACTIVE ERROR: ICD-10-CM

## 2019-03-15 DIAGNOSIS — H40.1234 BILATERAL LOW-TENSION GLAUCOMA, INDETERMINATE STAGE: ICD-10-CM

## 2019-03-15 PROCEDURE — 92012 PR EYE EXAM, EST PATIENT,INTERMED: ICD-10-PCS | Mod: HCNC,S$GLB,, | Performed by: OPHTHALMOLOGY

## 2019-03-15 PROCEDURE — 99999 PR PBB SHADOW E&M-EST. PATIENT-LVL I: CPT | Mod: PBBFAC,HCNC,,

## 2019-03-15 PROCEDURE — 92083 HUMPHREY VISUAL FIELD - OU - BOTH EYES: ICD-10-PCS | Mod: HCNC,S$GLB,, | Performed by: OPHTHALMOLOGY

## 2019-03-15 PROCEDURE — 99999 PR PBB SHADOW E&M-EST. PATIENT-LVL III: ICD-10-PCS | Mod: PBBFAC,HCNC,, | Performed by: OPHTHALMOLOGY

## 2019-03-15 PROCEDURE — 92012 INTRM OPH EXAM EST PATIENT: CPT | Mod: HCNC,S$GLB,, | Performed by: OPHTHALMOLOGY

## 2019-03-15 PROCEDURE — 99999 PR PBB SHADOW E&M-EST. PATIENT-LVL III: CPT | Mod: PBBFAC,HCNC,, | Performed by: OPHTHALMOLOGY

## 2019-03-15 PROCEDURE — 99999 PR PBB SHADOW E&M-EST. PATIENT-LVL I: ICD-10-PCS | Mod: PBBFAC,HCNC,,

## 2019-03-15 PROCEDURE — 92083 EXTENDED VISUAL FIELD XM: CPT | Mod: HCNC,S$GLB,, | Performed by: OPHTHALMOLOGY

## 2019-03-15 RX ORDER — LATANOPROST 50 UG/ML
1 SOLUTION/ DROPS OPHTHALMIC NIGHTLY
Qty: 1 BOTTLE | Refills: 11 | Status: SHIPPED | OUTPATIENT
Start: 2019-03-15 | End: 2019-12-05 | Stop reason: SDUPTHER

## 2019-03-15 NOTE — PROGRESS NOTES
"HPI     77 YO female presents today for a follow-up. She states that everything   is blurry and she cannot see very well.     Latanoprost QHS - OU   Cosopt BID OU   Brimonidine BID OU     Last edited by Lisa Powell, PCT on 3/15/2019  8:51 AM. (History)            Assessment /Plan     For exam results, see Encounter Report.    Bilateral low-tension glaucoma, indeterminate stage    Exudative age-related macular degeneration of left eye with active choroidal neovascularization    Nonexudative age-related macular degeneration, right eye, intermediate dry stage    Low-tension glaucoma of both eyes, severe stage    Pseudophakia of both eyes    Refractive error            1. Low tension Glaucoma  Reports progressive "dimming" of vision OD, despite good IOP control.  IOP seems stable low/mid teens, reports good compliance with drops, prior small NFL hemorrhages OS not present on last DFE.   Switched to Cosopt BID OU 7/8/15 when she appeared to have possible progression on clinical exam and HRT OD.   Baseline OCT nerve done 11/21/17 - thinning OD>OS. Repeated 10/30/18 stable  Last HRT 6/26/18 appears within range of priors, as does clinical exam.   CCT WNL.   HVF's now likely affected by ARMD - stable OU today? Continue Cosopt BID OU started 7/8/15. Added Brimonidine BID OU on 8/22/17.  Added Latanoprost OU on 10/30/18.  Brother with glaucoma with "stitch"  Another sibling now receiving injections for ARMD.  F/u 4 months for IOP check and DFE, HRT.    Macular hemorrhage OS F/u Dr. Lopez as scheduled.    2. Pseudophakia OU s/p Phaco/PCIOL OS with CTR for zonular dehiscence and Triescence.   s/p Phaco/PCIOL OD. Doing well.   3. Blepharitis  Ok to resume lid hygiene, continue artificial tears prn.   4. Hyperopia with astigmatism and presbyopia  MRx given prior visit   5. Allergic conjunctivitis Discussed cool compresses/AT's/Zaditor on prior visit.                    "

## 2019-03-18 DIAGNOSIS — K52.832 LYMPHOCYTIC COLITIS: ICD-10-CM

## 2019-03-19 DIAGNOSIS — K52.832 LYMPHOCYTIC COLITIS: ICD-10-CM

## 2019-03-19 RX ORDER — BALSALAZIDE DISODIUM 750 MG/1
CAPSULE ORAL
Qty: 540 CAPSULE | Refills: 5 | Status: SHIPPED | OUTPATIENT
Start: 2019-03-19 | End: 2019-04-29

## 2019-03-19 RX ORDER — BALSALAZIDE DISODIUM 750 MG/1
2250 CAPSULE ORAL 3 TIMES DAILY
Qty: 540 CAPSULE | Refills: 5 | Status: SHIPPED | OUTPATIENT
Start: 2019-03-19 | End: 2019-07-15

## 2019-04-17 ENCOUNTER — PATIENT MESSAGE (OUTPATIENT)
Dept: ORTHOPEDICS | Facility: CLINIC | Age: 79
End: 2019-04-17

## 2019-04-29 ENCOUNTER — PROCEDURE VISIT (OUTPATIENT)
Dept: OPHTHALMOLOGY | Facility: CLINIC | Age: 79
End: 2019-04-29
Payer: MEDICARE

## 2019-04-29 DIAGNOSIS — H35.3112 NONEXUDATIVE AGE-RELATED MACULAR DEGENERATION, RIGHT EYE, INTERMEDIATE DRY STAGE: ICD-10-CM

## 2019-04-29 DIAGNOSIS — H35.3221 EXUDATIVE AGE-RELATED MACULAR DEGENERATION OF LEFT EYE WITH ACTIVE CHOROIDAL NEOVASCULARIZATION: Primary | ICD-10-CM

## 2019-04-29 PROCEDURE — 99499 UNLISTED E&M SERVICE: CPT | Mod: S$GLB,,, | Performed by: OPHTHALMOLOGY

## 2019-04-29 PROCEDURE — 92134 POSTERIOR SEGMENT OCT RETINA (OCULAR COHERENCE TOMOGRAPHY)-BOTH EYES: ICD-10-PCS | Mod: HCNC,S$GLB,, | Performed by: OPHTHALMOLOGY

## 2019-04-29 PROCEDURE — 92226 PR SPECIAL EYE EXAM, SUBSEQUENT: ICD-10-PCS | Mod: 50,HCNC,S$GLB, | Performed by: OPHTHALMOLOGY

## 2019-04-29 PROCEDURE — 99499 RISK ADDL DX/OHS AUDIT: ICD-10-PCS | Mod: S$GLB,,, | Performed by: OPHTHALMOLOGY

## 2019-04-29 PROCEDURE — 92226 PR SPECIAL EYE EXAM, SUBSEQUENT: CPT | Mod: 50,HCNC,S$GLB, | Performed by: OPHTHALMOLOGY

## 2019-04-29 PROCEDURE — 92134 CPTRZ OPH DX IMG PST SGM RTA: CPT | Mod: HCNC,S$GLB,, | Performed by: OPHTHALMOLOGY

## 2019-04-29 PROCEDURE — 92014 COMPRE OPH EXAM EST PT 1/>: CPT | Mod: HCNC,S$GLB,, | Performed by: OPHTHALMOLOGY

## 2019-04-29 PROCEDURE — 92014 PR EYE EXAM, EST PATIENT,COMPREHESV: ICD-10-PCS | Mod: HCNC,S$GLB,, | Performed by: OPHTHALMOLOGY

## 2019-04-29 NOTE — PROGRESS NOTES
HPI     Macular Degeneration      Additional comments: 4 mon amd chk              Comments     Patient states she has noticed her va in her left eye has been more wavey   in the past most or so.    Eye Med(s) - Latanoprost QHS - OU            Cosopt BID OU               Brimonidine BID OU         OCT - slight increase in SRF OS  Drusen OD      A/P    1. Wet AMD OS  S/p Avastin OS x 15, Eylea x 14    Persistent SRF OS - improving  With RPE tear OS  Central fibrosis with atrophy - poor central potential  10/17 - increased SRH - will keep at 8 weeks for now  9/18 - stable cicatrix - try observation  12/18 - no activity      2. Dry AMD OD  AREDS/AG    3. PCIOL OU  CTR OS - but saw 20/30 with correction, in spite of sub RPE fibrosis      4. Glc Suspect OU   Good IOP control on Timolol    5. Floaters OU          6  Months OCT

## 2019-05-05 RX ORDER — METOPROLOL SUCCINATE 25 MG/1
TABLET, EXTENDED RELEASE ORAL
Qty: 60 TABLET | Refills: 11 | Status: SHIPPED | OUTPATIENT
Start: 2019-05-05 | End: 2019-05-29 | Stop reason: SDUPTHER

## 2019-05-23 ENCOUNTER — OFFICE VISIT (OUTPATIENT)
Dept: GASTROENTEROLOGY | Facility: CLINIC | Age: 79
End: 2019-05-23
Payer: MEDICARE

## 2019-05-23 VITALS
HEIGHT: 65 IN | HEART RATE: 63 BPM | BODY MASS INDEX: 22.77 KG/M2 | DIASTOLIC BLOOD PRESSURE: 72 MMHG | SYSTOLIC BLOOD PRESSURE: 125 MMHG | WEIGHT: 136.69 LBS

## 2019-05-23 DIAGNOSIS — K52.832 LYMPHOCYTIC COLITIS: Primary | Chronic | ICD-10-CM

## 2019-05-23 PROCEDURE — 99212 OFFICE O/P EST SF 10 MIN: CPT | Mod: HCNC,S$GLB,, | Performed by: INTERNAL MEDICINE

## 2019-05-23 PROCEDURE — 3074F SYST BP LT 130 MM HG: CPT | Mod: HCNC,CPTII,S$GLB, | Performed by: INTERNAL MEDICINE

## 2019-05-23 PROCEDURE — 99999 PR PBB SHADOW E&M-EST. PATIENT-LVL III: CPT | Mod: PBBFAC,HCNC,, | Performed by: INTERNAL MEDICINE

## 2019-05-23 PROCEDURE — 99212 PR OFFICE/OUTPT VISIT, EST, LEVL II, 10-19 MIN: ICD-10-PCS | Mod: HCNC,S$GLB,, | Performed by: INTERNAL MEDICINE

## 2019-05-23 PROCEDURE — 1101F PR PT FALLS ASSESS DOC 0-1 FALLS W/OUT INJ PAST YR: ICD-10-PCS | Mod: HCNC,CPTII,S$GLB, | Performed by: INTERNAL MEDICINE

## 2019-05-23 PROCEDURE — 3074F PR MOST RECENT SYSTOLIC BLOOD PRESSURE < 130 MM HG: ICD-10-PCS | Mod: HCNC,CPTII,S$GLB, | Performed by: INTERNAL MEDICINE

## 2019-05-23 PROCEDURE — 99999 PR PBB SHADOW E&M-EST. PATIENT-LVL III: ICD-10-PCS | Mod: PBBFAC,HCNC,, | Performed by: INTERNAL MEDICINE

## 2019-05-23 PROCEDURE — 1101F PT FALLS ASSESS-DOCD LE1/YR: CPT | Mod: HCNC,CPTII,S$GLB, | Performed by: INTERNAL MEDICINE

## 2019-05-23 PROCEDURE — 3078F PR MOST RECENT DIASTOLIC BLOOD PRESSURE < 80 MM HG: ICD-10-PCS | Mod: HCNC,CPTII,S$GLB, | Performed by: INTERNAL MEDICINE

## 2019-05-23 PROCEDURE — 3078F DIAST BP <80 MM HG: CPT | Mod: HCNC,CPTII,S$GLB, | Performed by: INTERNAL MEDICINE

## 2019-05-23 NOTE — PROGRESS NOTES
Subjective:       Patient ID: Serenity Epps is a 78 y.o. female.    Chief Complaint: Follow-up    Ms Epps returns for f/u of her diarrhea / lymphocytic colitis.  She was noting some changes last visit with soft stools, sometimes BID.  This persisted, and so she decided to stop the balsalazide AND the pepto bismol.  Shortly thereafter, her stools normalized.  In fact, she's now occasional constipated.  Otherwose, doing well.  Appetite good.  No nausea.  No new meds from the other docs.  Prune juice has worked for her in the past, and I suggested she try that first PRN.    Review of Systems   Constitutional: Negative for appetite change, fever and unexpected weight change.   HENT: Negative.  Negative for mouth sores, sore throat and trouble swallowing.    Eyes: Negative.    Respiratory: Negative.  Negative for cough and choking.    Cardiovascular: Negative.  Negative for chest pain and leg swelling.   Gastrointestinal: Positive for constipation. Negative for abdominal distention, abdominal pain, anal bleeding, blood in stool, diarrhea, nausea and vomiting.   Genitourinary: Negative.    Musculoskeletal: Negative.    Skin: Negative.  Negative for color change and rash.   Neurological: Negative.    Hematological: Negative for adenopathy.   Psychiatric/Behavioral: Negative.        Objective:      Physical Exam   Constitutional: She is oriented to person, place, and time. She appears well-developed and well-nourished.   HENT:   Mouth/Throat: Oropharynx is clear and moist. No oropharyngeal exudate.   Eyes: No scleral icterus.   Neck: No tracheal deviation present. No thyromegaly present.   Cardiovascular: Normal rate, regular rhythm and normal heart sounds.   Pulmonary/Chest: Effort normal and breath sounds normal.   Abdominal: Soft. Bowel sounds are normal. She exhibits no distension and no mass. There is no tenderness. There is no guarding.   Lymphadenopathy:     She has no cervical adenopathy.   Neurological: She is alert  and oriented to person, place, and time.   Skin: Skin is warm and dry. No rash noted.   Psychiatric: She has a normal mood and affect.   Nursing note and vitals reviewed.      Assessment:         Lymphocytic colitis      Plan:        1. Cont off the meds, but keep them around.  Have some imodium avail PRN.   2. Prune Juice for the constipation.   3. RTC 3-4 months.

## 2019-05-26 ENCOUNTER — PATIENT MESSAGE (OUTPATIENT)
Dept: FAMILY MEDICINE | Facility: CLINIC | Age: 79
End: 2019-05-26

## 2019-05-29 RX ORDER — BRIMONIDINE TARTRATE 2 MG/ML
SOLUTION/ DROPS OPHTHALMIC
Refills: 1 | OUTPATIENT
Start: 2019-05-29

## 2019-05-29 RX ORDER — BRIMONIDINE TARTRATE 2 MG/ML
SOLUTION/ DROPS OPHTHALMIC
Qty: 30 ML | Refills: 3 | Status: SHIPPED | OUTPATIENT
Start: 2019-05-29 | End: 2020-07-15 | Stop reason: SDUPTHER

## 2019-05-30 RX ORDER — METOPROLOL SUCCINATE 25 MG/1
25 TABLET, EXTENDED RELEASE ORAL 2 TIMES DAILY
Qty: 60 TABLET | Refills: 9 | Status: SHIPPED | OUTPATIENT
Start: 2019-05-30 | End: 2020-06-24

## 2019-07-15 ENCOUNTER — OFFICE VISIT (OUTPATIENT)
Dept: OPHTHALMOLOGY | Facility: CLINIC | Age: 79
End: 2019-07-15
Payer: MEDICARE

## 2019-07-15 DIAGNOSIS — H35.3112 NONEXUDATIVE AGE-RELATED MACULAR DEGENERATION, RIGHT EYE, INTERMEDIATE DRY STAGE: ICD-10-CM

## 2019-07-15 DIAGNOSIS — H40.1233 LOW-TENSION GLAUCOMA OF BOTH EYES, SEVERE STAGE: ICD-10-CM

## 2019-07-15 DIAGNOSIS — H40.1234 BILATERAL LOW-TENSION GLAUCOMA, INDETERMINATE STAGE: Primary | ICD-10-CM

## 2019-07-15 DIAGNOSIS — H52.7 REFRACTIVE ERROR: ICD-10-CM

## 2019-07-15 DIAGNOSIS — H35.3221 EXUDATIVE AGE-RELATED MACULAR DEGENERATION OF LEFT EYE WITH ACTIVE CHOROIDAL NEOVASCULARIZATION: ICD-10-CM

## 2019-07-15 DIAGNOSIS — Z96.1 PSEUDOPHAKIA OF BOTH EYES: ICD-10-CM

## 2019-07-15 PROCEDURE — 99999 PR PBB SHADOW E&M-EST. PATIENT-LVL III: ICD-10-PCS | Mod: PBBFAC,HCNC,, | Performed by: OPHTHALMOLOGY

## 2019-07-15 PROCEDURE — 92133 HEIDELBERG RETINA TOMOGRAPHY (HRT) - OU - BOTH EYES: ICD-10-PCS | Mod: HCNC,S$GLB,, | Performed by: OPHTHALMOLOGY

## 2019-07-15 PROCEDURE — 92014 COMPRE OPH EXAM EST PT 1/>: CPT | Mod: HCNC,S$GLB,, | Performed by: OPHTHALMOLOGY

## 2019-07-15 PROCEDURE — 99999 PR PBB SHADOW E&M-EST. PATIENT-LVL III: CPT | Mod: PBBFAC,HCNC,, | Performed by: OPHTHALMOLOGY

## 2019-07-15 PROCEDURE — 92014 PR EYE EXAM, EST PATIENT,COMPREHESV: ICD-10-PCS | Mod: HCNC,S$GLB,, | Performed by: OPHTHALMOLOGY

## 2019-07-15 PROCEDURE — 92133 CPTRZD OPH DX IMG PST SGM ON: CPT | Mod: HCNC,S$GLB,, | Performed by: OPHTHALMOLOGY

## 2019-07-15 NOTE — PROGRESS NOTES
"HPI     79 YO female presents today for an IOP check and HRT. She states she is   doing well, no problems or complaints.     Latanoprost QHS OU   Cosopt BID OU   Brimonidine BID OU     Last edited by TL Alcocer on 7/15/2019  8:08 AM. (History)        ROS     Positive for: Cardiovascular (HTN - controlleld), Eyes (denies history of   surgery or trauma; injections OS for wet ARMD)    Negative for: Gastrointestinal, Neurological (denies   seizure/tremor/restless legs), Genitourinary (denies flomax),   Musculoskeletal, HENT, Endocrine (denies DM), Respiratory (denies SOB),   Allergic/Imm    Last edited by Estelle Mello MD on 7/15/2019  9:09 AM.   (History)        Assessment /Plan     For exam results, see Encounter Report.    Bilateral low-tension glaucoma, indeterminate stage    Exudative age-related macular degeneration of left eye with active choroidal neovascularization    Nonexudative age-related macular degeneration, right eye, intermediate dry stage    Pseudophakia of both eyes    Refractive error            1. Low tension Glaucoma  Reports progressive "dimming" of vision OD, despite good IOP control.  IOP seems stable low/mid teens, reports good compliance with drops, prior small NFL hemorrhages OS not present on last DFE.   Switched to Cosopt BID OU 7/8/15 when she appeared to have possible progression on clinical exam and HRT OD.   Baseline OCT nerve done 11/21/17 - thinning OD>OS. Repeated 10/30/18 stable  Today's HRT 7/15/19 appears within range of priors OD, possible slight progression OS.  Clinical exam appears stable..   CCT WNL.   HVF's now likely affected by ARMD - stable OU today? Continue Cosopt BID OU started 7/8/15. Added Brimonidine BID OU on 8/22/17.  Added Latanoprost OU on 10/30/18.  Brother with glaucoma with "stitch"  Another sibling now receiving injections for ARMD.  Follow up in about 4 months (around 11/15/2019) for OCT optic nerve.    Macular hemorrhage OS F/u Dr." Jessica as scheduled. New Amsler grid given today 7/15/19.   2. Pseudophakia OU s/p Phaco/PCIOL OS with CTR for zonular dehiscence and Triescence.   s/p Phaco/PCIOL OD. Doing well.   3. Blepharitis  Ok to resume lid hygiene, continue artificial tears prn.   4. Hyperopia with astigmatism and presbyopia  MRx given prior visit   5. Allergic conjunctivitis Discussed cool compresses/AT's/Zaditor on prior visit.

## 2019-07-31 ENCOUNTER — PATIENT MESSAGE (OUTPATIENT)
Dept: FAMILY MEDICINE | Facility: CLINIC | Age: 79
End: 2019-07-31

## 2019-07-31 RX ORDER — GABAPENTIN 400 MG/1
CAPSULE ORAL
Qty: 90 CAPSULE | Refills: 9 | Status: SHIPPED | OUTPATIENT
Start: 2019-07-31 | End: 2019-07-31

## 2019-07-31 RX ORDER — GABAPENTIN 400 MG/1
400 CAPSULE ORAL 3 TIMES DAILY
Qty: 90 CAPSULE | Refills: 9 | Status: SHIPPED | OUTPATIENT
Start: 2019-07-31 | End: 2020-08-27 | Stop reason: SDUPTHER

## 2019-07-31 NOTE — TELEPHONE ENCOUNTER
----- Message from Jessica Mooney sent at 7/31/2019  8:46 AM CDT -----  Contact: Serenity  Type:  Patient Returning Call    Who Called:  patient  Who Left Message for Patient:  Doesn't know  Does the patient know what this is regarding?:  Med refill  Best Call Back Number:  [phone]  Additional Information:  Please advise--thank you

## 2019-08-01 ENCOUNTER — PATIENT MESSAGE (OUTPATIENT)
Dept: FAMILY MEDICINE | Facility: CLINIC | Age: 79
End: 2019-08-01

## 2019-08-05 RX ORDER — MELOXICAM 7.5 MG/1
7.5 TABLET ORAL DAILY
Qty: 30 TABLET | Refills: 3 | Status: CANCELLED | OUTPATIENT
Start: 2019-08-05 | End: 2019-08-19

## 2019-08-05 RX ORDER — MELOXICAM 7.5 MG/1
7.5 TABLET ORAL DAILY
Qty: 30 TABLET | Refills: 3 | Status: SHIPPED | OUTPATIENT
Start: 2019-08-05 | End: 2020-01-12 | Stop reason: SDUPTHER

## 2019-10-10 ENCOUNTER — IMMUNIZATION (OUTPATIENT)
Dept: PHARMACY | Facility: CLINIC | Age: 79
End: 2019-10-10
Payer: MEDICARE

## 2019-10-21 RX ORDER — DORZOLAMIDE HYDROCHLORIDE AND TIMOLOL MALEATE 20; 5 MG/ML; MG/ML
SOLUTION/ DROPS OPHTHALMIC
Qty: 30 ML | Refills: 4 | Status: SHIPPED | OUTPATIENT
Start: 2019-10-21 | End: 2020-08-27 | Stop reason: SDUPTHER

## 2019-11-01 ENCOUNTER — PATIENT MESSAGE (OUTPATIENT)
Dept: FAMILY MEDICINE | Facility: CLINIC | Age: 79
End: 2019-11-01

## 2019-11-01 DIAGNOSIS — Z13.6 ENCOUNTER FOR LIPID SCREENING FOR CARDIOVASCULAR DISEASE: ICD-10-CM

## 2019-11-01 DIAGNOSIS — Z13.220 ENCOUNTER FOR LIPID SCREENING FOR CARDIOVASCULAR DISEASE: ICD-10-CM

## 2019-11-01 DIAGNOSIS — Z00.00 HEALTH MAINTENANCE EXAMINATION: Primary | ICD-10-CM

## 2019-11-01 DIAGNOSIS — Z13.220 SCREENING CHOLESTEROL LEVEL: ICD-10-CM

## 2019-11-04 ENCOUNTER — LAB VISIT (OUTPATIENT)
Dept: LAB | Facility: HOSPITAL | Age: 79
End: 2019-11-04
Attending: FAMILY MEDICINE
Payer: MEDICARE

## 2019-11-04 DIAGNOSIS — Z13.6 ENCOUNTER FOR LIPID SCREENING FOR CARDIOVASCULAR DISEASE: ICD-10-CM

## 2019-11-04 DIAGNOSIS — Z13.220 ENCOUNTER FOR LIPID SCREENING FOR CARDIOVASCULAR DISEASE: ICD-10-CM

## 2019-11-04 DIAGNOSIS — Z00.00 HEALTH MAINTENANCE EXAMINATION: ICD-10-CM

## 2019-11-04 LAB
ALBUMIN SERPL BCP-MCNC: 3.8 G/DL (ref 3.5–5.2)
ALP SERPL-CCNC: 63 U/L (ref 55–135)
ALT SERPL W/O P-5'-P-CCNC: 11 U/L (ref 10–44)
ANION GAP SERPL CALC-SCNC: 4 MMOL/L (ref 8–16)
AST SERPL-CCNC: 17 U/L (ref 10–40)
BILIRUB SERPL-MCNC: 0.6 MG/DL (ref 0.1–1)
BUN SERPL-MCNC: 15 MG/DL (ref 8–23)
CALCIUM SERPL-MCNC: 9.2 MG/DL (ref 8.7–10.5)
CHLORIDE SERPL-SCNC: 107 MMOL/L (ref 95–110)
CHOLEST SERPL-MCNC: 206 MG/DL (ref 120–199)
CHOLEST/HDLC SERPL: 2.5 {RATIO} (ref 2–5)
CO2 SERPL-SCNC: 30 MMOL/L (ref 23–29)
CREAT SERPL-MCNC: 0.8 MG/DL (ref 0.5–1.4)
EST. GFR  (AFRICAN AMERICAN): >60 ML/MIN/1.73 M^2
EST. GFR  (NON AFRICAN AMERICAN): >60 ML/MIN/1.73 M^2
GLUCOSE SERPL-MCNC: 96 MG/DL (ref 70–110)
HDLC SERPL-MCNC: 81 MG/DL (ref 40–75)
HDLC SERPL: 39.3 % (ref 20–50)
LDLC SERPL CALC-MCNC: 109.2 MG/DL (ref 63–159)
NONHDLC SERPL-MCNC: 125 MG/DL
POTASSIUM SERPL-SCNC: 4.7 MMOL/L (ref 3.5–5.1)
PROT SERPL-MCNC: 7.1 G/DL (ref 6–8.4)
SODIUM SERPL-SCNC: 141 MMOL/L (ref 136–145)
TRIGL SERPL-MCNC: 79 MG/DL (ref 30–150)

## 2019-11-04 PROCEDURE — 80053 COMPREHEN METABOLIC PANEL: CPT | Mod: HCNC

## 2019-11-04 PROCEDURE — 80061 LIPID PANEL: CPT | Mod: HCNC

## 2019-11-04 PROCEDURE — 36415 COLL VENOUS BLD VENIPUNCTURE: CPT | Mod: HCNC,PO

## 2019-11-06 ENCOUNTER — OFFICE VISIT (OUTPATIENT)
Dept: FAMILY MEDICINE | Facility: CLINIC | Age: 79
End: 2019-11-06
Payer: MEDICARE

## 2019-11-06 VITALS
BODY MASS INDEX: 23.83 KG/M2 | DIASTOLIC BLOOD PRESSURE: 62 MMHG | WEIGHT: 143.06 LBS | TEMPERATURE: 98 F | OXYGEN SATURATION: 99 % | SYSTOLIC BLOOD PRESSURE: 106 MMHG | HEIGHT: 65 IN | HEART RATE: 54 BPM

## 2019-11-06 DIAGNOSIS — J31.0 CHRONIC RHINITIS: ICD-10-CM

## 2019-11-06 DIAGNOSIS — K52.832 LYMPHOCYTIC COLITIS: Primary | Chronic | ICD-10-CM

## 2019-11-06 DIAGNOSIS — I10 ESSENTIAL HYPERTENSION: ICD-10-CM

## 2019-11-06 DIAGNOSIS — Z78.0 ASYMPTOMATIC MENOPAUSAL STATE: ICD-10-CM

## 2019-11-06 PROCEDURE — 99999 PR PBB SHADOW E&M-EST. PATIENT-LVL IV: CPT | Mod: PBBFAC,HCNC,, | Performed by: FAMILY MEDICINE

## 2019-11-06 PROCEDURE — 3074F PR MOST RECENT SYSTOLIC BLOOD PRESSURE < 130 MM HG: ICD-10-PCS | Mod: HCNC,CPTII,S$GLB, | Performed by: FAMILY MEDICINE

## 2019-11-06 PROCEDURE — 1125F PR PAIN SEVERITY QUANTIFIED, PAIN PRESENT: ICD-10-PCS | Mod: HCNC,S$GLB,, | Performed by: FAMILY MEDICINE

## 2019-11-06 PROCEDURE — 3074F SYST BP LT 130 MM HG: CPT | Mod: HCNC,CPTII,S$GLB, | Performed by: FAMILY MEDICINE

## 2019-11-06 PROCEDURE — 1159F MED LIST DOCD IN RCRD: CPT | Mod: HCNC,S$GLB,, | Performed by: FAMILY MEDICINE

## 2019-11-06 PROCEDURE — 1125F AMNT PAIN NOTED PAIN PRSNT: CPT | Mod: HCNC,S$GLB,, | Performed by: FAMILY MEDICINE

## 2019-11-06 PROCEDURE — 99499 UNLISTED E&M SERVICE: CPT | Mod: S$GLB,,, | Performed by: FAMILY MEDICINE

## 2019-11-06 PROCEDURE — 1101F PR PT FALLS ASSESS DOC 0-1 FALLS W/OUT INJ PAST YR: ICD-10-PCS | Mod: HCNC,CPTII,S$GLB, | Performed by: FAMILY MEDICINE

## 2019-11-06 PROCEDURE — 99214 OFFICE O/P EST MOD 30 MIN: CPT | Mod: HCNC,S$GLB,, | Performed by: FAMILY MEDICINE

## 2019-11-06 PROCEDURE — 99999 PR PBB SHADOW E&M-EST. PATIENT-LVL IV: ICD-10-PCS | Mod: PBBFAC,HCNC,, | Performed by: FAMILY MEDICINE

## 2019-11-06 PROCEDURE — 99214 PR OFFICE/OUTPT VISIT, EST, LEVL IV, 30-39 MIN: ICD-10-PCS | Mod: HCNC,S$GLB,, | Performed by: FAMILY MEDICINE

## 2019-11-06 PROCEDURE — 3078F PR MOST RECENT DIASTOLIC BLOOD PRESSURE < 80 MM HG: ICD-10-PCS | Mod: HCNC,CPTII,S$GLB, | Performed by: FAMILY MEDICINE

## 2019-11-06 PROCEDURE — 3078F DIAST BP <80 MM HG: CPT | Mod: HCNC,CPTII,S$GLB, | Performed by: FAMILY MEDICINE

## 2019-11-06 PROCEDURE — 1101F PT FALLS ASSESS-DOCD LE1/YR: CPT | Mod: HCNC,CPTII,S$GLB, | Performed by: FAMILY MEDICINE

## 2019-11-06 PROCEDURE — 1159F PR MEDICATION LIST DOCUMENTED IN MEDICAL RECORD: ICD-10-PCS | Mod: HCNC,S$GLB,, | Performed by: FAMILY MEDICINE

## 2019-11-06 PROCEDURE — 99499 RISK ADDL DX/OHS AUDIT: ICD-10-PCS | Mod: S$GLB,,, | Performed by: FAMILY MEDICINE

## 2019-11-06 RX ORDER — LORATADINE 10 MG/1
10 TABLET ORAL DAILY
COMMUNITY
End: 2021-05-24

## 2019-11-06 RX ORDER — AZELASTINE 1 MG/ML
1 SPRAY, METERED NASAL 2 TIMES DAILY
Qty: 90 ML | Refills: 3 | Status: SHIPPED | OUTPATIENT
Start: 2019-11-06 | End: 2020-08-27 | Stop reason: SDUPTHER

## 2019-11-06 RX ORDER — SODIUM FLUORIDE 5 MG/G
GEL, DENTIFRICE DENTAL
COMMUNITY
Start: 2019-07-30 | End: 2022-12-24 | Stop reason: CLARIF

## 2019-11-06 NOTE — PROGRESS NOTES
Subjective:       Patient ID: Serenity Epps is a 79 y.o. female    Chief Complaint: Follow-up (6 mo )    Hypertension   This is a chronic problem. The problem is unchanged. The problem is controlled. Pertinent negatives include no chest pain, headaches, neck pain, orthopnea, palpitations, PND or shortness of breath. Past treatments include beta blockers. The current treatment provides significant improvement. There are no compliance problems.        Review of Systems   Constitutional: Negative for activity change and unexpected weight change.   HENT: Positive for rhinorrhea. Negative for hearing loss and trouble swallowing.    Eyes: Negative for discharge and visual disturbance.   Respiratory: Negative for chest tightness, shortness of breath and wheezing.    Cardiovascular: Negative for chest pain, palpitations, orthopnea and PND.   Gastrointestinal: Negative for constipation, diarrhea and vomiting.   Endocrine: Negative for polydipsia and polyuria.   Genitourinary: Negative for difficulty urinating, dysuria, hematuria and menstrual problem.   Musculoskeletal: Negative for joint swelling and neck pain.   Neurological: Negative for weakness and headaches.   Psychiatric/Behavioral: Positive for dysphoric mood. Negative for confusion.        Objective:   Physical Exam   Constitutional: She appears well-developed and well-nourished.   HENT:   Head: Normocephalic and atraumatic.   Eyes: Pupils are equal, round, and reactive to light. Conjunctivae and EOM are normal. No scleral icterus.   Neck: Normal range of motion. Neck supple. No thyromegaly present.   Cardiovascular: Normal rate, regular rhythm and normal heart sounds. Exam reveals no gallop and no friction rub.   No murmur heard.  Pulmonary/Chest: Effort normal and breath sounds normal. No respiratory distress. She has no wheezes. She has no rales.   Lymphadenopathy:     She has no cervical adenopathy.   Vitals reviewed.       Assessment:       1. Lymphocytic colitis      2. Essential hypertension     3. Asymptomatic menopausal state  DXA Bone Density Spine And Hip   4. Chronic rhinitis  azelastine (ASTELIN) 137 mcg (0.1 %) nasal spray        Plan:       Lymphocytic colitis  - Stable, Continue current therapy    Essential hypertension  - Continue current therapy  - Serial blood pressure monitoring  - Diet and exercise education.  - Follow up in about 6 months (around 5/6/2020).    Asymptomatic menopausal state  -     DXA Bone Density Spine And Hip; Future; Expected date: 11/06/2019    Chronic rhinitis  -     azelastine (ASTELIN) 137 mcg (0.1 %) nasal spray; 1 spray (137 mcg total) by Nasal route 2 (two) times daily.  Dispense: 90 mL; Refill: 3

## 2019-11-07 ENCOUNTER — HOSPITAL ENCOUNTER (OUTPATIENT)
Dept: RADIOLOGY | Facility: HOSPITAL | Age: 79
Discharge: HOME | End: 2019-11-07
Attending: FAMILY MEDICINE
Payer: MEDICARE

## 2019-11-07 DIAGNOSIS — Z78.0 ASYMPTOMATIC MENOPAUSAL STATE: ICD-10-CM

## 2019-11-07 PROCEDURE — 77080 DXA BONE DENSITY AXIAL: CPT | Mod: 26,HCNC,, | Performed by: RADIOLOGY

## 2019-11-07 PROCEDURE — 77080 DXA BONE DENSITY AXIAL: CPT | Mod: TC,HCNC,PO

## 2019-11-07 PROCEDURE — 77080 DEXA BONE DENSITY SPINE HIP: ICD-10-PCS | Mod: 26,HCNC,, | Performed by: RADIOLOGY

## 2019-11-15 ENCOUNTER — OFFICE VISIT (OUTPATIENT)
Dept: OPHTHALMOLOGY | Facility: CLINIC | Age: 79
End: 2019-11-15
Payer: MEDICARE

## 2019-11-15 DIAGNOSIS — Z96.1 PSEUDOPHAKIA OF BOTH EYES: ICD-10-CM

## 2019-11-15 DIAGNOSIS — H52.7 REFRACTIVE ERROR: ICD-10-CM

## 2019-11-15 DIAGNOSIS — H40.1234 BILATERAL LOW-TENSION GLAUCOMA, INDETERMINATE STAGE: Primary | ICD-10-CM

## 2019-11-15 DIAGNOSIS — H35.3112 NONEXUDATIVE AGE-RELATED MACULAR DEGENERATION, RIGHT EYE, INTERMEDIATE DRY STAGE: ICD-10-CM

## 2019-11-15 DIAGNOSIS — H35.3221 EXUDATIVE AGE-RELATED MACULAR DEGENERATION OF LEFT EYE WITH ACTIVE CHOROIDAL NEOVASCULARIZATION: ICD-10-CM

## 2019-11-15 PROCEDURE — 92133 CPTRZD OPH DX IMG PST SGM ON: CPT | Mod: HCNC,S$GLB,, | Performed by: OPHTHALMOLOGY

## 2019-11-15 PROCEDURE — 92012 INTRM OPH EXAM EST PATIENT: CPT | Mod: HCNC,S$GLB,, | Performed by: OPHTHALMOLOGY

## 2019-11-15 PROCEDURE — 99999 PR PBB SHADOW E&M-EST. PATIENT-LVL III: ICD-10-PCS | Mod: PBBFAC,HCNC,, | Performed by: OPHTHALMOLOGY

## 2019-11-15 PROCEDURE — 92012 PR EYE EXAM, EST PATIENT,INTERMED: ICD-10-PCS | Mod: HCNC,S$GLB,, | Performed by: OPHTHALMOLOGY

## 2019-11-15 PROCEDURE — 99999 PR PBB SHADOW E&M-EST. PATIENT-LVL III: CPT | Mod: PBBFAC,HCNC,, | Performed by: OPHTHALMOLOGY

## 2019-11-15 PROCEDURE — 92133 POSTERIOR SEGMENT OCT OPTIC NERVE(OCULAR COHERENCE TOMOGRAPHY) - OU - BOTH EYES: ICD-10-PCS | Mod: HCNC,S$GLB,, | Performed by: OPHTHALMOLOGY

## 2019-11-15 NOTE — PROGRESS NOTES
"HPI     DLS: 7/15/19    Pt here for IOP check and OCT N today. states not having any new   complaints.  Denies pain in eyes today, denies F/F. Latanoprost OU QHS ,   brimonidined OU BID, Cosopt OU BID     Last edited by Veronica Cross on 11/15/2019  8:15 AM. (History)            Assessment /Plan     For exam results, see Encounter Report.    Bilateral low-tension glaucoma, indeterminate stage  -     Posterior Segment OCT Optic Nerve- Both eyes    Exudative age-related macular degeneration of left eye with active choroidal neovascularization    Nonexudative age-related macular degeneration, right eye, intermediate dry stage    Pseudophakia of both eyes    Refractive error            1. Low tension Glaucoma  Reports progressive "dimming" of vision OD, despite good IOP control.  IOP seems stable low/mid teens, reports good compliance with drops, prior small NFL hemorrhages OS not present on last DFE.   Switched to Cosopt BID OU 7/8/15 when she appeared to have possible progression on clinical exam and HRT OD.   Baseline OCT nerve done 11/21/17 - thinning OD>OS. Repeated 10/30/18 & 11/15/19 - stable  Last HRT 7/15/19 appears within range of priors OD, possible slight progression OS.  Clinical exam appears stable..   CCT WNL.   HVF's now likely affected by ARMD - stable OU today? Continue Cosopt BID OU started 7/8/15. Added Brimonidine BID OU on 8/22/17.  Added Latanoprost OU on 10/30/18.  Brother with glaucoma with "stitch"  Another sibling now receiving injections for ARMD.  Follow up in about 4 months (around 3/15/2020) for MRx, 24-2 HVF, IOP check; also due for f/u with Dr. Lopez.    Macular hemorrhage OS F/u Dr. Lopez as scheduled. New Amsler grid given 7/15/19.   2. Pseudophakia OU s/p Phaco/PCIOL OS with CTR for zonular dehiscence and Triescence.   s/p Phaco/PCIOL OD. Doing well.   3. Blepharitis  Ok to resume lid hygiene, continue artificial tears prn.   4. Hyperopia with astigmatism and presbyopia  MRx next " visit   5. Allergic conjunctivitis Discussed cool compresses/AT's/Zaditor on prior visit.

## 2019-12-05 DIAGNOSIS — H40.1233 LOW-TENSION GLAUCOMA OF BOTH EYES, SEVERE STAGE: ICD-10-CM

## 2019-12-05 RX ORDER — LATANOPROST 50 UG/ML
SOLUTION/ DROPS OPHTHALMIC
Qty: 3 BOTTLE | Refills: 3 | Status: SHIPPED | OUTPATIENT
Start: 2019-12-05 | End: 2020-08-17 | Stop reason: SDUPTHER

## 2020-01-02 ENCOUNTER — PES CALL (OUTPATIENT)
Dept: ADMINISTRATIVE | Facility: CLINIC | Age: 80
End: 2020-01-02

## 2020-01-13 RX ORDER — MELOXICAM 7.5 MG/1
7.5 TABLET ORAL DAILY
Qty: 30 TABLET | Refills: 3 | Status: SHIPPED | OUTPATIENT
Start: 2020-01-13 | End: 2020-05-18

## 2020-01-13 NOTE — PROGRESS NOTES
Refill Routing Note     Medication(s) are not appropriate for processing by [facility]:    Medication Outside of Protocol    Appointments  past 12m or future 3m with PCP    Date Provider   Last Visit   11/6/2019 Hua Andersen MD   Next Visit   5/11/2020 Hua Andersen MD           Automatic Epic Protocol Generated Data:    Requested Prescriptions   Pending Prescriptions Disp Refills    meloxicam (MOBIC) 7.5 MG tablet 30 tablet 3     Sig: Take 1 tablet (7.5 mg total) by mouth once daily       NSAIDs Protocol Failed - 1/13/2020  8:25 AM        Failed - HGB greater than 10 or HCT greater than 30 in past 12 months        Passed - Patient not currently pregnant        Passed - No positive pregnancy test in past 12 months         Passed - Serum Creatinine less than 1.4 on file in the past 12 months     Lab Results   Component Value Date    CREATININE 0.8 11/04/2019    CREATININE 0.7 10/08/2018    CREATININE 0.6 08/30/2018     Lab Results   Component Value Date    EGFRNONAA >60.0 11/04/2019    EGFRNONAA >60 10/08/2018    EGFRNONAA >60.0 08/30/2018               Passed - Visit with authorizing provider in past 12 months or upcoming 90 days        Passed - AST in past 12 months      Lab Results   Component Value Date    AST 17 11/04/2019    AST 19 08/30/2018    AST 30 05/14/2018              Passed - Serum Potassium less than 5.2 on file in the past 12 months     Lab Results   Component Value Date    K 4.7 11/04/2019    K 3.7 08/30/2018    K 3.7 05/16/2018                  Passed - Blood Pressure below 139/89 on file in past 12 months      BP Readings from Last 3 Encounters:   11/06/19 106/62   05/23/19 125/72   03/01/19 122/80             Passed - ALT less than 95 in past 12 months     Lab Results   Component Value Date    ALT 11 11/04/2019    ALT 11 08/30/2018    ALT 27 05/14/2018

## 2020-01-20 ENCOUNTER — PROCEDURE VISIT (OUTPATIENT)
Dept: OPHTHALMOLOGY | Facility: CLINIC | Age: 80
End: 2020-01-20
Payer: MEDICARE

## 2020-01-20 VITALS — SYSTOLIC BLOOD PRESSURE: 139 MMHG | DIASTOLIC BLOOD PRESSURE: 72 MMHG | HEART RATE: 58 BPM

## 2020-01-20 DIAGNOSIS — H35.3221 EXUDATIVE AGE-RELATED MACULAR DEGENERATION OF LEFT EYE WITH ACTIVE CHOROIDAL NEOVASCULARIZATION: ICD-10-CM

## 2020-01-20 DIAGNOSIS — H35.3112 NONEXUDATIVE AGE-RELATED MACULAR DEGENERATION, RIGHT EYE, INTERMEDIATE DRY STAGE: Primary | ICD-10-CM

## 2020-01-20 PROCEDURE — 92134 CPTRZ OPH DX IMG PST SGM RTA: CPT | Mod: HCNC,S$GLB,, | Performed by: OPHTHALMOLOGY

## 2020-01-20 PROCEDURE — 99499 UNLISTED E&M SERVICE: CPT | Mod: HCNC,S$GLB,, | Performed by: OPHTHALMOLOGY

## 2020-01-20 PROCEDURE — 92014 COMPRE OPH EXAM EST PT 1/>: CPT | Mod: HCNC,S$GLB,, | Performed by: OPHTHALMOLOGY

## 2020-01-20 PROCEDURE — 92202 PR OPHTHALMOSCOPY, EXT, W/DRAW OPTIC NERVE/MACULA, I&R, UNI/BI: ICD-10-PCS | Mod: HCNC,S$GLB,, | Performed by: OPHTHALMOLOGY

## 2020-01-20 PROCEDURE — 92014 PR EYE EXAM, EST PATIENT,COMPREHESV: ICD-10-PCS | Mod: HCNC,S$GLB,, | Performed by: OPHTHALMOLOGY

## 2020-01-20 PROCEDURE — 99499 RISK ADDL DX/OHS AUDIT: ICD-10-PCS | Mod: HCNC,S$GLB,, | Performed by: OPHTHALMOLOGY

## 2020-01-20 PROCEDURE — 92134 POSTERIOR SEGMENT OCT RETINA (OCULAR COHERENCE TOMOGRAPHY)-BOTH EYES: ICD-10-PCS | Mod: HCNC,S$GLB,, | Performed by: OPHTHALMOLOGY

## 2020-01-20 PROCEDURE — 92202 OPSCPY EXTND ON/MAC DRAW: CPT | Mod: HCNC,S$GLB,, | Performed by: OPHTHALMOLOGY

## 2020-01-20 NOTE — PROGRESS NOTES
HPI     6 mo f/u   DLS: 04/29/2019 Dr. Lopez     Pt sts unsure of vision change since last visit.   Denies pain   (-)Flashes (-)Floaters  (+)Photophobia  (+)Glare    Eye Med(s) - Latanoprost QHS - OU            Cosopt BID OU               Brimonidine BID OU         OCT -stable cicatrix  Drusen OD      A/P    1. Wet AMD OS  S/p Avastin OS x 15, Eylea x 14    Persistent SRF OS - improving  With RPE tear OS  Central fibrosis with atrophy - poor central potential  10/17 - increased SRH - will keep at 8 weeks for now  9/18 - stable cicatrix - try observation  12/18 - no activity      2. Dry AMD OD  AREDS/AG    3. PCIOL OU  CTR OS - but saw 20/30 with correction, in spite of sub RPE fibrosis      4. Glc Suspect OU   Good IOP control on Timolol    5. Floaters OU        12  Months OCT

## 2020-01-21 ENCOUNTER — OFFICE VISIT (OUTPATIENT)
Dept: HOME HEALTH SERVICES | Facility: CLINIC | Age: 80
End: 2020-01-21
Payer: MEDICARE

## 2020-01-21 VITALS
HEART RATE: 75 BPM | TEMPERATURE: 99 F | WEIGHT: 143 LBS | RESPIRATION RATE: 16 BRPM | DIASTOLIC BLOOD PRESSURE: 70 MMHG | OXYGEN SATURATION: 96 % | BODY MASS INDEX: 23.8 KG/M2 | SYSTOLIC BLOOD PRESSURE: 136 MMHG

## 2020-01-21 DIAGNOSIS — Z00.00 ENCOUNTER FOR PREVENTIVE HEALTH EXAMINATION: ICD-10-CM

## 2020-01-21 DIAGNOSIS — R26.81 UNSTEADY GAIT: ICD-10-CM

## 2020-01-21 DIAGNOSIS — K52.832 LYMPHOCYTIC COLITIS: Chronic | ICD-10-CM

## 2020-01-21 DIAGNOSIS — M85.80 OSTEOPENIA, UNSPECIFIED LOCATION: ICD-10-CM

## 2020-01-21 DIAGNOSIS — F33.0 MILD EPISODE OF RECURRENT MAJOR DEPRESSIVE DISORDER: ICD-10-CM

## 2020-01-21 DIAGNOSIS — F41.9 ANXIETY: ICD-10-CM

## 2020-01-21 DIAGNOSIS — H40.003 GLAUCOMA SUSPECT OF BOTH EYES: ICD-10-CM

## 2020-01-21 DIAGNOSIS — H35.3112 NONEXUDATIVE AGE-RELATED MACULAR DEGENERATION, RIGHT EYE, INTERMEDIATE DRY STAGE: ICD-10-CM

## 2020-01-21 DIAGNOSIS — I51.89 VENTRICULAR DIASTOLIC DYSFUNCTION DETERMINED BY ECHOCARDIOGRAPHY: ICD-10-CM

## 2020-01-21 DIAGNOSIS — I10 ESSENTIAL HYPERTENSION: ICD-10-CM

## 2020-01-21 DIAGNOSIS — H35.3221 EXUDATIVE AGE-RELATED MACULAR DEGENERATION OF LEFT EYE WITH ACTIVE CHOROIDAL NEOVASCULARIZATION: Primary | ICD-10-CM

## 2020-01-21 PROCEDURE — 3078F DIAST BP <80 MM HG: CPT | Mod: CPTII,S$GLB,, | Performed by: NURSE PRACTITIONER

## 2020-01-21 PROCEDURE — G0439 PPPS, SUBSEQ VISIT: HCPCS | Mod: S$GLB,,, | Performed by: NURSE PRACTITIONER

## 2020-01-21 PROCEDURE — G0439 PR MEDICARE ANNUAL WELLNESS SUBSEQUENT VISIT: ICD-10-PCS | Mod: S$GLB,,, | Performed by: NURSE PRACTITIONER

## 2020-01-21 PROCEDURE — 3075F PR MOST RECENT SYSTOLIC BLOOD PRESS GE 130-139MM HG: ICD-10-PCS | Mod: CPTII,S$GLB,, | Performed by: NURSE PRACTITIONER

## 2020-01-21 PROCEDURE — 3075F SYST BP GE 130 - 139MM HG: CPT | Mod: CPTII,S$GLB,, | Performed by: NURSE PRACTITIONER

## 2020-01-21 PROCEDURE — 3078F PR MOST RECENT DIASTOLIC BLOOD PRESSURE < 80 MM HG: ICD-10-PCS | Mod: CPTII,S$GLB,, | Performed by: NURSE PRACTITIONER

## 2020-01-21 NOTE — PATIENT INSTRUCTIONS
Counseling and Referral of Other Preventative  (Italic type indicates deductible and co-insurance are waived)    Patient Name: Serenity Epps  Today's Date: 1/21/2020    Health Maintenance       Date Due Completion Date    Shingles Vaccine (1 of 2) 09/11/1988 ---    DEXA SCAN 11/07/2022 11/7/2019    Lipid Panel 11/04/2024 11/4/2019    TETANUS VACCINE 09/21/2026 9/21/2016        No orders of the defined types were placed in this encounter.    The following information is provided to all patients.  This information is to help you find resources for any of the problems found today that may be affecting your health:                Living healthy guide: www.[facility].[place].[facility]      Understanding Diabetes: www.diabetes.org      Eating healthy: www.cdc.gov/healthyweight      [facility] home safety checklist: www.cdc.gov/steadi/patient.html      Agency on Aging: www.goea.[place].[facility]      Alcoholics anonymous (AA): www.aa.org      Physical Activity: www.divina.nih.gov/ms2punc      Tobacco use: www.quitwithusla.org     Fall Prevention  Falls often occur due to slipping, tripping or losing your balance. Millions of people fall every year and injure themselves. Here are ways to reduce your risk of falling again.  · Think about your fall, was there anything that caused your fall that can be fixed, removed, or replaced?  · Make your home safe by keeping walkways clear of objects you may trip over.  · Use non-slip pads under rugs. Do not use area rugs or small throw rugs.  · Use non-slip mats in bathtubs and showers.  · Install handrails and lights on staircases.  · Do not walk in poorly lit areas.  · Do not stand on chairs or wobbly ladders.  · Use caution when reaching overhead or looking upward. This position can cause a loss of balance.  · Be sure your shoes fit properly, have non-slip bottoms and are in good condition.   · Wear shoes both inside and out. Avoid going barefoot or wearing slippers.  · Be cautious when going up and down  stairs, curbs, and when walking on uneven sidewalks.  · If your balance is poor, consider using a cane or walker.  · If your fall was related to alcohol use, stop or limit alcohol intake.   · If your fall was related to use of sleeping medicines, talk to your doctor about this. You may need to reduce your dosage at bedtime if you awaken during the night to go to the bathroom.    · To reduce the need for nighttime bathroom trips:  ¨ Avoid drinking fluids for several hours before going to bed  ¨ Empty your bladder before going to bed  ¨ Men can keep a urinal at the bedside  · Stay as active as you can. Balance, flexibility, strength, and endurance all come from exercise. They all play a role in preventing falls. Ask your healthcare provider which types of activity are right for you.  · Get your vision checked on a regular basis.  · If you have pets, know where they are before you stand up or walk so you don't trip over them.  · Use night lights.  Date Last Reviewed: 11/5/2015  © 8106-7691 [employer]. [address] [place] [address], [place] [address]. All rights reserved. This information is not intended as a substitute for professional medical care. Always follow your healthcare professional's instructions.

## 2020-01-21 NOTE — PROGRESS NOTES
Serenity Epps presented for a follow-up Medicare AWV today. The following components were reviewed and updated:    · Medical history  · Family History  · Social history  · Allergies and Current Medications  · Health Risk Assessment  · Health Maintenance  · Care Team    **See Completed Assessments for Annual Wellness visit with in the encounter summary    The following assessments were completed:  · Depression Screening  · Cognitive function Screening  ·   ·   ·   · Timed Get Up Test  · Whisper Test    Vitals:    01/21/20 0813   BP: 136/70   Pulse: 75   Resp: 16   Temp: 98.6 °F (37 °C)   TempSrc: Temporal   SpO2: 96%   Weight: 64.9 kg (143 lb)     Body mass index is 23.8 kg/m².   ]    Physical Exam   Constitutional: She is oriented to person, place, and time. She appears well-developed and well-nourished.   HENT:   Mouth/Throat: Oropharynx is clear and moist. No oropharyngeal exudate.   Eyes: No scleral icterus.   Neck: No tracheal deviation present. No thyromegaly present.   Cardiovascular: Normal rate, regular rhythm and normal heart sounds.   Pulmonary/Chest: Effort normal and breath sounds normal.   Abdominal: Soft. Bowel sounds are normal. She exhibits no distension and no mass. There is no tenderness. There is no guarding.   Lymphadenopathy:     She has no cervical adenopathy.   Musculoskeletal: unsteady gait  Neurological: She is alert and oriented to person, place, and time.   Skin: Skin is warm and dry. No rash noted.   Psychiatric: She has a normal mood and affect.       Diagnoses and health risks identified today and associated recommendations/orders:  1. Encounter for preventive health examination  Assessment performed and preventive measures reviewed with patient.    2. Exudative age-related macular degeneration of left eye with active choroidal neovascularization  Stable. Followed by Ophthalmology.    3. Nonexudative age-related macular degeneration, right eye, intermediate dry stage  Stable. Followed by  Ophthalmology.    4. Glaucoma suspect of both eyes  Stable. Followed by Ophthalmology.    5. Anxiety  Stable. Followed by PCP.    6. Mild episode of recurrent major depressive disorder  Stable. Followed by PCP. Denies SI/HI or plans. Reports has had loss of family members over the last few years.    7. Essential hypertension  Stable. Followed by PCP.    8. 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 Followed by PCP.    9. Lymphocytic colitis  Stable. Followed by GI.    10. Osteopenia, unspecified location  Stable. Followed by PCP.    11. Unsteady gait  Stable. Followed by PCP.    12. BMI 23.0-23.9, adult  Stable.      Provided Serenity with a 5-10 year written screening schedule and personal prevention plan. Recommendations were developed using the USPSTF age appropriate recommendations. Education, counseling, and referrals were provided as needed.  After Visit Summary printed and given to patient which includes a list of additional screenings\tests needed.    Follow up in about 1 year (around 1/21/2021).    Consent for treatment obtained from patient on visit today.    Janeth Hendricks NP  I offered to discuss end of life issues, including information on how to make advance directives that the patient could use to name someone who would make medical decisions on their behalf if they became too ill to make themselves.    _X_Patient declined  ___Patient is interested, I provided paper work and offered to discuss.

## 2020-01-22 PROBLEM — R26.81 UNSTEADY GAIT: Status: ACTIVE | Noted: 2020-01-22

## 2020-01-22 PROBLEM — Z00.00 ENCOUNTER FOR PREVENTIVE HEALTH EXAMINATION: Status: ACTIVE | Noted: 2020-01-22

## 2020-03-03 ENCOUNTER — TELEPHONE (OUTPATIENT)
Dept: FAMILY MEDICINE | Facility: CLINIC | Age: 80
End: 2020-03-03

## 2020-03-03 NOTE — TELEPHONE ENCOUNTER
----- Message from Aggie Perdomo sent at 3/3/2020  9:55 AM CST -----  Contact: pt  Type: Needs Medical Advice    Who Called:  Pt  Best Call Back Number:[phone]- or [phone]  Additional Information: Pt wants to speak to  Someone in office to verify appointment . I did verify but she is requesting the office give her a call ASAP.  Please call pt to advise.

## 2020-04-13 DIAGNOSIS — F32.1 MAJOR DEPRESSIVE DISORDER, SINGLE EPISODE, MODERATE: ICD-10-CM

## 2020-04-13 RX ORDER — MIRTAZAPINE 15 MG/1
15 TABLET, FILM COATED ORAL NIGHTLY
Qty: 30 TABLET | Refills: 9 | Status: SHIPPED | OUTPATIENT
Start: 2020-04-13 | End: 2020-08-27 | Stop reason: SDUPTHER

## 2020-04-27 PROBLEM — Z00.00 ENCOUNTER FOR PREVENTIVE HEALTH EXAMINATION: Status: RESOLVED | Noted: 2020-01-22 | Resolved: 2020-04-27

## 2020-05-04 ENCOUNTER — PATIENT MESSAGE (OUTPATIENT)
Dept: FAMILY MEDICINE | Facility: CLINIC | Age: 80
End: 2020-05-04

## 2020-05-06 ENCOUNTER — PATIENT MESSAGE (OUTPATIENT)
Dept: ADMINISTRATIVE | Facility: HOSPITAL | Age: 80
End: 2020-05-06

## 2020-05-06 ENCOUNTER — PATIENT MESSAGE (OUTPATIENT)
Dept: OPHTHALMOLOGY | Facility: CLINIC | Age: 80
End: 2020-05-06

## 2020-05-11 ENCOUNTER — TELEPHONE (OUTPATIENT)
Dept: OPHTHALMOLOGY | Facility: CLINIC | Age: 80
End: 2020-05-11

## 2020-05-11 ENCOUNTER — TELEPHONE (OUTPATIENT)
Dept: FAMILY MEDICINE | Facility: CLINIC | Age: 80
End: 2020-05-11

## 2020-05-11 ENCOUNTER — OFFICE VISIT (OUTPATIENT)
Dept: FAMILY MEDICINE | Facility: CLINIC | Age: 80
End: 2020-05-11
Payer: MEDICARE

## 2020-05-11 VITALS
WEIGHT: 150.13 LBS | HEART RATE: 60 BPM | TEMPERATURE: 97 F | RESPIRATION RATE: 20 BRPM | SYSTOLIC BLOOD PRESSURE: 116 MMHG | BODY MASS INDEX: 25.01 KG/M2 | OXYGEN SATURATION: 96 % | DIASTOLIC BLOOD PRESSURE: 80 MMHG | HEIGHT: 65 IN

## 2020-05-11 DIAGNOSIS — H40.003 GLAUCOMA SUSPECT OF BOTH EYES: ICD-10-CM

## 2020-05-11 DIAGNOSIS — I10 ESSENTIAL HYPERTENSION: Primary | ICD-10-CM

## 2020-05-11 PROCEDURE — 1126F AMNT PAIN NOTED NONE PRSNT: CPT | Mod: HCNC,S$GLB,, | Performed by: FAMILY MEDICINE

## 2020-05-11 PROCEDURE — 1126F PR PAIN SEVERITY QUANTIFIED, NO PAIN PRESENT: ICD-10-PCS | Mod: HCNC,S$GLB,, | Performed by: FAMILY MEDICINE

## 2020-05-11 PROCEDURE — 3079F PR MOST RECENT DIASTOLIC BLOOD PRESSURE 80-89 MM HG: ICD-10-PCS | Mod: HCNC,CPTII,S$GLB, | Performed by: FAMILY MEDICINE

## 2020-05-11 PROCEDURE — 99213 PR OFFICE/OUTPT VISIT, EST, LEVL III, 20-29 MIN: ICD-10-PCS | Mod: HCNC,S$GLB,, | Performed by: FAMILY MEDICINE

## 2020-05-11 PROCEDURE — 99213 OFFICE O/P EST LOW 20 MIN: CPT | Mod: HCNC,S$GLB,, | Performed by: FAMILY MEDICINE

## 2020-05-11 PROCEDURE — 1101F PR PT FALLS ASSESS DOC 0-1 FALLS W/OUT INJ PAST YR: ICD-10-PCS | Mod: HCNC,CPTII,S$GLB, | Performed by: FAMILY MEDICINE

## 2020-05-11 PROCEDURE — 1101F PT FALLS ASSESS-DOCD LE1/YR: CPT | Mod: HCNC,CPTII,S$GLB, | Performed by: FAMILY MEDICINE

## 2020-05-11 PROCEDURE — 1159F MED LIST DOCD IN RCRD: CPT | Mod: HCNC,S$GLB,, | Performed by: FAMILY MEDICINE

## 2020-05-11 PROCEDURE — 99999 PR PBB SHADOW E&M-EST. PATIENT-LVL III: CPT | Mod: PBBFAC,HCNC,, | Performed by: FAMILY MEDICINE

## 2020-05-11 PROCEDURE — 3074F PR MOST RECENT SYSTOLIC BLOOD PRESSURE < 130 MM HG: ICD-10-PCS | Mod: HCNC,CPTII,S$GLB, | Performed by: FAMILY MEDICINE

## 2020-05-11 PROCEDURE — 3074F SYST BP LT 130 MM HG: CPT | Mod: HCNC,CPTII,S$GLB, | Performed by: FAMILY MEDICINE

## 2020-05-11 PROCEDURE — 99999 PR PBB SHADOW E&M-EST. PATIENT-LVL III: ICD-10-PCS | Mod: PBBFAC,HCNC,, | Performed by: FAMILY MEDICINE

## 2020-05-11 PROCEDURE — 1159F PR MEDICATION LIST DOCUMENTED IN MEDICAL RECORD: ICD-10-PCS | Mod: HCNC,S$GLB,, | Performed by: FAMILY MEDICINE

## 2020-05-11 PROCEDURE — 3079F DIAST BP 80-89 MM HG: CPT | Mod: HCNC,CPTII,S$GLB, | Performed by: FAMILY MEDICINE

## 2020-05-11 RX ORDER — DOCUSATE SODIUM 100 MG/1
100 CAPSULE, LIQUID FILLED ORAL 2 TIMES DAILY
COMMUNITY

## 2020-05-11 NOTE — PROGRESS NOTES
Subjective:       Patient ID: Serenity Epps is a 79 y.o. female    Chief Complaint: Follow-up    Hypertension   This is a chronic problem. The problem is controlled. Pertinent negatives include no chest pain, headaches, neck pain, orthopnea, palpitations, peripheral edema or shortness of breath. Past treatments include beta blockers. The current treatment provides significant improvement. There are no compliance problems.        Review of Systems   Constitutional: Negative for activity change and unexpected weight change.   HENT: Positive for rhinorrhea. Negative for hearing loss and trouble swallowing.    Eyes: Positive for visual disturbance (low vision due to macular degeneration and glaucoma). Negative for discharge.   Respiratory: Negative for chest tightness, shortness of breath and wheezing.    Cardiovascular: Negative for chest pain, palpitations and orthopnea.   Gastrointestinal: Negative for blood in stool, constipation, diarrhea and vomiting.   Endocrine: Negative for polydipsia and polyuria.   Genitourinary: Negative for difficulty urinating, dysuria, hematuria and menstrual problem.   Musculoskeletal: Positive for arthralgias (stiffness) and joint swelling. Negative for neck pain.   Neurological: Negative for weakness and headaches.   Psychiatric/Behavioral: Positive for dysphoric mood. Negative for confusion.        Objective:   Physical Exam   Constitutional: She is oriented to person, place, and time. She appears well-developed and well-nourished.   Neurological: She is alert and oriented to person, place, and time.   Vitals reviewed.       Assessment:       1. Essential hypertension     2. Glaucoma suspect of both eyes          Plan:       Essential hypertension  - Continue current therapy  - Serial blood pressure monitoring  - Diet and exercise education.    Glaucoma suspect of both eyes  - Continue Ophthalmology

## 2020-05-11 NOTE — TELEPHONE ENCOUNTER
----- Message from Toña Fuchs sent at 5/11/2020 10:49 AM CDT -----  Just got a call from Dr. Andersen and is requesting pt be seen at a sooner date due to declining [facility].  Let the nurse know it may be difficult due to social distancing. Please call pt and advise. I am going to work on getting pt in sooner for [place].

## 2020-05-11 NOTE — TELEPHONE ENCOUNTER
Spoke with Dr Ryan's office to get both of her appointments rescheduled for sooner per Dr Andersen's request. They are going to call patient and  as soon as she can get the new appt.

## 2020-05-12 ENCOUNTER — TELEPHONE (OUTPATIENT)
Dept: OPHTHALMOLOGY | Facility: CLINIC | Age: 80
End: 2020-05-12

## 2020-05-12 NOTE — TELEPHONE ENCOUNTER
----- Message from Toña Fuchs sent at 5/11/2020 11:17 AM CDT -----  Dr. Andersen would like pt seen sooner due to decreased [facility]. Told nurse it would be difficult due to social distancing. Please advise.

## 2020-05-18 RX ORDER — MELOXICAM 7.5 MG/1
7.5 TABLET ORAL DAILY
Qty: 30 TABLET | Refills: 3 | Status: SHIPPED | OUTPATIENT
Start: 2020-05-18 | End: 2020-08-14

## 2020-05-22 ENCOUNTER — OFFICE VISIT (OUTPATIENT)
Dept: OPHTHALMOLOGY | Facility: CLINIC | Age: 80
End: 2020-05-22
Payer: MEDICARE

## 2020-05-22 ENCOUNTER — CLINICAL SUPPORT (OUTPATIENT)
Dept: OPHTHALMOLOGY | Facility: CLINIC | Age: 80
End: 2020-05-22
Payer: MEDICARE

## 2020-05-22 DIAGNOSIS — H40.1234 BILATERAL LOW-TENSION GLAUCOMA, INDETERMINATE STAGE: Primary | ICD-10-CM

## 2020-05-22 DIAGNOSIS — H35.3112 NONEXUDATIVE AGE-RELATED MACULAR DEGENERATION, RIGHT EYE, INTERMEDIATE DRY STAGE: ICD-10-CM

## 2020-05-22 DIAGNOSIS — H35.3221 EXUDATIVE AGE-RELATED MACULAR DEGENERATION OF LEFT EYE WITH ACTIVE CHOROIDAL NEOVASCULARIZATION: ICD-10-CM

## 2020-05-22 DIAGNOSIS — H40.1234 BILATERAL LOW-TENSION GLAUCOMA, INDETERMINATE STAGE: ICD-10-CM

## 2020-05-22 DIAGNOSIS — H52.7 REFRACTIVE ERROR: ICD-10-CM

## 2020-05-22 DIAGNOSIS — Z96.1 PSEUDOPHAKIA OF BOTH EYES: ICD-10-CM

## 2020-05-22 PROCEDURE — 99999 PR PBB SHADOW E&M-EST. PATIENT-LVL I: CPT | Mod: PBBFAC,HCNC,,

## 2020-05-22 PROCEDURE — 92014 COMPRE OPH EXAM EST PT 1/>: CPT | Mod: HCNC,S$GLB,, | Performed by: OPHTHALMOLOGY

## 2020-05-22 PROCEDURE — 92083 HUMPHREY VISUAL FIELD - OU - BOTH EYES: ICD-10-PCS | Mod: HCNC,S$GLB,, | Performed by: OPHTHALMOLOGY

## 2020-05-22 PROCEDURE — 99999 PR PBB SHADOW E&M-EST. PATIENT-LVL I: ICD-10-PCS | Mod: PBBFAC,HCNC,,

## 2020-05-22 PROCEDURE — 99999 PR PBB SHADOW E&M-EST. PATIENT-LVL III: CPT | Mod: PBBFAC,HCNC,, | Performed by: OPHTHALMOLOGY

## 2020-05-22 PROCEDURE — 92014 PR EYE EXAM, EST PATIENT,COMPREHESV: ICD-10-PCS | Mod: HCNC,S$GLB,, | Performed by: OPHTHALMOLOGY

## 2020-05-22 PROCEDURE — 92083 EXTENDED VISUAL FIELD XM: CPT | Mod: HCNC,S$GLB,, | Performed by: OPHTHALMOLOGY

## 2020-05-22 PROCEDURE — 99999 PR PBB SHADOW E&M-EST. PATIENT-LVL III: ICD-10-PCS | Mod: PBBFAC,HCNC,, | Performed by: OPHTHALMOLOGY

## 2020-05-22 NOTE — PROGRESS NOTES
"HPI     Glaucoma      Additional comments: 4 month iop ck              Comments     DLS: //    Pt states not sure it she can see as well OD since last visit.     Gtts: Brimonidine BID, Cosopt BID, Latanoprost QHS OU          Last edited by Cheryle Quintana on 5/22/2020  2:18 PM. (History)        ROS     Negative for: Constitutional, Gastrointestinal, Neurological, Skin,   Genitourinary, Musculoskeletal, HENT, Endocrine, Cardiovascular, Eyes,   Respiratory, Psychiatric, Allergic/Imm, Heme/Lymph    Last edited by Ashish Fragoso Jr., MD on 5/22/2020  3:01 PM. (History)        Assessment /Plan     For exam results, see Encounter Report.    Bilateral low-tension glaucoma, indeterminate stage    Nonexudative age-related macular degeneration, right eye, intermediate dry stage    Exudative age-related macular degeneration of left eye with active choroidal neovascularization    Pseudophakia of both eyes    Refractive error        1. Low tension Glaucoma  Reports progressive "dimming" of vision OD, despite good IOP control.  IOP seems stable low/mid teens, reports good compliance with drops, prior small NFL hemorrhages OS not present on last DFE.   Switched to Cosopt BID OU 7/8/15 when she appeared to have possible progression on clinical exam and HRT OD.   Baseline OCT nerve done 11/21/17 - thinning OD>OS. Repeated 10/30/18 & 11/15/19 - stable  Last HRT 7/15/19 appears within range of priors OD, possible slight progression OS.  Clinical exam appears stable..   CCT WNL.   HVF's now likely affected by ARMD - stable OU today? Continue Cosopt BID OU started 7/8/15. Added Brimonidine BID OU on 8/22/17.  Added Latanoprost OU on 10/30/18.  Brother with glaucoma with "stitch"  Another sibling now receiving injections for ARMD.  Continue current drop regimen  Follow up in about 4 months (around 9/22/2020) for IOP and Medication check .    Macular hemorrhage OS F/u Dr. Lopez as scheduled. New Amsler grid given 7/15/19.   2. " Pseudophakia OU s/p Phaco/PCIOL OS with CTR for zonular dehiscence and Triescence.   s/p Phaco/PCIOL OD. Doing well.        .3. Hyperopia with astigmatism and presbyopia  MRx next visit

## 2020-06-15 ENCOUNTER — OFFICE VISIT (OUTPATIENT)
Dept: OPHTHALMOLOGY | Facility: CLINIC | Age: 80
End: 2020-06-15
Payer: MEDICARE

## 2020-06-15 VITALS — HEART RATE: 55 BPM | DIASTOLIC BLOOD PRESSURE: 74 MMHG | SYSTOLIC BLOOD PRESSURE: 129 MMHG

## 2020-06-15 DIAGNOSIS — H35.3221 EXUDATIVE AGE-RELATED MACULAR DEGENERATION OF LEFT EYE WITH ACTIVE CHOROIDAL NEOVASCULARIZATION: Primary | ICD-10-CM

## 2020-06-15 DIAGNOSIS — H35.3112 NONEXUDATIVE AGE-RELATED MACULAR DEGENERATION, RIGHT EYE, INTERMEDIATE DRY STAGE: ICD-10-CM

## 2020-06-15 PROCEDURE — 92202 PR OPHTHALMOSCOPY, EXT, W/DRAW OPTIC NERVE/MACULA, I&R, UNI/BI: ICD-10-PCS | Mod: HCNC,S$GLB,, | Performed by: OPHTHALMOLOGY

## 2020-06-15 PROCEDURE — 99499 UNLISTED E&M SERVICE: CPT | Mod: HCNC,S$GLB,, | Performed by: OPHTHALMOLOGY

## 2020-06-15 PROCEDURE — 92202 OPSCPY EXTND ON/MAC DRAW: CPT | Mod: HCNC,S$GLB,, | Performed by: OPHTHALMOLOGY

## 2020-06-15 PROCEDURE — 99999 PR PBB SHADOW E&M-EST. PATIENT-LVL IV: CPT | Mod: PBBFAC,HCNC,, | Performed by: OPHTHALMOLOGY

## 2020-06-15 PROCEDURE — 92014 COMPRE OPH EXAM EST PT 1/>: CPT | Mod: HCNC,S$GLB,, | Performed by: OPHTHALMOLOGY

## 2020-06-15 PROCEDURE — 99999 PR PBB SHADOW E&M-EST. PATIENT-LVL IV: ICD-10-PCS | Mod: PBBFAC,HCNC,, | Performed by: OPHTHALMOLOGY

## 2020-06-15 PROCEDURE — 99499 RISK ADDL DX/OHS AUDIT: ICD-10-PCS | Mod: HCNC,S$GLB,, | Performed by: OPHTHALMOLOGY

## 2020-06-15 PROCEDURE — 92014 PR EYE EXAM, EST PATIENT,COMPREHESV: ICD-10-PCS | Mod: HCNC,S$GLB,, | Performed by: OPHTHALMOLOGY

## 2020-06-24 RX ORDER — METOPROLOL SUCCINATE 25 MG/1
TABLET, EXTENDED RELEASE ORAL
Qty: 180 TABLET | Refills: 3 | Status: SHIPPED | OUTPATIENT
Start: 2020-06-24 | End: 2020-08-27 | Stop reason: SDUPTHER

## 2020-06-24 NOTE — PROGRESS NOTES
Refill Authorization Note    is requesting a refill authorization.    Brief assessment and rationale for refill: APPROVE: PRR               Medication reconciliation completed: No                         Comments:      Requested Prescriptions   Pending Prescriptions Disp Refills    metoprolol succinate (TOPROL-XL) 25 MG 24 hr tablet [Pharmacy Med Name: Metoprolol Succinate ER 25 MG Oral Tablet Extended Release 24 Hour] 180 tablet 3     Sig: Take 1 tablet by mouth twice daily       Cardiovascular:  Beta Blockers Passed - 6/23/2020  6:25 PM        Passed - Patient is at least 18 years old        Passed - Last BP in normal range within 360 days.     BP Readings from Last 3 Encounters:   06/15/20 129/74   05/11/20 116/80   01/21/20 136/70              Passed - Last Heart Rate in normal range within 360 days.     Pulse Readings from Last 3 Encounters:   06/15/20 55   05/11/20 60   01/21/20 75             Passed - Office visit in past 12 months or future 90 days.     Recent Outpatient Visits            1 week ago Exudative age-related macular degeneration of left eye with active choroidal neovascularization    [place] - Ophthalmology NATHANIEL Lopez MD    1 month ago Bilateral low-tension glaucoma, indeterminate stage    [place] - Ophthalmology Ashish Fragoso Jr., MD    1 month ago Essential hypertension    [facility] Hua Andersen MD    5 months ago Exudative age-related macular degeneration of left eye with active choroidal neovascularization    [facility] - Home Visits Janeth Hendricks NP    7 months ago Bilateral low-tension glaucoma, indeterminate stage    [address] Ophthalmology Estelle Mello MD          Future Appointments              In 3 months [facility]oso Jr., [facility]    In 5 months Hua Andersen MD [facility]                    Appointments  past 12m or future 3m with PCP     Date Provider   Last Visit   5/11/2020 Hua Andersen MD   Next Visit   11/30/2020 Hua Andersen MD   ED visits in past 90 days: 0     Note composed:7:19 AM 06/24/2020

## 2020-07-15 RX ORDER — BRIMONIDINE TARTRATE 2 MG/ML
SOLUTION/ DROPS OPHTHALMIC
Qty: 30 ML | Refills: 3 | Status: SHIPPED | OUTPATIENT
Start: 2020-07-15 | End: 2020-08-13 | Stop reason: SDUPTHER

## 2020-08-14 RX ORDER — BRIMONIDINE TARTRATE 2 MG/ML
SOLUTION/ DROPS OPHTHALMIC
Qty: 30 ML | Refills: 3 | Status: SHIPPED | OUTPATIENT
Start: 2020-08-14 | End: 2021-10-05

## 2020-08-14 NOTE — TELEPHONE ENCOUNTER
Left message to inform pt that Dr Fragoso sent in her refill for brimonidine to [facility].

## 2020-08-17 DIAGNOSIS — H40.1233 LOW-TENSION GLAUCOMA OF BOTH EYES, SEVERE STAGE: ICD-10-CM

## 2020-08-17 RX ORDER — LATANOPROST 50 UG/ML
SOLUTION/ DROPS OPHTHALMIC
Qty: 2.5 ML | Refills: 11 | Status: SHIPPED | OUTPATIENT
Start: 2020-08-17 | End: 2020-08-27 | Stop reason: SDUPTHER

## 2020-08-17 NOTE — TELEPHONE ENCOUNTER
----- Message from Stephanie Rodriguez sent at 8/17/2020 11:28 AM CDT -----  Contact: self  Type:  RX Refill Request    Who Called:  patient   Refill or New Rx:  refill, requesting change in pharmacy  RX Name and Strength:  latanoprost 0.005 % ophthalmic solution  How is the patient currently taking it? (ex. 1XDay):  1 drop each eye in the evening  Is this a 30 day or 90 day RX:    Preferred Pharmacy with phone number:    [facility]  [facility], [facility] order   [phone]  Local or Mail Order:  mail order  Ordering Provider:   [facility] Call Back Number:  [phone]  Additional Information:

## 2020-08-27 ENCOUNTER — PATIENT MESSAGE (OUTPATIENT)
Dept: FAMILY MEDICINE | Facility: CLINIC | Age: 80
End: 2020-08-27

## 2020-08-27 DIAGNOSIS — F32.1 MAJOR DEPRESSIVE DISORDER, SINGLE EPISODE, MODERATE: ICD-10-CM

## 2020-08-27 DIAGNOSIS — H40.1233 LOW-TENSION GLAUCOMA OF BOTH EYES, SEVERE STAGE: ICD-10-CM

## 2020-08-27 DIAGNOSIS — J31.0 CHRONIC RHINITIS: ICD-10-CM

## 2020-08-27 DIAGNOSIS — I10 ESSENTIAL HYPERTENSION: Primary | ICD-10-CM

## 2020-08-27 RX ORDER — MIRTAZAPINE 15 MG/1
15 TABLET, FILM COATED ORAL NIGHTLY
Qty: 90 TABLET | Refills: 0 | Status: SHIPPED | OUTPATIENT
Start: 2020-08-27 | End: 2020-12-02 | Stop reason: SDUPTHER

## 2020-08-27 RX ORDER — AZELASTINE 1 MG/ML
1 SPRAY, METERED NASAL 2 TIMES DAILY
Qty: 90 ML | Refills: 2 | Status: SHIPPED | OUTPATIENT
Start: 2020-08-27 | End: 2020-12-02

## 2020-08-27 RX ORDER — METOPROLOL SUCCINATE 25 MG/1
25 TABLET, EXTENDED RELEASE ORAL 2 TIMES DAILY
Qty: 180 TABLET | Refills: 3 | Status: SHIPPED | OUTPATIENT
Start: 2020-08-27 | End: 2021-04-01

## 2020-08-27 NOTE — TELEPHONE ENCOUNTER
Refill Authorization Note     is requesting a refill authorization.    Brief assessment and rationale for refill: ROUTE: meloxicam and gabapentin -op // APPROVE: azelastine, mirtazapine and metoprolol succinate - needs labs     Medication-related problems identified: Requires labs    Medication Therapy Plan: NTBO(CMP/CBC/LIPID)    Medication reconciliation completed: No                         Comments:   Automatic Epic Protocol Generated Data:    Requested Prescriptions   Pending Prescriptions Disp Refills    gabapentin (NEURONTIN) 400 MG capsule 270 capsule 3     Sig: Take 1 capsule (400 mg total) by mouth 3 (three) times daily.       Anticonvulsants Protocol Passed - 8/27/2020  3:22 PM        Passed - Visit with Authorizing provider in past 9 months or upcoming 90 days        Passed - No active pregnancy on record          meloxicam (MOBIC) 7.5 MG tablet 90 tablet 1     Sig: Take 1 tablet (7.5 mg total) by mouth once daily.       NSAIDs Protocol Failed - 8/27/2020  3:22 PM        Failed - Active on medication list        Failed - HGB greater than 10 or HCT greater than 30 in past 12 months        Passed - Patient not currently pregnant        Passed - No positive pregnancy test in past 12 months         Passed - Serum Creatinine less than 1.4 on file in the past 12 months     Lab Results   Component Value Date    CREATININE 0.8 11/04/2019    CREATININE 0.7 10/08/2018    CREATININE 0.6 08/30/2018     Lab Results   Component Value Date    EGFRNONAA >60.0 11/04/2019    EGFRNONAA >60 10/08/2018    EGFRNONAA >60.0 08/30/2018               Passed - Visit with authorizing provider in past 12 months or upcoming 90 days        Passed - AST in past 12 months      Lab Results   Component Value Date    AST 17 11/04/2019    AST 19 08/30/2018    AST 30 05/14/2018              Passed - Serum Potassium less than 5.2 on file in the past 12 months     Lab Results   Component Value Date    K 4.7 11/04/2019    K 3.7  08/30/2018    K 3.7 05/16/2018                  Passed - Blood Pressure below 139/89 on file in past 12 months      BP Readings from Last 3 Encounters:   06/15/20 129/74   05/11/20 116/80   01/21/20 136/70             Passed - ALT less than 95 in past 12 months     Lab Results   Component Value Date    ALT 11 11/04/2019    ALT 11 08/30/2018    ALT 27 05/14/2018               Signed Prescriptions Disp Refills    metoprolol succinate (TOPROL-XL) 25 MG 24 hr tablet 180 tablet 3     Sig: Take 1 tablet (25 mg total) by mouth 2 (two) times daily.       Cardiovascular:  Beta Blockers Passed - 8/27/2020  3:22 PM        Passed - Patient is at least 18 years old        Passed - Last BP in normal range within 360 days.     BP Readings from Last 3 Encounters:   06/15/20 129/74   05/11/20 116/80   01/21/20 136/70              Passed - Last Heart Rate in normal range within 360 days.     Pulse Readings from Last 3 Encounters:   06/15/20 55   05/11/20 60   01/21/20 75             Passed - Office visit in past 12 months or future 90 days.     Recent Outpatient Visits            2 months ago Exudative age-related macular degeneration of left eye with active choroidal neovascularization    [place] - Ophthalmology NATHANIEL Lopez MD    3 months ago Bilateral low-tension glaucoma, indeterminate stage    [place] - Ophthalmology Ashish Fragoso Jr., MD    3 months ago Essential hypertension    [facility] Hua Andersen MD    7 months ago Exudative age-related macular degeneration of left eye with active choroidal neovascularization    [facility] - Home Visits Janeth Hendricks NP    9 months ago Bilateral low-tension glaucoma, indeterminate stage    [place] [id number] - Ophthalmology Estelle Delvis Mello MD          Future Appointments              In 3 weeks [facility]oso Jr., [facility] [place] [facility]    In 3 months Hua Andersen MD [facility]                   mirtazapine (REMERON) 15 MG tablet 90 tablet 0     Sig: Take 1 tablet (15 mg total) by mouth every evening.       Psychiatry: Antidepressants - mirtazapine Failed - 8/27/2020  3:22 PM        Failed - Total Cholesterol in normal range and within 360 days     Cholesterol   Date Value Ref Range Status   11/04/2019 206 (H) 120 - 199 mg/dL Final     Comment:     The National Cholesterol Education Program (NCEP) has set the  following guidelines (reference ranges) for Cholesterol:  Optimal.....................<200 mg/dL  Borderline High.............200-239 mg/dL  High........................> or = 240 mg/dL     08/07/2017 212 (H) 120 - 199 mg/dL Final     Comment:     The National Cholesterol Education Program (NCEP) has set the  following guidelines (reference ranges) for Cholesterol:  Optimal.....................<200 mg/dL  Borderline High.............200-239 mg/dL  High........................> or = 240 mg/dL     08/02/2016 208 (H) 120 - 199 mg/dL Final     Comment:     The National Cholesterol Education Program (NCEP) has set the  following guidelines (reference ranges) for Cholesterol:  Optimal.....................<200 mg/dL  Borderline High.............200-239 mg/dL  High........................> or = 240 mg/dL                Failed - WBC in normal range and within 360 days     WBC   Date Value Ref Range Status   08/30/2018 8.99 3.90 - 12.70 K/uL Final   05/17/2018 8.90 3.90 - 12.70 K/uL Final   05/16/2018 7.61 3.90 - 12.70 K/uL Final              Passed - Patient is at least 18 years old        Passed - Office visit in past 6 months or future 90 days.     Recent Outpatient Visits            2 months ago Exudative age-related macular degeneration of left eye with active choroidal neovascularization    [place] - Ophthalmology NATHANIEL Lopez MD    3 months ago Bilateral low-tension glaucoma, indeterminate stage    [place] - Ophthalmology Ashish Fragoso Jr., MD    3 months ago Essential hypertension     [facility] Hua Andersen MD    7 months ago Exudative age-related macular degeneration of left eye with active choroidal neovascularization    [facility] - Home Visits Janeth Hendricks NP    9 months ago Bilateral low-tension glaucoma, indeterminate stage    [facility] - Ophthalmology Estelle Mello MD          Future Appointments              In 3 weeks Ashish Fragoso Jr., MD [place] [facility], [place]    In 3 months Hua Andersen MD [place] [facility], [place]                Passed - ALT is 94 or below and within 360 days     ALT   Date Value Ref Range Status   2019 11 10 - 44 U/L Final   2018 11 10 - 44 U/L Final   2018 27 10 - 44 U/L Final              Passed - Triglycerides in normal range and within 360 days     Triglycerides   Date Value Ref Range Status   2019 79 30 - 150 mg/dL Final     Comment:     The National Cholesterol Education Program (NCEP) has set the  following guidelines (reference values) for triglycerides:  Normal......................<150 mg/dL  Borderline High.............150-199 mg/dL  High........................200-499 mg/dL     2017 185 (H) 30 - 150 mg/dL Final     Comment:     The National Cholesterol Education Program (NCEP) has set the  following guidelines (reference values) for triglycerides:  Normal......................<150 mg/dL  Borderline High.............150-199 mg/dL  High........................200-499 mg/dL     2016 103 30 - 150 mg/dL Final     Comment:     The National Cholesterol Education Program (NCEP) has set the  following guidelines (reference values) for triglycerides:  Normal......................<150 mg/dL  Borderline High.............150-199 mg/dL  High........................200-499 mg/dL                  azelastine (ASTELIN) 137 mcg (0.1 %) nasal spray 90 mL 2     Si spray (137 mcg total) by Nasal route 2 (two) times daily.       Ear, Nose, and Throat:  Nasal Preparations - Antiallergy Failed - 8/27/2020  3:22 PM        Failed - An appropriate indication is on the problem list     Allergic Rhinitis  Sinusitis  Seasonal Allergies              Passed - Patient is at least 18 years old        Passed - Office visit in past 12 months or future 90 days.     Recent Outpatient Visits            2 months ago Exudative age-related macular degeneration of left eye with active choroidal neovascularization    [place] - Ophthalmology NATHANIEL Lopez MD    3 months ago Bilateral low-tension glaucoma, indeterminate stage    [place] - Ophthalmology Ashish Fragoso Jr., MD    3 months ago Essential hypertension    [facility] Hua Andersen MD    7 months ago Exudative age-related macular degeneration of left eye with active choroidal neovascularization    [facility] - Home Visits Janeth Hendricks NP    9 months ago Bilateral low-tension glaucoma, indeterminate stage    [place] [id number] - Ophthal[place] [facility] Delvis Mello MD          Future Appointments              In 3 weeks [facility]oso Jr., [facility]    In 3 months Hua Andersen MD [facility]                      Appointments  past 12m or future 3m with PCP    Date Provider   Last Visit   5/11/2020 Hua Andersen MD   Next Visit   11/30/2020 Hua Andersen MD   ED visits in past 90 days: [unfilled]     Note composed:4:09 PM 08/27/2020

## 2020-08-27 NOTE — TELEPHONE ENCOUNTER
Care Due:                  Date            Visit Type   Department     Provider  --------------------------------------------------------------------------------                                ESTABLISHED                              PATIENT      [facility]  Last Visit: 05-      SHORT        MEDICINE       TIMI FORMAN                              ESTABLISHED   [facility]  Next Visit: 11-      PATIENT      MEDICINE       TIMI FORMAN                                                            Last  Test          Frequency    Reason                     Performed    Due Date  --------------------------------------------------------------------------------    ALT.........  12 months..  mirtazapine..............  11-   10-    Total         12 months..  mirtazapine..............  11-   10-  Cholesterol.    Triglyceride  12 months..  mirtazapine..............  11-   10-  s...........    WBC.........  12 months..  mirtazapine..............  08- 08-    Powered by [employer]. Reference number: [id number]. 8/27/2020 3:22:53 PM CDT

## 2020-08-28 RX ORDER — MELOXICAM 7.5 MG/1
7.5 TABLET ORAL DAILY
Qty: 90 TABLET | Refills: 2 | Status: SHIPPED | OUTPATIENT
Start: 2020-08-28 | End: 2022-08-18

## 2020-08-28 RX ORDER — GABAPENTIN 400 MG/1
400 CAPSULE ORAL 3 TIMES DAILY
Qty: 270 CAPSULE | Refills: 2 | Status: SHIPPED | OUTPATIENT
Start: 2020-08-28 | End: 2021-06-17 | Stop reason: SDUPTHER

## 2020-08-28 RX ORDER — LATANOPROST 50 UG/ML
SOLUTION/ DROPS OPHTHALMIC
Qty: 2.5 ML | Refills: 11 | Status: SHIPPED | OUTPATIENT
Start: 2020-08-28 | End: 2021-03-17 | Stop reason: SDUPTHER

## 2020-08-28 RX ORDER — DORZOLAMIDE HYDROCHLORIDE AND TIMOLOL MALEATE 20; 5 MG/ML; MG/ML
SOLUTION/ DROPS OPHTHALMIC
Qty: 30 ML | Refills: 4 | Status: SHIPPED | OUTPATIENT
Start: 2020-08-28 | End: 2021-09-08

## 2020-08-28 NOTE — TELEPHONE ENCOUNTER
Left message to inform pt that Dr Fragoso sent in her refills for latanoprost and cosopt to [facility].

## 2020-08-28 NOTE — TELEPHONE ENCOUNTER
----- Message from Danelle Kongza sent at 8/28/2020 10:16 AM CDT -----  Patient is calling regarding her prescriptions.  She is trying to change to [facility].  Please call [facility] at [phone] and [phone]. Thank you!

## 2020-09-21 ENCOUNTER — PATIENT OUTREACH (OUTPATIENT)
Dept: ADMINISTRATIVE | Facility: OTHER | Age: 80
End: 2020-09-21

## 2020-09-21 NOTE — PROGRESS NOTES
LINKS immunization registry updated  Care Everywhere updated  Health Maintenance updated  Chart reviewed for overdue Proactive [facility] Encounters (DERIK) health maintenance testing (CRS, Breast Ca, Diabetic Eye Exam)   Orders entered:N/A

## 2020-09-22 ENCOUNTER — OFFICE VISIT (OUTPATIENT)
Dept: OPHTHALMOLOGY | Facility: CLINIC | Age: 80
End: 2020-09-22
Payer: MEDICARE

## 2020-09-22 DIAGNOSIS — H35.3221 EXUDATIVE AGE-RELATED MACULAR DEGENERATION OF LEFT EYE WITH ACTIVE CHOROIDAL NEOVASCULARIZATION: ICD-10-CM

## 2020-09-22 DIAGNOSIS — Z96.1 PSEUDOPHAKIA OF BOTH EYES: ICD-10-CM

## 2020-09-22 DIAGNOSIS — H35.3112 NONEXUDATIVE AGE-RELATED MACULAR DEGENERATION, RIGHT EYE, INTERMEDIATE DRY STAGE: ICD-10-CM

## 2020-09-22 DIAGNOSIS — H52.7 REFRACTIVE ERROR: ICD-10-CM

## 2020-09-22 DIAGNOSIS — H40.1232 LOW-TENSION GLAUCOMA OF BOTH EYES, MODERATE STAGE: Primary | ICD-10-CM

## 2020-09-22 PROCEDURE — 99999 PR PBB SHADOW E&M-EST. PATIENT-LVL III: CPT | Mod: PBBFAC,HCNC,, | Performed by: OPHTHALMOLOGY

## 2020-09-22 PROCEDURE — 99499 UNLISTED E&M SERVICE: CPT | Mod: S$GLB,,, | Performed by: OPHTHALMOLOGY

## 2020-09-22 PROCEDURE — 92012 PR EYE EXAM, EST PATIENT,INTERMED: ICD-10-PCS | Mod: HCNC,S$GLB,, | Performed by: OPHTHALMOLOGY

## 2020-09-22 PROCEDURE — 99499 RISK ADDL DX/OHS AUDIT: ICD-10-PCS | Mod: S$GLB,,, | Performed by: OPHTHALMOLOGY

## 2020-09-22 PROCEDURE — 92012 INTRM OPH EXAM EST PATIENT: CPT | Mod: HCNC,S$GLB,, | Performed by: OPHTHALMOLOGY

## 2020-09-22 PROCEDURE — 99999 PR PBB SHADOW E&M-EST. PATIENT-LVL III: ICD-10-PCS | Mod: PBBFAC,HCNC,, | Performed by: OPHTHALMOLOGY

## 2020-09-22 NOTE — PROGRESS NOTES
"HPI     DLE- 6/15/20 Sera     Pt is here for 4 month IOP ck. Pt states no changes since last seen.   Denies eye pain. Pt is taking alphagan BID OU, Cosopt BID OU, and   Latanoprost QHS.     Last edited by Blair Perez on 9/22/2020  8:12 AM. (History)        ROS     Negative for: Constitutional, Gastrointestinal, Neurological, Skin,   Genitourinary, Musculoskeletal, HENT, Endocrine, Cardiovascular, Eyes,   Respiratory, Psychiatric, Allergic/Imm, Heme/Lymph    Last edited by Ashish Fragoso Jr., MD on 9/22/2020  8:30 AM. (History)        Assessment /Plan     For exam results, see Encounter Report.    Low-tension glaucoma of both eyes, moderate stage    Nonexudative age-related macular degeneration, right eye, intermediate dry stage    Exudative age-related macular degeneration of left eye with active choroidal neovascularization    Pseudophakia of both eyes    Refractive error        1. Low tension Glaucoma  Reports progressive "dimming" of vision OD, despite good IOP control.  IOP seems stable low/mid teens, reports good compliance with drops, prior small NFL hemorrhages OS not present on last DFE.   Switched to Cosopt BID OU 7/8/15 when she appeared to have possible progression on clinical exam and HRT OD.   Baseline OCT nerve done 11/21/17 - thinning OD>OS. Repeated 10/30/18 & 11/15/19 - stable  Last HRT 7/15/19 appears within range of priors OD, possible slight progression OS.  Clinical exam appears stable..   CCT WNL.   HVF's now likely affected by ARMD - stable OU today? Continue Cosopt BID OU started 7/8/15. Added Brimonidine BID OU on 8/22/17.  Added Latanoprost OU on 10/30/18.  Brother with glaucoma with "stitch"  Another sibling now receiving injections for ARMD.  Continue current drop regimen  9/22/20 IOP 8 OU today  Continue cosopt bid ou, latanoprost qhs ou, and brimonidine bid ou  Follow up in about 4 months (around 1/22/2021) for HVF, OCT ON, Gonio, Pachy.    Macular hemorrhage OS F/u Dr. Lopez as " scheduled. New A[place]ler grid given 7/15/19.   2. Pseudophakia OU s/p Phaco/PCIOL OS with CTR for zonular dehiscence and Triescence.   s/p Phaco/PCIOL OD. Doing well.        .3. Hyperopia with astigmatism and presbyopia  stable

## 2020-11-17 ENCOUNTER — PATIENT MESSAGE (OUTPATIENT)
Dept: FAMILY MEDICINE | Facility: CLINIC | Age: 80
End: 2020-11-17

## 2020-11-27 ENCOUNTER — LAB VISIT (OUTPATIENT)
Dept: LAB | Facility: HOSPITAL | Age: 80
End: 2020-11-27
Attending: FAMILY MEDICINE
Payer: MEDICARE

## 2020-11-27 DIAGNOSIS — F32.1 MAJOR DEPRESSIVE DISORDER, SINGLE EPISODE, MODERATE: ICD-10-CM

## 2020-11-27 DIAGNOSIS — I10 ESSENTIAL HYPERTENSION: ICD-10-CM

## 2020-11-27 LAB
ALBUMIN SERPL BCP-MCNC: 4 G/DL (ref 3.5–5.2)
ALP SERPL-CCNC: 58 U/L (ref 55–135)
ALT SERPL W/O P-5'-P-CCNC: 15 U/L (ref 10–44)
ANION GAP SERPL CALC-SCNC: 8 MMOL/L (ref 8–16)
AST SERPL-CCNC: 19 U/L (ref 10–40)
BILIRUB SERPL-MCNC: 1 MG/DL (ref 0.1–1)
BUN SERPL-MCNC: 14 MG/DL (ref 8–23)
CALCIUM SERPL-MCNC: 9.1 MG/DL (ref 8.7–10.5)
CHLORIDE SERPL-SCNC: 102 MMOL/L (ref 95–110)
CHOLEST SERPL-MCNC: 235 MG/DL (ref 120–199)
CHOLEST/HDLC SERPL: 2.6 {RATIO} (ref 2–5)
CO2 SERPL-SCNC: 28 MMOL/L (ref 23–29)
CREAT SERPL-MCNC: 0.8 MG/DL (ref 0.5–1.4)
ERYTHROCYTE [DISTWIDTH] IN BLOOD BY AUTOMATED COUNT: 13 % (ref 11.5–14.5)
EST. GFR  (AFRICAN AMERICAN): >60 ML/MIN/1.73 M^2
EST. GFR  (NON AFRICAN AMERICAN): >60 ML/MIN/1.73 M^2
GLUCOSE SERPL-MCNC: 101 MG/DL (ref 70–110)
HCT VFR BLD AUTO: 43.6 % (ref 37–48.5)
HDLC SERPL-MCNC: 92 MG/DL (ref 40–75)
HDLC SERPL: 39.1 % (ref 20–50)
HGB BLD-MCNC: 13.6 G/DL (ref 12–16)
LDLC SERPL CALC-MCNC: 121 MG/DL (ref 63–159)
MCH RBC QN AUTO: 29.2 PG (ref 27–31)
MCHC RBC AUTO-ENTMCNC: 31.2 G/DL (ref 32–36)
MCV RBC AUTO: 94 FL (ref 82–98)
NONHDLC SERPL-MCNC: 143 MG/DL
PLATELET # BLD AUTO: 231 K/UL (ref 150–350)
PMV BLD AUTO: 10.5 FL (ref 9.2–12.9)
POTASSIUM SERPL-SCNC: 4.1 MMOL/L (ref 3.5–5.1)
PROT SERPL-MCNC: 7.2 G/DL (ref 6–8.4)
RBC # BLD AUTO: 4.66 M/UL (ref 4–5.4)
SODIUM SERPL-SCNC: 138 MMOL/L (ref 136–145)
TRIGL SERPL-MCNC: 110 MG/DL (ref 30–150)
WBC # BLD AUTO: 5.93 K/UL (ref 3.9–12.7)

## 2020-11-27 PROCEDURE — 80061 LIPID PANEL: CPT | Mod: HCNC

## 2020-11-27 PROCEDURE — 85027 COMPLETE CBC AUTOMATED: CPT | Mod: HCNC

## 2020-11-27 PROCEDURE — 36415 COLL VENOUS BLD VENIPUNCTURE: CPT | Mod: HCNC,PO

## 2020-11-27 PROCEDURE — 80053 COMPREHEN METABOLIC PANEL: CPT | Mod: HCNC

## 2020-11-30 ENCOUNTER — OFFICE VISIT (OUTPATIENT)
Dept: FAMILY MEDICINE | Facility: CLINIC | Age: 80
End: 2020-11-30
Payer: MEDICARE

## 2020-11-30 VITALS
SYSTOLIC BLOOD PRESSURE: 118 MMHG | HEART RATE: 51 BPM | HEIGHT: 65 IN | BODY MASS INDEX: 25.49 KG/M2 | OXYGEN SATURATION: 95 % | WEIGHT: 153 LBS | DIASTOLIC BLOOD PRESSURE: 60 MMHG

## 2020-11-30 DIAGNOSIS — F32.1 MAJOR DEPRESSIVE DISORDER, SINGLE EPISODE, MODERATE: ICD-10-CM

## 2020-11-30 DIAGNOSIS — N39.3 STRESS INCONTINENCE OF URINE: ICD-10-CM

## 2020-11-30 DIAGNOSIS — H35.30 MACULAR DEGENERATION, UNSPECIFIED LATERALITY, UNSPECIFIED TYPE: ICD-10-CM

## 2020-11-30 DIAGNOSIS — Z00.00 ROUTINE HEALTH MAINTENANCE: Primary | ICD-10-CM

## 2020-11-30 DIAGNOSIS — I10 ESSENTIAL HYPERTENSION: ICD-10-CM

## 2020-11-30 LAB
BACTERIA #/AREA URNS HPF: NORMAL /HPF
BILIRUB UR QL STRIP: NEGATIVE
CLARITY UR: CLEAR
COLOR UR: YELLOW
GLUCOSE UR QL STRIP: NEGATIVE
HGB UR QL STRIP: NEGATIVE
KETONES UR QL STRIP: NEGATIVE
LEUKOCYTE ESTERASE UR QL STRIP: ABNORMAL
MICROSCOPIC COMMENT: NORMAL
NITRITE UR QL STRIP: NEGATIVE
PH UR STRIP: 7 [PH] (ref 5–8)
PROT UR QL STRIP: NEGATIVE
RBC #/AREA URNS HPF: 1 /HPF (ref 0–4)
SP GR UR STRIP: 1.01 (ref 1–1.03)
SQUAMOUS #/AREA URNS HPF: 3 /HPF
URN SPEC COLLECT METH UR: ABNORMAL
WBC #/AREA URNS HPF: 2 /HPF (ref 0–5)

## 2020-11-30 PROCEDURE — 99999 PR PBB SHADOW E&M-EST. PATIENT-LVL III: ICD-10-PCS | Mod: PBBFAC,HCNC,, | Performed by: FAMILY MEDICINE

## 2020-11-30 PROCEDURE — 3074F SYST BP LT 130 MM HG: CPT | Mod: HCNC,CPTII,S$GLB, | Performed by: FAMILY MEDICINE

## 2020-11-30 PROCEDURE — 1157F ADVNC CARE PLAN IN RCRD: CPT | Mod: HCNC,S$GLB,, | Performed by: FAMILY MEDICINE

## 2020-11-30 PROCEDURE — 1126F AMNT PAIN NOTED NONE PRSNT: CPT | Mod: HCNC,S$GLB,, | Performed by: FAMILY MEDICINE

## 2020-11-30 PROCEDURE — 99499 RISK ADDL DX/OHS AUDIT: ICD-10-PCS | Mod: S$GLB,,, | Performed by: FAMILY MEDICINE

## 2020-11-30 PROCEDURE — 81000 URINALYSIS NONAUTO W/SCOPE: CPT | Mod: HCNC,PO

## 2020-11-30 PROCEDURE — 3078F PR MOST RECENT DIASTOLIC BLOOD PRESSURE < 80 MM HG: ICD-10-PCS | Mod: HCNC,CPTII,S$GLB, | Performed by: FAMILY MEDICINE

## 2020-11-30 PROCEDURE — 99397 PER PM REEVAL EST PAT 65+ YR: CPT | Mod: HCNC,S$GLB,, | Performed by: FAMILY MEDICINE

## 2020-11-30 PROCEDURE — 3074F PR MOST RECENT SYSTOLIC BLOOD PRESSURE < 130 MM HG: ICD-10-PCS | Mod: HCNC,CPTII,S$GLB, | Performed by: FAMILY MEDICINE

## 2020-11-30 PROCEDURE — 99499 UNLISTED E&M SERVICE: CPT | Mod: S$GLB,,, | Performed by: FAMILY MEDICINE

## 2020-11-30 PROCEDURE — 3078F DIAST BP <80 MM HG: CPT | Mod: HCNC,CPTII,S$GLB, | Performed by: FAMILY MEDICINE

## 2020-11-30 PROCEDURE — 1157F PR ADVANCE CARE PLAN OR EQUIV PRESENT IN MEDICAL RECORD: ICD-10-PCS | Mod: HCNC,S$GLB,, | Performed by: FAMILY MEDICINE

## 2020-11-30 PROCEDURE — 99397 PR PREVENTIVE VISIT,EST,65 & OVER: ICD-10-PCS | Mod: HCNC,S$GLB,, | Performed by: FAMILY MEDICINE

## 2020-11-30 PROCEDURE — 1126F PR PAIN SEVERITY QUANTIFIED, NO PAIN PRESENT: ICD-10-PCS | Mod: HCNC,S$GLB,, | Performed by: FAMILY MEDICINE

## 2020-11-30 PROCEDURE — 99999 PR PBB SHADOW E&M-EST. PATIENT-LVL III: CPT | Mod: PBBFAC,HCNC,, | Performed by: FAMILY MEDICINE

## 2020-11-30 NOTE — PROGRESS NOTES
HPI  Serenity Epps is a 80 y.o. female with multiple medical diagnoses as listed in the medical history and problem list that presents for No chief complaint on file.  .      HPI  Here today for routine health maintenance.    PAST MEDICAL HISTORY:  Past Medical History:   Diagnosis Date    Anxiety     Arthritis     Cataract - Both Eyes     OU done//    CKD (chronic kidney disease), stage II 09/21/2016    pt denies    Diarrhea     Diverticulosis     DVT (deep venous thrombosis)     left leg, after childbirth    Exudative age-related macular degeneration of left eye 2/20/2014    Glaucoma     Hypertension     Irritable bowel syndrome     Left foot pain     chronic    Lymphocytic colitis     Macular degeneration     bimonthly intraoccular injections    Osteopenia     Recurrent major depressive disorder 4/27/2018    Rhinitis, chronic     Strabismus     as child       PAST SURGICAL HISTORY:  Past Surgical History:   Procedure Laterality Date    APPENDECTOMY      age 38    CATARACT EXTRACTION      OU done//    CATARACT EXTRACTION W/ INTRAOCULAR LENS  IMPLANT, BILATERAL Bilateral 2016    COLONOSCOPY  9/26/2006  [facility]ane    Diverticulosis.   Internal small hemorrhoids were found.     COLONOSCOPY  10/03/2012    Dr. Lynn, repeat in 7-8 years for surveillance    COLONOSCOPY N/A 5/2/2018    COLONOSCOPY  05/02/2018    Procedure: COLONOSCOPY;  Surgeon: Toby Lynn Jr., MD;  Location: [facility];  Service: Endoscopy;  Laterality: N/A;    EYE SURGERY Bilateral     Phaco with IOL (cataract extraction), rigth:[id number] 22.0 d//    FOOT HARDWARE REMOVAL Left 06/01/2016    Dr. Hammonds    FOOT SURGERY Left 2014    ERG and open reduction tallo tarsal dislocation (hyprocure implant)    JOINT REPLACEMENT      PARTIAL HYSTERECTOMY      age 38, ovaries conserved    PIP JOINT FUSION Right 06/01/2016    2nd-4th PIP joints of right foot; Dr. Hammonds    TONSILLECTOMY      as a child    TOTAL KNEE ARTHROPLASTY Left  2015    UPPER GASTROINTESTINAL ENDOSCOPY  2006  [facility]ane    Erythema in the lower third of the esophagus (NERD).  Patulous lower esophageal sphincter.   Gastric mucosal atrophy.   PARAG Test  Negative.     VEIN LIGATION  1964    BLE       SOCIAL HISTORY:  Social History     Socioeconomic History    Marital status:      Spouse name: Not on file    Number of children: Not on file    Years of education: Not on file    Highest education level: Not on file   Occupational History    Not on file   Social Needs    Financial resource strain: Not hard at all    Food insecurity     Worry: Never true     Inability: Never true    Transportation needs     Medical: No     Non-medical: No   Tobacco Use    Smoking status: Former Smoker     Packs/day: 2.00     Years: 35.00     Pack years: 70.00     Types: Cigarettes     Quit date: 2002     Years since quittin.9    Smokeless tobacco: Never Used   Substance and Sexual Activity    Alcohol use: Yes     Alcohol/week: 14.0 standard drinks     Types: 14 Glasses of wine per week     Frequency: 4 or more times a week     Drinks per session: 3 or 4     Binge frequency: Never     Comment: 2-3 glasses per night    Drug use: Yes     Types: Benzodiazepines    Sexual activity: Yes     Partners: Male   Lifestyle    Physical activity     Days per week: 0 days     Minutes per session: 0 min    Stress: To some extent   Relationships    Social connections     Talks on phone: More than three times a week     Gets together: Never     Attends Protestant service: Not on file     Active member of club or organization: No     Attends meetings of clubs or organizations: Never     Relationship status:    Other Topics Concern    Not on file   Social History Narrative    Not on file       FAMILY HISTORY:  Family History   Problem Relation Age of Onset    No Known Problems Brother     Breast cancer Daughter     Cancer Daughter         breast    Coronary artery  disease Mother 78    Glaucoma Mother     Diverticulitis Father     No Known Problems Maternal Grandmother     No Known Problems Maternal Grandfather     No Known Problems Paternal Grandmother     No Known Problems Paternal Grandfather     Glaucoma Brother     Ankylosing spondylitis Brother     Diabetes Brother     Macular degeneration Brother          twin brother wet mac//    Cancer Brother         prostate    Crohn's disease Brother     Amblyopia Neg Hx     Blindness Neg Hx     Cataracts Neg Hx     Hypertension Neg Hx     Strabismus Neg Hx     Stroke Neg Hx     Thyroid disease Neg Hx     Retinal detachment Neg Hx     Celiac disease Neg Hx        ALLERGIES AND MEDICATIONS: updated and reviewed.  Review of patient's allergies indicates:   Allergen Reactions    Clindamycin Diarrhea     Current Outpatient Medications   Medication Sig Dispense Refill    azelastine (ASTELIN) 137 mcg (0.1 %) nasal spray Use 1 spray (137 mcg total) by Nasal route 2 (two) times daily. 90 mL 2    BENEFIBER, WHEAT DEXTRIN, ORAL Take by mouth 2 (two) times daily.      biotin 1 mg tablet Take 1,000 mcg by mouth every evening.       brimonidine 0.2% (ALPHAGAN) 0.2 % Drop INSTILL 1 DROP INTO EACH EYE TWICE DAILY 30 mL 3    calcium carbonate (OS-BLAKE) 600 mg (1,500 mg) Tab Take 600 mg by mouth 2 (two) times daily with meals.      docusate sodium (COLACE) 100 MG capsule Take 100 mg by mouth 2 (two) times daily.      dorzolamide-timolol 2-0.5% (COSOPT) 22.3-6.8 mg/mL ophthalmic solution INSTILL 1 DROP INTO EACH EYE TWICE DAILY 30 mL 4    gabapentin (NEURONTIN) 400 MG capsule Take 1 capsule (400 mg total) by mouth 3 (three) times daily. 270 capsule 2    Lactobacillus rhamnosus GG (CULTURELLE) 10 billion cell capsule Take 1 capsule by mouth once daily.      latanoprost 0.005 % ophthalmic solution INSTILL 1 DROP INTO EACH EYE IN THE EVENING 2.5 mL 11    loratadine (CLARITIN) 10 mg tablet Take 10 mg by mouth once daily.   "    meloxicam (MOBIC) 7.5 MG tablet Take 1 tablet (7.5 mg total) by mouth once daily. 90 tablet 2    metoprolol succinate (TOPROL-XL) 25 MG 24 hr tablet Take 1 tablet (25 mg total) by mouth 2 (two) times daily. 180 tablet 3    mirtazapine (REMERON) 15 MG tablet Take 1 tablet (15 mg total) by mouth every evening. 90 tablet 0    PREVIDENT 1.1 % Gel       VIT C/E/ZN/COPPR/LUTEIN/ZEAXAN (PRESERVISION AREDS 2 ORAL) Take 1 capsule by mouth 2 (two) times daily.        Current Facility-Administered Medications   Medication Dose Route Frequency Provider Last Rate Last Dose    bevacizumab (AVASTIN) 2.5 mg/0.10 mL 1.25 mg  1.25 mg Intraocular 1 time in Clinic/HOD NATHANIEL Lopez MD           ROS  Review of Systems   Constitutional: Negative for fatigue, fever and unexpected weight change.   HENT: Negative for congestion, hearing loss, rhinorrhea and sore throat.    Eyes: Positive for visual disturbance (chronic secondary to macular degeneration).   Respiratory: Negative for cough, chest tightness, shortness of breath and wheezing.    Cardiovascular: Negative for chest pain, palpitations and leg swelling.   Gastrointestinal: Negative for abdominal distention, abdominal pain, blood in stool, constipation, diarrhea, nausea and vomiting.   Genitourinary: Negative for difficulty urinating, dysuria, frequency, hematuria, menstrual problem, pelvic pain, urgency and vaginal bleeding.        Stress incontinence   Musculoskeletal: Negative for back pain, joint swelling and neck pain.   Skin: Negative for rash.   Neurological: Negative for dizziness, tremors, weakness, light-headedness, numbness and headaches.   Psychiatric/Behavioral: Positive for dysphoric mood. Negative for confusion and sleep disturbance. The patient is not nervous/anxious.        Physical Exam  Vitals:    11/30/20 0737   BP: 118/60   Pulse: (!) 51    Body mass index is 25.46 kg/m².  Weight: 69.4 kg (153 lb)   Height: 5' 5" (165.1 cm)     Physical " Exam  Vitals signs reviewed.   Constitutional:       Appearance: She is well-developed.   HENT:      Head: Normocephalic and atraumatic.      Right Ear: External ear normal.      Left Ear: External ear normal.      Nose: Nose normal.   Eyes:      General: No scleral icterus.     Conjunctiva/sclera: Conjunctivae normal.      Pupils: Pupils are equal, round, and reactive to light.   Neck:      Musculoskeletal: Normal range of motion and neck supple.      Thyroid: No thyromegaly.      Vascular: No JVD.   Cardiovascular:      Rate and Rhythm: Normal rate and regular rhythm.      Heart sounds: Normal heart sounds. No murmur. No friction rub. No gallop.    Pulmonary:      Effort: Pulmonary effort is normal.      Breath sounds: Normal breath sounds. No wheezing or rales.   Abdominal:      General: Bowel sounds are normal. There is no distension.      Palpations: Abdomen is soft. There is no mass.      Tenderness: There is no abdominal tenderness. There is no guarding or rebound.   Musculoskeletal: Normal range of motion.   Lymphadenopathy:      Cervical: No cervical adenopathy.   Skin:     General: Skin is warm and dry.      Findings: No rash.   Neurological:      Mental Status: She is alert and oriented to person, place, and time.      Cranial Nerves: No cranial nerve deficit.      Sensory: No sensory deficit.       Results for orders placed or performed in visit on 11/30/20   Urinalysis, Reflex to Urine Culture Urine, Clean Catch    Specimen: Urine   Result Value Ref Range    Specimen UA Urine, Clean Catch     Color, UA Yellow Yellow, Straw, Heena    Appearance, UA Clear Clear    pH, UA 7.0 5.0 - 8.0    Specific Gravity, UA 1.010 1.005 - 1.030    Protein, UA Negative Negative    Glucose, UA Negative Negative    Ketones, UA Negative Negative    Bilirubin (UA) Negative Negative    Occult Blood UA Negative Negative    Nitrite, UA Negative Negative    Leukocytes, UA 1+ (A) Negative   Urinalysis Microscopic   Result Value Ref  Range    RBC, UA 1 0 - 4 /hpf    WBC, UA 2 0 - 5 /hpf    Bacteria Occasional None-Occ /hpf    Squam Epithel, UA 3 /hpf    Microscopic Comment SEE COMMENT         Health Maintenance       Date Due Completion Date    Shingles Vaccine (1 of 2) 09/11/1988 ---    DEXA SCAN 11/07/2022 11/7/2019    Lipid Panel 11/27/2025 11/27/2020    TETANUS VACCINE 09/21/2026 9/21/2016          Assessment & Plan    Routine health maintenance  - Health maintenance reviewed  - Diet and exercise education.    Essential hypertension  - Continue current therapy  - Serial blood pressure monitoring  - Diet and exercise education.     Major depressive disorder, single episode, moderate  - Discussed treatment options, will continue to use family support    Stress incontinence of urine  -     Urinalysis, Reflex to Urine Culture Urine, Clean Catch    Macular degeneration, unspecified laterality, unspecified type  - Continue current therapy  - Continue Ophthalmology      Follow up in about 6 months (around 5/30/2021).

## 2020-12-02 DIAGNOSIS — F32.1 MAJOR DEPRESSIVE DISORDER, SINGLE EPISODE, MODERATE: ICD-10-CM

## 2020-12-02 DIAGNOSIS — J31.0 CHRONIC RHINITIS: ICD-10-CM

## 2020-12-02 RX ORDER — AZELASTINE 1 MG/ML
SPRAY, METERED NASAL
Qty: 90 ML | Refills: 3 | Status: SHIPPED | OUTPATIENT
Start: 2020-12-02 | End: 2022-02-09

## 2020-12-02 NOTE — TELEPHONE ENCOUNTER
No new care gaps identified.  Powered by [employer]. Reference number: [id number]. 12/02/2020 1:31:21 PM   CST

## 2020-12-02 NOTE — TELEPHONE ENCOUNTER
No new care gaps identified.  Powered by [employer]. Reference number: [id number]. 12/02/2020 4:07:19 PM   CST

## 2020-12-03 RX ORDER — MIRTAZAPINE 15 MG/1
15 TABLET, FILM COATED ORAL NIGHTLY
Qty: 90 TABLET | Refills: 3 | Status: SHIPPED | OUTPATIENT
Start: 2020-12-03 | End: 2021-09-07 | Stop reason: SDUPTHER

## 2020-12-03 NOTE — PROGRESS NOTES
Refill Authorization Note   Serenity Epps  is requesting a refill authorization.  Brief Assessment and Rationale for Refill:  Approve     Medication Therapy Plan:  [facility]    Medication Reconciliation Completed: No   Comments:   Orders Placed This Encounter    azelastine (ASTELIN) 137 mcg (0.1 %) nasal spray      Requested Prescriptions   Signed Prescriptions Disp Refills    azelastine (ASTELIN) 137 mcg (0.1 %) nasal spray 90 mL 3     Sig: USE 1 SPRAY IN EACH NOSTRIL TWICE DAILY       Ear, Nose, and Throat: Nasal Preparations - Antiallergy Passed - 12/2/2020  1:30 PM        Passed - Patient is at least 18 years old        Passed - Office visit in past 12 months or future 90 days     Recent Outpatient Visits            2 days ago Routine health maintenance    [facility] Hua Andersen MD    2 months ago Low-tension glaucoma of both eyes, moderate stage    [place] - Ophthalmology Ashish Fragoso Jr., MD    5 months ago Exudative age-related macular degeneration of left eye with active choroidal neovascularization    [facility] NATHANIEL Lopez MD    6 months ago Bilateral low-tension glaucoma, indeterminate stage    [place] - Ophthalmology Ashish Fragoso Jr., MD    6 months ago Essential hypertension    [facility] Hua Andersen MD          Future Appointments              In 3 months [facility]    In 3 months Ashish Fragoso Jr., MD [facility] [place]                    Appointments  past 12m or future 3m with PCP    Date Provider   Last Visit   11/30/2020 Hua Andersen MD   Next Visit   12/2/2020 Hua Andersen MD   ED visits in past 90 days: 0     Note composed:9:15 PM 12/02/2020

## 2020-12-03 NOTE — PROGRESS NOTES
Refill Authorization Note   Serenity Epps  is requesting a refill authorization.  Brief Assessment and Rationale for Refill:  Approve     Medication Therapy Plan:  CD    Medication Reconciliation Completed: No   Comments:   Orders Placed This Encounter    mirtazapine (REMERON) 15 MG tablet      Requested Prescriptions   Signed Prescriptions Disp Refills    mirtazapine (REMERON) 15 MG tablet 90 tablet 3     Sig: Take 1 tablet (15 mg total) by mouth every evening.       Psychiatry: Antidepressants - mirtazapine Failed - 12/2/2020  4:10 PM        Failed - Total Cholesterol in normal range and within 360 days     Cholesterol   Date Value Ref Range Status   11/27/2020 235 (H) 120 - 199 mg/dL Final     Comment:     The National Cholesterol Education Program (NCEP) has set the  following guidelines (reference ranges) for Cholesterol:  Optimal.....................<200 mg/dL  Borderline High.............200-239 mg/dL  High........................> or = 240 mg/dL     11/04/2019 206 (H) 120 - 199 mg/dL Final     Comment:     The National Cholesterol Education Program (NCEP) has set the  following guidelines (reference ranges) for Cholesterol:  Optimal.....................<200 mg/dL  Borderline High.............200-239 mg/dL  High........................> or = 240 mg/dL     08/07/2017 212 (H) 120 - 199 mg/dL Final     Comment:     The National Cholesterol Education Program (NCEP) has set the  following guidelines (reference ranges) for Cholesterol:  Optimal.....................<200 mg/dL  Borderline High.............200-239 mg/dL  High........................> or = 240 mg/dL                Passed - Patient is at least 18 years old        Passed - Office visit in past 12 months or future 90 days     Recent Outpatient Visits            3 days ago Routine health maintenance    [facility] Hua Andersen MD    2 months ago Low-tension glaucoma of both eyes, moderate stage    [facility] Ashish  Richmond Rodriguez MD    5 months ago Exudative age-related macular degeneration of left eye with active choroidal neovascularization    [place] - Ophthalmology NATHANIEL Lopez MD    6 months ago Bilateral low-tension glaucoma, indeterminate stage    [place] - Ophthalmology Ashish Fragoso Jr., MD    6 months ago Essential hypertension    [place] [facility] Hua Andersen MD          Future Appointments              In 2 months [facility] [place] [facility]    In 2 months Ashish Fragoso Jr., MD [place] [facility]                Passed - ALT is 94 or below and within 360 days     ALT   Date Value Ref Range Status   11/27/2020 15 10 - 44 U/L Final   11/04/2019 11 10 - 44 U/L Final   08/30/2018 11 10 - 44 U/L Final              Passed - Triglycerides in normal range and within 360 days     Triglycerides   Date Value Ref Range Status   11/27/2020 110 30 - 150 mg/dL Final     Comment:     The National Cholesterol Education Program (NCEP) has set the  following guidelines (reference values) for triglycerides:  Normal......................<150 mg/dL  Borderline High.............150-199 mg/dL  High........................200-499 mg/dL     11/04/2019 79 30 - 150 mg/dL Final     Comment:     The National Cholesterol Education Program (NCEP) has set the  following guidelines (reference values) for triglycerides:  Normal......................<150 mg/dL  Borderline High.............150-199 mg/dL  High........................200-499 mg/dL     08/07/2017 185 (H) 30 - 150 mg/dL Final     Comment:     The National Cholesterol Education Program (NCEP) has set the  following guidelines (reference values) for triglycerides:  Normal......................<150 mg/dL  Borderline High.............150-199 mg/dL  High........................200-499 mg/dL                Passed - WBC in normal range and within 360 days     WBC   Date Value Ref Range Status   11/27/2020 5.93 3.90 - 12.70  K/uL Final   08/30/2018 8.99 3.90 - 12.70 K/uL Final   05/17/2018 8.90 3.90 - 12.70 K/uL Final                  Appointments  past 12m or future 3m with PCP    Date Provider   Last Visit   11/30/2020 Hua Andersen MD   Next Visit   Visit date not found Hua Andersen MD   ED visits in past 90 days: 0     Note composed:9:34 AM 12/03/2020

## 2020-12-05 ENCOUNTER — PES CALL (OUTPATIENT)
Dept: ADMINISTRATIVE | Facility: CLINIC | Age: 80
End: 2020-12-05

## 2020-12-11 ENCOUNTER — PATIENT MESSAGE (OUTPATIENT)
Dept: OTHER | Facility: OTHER | Age: 80
End: 2020-12-11

## 2021-01-12 ENCOUNTER — IMMUNIZATION (OUTPATIENT)
Dept: FAMILY MEDICINE | Facility: CLINIC | Age: 81
End: 2021-01-12
Payer: MEDICARE

## 2021-01-12 DIAGNOSIS — Z23 NEED FOR VACCINATION: ICD-10-CM

## 2021-01-12 PROCEDURE — 91300 COVID-19, MRNA, LNP-S, PF, 30 MCG/0.3 ML DOSE VACCINE: CPT | Mod: PBBFAC | Performed by: FAMILY MEDICINE

## 2021-02-02 ENCOUNTER — IMMUNIZATION (OUTPATIENT)
Dept: FAMILY MEDICINE | Facility: CLINIC | Age: 81
End: 2021-02-02
Payer: MEDICARE

## 2021-02-02 DIAGNOSIS — Z23 NEED FOR VACCINATION: Primary | ICD-10-CM

## 2021-02-02 PROCEDURE — 0002A COVID-19, MRNA, LNP-S, PF, 30 MCG/0.3 ML DOSE VACCINE: CPT | Mod: PBBFAC | Performed by: FAMILY MEDICINE

## 2021-02-02 PROCEDURE — 91300 COVID-19, MRNA, LNP-S, PF, 30 MCG/0.3 ML DOSE VACCINE: CPT | Mod: PBBFAC | Performed by: FAMILY MEDICINE

## 2021-02-12 ENCOUNTER — TELEPHONE (OUTPATIENT)
Dept: OPHTHALMOLOGY | Facility: CLINIC | Age: 81
End: 2021-02-12

## 2021-02-28 ENCOUNTER — OFFICE VISIT (OUTPATIENT)
Dept: URGENT CARE | Facility: CLINIC | Age: 81
End: 2021-02-28
Payer: MEDICARE

## 2021-02-28 VITALS
OXYGEN SATURATION: 96 % | SYSTOLIC BLOOD PRESSURE: 145 MMHG | BODY MASS INDEX: 25.49 KG/M2 | TEMPERATURE: 98 F | WEIGHT: 153 LBS | HEIGHT: 65 IN | DIASTOLIC BLOOD PRESSURE: 72 MMHG | RESPIRATION RATE: 18 BRPM | HEART RATE: 67 BPM

## 2021-02-28 DIAGNOSIS — T14.8XXA BLISTER: ICD-10-CM

## 2021-02-28 DIAGNOSIS — L03.116 CELLULITIS OF LEFT LOWER EXTREMITY: Primary | ICD-10-CM

## 2021-02-28 PROCEDURE — 1157F ADVNC CARE PLAN IN RCRD: CPT | Mod: S$GLB,,, | Performed by: PHYSICIAN ASSISTANT

## 2021-02-28 PROCEDURE — 99214 OFFICE O/P EST MOD 30 MIN: CPT | Mod: S$GLB,,, | Performed by: PHYSICIAN ASSISTANT

## 2021-02-28 PROCEDURE — 1157F PR ADVANCE CARE PLAN OR EQUIV PRESENT IN MEDICAL RECORD: ICD-10-PCS | Mod: S$GLB,,, | Performed by: PHYSICIAN ASSISTANT

## 2021-02-28 PROCEDURE — 73630 XR FOOT COMPLETE 3 VIEW LEFT: ICD-10-PCS | Mod: LT,S$GLB,, | Performed by: RADIOLOGY

## 2021-02-28 PROCEDURE — 73630 X-RAY EXAM OF FOOT: CPT | Mod: LT,S$GLB,, | Performed by: RADIOLOGY

## 2021-02-28 PROCEDURE — 99214 PR OFFICE/OUTPT VISIT, EST, LEVL IV, 30-39 MIN: ICD-10-PCS | Mod: S$GLB,,, | Performed by: PHYSICIAN ASSISTANT

## 2021-02-28 RX ORDER — CLINDAMYCIN PHOSPHATE 150 MG/ML
600 INJECTION, SOLUTION INTRAVENOUS
Status: COMPLETED | OUTPATIENT
Start: 2021-02-28 | End: 2021-02-28

## 2021-02-28 RX ORDER — CHOLECALCIFEROL (VITAMIN D3) 125 MCG
CAPSULE ORAL
COMMUNITY
End: 2021-05-24

## 2021-02-28 RX ORDER — DOXYCYCLINE HYCLATE 100 MG
100 TABLET ORAL 2 TIMES DAILY
Qty: 20 TABLET | Refills: 0 | Status: SHIPPED | OUTPATIENT
Start: 2021-02-28 | End: 2021-03-10

## 2021-02-28 RX ORDER — CLINDAMYCIN HYDROCHLORIDE 300 MG/1
300 CAPSULE ORAL 3 TIMES DAILY
Qty: 21 CAPSULE | Refills: 0 | Status: SHIPPED | OUTPATIENT
Start: 2021-02-28 | End: 2021-03-07

## 2021-02-28 RX ORDER — MUPIROCIN 20 MG/G
OINTMENT TOPICAL
Qty: 22 G | Refills: 0 | Status: SHIPPED | OUTPATIENT
Start: 2021-02-28 | End: 2021-05-24

## 2021-02-28 RX ORDER — MUPIROCIN 20 MG/G
OINTMENT TOPICAL
Qty: 22 G | Refills: 0 | Status: SHIPPED | OUTPATIENT
Start: 2021-02-28 | End: 2021-02-28

## 2021-02-28 RX ADMIN — CLINDAMYCIN PHOSPHATE 600 MG: 150 INJECTION, SOLUTION INTRAVENOUS at 02:02

## 2021-03-01 ENCOUNTER — OFFICE VISIT (OUTPATIENT)
Dept: PODIATRY | Facility: CLINIC | Age: 81
End: 2021-03-01
Payer: MEDICARE

## 2021-03-01 ENCOUNTER — TELEPHONE (OUTPATIENT)
Dept: PODIATRY | Facility: CLINIC | Age: 81
End: 2021-03-01

## 2021-03-01 ENCOUNTER — LAB VISIT (OUTPATIENT)
Dept: LAB | Facility: HOSPITAL | Age: 81
End: 2021-03-01
Attending: PODIATRIST
Payer: MEDICARE

## 2021-03-01 DIAGNOSIS — L03.116 CELLULITIS OF LEFT FOOT: ICD-10-CM

## 2021-03-01 DIAGNOSIS — I73.9 PERIPHERAL VASCULAR DISEASE: ICD-10-CM

## 2021-03-01 DIAGNOSIS — L97.522 FOOT ULCER WITH FAT LAYER EXPOSED, LEFT: Primary | ICD-10-CM

## 2021-03-01 DIAGNOSIS — L97.522 FOOT ULCER WITH FAT LAYER EXPOSED, LEFT: ICD-10-CM

## 2021-03-01 LAB
BASOPHILS # BLD AUTO: 0.06 K/UL (ref 0–0.2)
BASOPHILS NFR BLD: 0.6 % (ref 0–1.9)
DIFFERENTIAL METHOD: ABNORMAL
EOSINOPHIL # BLD AUTO: 0.3 K/UL (ref 0–0.5)
EOSINOPHIL NFR BLD: 2.7 % (ref 0–8)
ERYTHROCYTE [DISTWIDTH] IN BLOOD BY AUTOMATED COUNT: 13.2 % (ref 11.5–14.5)
ERYTHROCYTE [SEDIMENTATION RATE] IN BLOOD BY WESTERGREN METHOD: 65 MM/HR (ref 0–36)
HCT VFR BLD AUTO: 42.4 % (ref 37–48.5)
HGB BLD-MCNC: 13.6 G/DL (ref 12–16)
IMM GRANULOCYTES # BLD AUTO: 0.05 K/UL (ref 0–0.04)
IMM GRANULOCYTES NFR BLD AUTO: 0.5 % (ref 0–0.5)
LYMPHOCYTES # BLD AUTO: 2 K/UL (ref 1–4.8)
LYMPHOCYTES NFR BLD: 20.8 % (ref 18–48)
MCH RBC QN AUTO: 29.6 PG (ref 27–31)
MCHC RBC AUTO-ENTMCNC: 32.1 G/DL (ref 32–36)
MCV RBC AUTO: 92 FL (ref 82–98)
MONOCYTES # BLD AUTO: 1.1 K/UL (ref 0.3–1)
MONOCYTES NFR BLD: 11.8 % (ref 4–15)
NEUTROPHILS # BLD AUTO: 6.1 K/UL (ref 1.8–7.7)
NEUTROPHILS NFR BLD: 63.6 % (ref 38–73)
NRBC BLD-RTO: 0 /100 WBC
PLATELET # BLD AUTO: 223 K/UL (ref 150–350)
PMV BLD AUTO: 11.4 FL (ref 9.2–12.9)
RBC # BLD AUTO: 4.59 M/UL (ref 4–5.4)
WBC # BLD AUTO: 9.61 K/UL (ref 3.9–12.7)

## 2021-03-01 PROCEDURE — 85652 RBC SED RATE AUTOMATED: CPT

## 2021-03-01 PROCEDURE — 1125F AMNT PAIN NOTED PAIN PRSNT: CPT | Mod: S$GLB,,, | Performed by: PODIATRIST

## 2021-03-01 PROCEDURE — 1157F ADVNC CARE PLAN IN RCRD: CPT | Mod: S$GLB,,, | Performed by: PODIATRIST

## 2021-03-01 PROCEDURE — 99999 PR PBB SHADOW E&M-EST. PATIENT-LVL IV: CPT | Mod: PBBFAC,,, | Performed by: PODIATRIST

## 2021-03-01 PROCEDURE — 99203 OFFICE O/P NEW LOW 30 MIN: CPT | Mod: 25,S$GLB,, | Performed by: PODIATRIST

## 2021-03-01 PROCEDURE — 86140 C-REACTIVE PROTEIN: CPT

## 2021-03-01 PROCEDURE — 1157F PR ADVANCE CARE PLAN OR EQUIV PRESENT IN MEDICAL RECORD: ICD-10-PCS | Mod: S$GLB,,, | Performed by: PODIATRIST

## 2021-03-01 PROCEDURE — 99203 PR OFFICE/OUTPT VISIT, NEW, LEVL III, 30-44 MIN: ICD-10-PCS | Mod: 25,S$GLB,, | Performed by: PODIATRIST

## 2021-03-01 PROCEDURE — 99499 RISK ADDL DX/OHS AUDIT: ICD-10-PCS | Mod: S$GLB,,, | Performed by: PODIATRIST

## 2021-03-01 PROCEDURE — 11042 DBRDMT SUBQ TIS 1ST 20SQCM/<: CPT | Mod: LT,S$GLB,, | Performed by: PODIATRIST

## 2021-03-01 PROCEDURE — 3288F FALL RISK ASSESSMENT DOCD: CPT | Mod: CPTII,S$GLB,, | Performed by: PODIATRIST

## 2021-03-01 PROCEDURE — 1159F MED LIST DOCD IN RCRD: CPT | Mod: S$GLB,,, | Performed by: PODIATRIST

## 2021-03-01 PROCEDURE — 99499 UNLISTED E&M SERVICE: CPT | Mod: S$GLB,,, | Performed by: PODIATRIST

## 2021-03-01 PROCEDURE — 3288F PR FALLS RISK ASSESSMENT DOCUMENTED: ICD-10-PCS | Mod: CPTII,S$GLB,, | Performed by: PODIATRIST

## 2021-03-01 PROCEDURE — 1159F PR MEDICATION LIST DOCUMENTED IN MEDICAL RECORD: ICD-10-PCS | Mod: S$GLB,,, | Performed by: PODIATRIST

## 2021-03-01 PROCEDURE — 1101F PR PT FALLS ASSESS DOC 0-1 FALLS W/OUT INJ PAST YR: ICD-10-PCS | Mod: CPTII,S$GLB,, | Performed by: PODIATRIST

## 2021-03-01 PROCEDURE — 1101F PT FALLS ASSESS-DOCD LE1/YR: CPT | Mod: CPTII,S$GLB,, | Performed by: PODIATRIST

## 2021-03-01 PROCEDURE — 36415 COLL VENOUS BLD VENIPUNCTURE: CPT | Mod: PO

## 2021-03-01 PROCEDURE — 99999 PR PBB SHADOW E&M-EST. PATIENT-LVL IV: ICD-10-PCS | Mod: PBBFAC,,, | Performed by: PODIATRIST

## 2021-03-01 PROCEDURE — 87186 SC STD MICRODIL/AGAR DIL: CPT

## 2021-03-01 PROCEDURE — 1125F PR PAIN SEVERITY QUANTIFIED, PAIN PRESENT: ICD-10-PCS | Mod: S$GLB,,, | Performed by: PODIATRIST

## 2021-03-01 PROCEDURE — 85025 COMPLETE CBC W/AUTO DIFF WBC: CPT

## 2021-03-01 PROCEDURE — 87077 CULTURE AEROBIC IDENTIFY: CPT

## 2021-03-01 PROCEDURE — 87075 CULTR BACTERIA EXCEPT BLOOD: CPT

## 2021-03-01 PROCEDURE — 87070 CULTURE OTHR SPECIMN AEROBIC: CPT

## 2021-03-01 PROCEDURE — 11042 WOUND DEBRIDEMENT: ICD-10-PCS | Mod: LT,S$GLB,, | Performed by: PODIATRIST

## 2021-03-02 ENCOUNTER — OFFICE VISIT (OUTPATIENT)
Dept: OPHTHALMOLOGY | Facility: CLINIC | Age: 81
End: 2021-03-02
Payer: MEDICARE

## 2021-03-02 ENCOUNTER — CLINICAL SUPPORT (OUTPATIENT)
Dept: OPHTHALMOLOGY | Facility: CLINIC | Age: 81
End: 2021-03-02
Payer: MEDICARE

## 2021-03-02 ENCOUNTER — TELEPHONE (OUTPATIENT)
Dept: URGENT CARE | Facility: CLINIC | Age: 81
End: 2021-03-02

## 2021-03-02 DIAGNOSIS — H52.7 REFRACTIVE ERROR: ICD-10-CM

## 2021-03-02 DIAGNOSIS — H35.3221 EXUDATIVE AGE-RELATED MACULAR DEGENERATION OF LEFT EYE WITH ACTIVE CHOROIDAL NEOVASCULARIZATION: ICD-10-CM

## 2021-03-02 DIAGNOSIS — H40.1232 LOW-TENSION GLAUCOMA OF BOTH EYES, MODERATE STAGE: Primary | ICD-10-CM

## 2021-03-02 DIAGNOSIS — H35.3112 NONEXUDATIVE AGE-RELATED MACULAR DEGENERATION, RIGHT EYE, INTERMEDIATE DRY STAGE: ICD-10-CM

## 2021-03-02 DIAGNOSIS — H04.123 DRY EYES, BILATERAL: ICD-10-CM

## 2021-03-02 DIAGNOSIS — Z96.1 PSEUDOPHAKIA OF BOTH EYES: ICD-10-CM

## 2021-03-02 LAB — CRP SERPL-MCNC: 129.6 MG/L (ref 0–8.2)

## 2021-03-02 PROCEDURE — 1159F PR MEDICATION LIST DOCUMENTED IN MEDICAL RECORD: ICD-10-PCS | Mod: S$GLB,,, | Performed by: OPHTHALMOLOGY

## 2021-03-02 PROCEDURE — 3288F PR FALLS RISK ASSESSMENT DOCUMENTED: ICD-10-PCS | Mod: CPTII,S$GLB,, | Performed by: OPHTHALMOLOGY

## 2021-03-02 PROCEDURE — 99999 PR PBB SHADOW E&M-EST. PATIENT-LVL III: ICD-10-PCS | Mod: PBBFAC,,, | Performed by: OPHTHALMOLOGY

## 2021-03-02 PROCEDURE — 92133 POSTERIOR SEGMENT OCT OPTIC NERVE(OCULAR COHERENCE TOMOGRAPHY) - OU - BOTH EYES: ICD-10-PCS | Mod: S$GLB,,, | Performed by: OPHTHALMOLOGY

## 2021-03-02 PROCEDURE — 92083 HUMPHREY VISUAL FIELD - OU - BOTH EYES: ICD-10-PCS | Mod: S$GLB,,, | Performed by: OPHTHALMOLOGY

## 2021-03-02 PROCEDURE — 92015 DETERMINE REFRACTIVE STATE: CPT | Mod: S$GLB,,, | Performed by: OPHTHALMOLOGY

## 2021-03-02 PROCEDURE — 1101F PT FALLS ASSESS-DOCD LE1/YR: CPT | Mod: CPTII,S$GLB,, | Performed by: OPHTHALMOLOGY

## 2021-03-02 PROCEDURE — 1157F ADVNC CARE PLAN IN RCRD: CPT | Mod: S$GLB,,, | Performed by: OPHTHALMOLOGY

## 2021-03-02 PROCEDURE — 1101F PR PT FALLS ASSESS DOC 0-1 FALLS W/OUT INJ PAST YR: ICD-10-PCS | Mod: CPTII,S$GLB,, | Performed by: OPHTHALMOLOGY

## 2021-03-02 PROCEDURE — 3288F FALL RISK ASSESSMENT DOCD: CPT | Mod: CPTII,S$GLB,, | Performed by: OPHTHALMOLOGY

## 2021-03-02 PROCEDURE — 99499 RISK ADDL DX/OHS AUDIT: ICD-10-PCS | Mod: S$GLB,,, | Performed by: OPHTHALMOLOGY

## 2021-03-02 PROCEDURE — 99999 PR PBB SHADOW E&M-EST. PATIENT-LVL III: CPT | Mod: PBBFAC,,, | Performed by: OPHTHALMOLOGY

## 2021-03-02 PROCEDURE — 99214 PR OFFICE/OUTPT VISIT, EST, LEVL IV, 30-39 MIN: ICD-10-PCS | Mod: S$GLB,,, | Performed by: OPHTHALMOLOGY

## 2021-03-02 PROCEDURE — 99214 OFFICE O/P EST MOD 30 MIN: CPT | Mod: S$GLB,,, | Performed by: OPHTHALMOLOGY

## 2021-03-02 PROCEDURE — 99499 UNLISTED E&M SERVICE: CPT | Mod: S$GLB,,, | Performed by: OPHTHALMOLOGY

## 2021-03-02 PROCEDURE — 92083 EXTENDED VISUAL FIELD XM: CPT | Mod: S$GLB,,, | Performed by: OPHTHALMOLOGY

## 2021-03-02 PROCEDURE — 1157F PR ADVANCE CARE PLAN OR EQUIV PRESENT IN MEDICAL RECORD: ICD-10-PCS | Mod: S$GLB,,, | Performed by: OPHTHALMOLOGY

## 2021-03-02 PROCEDURE — 1159F MED LIST DOCD IN RCRD: CPT | Mod: S$GLB,,, | Performed by: OPHTHALMOLOGY

## 2021-03-02 PROCEDURE — 92015 PR REFRACTION: ICD-10-PCS | Mod: S$GLB,,, | Performed by: OPHTHALMOLOGY

## 2021-03-02 PROCEDURE — 92133 CPTRZD OPH DX IMG PST SGM ON: CPT | Mod: S$GLB,,, | Performed by: OPHTHALMOLOGY

## 2021-03-02 PROCEDURE — 1126F AMNT PAIN NOTED NONE PRSNT: CPT | Mod: S$GLB,,, | Performed by: OPHTHALMOLOGY

## 2021-03-02 PROCEDURE — 1126F PR PAIN SEVERITY QUANTIFIED, NO PAIN PRESENT: ICD-10-PCS | Mod: S$GLB,,, | Performed by: OPHTHALMOLOGY

## 2021-03-04 ENCOUNTER — TELEPHONE (OUTPATIENT)
Dept: PODIATRY | Facility: CLINIC | Age: 81
End: 2021-03-04

## 2021-03-04 LAB — BACTERIA SPEC AEROBE CULT: ABNORMAL

## 2021-03-08 ENCOUNTER — TELEPHONE (OUTPATIENT)
Dept: PODIATRY | Facility: CLINIC | Age: 81
End: 2021-03-08

## 2021-03-08 LAB — BACTERIA SPEC ANAEROBE CULT: NORMAL

## 2021-03-09 ENCOUNTER — OFFICE VISIT (OUTPATIENT)
Dept: PODIATRY | Facility: CLINIC | Age: 81
End: 2021-03-09
Payer: MEDICARE

## 2021-03-09 ENCOUNTER — PES CALL (OUTPATIENT)
Dept: ADMINISTRATIVE | Facility: CLINIC | Age: 81
End: 2021-03-09

## 2021-03-09 VITALS — BODY MASS INDEX: 25.49 KG/M2 | WEIGHT: 153 LBS | HEIGHT: 65 IN

## 2021-03-09 DIAGNOSIS — I73.9 PERIPHERAL VASCULAR DISEASE: ICD-10-CM

## 2021-03-09 DIAGNOSIS — L97.522 FOOT ULCER WITH FAT LAYER EXPOSED, LEFT: Primary | ICD-10-CM

## 2021-03-09 PROCEDURE — 1157F PR ADVANCE CARE PLAN OR EQUIV PRESENT IN MEDICAL RECORD: ICD-10-PCS | Mod: S$GLB,,, | Performed by: PODIATRIST

## 2021-03-09 PROCEDURE — 3288F PR FALLS RISK ASSESSMENT DOCUMENTED: ICD-10-PCS | Mod: CPTII,S$GLB,, | Performed by: PODIATRIST

## 2021-03-09 PROCEDURE — 99499 NO LOS: ICD-10-PCS | Mod: S$GLB,,, | Performed by: PODIATRIST

## 2021-03-09 PROCEDURE — 1125F PR PAIN SEVERITY QUANTIFIED, PAIN PRESENT: ICD-10-PCS | Mod: S$GLB,,, | Performed by: PODIATRIST

## 2021-03-09 PROCEDURE — 3288F FALL RISK ASSESSMENT DOCD: CPT | Mod: CPTII,S$GLB,, | Performed by: PODIATRIST

## 2021-03-09 PROCEDURE — 1125F AMNT PAIN NOTED PAIN PRSNT: CPT | Mod: S$GLB,,, | Performed by: PODIATRIST

## 2021-03-09 PROCEDURE — 11042 DBRDMT SUBQ TIS 1ST 20SQCM/<: CPT | Mod: LT,S$GLB,, | Performed by: PODIATRIST

## 2021-03-09 PROCEDURE — 99999 PR PBB SHADOW E&M-EST. PATIENT-LVL IV: CPT | Mod: PBBFAC,,, | Performed by: PODIATRIST

## 2021-03-09 PROCEDURE — 99999 PR PBB SHADOW E&M-EST. PATIENT-LVL IV: ICD-10-PCS | Mod: PBBFAC,,, | Performed by: PODIATRIST

## 2021-03-09 PROCEDURE — 1101F PT FALLS ASSESS-DOCD LE1/YR: CPT | Mod: CPTII,S$GLB,, | Performed by: PODIATRIST

## 2021-03-09 PROCEDURE — 1157F ADVNC CARE PLAN IN RCRD: CPT | Mod: S$GLB,,, | Performed by: PODIATRIST

## 2021-03-09 PROCEDURE — 99499 UNLISTED E&M SERVICE: CPT | Mod: S$GLB,,, | Performed by: PODIATRIST

## 2021-03-09 PROCEDURE — 1101F PR PT FALLS ASSESS DOC 0-1 FALLS W/OUT INJ PAST YR: ICD-10-PCS | Mod: CPTII,S$GLB,, | Performed by: PODIATRIST

## 2021-03-09 PROCEDURE — 11042 WOUND DEBRIDEMENT: ICD-10-PCS | Mod: LT,S$GLB,, | Performed by: PODIATRIST

## 2021-03-15 ENCOUNTER — OFFICE VISIT (OUTPATIENT)
Dept: PODIATRY | Facility: CLINIC | Age: 81
End: 2021-03-15
Payer: MEDICARE

## 2021-03-15 VITALS — WEIGHT: 153 LBS | HEIGHT: 65 IN | BODY MASS INDEX: 25.49 KG/M2

## 2021-03-15 DIAGNOSIS — I73.9 PERIPHERAL VASCULAR DISEASE: ICD-10-CM

## 2021-03-15 DIAGNOSIS — L97.522 FOOT ULCER WITH FAT LAYER EXPOSED, LEFT: Primary | ICD-10-CM

## 2021-03-15 PROCEDURE — 99999 PR PBB SHADOW E&M-EST. PATIENT-LVL IV: ICD-10-PCS | Mod: PBBFAC,,, | Performed by: PODIATRIST

## 2021-03-15 PROCEDURE — 99499 NO LOS: ICD-10-PCS | Mod: S$GLB,,, | Performed by: PODIATRIST

## 2021-03-15 PROCEDURE — 1101F PR PT FALLS ASSESS DOC 0-1 FALLS W/OUT INJ PAST YR: ICD-10-PCS | Mod: CPTII,S$GLB,, | Performed by: PODIATRIST

## 2021-03-15 PROCEDURE — 99499 UNLISTED E&M SERVICE: CPT | Mod: S$GLB,,, | Performed by: PODIATRIST

## 2021-03-15 PROCEDURE — 1101F PT FALLS ASSESS-DOCD LE1/YR: CPT | Mod: CPTII,S$GLB,, | Performed by: PODIATRIST

## 2021-03-15 PROCEDURE — 3288F FALL RISK ASSESSMENT DOCD: CPT | Mod: CPTII,S$GLB,, | Performed by: PODIATRIST

## 2021-03-15 PROCEDURE — 11042 WOUND DEBRIDEMENT: ICD-10-PCS | Mod: LT,S$GLB,, | Performed by: PODIATRIST

## 2021-03-15 PROCEDURE — 3288F PR FALLS RISK ASSESSMENT DOCUMENTED: ICD-10-PCS | Mod: CPTII,S$GLB,, | Performed by: PODIATRIST

## 2021-03-15 PROCEDURE — 1157F ADVNC CARE PLAN IN RCRD: CPT | Mod: S$GLB,,, | Performed by: PODIATRIST

## 2021-03-15 PROCEDURE — 1157F PR ADVANCE CARE PLAN OR EQUIV PRESENT IN MEDICAL RECORD: ICD-10-PCS | Mod: S$GLB,,, | Performed by: PODIATRIST

## 2021-03-15 PROCEDURE — 99999 PR PBB SHADOW E&M-EST. PATIENT-LVL IV: CPT | Mod: PBBFAC,,, | Performed by: PODIATRIST

## 2021-03-15 PROCEDURE — 11042 DBRDMT SUBQ TIS 1ST 20SQCM/<: CPT | Mod: LT,S$GLB,, | Performed by: PODIATRIST

## 2021-03-17 ENCOUNTER — PATIENT MESSAGE (OUTPATIENT)
Dept: OPHTHALMOLOGY | Facility: CLINIC | Age: 81
End: 2021-03-17

## 2021-03-17 DIAGNOSIS — H40.1233 LOW-TENSION GLAUCOMA OF BOTH EYES, SEVERE STAGE: ICD-10-CM

## 2021-03-17 RX ORDER — LATANOPROST 50 UG/ML
SOLUTION/ DROPS OPHTHALMIC
Qty: 2.5 ML | Refills: 11 | Status: SHIPPED | OUTPATIENT
Start: 2021-03-17 | End: 2021-03-17 | Stop reason: SDUPTHER

## 2021-03-17 RX ORDER — LATANOPROST 50 UG/ML
SOLUTION/ DROPS OPHTHALMIC
Qty: 2.5 ML | Refills: 11 | Status: SHIPPED | OUTPATIENT
Start: 2021-03-17 | End: 2021-04-29 | Stop reason: SDUPTHER

## 2021-03-23 ENCOUNTER — OFFICE VISIT (OUTPATIENT)
Dept: PODIATRY | Facility: CLINIC | Age: 81
End: 2021-03-23
Payer: MEDICARE

## 2021-03-23 VITALS — WEIGHT: 153 LBS | HEIGHT: 65 IN | BODY MASS INDEX: 25.49 KG/M2

## 2021-03-23 DIAGNOSIS — I73.9 PERIPHERAL VASCULAR DISEASE: ICD-10-CM

## 2021-03-23 DIAGNOSIS — L97.522 FOOT ULCER WITH FAT LAYER EXPOSED, LEFT: Primary | ICD-10-CM

## 2021-03-23 PROCEDURE — 1126F PR PAIN SEVERITY QUANTIFIED, NO PAIN PRESENT: ICD-10-PCS | Mod: S$GLB,,, | Performed by: PODIATRIST

## 2021-03-23 PROCEDURE — 1126F AMNT PAIN NOTED NONE PRSNT: CPT | Mod: S$GLB,,, | Performed by: PODIATRIST

## 2021-03-23 PROCEDURE — 99999 PR PBB SHADOW E&M-EST. PATIENT-LVL IV: CPT | Mod: PBBFAC,,, | Performed by: PODIATRIST

## 2021-03-23 PROCEDURE — 11042 WOUND DEBRIDEMENT: ICD-10-PCS | Mod: LT,S$GLB,, | Performed by: PODIATRIST

## 2021-03-23 PROCEDURE — 1157F PR ADVANCE CARE PLAN OR EQUIV PRESENT IN MEDICAL RECORD: ICD-10-PCS | Mod: S$GLB,,, | Performed by: PODIATRIST

## 2021-03-23 PROCEDURE — 1101F PR PT FALLS ASSESS DOC 0-1 FALLS W/OUT INJ PAST YR: ICD-10-PCS | Mod: CPTII,S$GLB,, | Performed by: PODIATRIST

## 2021-03-23 PROCEDURE — 11042 DBRDMT SUBQ TIS 1ST 20SQCM/<: CPT | Mod: LT,S$GLB,, | Performed by: PODIATRIST

## 2021-03-23 PROCEDURE — 1157F ADVNC CARE PLAN IN RCRD: CPT | Mod: S$GLB,,, | Performed by: PODIATRIST

## 2021-03-23 PROCEDURE — 3288F FALL RISK ASSESSMENT DOCD: CPT | Mod: CPTII,S$GLB,, | Performed by: PODIATRIST

## 2021-03-23 PROCEDURE — 99499 UNLISTED E&M SERVICE: CPT | Mod: S$GLB,,, | Performed by: PODIATRIST

## 2021-03-23 PROCEDURE — 3288F PR FALLS RISK ASSESSMENT DOCUMENTED: ICD-10-PCS | Mod: CPTII,S$GLB,, | Performed by: PODIATRIST

## 2021-03-23 PROCEDURE — 99499 NO LOS: ICD-10-PCS | Mod: S$GLB,,, | Performed by: PODIATRIST

## 2021-03-23 PROCEDURE — 1101F PT FALLS ASSESS-DOCD LE1/YR: CPT | Mod: CPTII,S$GLB,, | Performed by: PODIATRIST

## 2021-03-23 PROCEDURE — 99999 PR PBB SHADOW E&M-EST. PATIENT-LVL IV: ICD-10-PCS | Mod: PBBFAC,,, | Performed by: PODIATRIST

## 2021-03-29 ENCOUNTER — OFFICE VISIT (OUTPATIENT)
Dept: PODIATRY | Facility: CLINIC | Age: 81
End: 2021-03-29
Payer: MEDICARE

## 2021-03-29 VITALS — BODY MASS INDEX: 25.49 KG/M2 | WEIGHT: 153 LBS | HEIGHT: 65 IN

## 2021-03-29 DIAGNOSIS — I73.9 PERIPHERAL VASCULAR DISEASE: ICD-10-CM

## 2021-03-29 DIAGNOSIS — Z87.2 HEALED ULCER OF LEFT FOOT ON EXAMINATION: Primary | ICD-10-CM

## 2021-03-29 PROCEDURE — 3288F PR FALLS RISK ASSESSMENT DOCUMENTED: ICD-10-PCS | Mod: CPTII,S$GLB,, | Performed by: PODIATRIST

## 2021-03-29 PROCEDURE — 1101F PR PT FALLS ASSESS DOC 0-1 FALLS W/OUT INJ PAST YR: ICD-10-PCS | Mod: CPTII,S$GLB,, | Performed by: PODIATRIST

## 2021-03-29 PROCEDURE — 1125F PR PAIN SEVERITY QUANTIFIED, PAIN PRESENT: ICD-10-PCS | Mod: S$GLB,,, | Performed by: PODIATRIST

## 2021-03-29 PROCEDURE — 99212 PR OFFICE/OUTPT VISIT, EST, LEVL II, 10-19 MIN: ICD-10-PCS | Mod: S$GLB,,, | Performed by: PODIATRIST

## 2021-03-29 PROCEDURE — 1125F AMNT PAIN NOTED PAIN PRSNT: CPT | Mod: S$GLB,,, | Performed by: PODIATRIST

## 2021-03-29 PROCEDURE — 1157F PR ADVANCE CARE PLAN OR EQUIV PRESENT IN MEDICAL RECORD: ICD-10-PCS | Mod: S$GLB,,, | Performed by: PODIATRIST

## 2021-03-29 PROCEDURE — 99999 PR PBB SHADOW E&M-EST. PATIENT-LVL IV: ICD-10-PCS | Mod: PBBFAC,,, | Performed by: PODIATRIST

## 2021-03-29 PROCEDURE — 1159F PR MEDICATION LIST DOCUMENTED IN MEDICAL RECORD: ICD-10-PCS | Mod: S$GLB,,, | Performed by: PODIATRIST

## 2021-03-29 PROCEDURE — 99212 OFFICE O/P EST SF 10 MIN: CPT | Mod: S$GLB,,, | Performed by: PODIATRIST

## 2021-03-29 PROCEDURE — 1157F ADVNC CARE PLAN IN RCRD: CPT | Mod: S$GLB,,, | Performed by: PODIATRIST

## 2021-03-29 PROCEDURE — 99999 PR PBB SHADOW E&M-EST. PATIENT-LVL IV: CPT | Mod: PBBFAC,,, | Performed by: PODIATRIST

## 2021-03-29 PROCEDURE — 1101F PT FALLS ASSESS-DOCD LE1/YR: CPT | Mod: CPTII,S$GLB,, | Performed by: PODIATRIST

## 2021-03-29 PROCEDURE — 3288F FALL RISK ASSESSMENT DOCD: CPT | Mod: CPTII,S$GLB,, | Performed by: PODIATRIST

## 2021-03-29 PROCEDURE — 1159F MED LIST DOCD IN RCRD: CPT | Mod: S$GLB,,, | Performed by: PODIATRIST

## 2021-03-30 ENCOUNTER — PATIENT MESSAGE (OUTPATIENT)
Dept: FAMILY MEDICINE | Facility: CLINIC | Age: 81
End: 2021-03-30

## 2021-04-07 RX ORDER — METOPROLOL SUCCINATE 25 MG/1
TABLET, EXTENDED RELEASE ORAL
Qty: 180 TABLET | Refills: 2 | Status: SHIPPED | OUTPATIENT
Start: 2021-04-07 | End: 2021-11-04

## 2021-04-07 RX ORDER — METOPROLOL SUCCINATE 25 MG/1
25 TABLET, EXTENDED RELEASE ORAL 2 TIMES DAILY
Qty: 180 TABLET | Refills: 0 | OUTPATIENT
Start: 2021-04-07

## 2021-04-11 ENCOUNTER — PATIENT MESSAGE (OUTPATIENT)
Dept: OPHTHALMOLOGY | Facility: CLINIC | Age: 81
End: 2021-04-11

## 2021-04-12 ENCOUNTER — OFFICE VISIT (OUTPATIENT)
Dept: PODIATRY | Facility: CLINIC | Age: 81
End: 2021-04-12
Payer: MEDICARE

## 2021-04-12 VITALS — BODY MASS INDEX: 25.49 KG/M2 | HEIGHT: 65 IN | WEIGHT: 153 LBS

## 2021-04-12 DIAGNOSIS — I73.9 PERIPHERAL VASCULAR DISEASE: Primary | ICD-10-CM

## 2021-04-12 DIAGNOSIS — B35.1 ONYCHOMYCOSIS DUE TO DERMATOPHYTE: ICD-10-CM

## 2021-04-12 DIAGNOSIS — Z87.2 HEALED ULCER OF LEFT FOOT ON EXAMINATION: ICD-10-CM

## 2021-04-12 PROCEDURE — 99499 NO LOS: ICD-10-PCS | Mod: S$GLB,,, | Performed by: PODIATRIST

## 2021-04-12 PROCEDURE — 1101F PT FALLS ASSESS-DOCD LE1/YR: CPT | Mod: CPTII,S$GLB,, | Performed by: PODIATRIST

## 2021-04-12 PROCEDURE — 99999 PR PBB SHADOW E&M-EST. PATIENT-LVL IV: ICD-10-PCS | Mod: PBBFAC,,, | Performed by: PODIATRIST

## 2021-04-12 PROCEDURE — 1157F PR ADVANCE CARE PLAN OR EQUIV PRESENT IN MEDICAL RECORD: ICD-10-PCS | Mod: S$GLB,,, | Performed by: PODIATRIST

## 2021-04-12 PROCEDURE — 1101F PR PT FALLS ASSESS DOC 0-1 FALLS W/OUT INJ PAST YR: ICD-10-PCS | Mod: CPTII,S$GLB,, | Performed by: PODIATRIST

## 2021-04-12 PROCEDURE — 3288F PR FALLS RISK ASSESSMENT DOCUMENTED: ICD-10-PCS | Mod: CPTII,S$GLB,, | Performed by: PODIATRIST

## 2021-04-12 PROCEDURE — 11721 PR DEBRIDEMENT OF NAILS, 6 OR MORE: ICD-10-PCS | Mod: Q9,S$GLB,, | Performed by: PODIATRIST

## 2021-04-12 PROCEDURE — 99499 UNLISTED E&M SERVICE: CPT | Mod: S$GLB,,, | Performed by: PODIATRIST

## 2021-04-12 PROCEDURE — 1126F AMNT PAIN NOTED NONE PRSNT: CPT | Mod: S$GLB,,, | Performed by: PODIATRIST

## 2021-04-12 PROCEDURE — 3288F FALL RISK ASSESSMENT DOCD: CPT | Mod: CPTII,S$GLB,, | Performed by: PODIATRIST

## 2021-04-12 PROCEDURE — 1126F PR PAIN SEVERITY QUANTIFIED, NO PAIN PRESENT: ICD-10-PCS | Mod: S$GLB,,, | Performed by: PODIATRIST

## 2021-04-12 PROCEDURE — 11721 DEBRIDE NAIL 6 OR MORE: CPT | Mod: Q9,S$GLB,, | Performed by: PODIATRIST

## 2021-04-12 PROCEDURE — 99999 PR PBB SHADOW E&M-EST. PATIENT-LVL IV: CPT | Mod: PBBFAC,,, | Performed by: PODIATRIST

## 2021-04-12 PROCEDURE — 1157F ADVNC CARE PLAN IN RCRD: CPT | Mod: S$GLB,,, | Performed by: PODIATRIST

## 2021-04-13 ENCOUNTER — PATIENT MESSAGE (OUTPATIENT)
Dept: OPHTHALMOLOGY | Facility: CLINIC | Age: 81
End: 2021-04-13

## 2021-04-29 DIAGNOSIS — H40.1233 LOW-TENSION GLAUCOMA OF BOTH EYES, SEVERE STAGE: ICD-10-CM

## 2021-04-29 RX ORDER — LATANOPROST 50 UG/ML
SOLUTION/ DROPS OPHTHALMIC
Qty: 2.5 ML | Refills: 11 | Status: SHIPPED | OUTPATIENT
Start: 2021-04-29 | End: 2021-05-10 | Stop reason: SDUPTHER

## 2021-05-10 ENCOUNTER — PATIENT MESSAGE (OUTPATIENT)
Dept: OPHTHALMOLOGY | Facility: CLINIC | Age: 81
End: 2021-05-10

## 2021-05-10 DIAGNOSIS — H40.1233 LOW-TENSION GLAUCOMA OF BOTH EYES, SEVERE STAGE: ICD-10-CM

## 2021-05-10 DIAGNOSIS — H40.1232 LOW-TENSION GLAUCOMA OF BOTH EYES, MODERATE STAGE: Primary | ICD-10-CM

## 2021-05-10 RX ORDER — LATANOPROST 50 UG/ML
SOLUTION/ DROPS OPHTHALMIC
Qty: 7.5 ML | Refills: 4 | Status: SHIPPED | OUTPATIENT
Start: 2021-05-10 | End: 2021-05-12 | Stop reason: SDUPTHER

## 2021-05-12 DIAGNOSIS — H40.1233 LOW-TENSION GLAUCOMA OF BOTH EYES, SEVERE STAGE: ICD-10-CM

## 2021-05-12 RX ORDER — LATANOPROST 50 UG/ML
1 SOLUTION/ DROPS OPHTHALMIC NIGHTLY
Qty: 2.5 ML | Refills: 2 | Status: SHIPPED | OUTPATIENT
Start: 2021-05-12 | End: 2022-04-01

## 2021-05-24 ENCOUNTER — OFFICE VISIT (OUTPATIENT)
Dept: FAMILY MEDICINE | Facility: CLINIC | Age: 81
End: 2021-05-24
Payer: MEDICARE

## 2021-05-24 VITALS
BODY MASS INDEX: 25.24 KG/M2 | OXYGEN SATURATION: 96 % | HEART RATE: 72 BPM | SYSTOLIC BLOOD PRESSURE: 124 MMHG | WEIGHT: 151.69 LBS | DIASTOLIC BLOOD PRESSURE: 74 MMHG

## 2021-05-24 DIAGNOSIS — H35.3221 EXUDATIVE AGE-RELATED MACULAR DEGENERATION OF LEFT EYE WITH ACTIVE CHOROIDAL NEOVASCULARIZATION: Primary | ICD-10-CM

## 2021-05-24 DIAGNOSIS — N39.41 URGE INCONTINENCE: ICD-10-CM

## 2021-05-24 DIAGNOSIS — I10 ESSENTIAL HYPERTENSION: ICD-10-CM

## 2021-05-24 DIAGNOSIS — F32.1 MAJOR DEPRESSIVE DISORDER, SINGLE EPISODE, MODERATE: ICD-10-CM

## 2021-05-24 LAB
BILIRUB UR QL STRIP: NEGATIVE
CLARITY UR: CLEAR
COLOR UR: YELLOW
GLUCOSE UR QL STRIP: NEGATIVE
HGB UR QL STRIP: NEGATIVE
KETONES UR QL STRIP: NEGATIVE
LEUKOCYTE ESTERASE UR QL STRIP: NEGATIVE
NITRITE UR QL STRIP: NEGATIVE
PH UR STRIP: 8 [PH] (ref 5–8)
PROT UR QL STRIP: NEGATIVE
SP GR UR STRIP: 1.01 (ref 1–1.03)
URN SPEC COLLECT METH UR: NORMAL

## 2021-05-24 PROCEDURE — 1159F MED LIST DOCD IN RCRD: CPT | Mod: S$GLB,,, | Performed by: FAMILY MEDICINE

## 2021-05-24 PROCEDURE — 1157F ADVNC CARE PLAN IN RCRD: CPT | Mod: S$GLB,,, | Performed by: FAMILY MEDICINE

## 2021-05-24 PROCEDURE — 99999 PR PBB SHADOW E&M-EST. PATIENT-LVL IV: CPT | Mod: PBBFAC,,, | Performed by: FAMILY MEDICINE

## 2021-05-24 PROCEDURE — 99499 UNLISTED E&M SERVICE: CPT | Mod: HCNC,S$GLB,, | Performed by: FAMILY MEDICINE

## 2021-05-24 PROCEDURE — 99214 PR OFFICE/OUTPT VISIT, EST, LEVL IV, 30-39 MIN: ICD-10-PCS | Mod: S$GLB,,, | Performed by: FAMILY MEDICINE

## 2021-05-24 PROCEDURE — 99499 RISK ADDL DX/OHS AUDIT: ICD-10-PCS | Mod: HCNC,S$GLB,, | Performed by: FAMILY MEDICINE

## 2021-05-24 PROCEDURE — 99214 OFFICE O/P EST MOD 30 MIN: CPT | Mod: S$GLB,,, | Performed by: FAMILY MEDICINE

## 2021-05-24 PROCEDURE — 81003 URINALYSIS AUTO W/O SCOPE: CPT | Mod: PO | Performed by: FAMILY MEDICINE

## 2021-05-24 PROCEDURE — 1157F PR ADVANCE CARE PLAN OR EQUIV PRESENT IN MEDICAL RECORD: ICD-10-PCS | Mod: S$GLB,,, | Performed by: FAMILY MEDICINE

## 2021-05-24 PROCEDURE — 3288F PR FALLS RISK ASSESSMENT DOCUMENTED: ICD-10-PCS | Mod: CPTII,S$GLB,, | Performed by: FAMILY MEDICINE

## 2021-05-24 PROCEDURE — 99999 PR PBB SHADOW E&M-EST. PATIENT-LVL IV: ICD-10-PCS | Mod: PBBFAC,,, | Performed by: FAMILY MEDICINE

## 2021-05-24 PROCEDURE — 1126F AMNT PAIN NOTED NONE PRSNT: CPT | Mod: S$GLB,,, | Performed by: FAMILY MEDICINE

## 2021-05-24 PROCEDURE — 3288F FALL RISK ASSESSMENT DOCD: CPT | Mod: CPTII,S$GLB,, | Performed by: FAMILY MEDICINE

## 2021-05-24 PROCEDURE — 1101F PR PT FALLS ASSESS DOC 0-1 FALLS W/OUT INJ PAST YR: ICD-10-PCS | Mod: CPTII,S$GLB,, | Performed by: FAMILY MEDICINE

## 2021-05-24 PROCEDURE — 1159F PR MEDICATION LIST DOCUMENTED IN MEDICAL RECORD: ICD-10-PCS | Mod: S$GLB,,, | Performed by: FAMILY MEDICINE

## 2021-05-24 PROCEDURE — 1101F PT FALLS ASSESS-DOCD LE1/YR: CPT | Mod: CPTII,S$GLB,, | Performed by: FAMILY MEDICINE

## 2021-05-24 PROCEDURE — 1126F PR PAIN SEVERITY QUANTIFIED, NO PAIN PRESENT: ICD-10-PCS | Mod: S$GLB,,, | Performed by: FAMILY MEDICINE

## 2021-05-24 RX ORDER — CLINDAMYCIN PHOSPHATE 150 MG/ML
INJECTION, SOLUTION INTRAMUSCULAR; INTRAVENOUS
COMMUNITY
Start: 2021-02-28 | End: 2022-08-18

## 2021-05-24 RX ORDER — FLUTICASONE PROPIONATE 50 MCG
2 SPRAY, SUSPENSION (ML) NASAL DAILY
Qty: 48 G | Refills: 3 | Status: SHIPPED | OUTPATIENT
Start: 2021-05-24 | End: 2022-04-04

## 2021-05-31 ENCOUNTER — OUTPATIENT CASE MANAGEMENT (OUTPATIENT)
Dept: ADMINISTRATIVE | Facility: OTHER | Age: 81
End: 2021-05-31

## 2021-06-17 RX ORDER — GABAPENTIN 400 MG/1
400 CAPSULE ORAL 3 TIMES DAILY
Qty: 270 CAPSULE | Refills: 2 | Status: CANCELLED | OUTPATIENT
Start: 2021-06-17

## 2021-06-20 ENCOUNTER — PATIENT MESSAGE (OUTPATIENT)
Dept: FAMILY MEDICINE | Facility: CLINIC | Age: 81
End: 2021-06-20

## 2021-06-21 ENCOUNTER — OFFICE VISIT (OUTPATIENT)
Dept: OPHTHALMOLOGY | Facility: CLINIC | Age: 81
End: 2021-06-21
Payer: MEDICARE

## 2021-06-21 DIAGNOSIS — H35.3221 EXUDATIVE AGE-RELATED MACULAR DEGENERATION OF LEFT EYE WITH ACTIVE CHOROIDAL NEOVASCULARIZATION: Primary | ICD-10-CM

## 2021-06-21 DIAGNOSIS — H35.3112 NONEXUDATIVE AGE-RELATED MACULAR DEGENERATION, RIGHT EYE, INTERMEDIATE DRY STAGE: ICD-10-CM

## 2021-06-21 PROCEDURE — 1157F PR ADVANCE CARE PLAN OR EQUIV PRESENT IN MEDICAL RECORD: ICD-10-PCS | Mod: S$GLB,,, | Performed by: OPHTHALMOLOGY

## 2021-06-21 PROCEDURE — 92201 OPSCPY EXTND RTA DRAW UNI/BI: CPT | Mod: S$GLB,,, | Performed by: OPHTHALMOLOGY

## 2021-06-21 PROCEDURE — 99999 PR PBB SHADOW E&M-EST. PATIENT-LVL IV: CPT | Mod: PBBFAC,,, | Performed by: OPHTHALMOLOGY

## 2021-06-21 PROCEDURE — 92014 COMPRE OPH EXAM EST PT 1/>: CPT | Mod: S$GLB,,, | Performed by: OPHTHALMOLOGY

## 2021-06-21 PROCEDURE — 1126F AMNT PAIN NOTED NONE PRSNT: CPT | Mod: S$GLB,,, | Performed by: OPHTHALMOLOGY

## 2021-06-21 PROCEDURE — 92201 PR OPHTHALMOSCOPY, EXT, W/RET DRAW/SCLERAL DEPR, I&R, UNI/BI: ICD-10-PCS | Mod: S$GLB,,, | Performed by: OPHTHALMOLOGY

## 2021-06-21 PROCEDURE — 99999 PR PBB SHADOW E&M-EST. PATIENT-LVL IV: ICD-10-PCS | Mod: PBBFAC,,, | Performed by: OPHTHALMOLOGY

## 2021-06-21 PROCEDURE — 92014 PR EYE EXAM, EST PATIENT,COMPREHESV: ICD-10-PCS | Mod: S$GLB,,, | Performed by: OPHTHALMOLOGY

## 2021-06-21 PROCEDURE — 3288F PR FALLS RISK ASSESSMENT DOCUMENTED: ICD-10-PCS | Mod: CPTII,S$GLB,, | Performed by: OPHTHALMOLOGY

## 2021-06-21 PROCEDURE — 3288F FALL RISK ASSESSMENT DOCD: CPT | Mod: CPTII,S$GLB,, | Performed by: OPHTHALMOLOGY

## 2021-06-21 PROCEDURE — 92134 CPTRZ OPH DX IMG PST SGM RTA: CPT | Mod: S$GLB,,, | Performed by: OPHTHALMOLOGY

## 2021-06-21 PROCEDURE — 92134 POSTERIOR SEGMENT OCT RETINA (OCULAR COHERENCE TOMOGRAPHY)-BOTH EYES: ICD-10-PCS | Mod: S$GLB,,, | Performed by: OPHTHALMOLOGY

## 2021-06-21 PROCEDURE — 1101F PR PT FALLS ASSESS DOC 0-1 FALLS W/OUT INJ PAST YR: ICD-10-PCS | Mod: CPTII,S$GLB,, | Performed by: OPHTHALMOLOGY

## 2021-06-21 PROCEDURE — 1126F PR PAIN SEVERITY QUANTIFIED, NO PAIN PRESENT: ICD-10-PCS | Mod: S$GLB,,, | Performed by: OPHTHALMOLOGY

## 2021-06-21 PROCEDURE — 1157F ADVNC CARE PLAN IN RCRD: CPT | Mod: S$GLB,,, | Performed by: OPHTHALMOLOGY

## 2021-06-21 PROCEDURE — 1101F PT FALLS ASSESS-DOCD LE1/YR: CPT | Mod: CPTII,S$GLB,, | Performed by: OPHTHALMOLOGY

## 2021-06-23 RX ORDER — GABAPENTIN 400 MG/1
400 CAPSULE ORAL 3 TIMES DAILY
Qty: 270 CAPSULE | Refills: 3 | Status: SHIPPED | OUTPATIENT
Start: 2021-06-23 | End: 2021-09-07 | Stop reason: SDUPTHER

## 2021-06-24 ENCOUNTER — PATIENT MESSAGE (OUTPATIENT)
Dept: FAMILY MEDICINE | Facility: CLINIC | Age: 81
End: 2021-06-24

## 2021-06-24 RX ORDER — MIRABEGRON 25 MG/1
25 TABLET, FILM COATED, EXTENDED RELEASE ORAL DAILY
Qty: 30 TABLET | Refills: 11 | Status: SHIPPED | OUTPATIENT
Start: 2021-06-24 | End: 2022-08-29

## 2021-07-07 ENCOUNTER — OFFICE VISIT (OUTPATIENT)
Dept: OPHTHALMOLOGY | Facility: CLINIC | Age: 81
End: 2021-07-07
Payer: MEDICARE

## 2021-07-07 DIAGNOSIS — H40.1233 LOW-TENSION GLAUCOMA OF BOTH EYES, SEVERE STAGE: Primary | ICD-10-CM

## 2021-07-07 DIAGNOSIS — H04.123 DRY EYES, BILATERAL: ICD-10-CM

## 2021-07-07 DIAGNOSIS — Z96.1 PSEUDOPHAKIA OF BOTH EYES: ICD-10-CM

## 2021-07-07 DIAGNOSIS — H35.3221 EXUDATIVE AGE-RELATED MACULAR DEGENERATION OF LEFT EYE WITH ACTIVE CHOROIDAL NEOVASCULARIZATION: ICD-10-CM

## 2021-07-07 DIAGNOSIS — H35.3112 NONEXUDATIVE AGE-RELATED MACULAR DEGENERATION, RIGHT EYE, INTERMEDIATE DRY STAGE: ICD-10-CM

## 2021-07-07 PROCEDURE — 1101F PR PT FALLS ASSESS DOC 0-1 FALLS W/OUT INJ PAST YR: ICD-10-PCS | Mod: CPTII,S$GLB,, | Performed by: OPHTHALMOLOGY

## 2021-07-07 PROCEDURE — 99999 PR PBB SHADOW E&M-EST. PATIENT-LVL III: ICD-10-PCS | Mod: PBBFAC,,, | Performed by: OPHTHALMOLOGY

## 2021-07-07 PROCEDURE — 99214 OFFICE O/P EST MOD 30 MIN: CPT | Mod: S$GLB,,, | Performed by: OPHTHALMOLOGY

## 2021-07-07 PROCEDURE — 1159F PR MEDICATION LIST DOCUMENTED IN MEDICAL RECORD: ICD-10-PCS | Mod: S$GLB,,, | Performed by: OPHTHALMOLOGY

## 2021-07-07 PROCEDURE — 1125F AMNT PAIN NOTED PAIN PRSNT: CPT | Mod: S$GLB,,, | Performed by: OPHTHALMOLOGY

## 2021-07-07 PROCEDURE — 1159F MED LIST DOCD IN RCRD: CPT | Mod: S$GLB,,, | Performed by: OPHTHALMOLOGY

## 2021-07-07 PROCEDURE — 99214 PR OFFICE/OUTPT VISIT, EST, LEVL IV, 30-39 MIN: ICD-10-PCS | Mod: S$GLB,,, | Performed by: OPHTHALMOLOGY

## 2021-07-07 PROCEDURE — 1125F PR PAIN SEVERITY QUANTIFIED, PAIN PRESENT: ICD-10-PCS | Mod: S$GLB,,, | Performed by: OPHTHALMOLOGY

## 2021-07-07 PROCEDURE — 3288F FALL RISK ASSESSMENT DOCD: CPT | Mod: CPTII,S$GLB,, | Performed by: OPHTHALMOLOGY

## 2021-07-07 PROCEDURE — 99499 RISK ADDL DX/OHS AUDIT: ICD-10-PCS | Mod: HCNC,S$GLB,, | Performed by: OPHTHALMOLOGY

## 2021-07-07 PROCEDURE — 1157F ADVNC CARE PLAN IN RCRD: CPT | Mod: S$GLB,,, | Performed by: OPHTHALMOLOGY

## 2021-07-07 PROCEDURE — 99499 UNLISTED E&M SERVICE: CPT | Mod: HCNC,S$GLB,, | Performed by: OPHTHALMOLOGY

## 2021-07-07 PROCEDURE — 1101F PT FALLS ASSESS-DOCD LE1/YR: CPT | Mod: CPTII,S$GLB,, | Performed by: OPHTHALMOLOGY

## 2021-07-07 PROCEDURE — 1157F PR ADVANCE CARE PLAN OR EQUIV PRESENT IN MEDICAL RECORD: ICD-10-PCS | Mod: S$GLB,,, | Performed by: OPHTHALMOLOGY

## 2021-07-07 PROCEDURE — 99999 PR PBB SHADOW E&M-EST. PATIENT-LVL III: CPT | Mod: PBBFAC,,, | Performed by: OPHTHALMOLOGY

## 2021-07-07 PROCEDURE — 3288F PR FALLS RISK ASSESSMENT DOCUMENTED: ICD-10-PCS | Mod: CPTII,S$GLB,, | Performed by: OPHTHALMOLOGY

## 2021-07-12 ENCOUNTER — PATIENT OUTREACH (OUTPATIENT)
Dept: ADMINISTRATIVE | Facility: OTHER | Age: 81
End: 2021-07-12

## 2021-07-12 ENCOUNTER — OFFICE VISIT (OUTPATIENT)
Dept: PODIATRY | Facility: CLINIC | Age: 81
End: 2021-07-12
Payer: MEDICARE

## 2021-07-12 VITALS — DIASTOLIC BLOOD PRESSURE: 84 MMHG | HEART RATE: 59 BPM | SYSTOLIC BLOOD PRESSURE: 162 MMHG

## 2021-07-12 DIAGNOSIS — I73.9 PERIPHERAL VASCULAR DISEASE: Primary | ICD-10-CM

## 2021-07-12 DIAGNOSIS — L84 CORN OR CALLUS: ICD-10-CM

## 2021-07-12 DIAGNOSIS — B35.1 ONYCHOMYCOSIS DUE TO DERMATOPHYTE: ICD-10-CM

## 2021-07-12 PROCEDURE — 99999 PR PBB SHADOW E&M-EST. PATIENT-LVL IV: ICD-10-PCS | Mod: PBBFAC,,, | Performed by: PODIATRIST

## 2021-07-12 PROCEDURE — 1126F AMNT PAIN NOTED NONE PRSNT: CPT | Mod: S$GLB,,, | Performed by: PODIATRIST

## 2021-07-12 PROCEDURE — 99499 NO LOS: ICD-10-PCS | Mod: S$GLB,,, | Performed by: PODIATRIST

## 2021-07-12 PROCEDURE — 11055 PR TRIM HYPERKERATOTIC SKIN LESION, ONE: ICD-10-PCS | Mod: Q9,S$GLB,, | Performed by: PODIATRIST

## 2021-07-12 PROCEDURE — 11055 PARING/CUTG B9 HYPRKER LES 1: CPT | Mod: Q9,S$GLB,, | Performed by: PODIATRIST

## 2021-07-12 PROCEDURE — 99499 UNLISTED E&M SERVICE: CPT | Mod: S$GLB,,, | Performed by: PODIATRIST

## 2021-07-12 PROCEDURE — 1101F PT FALLS ASSESS-DOCD LE1/YR: CPT | Mod: CPTII,S$GLB,, | Performed by: PODIATRIST

## 2021-07-12 PROCEDURE — 3288F PR FALLS RISK ASSESSMENT DOCUMENTED: ICD-10-PCS | Mod: CPTII,S$GLB,, | Performed by: PODIATRIST

## 2021-07-12 PROCEDURE — 1126F PR PAIN SEVERITY QUANTIFIED, NO PAIN PRESENT: ICD-10-PCS | Mod: S$GLB,,, | Performed by: PODIATRIST

## 2021-07-12 PROCEDURE — 1101F PR PT FALLS ASSESS DOC 0-1 FALLS W/OUT INJ PAST YR: ICD-10-PCS | Mod: CPTII,S$GLB,, | Performed by: PODIATRIST

## 2021-07-12 PROCEDURE — 99999 PR PBB SHADOW E&M-EST. PATIENT-LVL IV: CPT | Mod: PBBFAC,,, | Performed by: PODIATRIST

## 2021-07-12 PROCEDURE — 11721 PR DEBRIDEMENT OF NAILS, 6 OR MORE: ICD-10-PCS | Mod: 59,Q9,S$GLB, | Performed by: PODIATRIST

## 2021-07-12 PROCEDURE — 3288F FALL RISK ASSESSMENT DOCD: CPT | Mod: CPTII,S$GLB,, | Performed by: PODIATRIST

## 2021-07-12 PROCEDURE — 11721 DEBRIDE NAIL 6 OR MORE: CPT | Mod: 59,Q9,S$GLB, | Performed by: PODIATRIST

## 2021-07-12 PROCEDURE — 1157F ADVNC CARE PLAN IN RCRD: CPT | Mod: S$GLB,,, | Performed by: PODIATRIST

## 2021-07-12 PROCEDURE — 1157F PR ADVANCE CARE PLAN OR EQUIV PRESENT IN MEDICAL RECORD: ICD-10-PCS | Mod: S$GLB,,, | Performed by: PODIATRIST

## 2021-07-23 ENCOUNTER — PATIENT MESSAGE (OUTPATIENT)
Dept: FAMILY MEDICINE | Facility: CLINIC | Age: 81
End: 2021-07-23

## 2021-09-07 DIAGNOSIS — F32.1 MAJOR DEPRESSIVE DISORDER, SINGLE EPISODE, MODERATE: ICD-10-CM

## 2021-09-08 RX ORDER — MIRTAZAPINE 15 MG/1
15 TABLET, FILM COATED ORAL NIGHTLY
Qty: 90 TABLET | Refills: 2 | Status: SHIPPED | OUTPATIENT
Start: 2021-09-08 | End: 2022-04-04

## 2021-09-08 RX ORDER — GABAPENTIN 400 MG/1
400 CAPSULE ORAL 3 TIMES DAILY
Qty: 270 CAPSULE | Refills: 2 | Status: SHIPPED | OUTPATIENT
Start: 2021-09-08 | End: 2022-06-29

## 2021-09-22 ENCOUNTER — TELEPHONE (OUTPATIENT)
Dept: OPHTHALMOLOGY | Facility: CLINIC | Age: 81
End: 2021-09-22

## 2021-09-27 ENCOUNTER — OFFICE VISIT (OUTPATIENT)
Dept: OPHTHALMOLOGY | Facility: CLINIC | Age: 81
End: 2021-09-27
Payer: MEDICARE

## 2021-09-27 DIAGNOSIS — H35.3211 EXUDATIVE AGE-RELATED MACULAR DEGENERATION OF RIGHT EYE WITH ACTIVE CHOROIDAL NEOVASCULARIZATION: Primary | ICD-10-CM

## 2021-09-27 DIAGNOSIS — H35.3112 NONEXUDATIVE AGE-RELATED MACULAR DEGENERATION, RIGHT EYE, INTERMEDIATE DRY STAGE: ICD-10-CM

## 2021-09-27 PROCEDURE — 1126F PR PAIN SEVERITY QUANTIFIED, NO PAIN PRESENT: ICD-10-PCS | Mod: HCNC,CPTII,S$GLB, | Performed by: OPHTHALMOLOGY

## 2021-09-27 PROCEDURE — 92014 PR EYE EXAM, EST PATIENT,COMPREHESV: ICD-10-PCS | Mod: 25,HCNC,S$GLB, | Performed by: OPHTHALMOLOGY

## 2021-09-27 PROCEDURE — 1101F PR PT FALLS ASSESS DOC 0-1 FALLS W/OUT INJ PAST YR: ICD-10-PCS | Mod: HCNC,CPTII,S$GLB, | Performed by: OPHTHALMOLOGY

## 2021-09-27 PROCEDURE — 92014 COMPRE OPH EXAM EST PT 1/>: CPT | Mod: 25,HCNC,S$GLB, | Performed by: OPHTHALMOLOGY

## 2021-09-27 PROCEDURE — 99999 PR PBB SHADOW E&M-EST. PATIENT-LVL III: ICD-10-PCS | Mod: PBBFAC,HCNC,, | Performed by: OPHTHALMOLOGY

## 2021-09-27 PROCEDURE — 1159F PR MEDICATION LIST DOCUMENTED IN MEDICAL RECORD: ICD-10-PCS | Mod: HCNC,CPTII,S$GLB, | Performed by: OPHTHALMOLOGY

## 2021-09-27 PROCEDURE — 1160F RVW MEDS BY RX/DR IN RCRD: CPT | Mod: HCNC,CPTII,S$GLB, | Performed by: OPHTHALMOLOGY

## 2021-09-27 PROCEDURE — 67028 INJECTION EYE DRUG: CPT | Mod: HCNC,RT,S$GLB, | Performed by: OPHTHALMOLOGY

## 2021-09-27 PROCEDURE — 1126F AMNT PAIN NOTED NONE PRSNT: CPT | Mod: HCNC,CPTII,S$GLB, | Performed by: OPHTHALMOLOGY

## 2021-09-27 PROCEDURE — 3288F FALL RISK ASSESSMENT DOCD: CPT | Mod: HCNC,CPTII,S$GLB, | Performed by: OPHTHALMOLOGY

## 2021-09-27 PROCEDURE — 92134 CPTRZ OPH DX IMG PST SGM RTA: CPT | Mod: HCNC,S$GLB,, | Performed by: OPHTHALMOLOGY

## 2021-09-27 PROCEDURE — 1157F ADVNC CARE PLAN IN RCRD: CPT | Mod: HCNC,CPTII,S$GLB, | Performed by: OPHTHALMOLOGY

## 2021-09-27 PROCEDURE — 92134 POSTERIOR SEGMENT OCT RETINA (OCULAR COHERENCE TOMOGRAPHY)-BOTH EYES: ICD-10-PCS | Mod: HCNC,S$GLB,, | Performed by: OPHTHALMOLOGY

## 2021-09-27 PROCEDURE — 1157F PR ADVANCE CARE PLAN OR EQUIV PRESENT IN MEDICAL RECORD: ICD-10-PCS | Mod: HCNC,CPTII,S$GLB, | Performed by: OPHTHALMOLOGY

## 2021-09-27 PROCEDURE — 3288F PR FALLS RISK ASSESSMENT DOCUMENTED: ICD-10-PCS | Mod: HCNC,CPTII,S$GLB, | Performed by: OPHTHALMOLOGY

## 2021-09-27 PROCEDURE — 1101F PT FALLS ASSESS-DOCD LE1/YR: CPT | Mod: HCNC,CPTII,S$GLB, | Performed by: OPHTHALMOLOGY

## 2021-09-27 PROCEDURE — 67028 PR INJECT INTRAVITREAL PHARMCOLOGIC: ICD-10-PCS | Mod: HCNC,RT,S$GLB, | Performed by: OPHTHALMOLOGY

## 2021-09-27 PROCEDURE — 1159F MED LIST DOCD IN RCRD: CPT | Mod: HCNC,CPTII,S$GLB, | Performed by: OPHTHALMOLOGY

## 2021-09-27 PROCEDURE — 99999 PR PBB SHADOW E&M-EST. PATIENT-LVL III: CPT | Mod: PBBFAC,HCNC,, | Performed by: OPHTHALMOLOGY

## 2021-09-27 PROCEDURE — 1160F PR REVIEW ALL MEDS BY PRESCRIBER/CLIN PHARMACIST DOCUMENTED: ICD-10-PCS | Mod: HCNC,CPTII,S$GLB, | Performed by: OPHTHALMOLOGY

## 2021-09-27 RX ADMIN — Medication 1.25 MG: at 08:09

## 2021-10-05 ENCOUNTER — OFFICE VISIT (OUTPATIENT)
Dept: OPHTHALMOLOGY | Facility: CLINIC | Age: 81
End: 2021-10-05
Payer: MEDICARE

## 2021-10-05 ENCOUNTER — IMMUNIZATION (OUTPATIENT)
Dept: FAMILY MEDICINE | Facility: CLINIC | Age: 81
End: 2021-10-05
Payer: MEDICARE

## 2021-10-05 DIAGNOSIS — H40.1233 LOW-TENSION GLAUCOMA OF BOTH EYES, SEVERE STAGE: Primary | ICD-10-CM

## 2021-10-05 DIAGNOSIS — Z23 NEED FOR VACCINATION: Primary | ICD-10-CM

## 2021-10-05 DIAGNOSIS — H04.123 DRY EYES, BILATERAL: ICD-10-CM

## 2021-10-05 DIAGNOSIS — H35.3221 EXUDATIVE AGE-RELATED MACULAR DEGENERATION OF LEFT EYE WITH ACTIVE CHOROIDAL NEOVASCULARIZATION: ICD-10-CM

## 2021-10-05 DIAGNOSIS — Z96.1 PSEUDOPHAKIA OF BOTH EYES: ICD-10-CM

## 2021-10-05 DIAGNOSIS — H35.3211 EXUDATIVE AGE-RELATED MACULAR DEGENERATION OF RIGHT EYE WITH ACTIVE CHOROIDAL NEOVASCULARIZATION: ICD-10-CM

## 2021-10-05 PROCEDURE — 3288F FALL RISK ASSESSMENT DOCD: CPT | Mod: HCNC,CPTII,S$GLB, | Performed by: OPHTHALMOLOGY

## 2021-10-05 PROCEDURE — 99214 PR OFFICE/OUTPT VISIT, EST, LEVL IV, 30-39 MIN: ICD-10-PCS | Mod: HCNC,S$GLB,, | Performed by: OPHTHALMOLOGY

## 2021-10-05 PROCEDURE — 99214 OFFICE O/P EST MOD 30 MIN: CPT | Mod: HCNC,S$GLB,, | Performed by: OPHTHALMOLOGY

## 2021-10-05 PROCEDURE — 99499 UNLISTED E&M SERVICE: CPT | Mod: S$GLB,,, | Performed by: OPHTHALMOLOGY

## 2021-10-05 PROCEDURE — 99499 RISK ADDL DX/OHS AUDIT: ICD-10-PCS | Mod: S$GLB,,, | Performed by: OPHTHALMOLOGY

## 2021-10-05 PROCEDURE — 99999 PR PBB SHADOW E&M-EST. PATIENT-LVL III: ICD-10-PCS | Mod: PBBFAC,HCNC,, | Performed by: OPHTHALMOLOGY

## 2021-10-05 PROCEDURE — 1126F AMNT PAIN NOTED NONE PRSNT: CPT | Mod: HCNC,CPTII,S$GLB, | Performed by: OPHTHALMOLOGY

## 2021-10-05 PROCEDURE — 91300 COVID-19, MRNA, LNP-S, PF, 30 MCG/0.3 ML DOSE VACCINE: CPT | Mod: HCNC,PBBFAC | Performed by: FAMILY MEDICINE

## 2021-10-05 PROCEDURE — 3288F PR FALLS RISK ASSESSMENT DOCUMENTED: ICD-10-PCS | Mod: HCNC,CPTII,S$GLB, | Performed by: OPHTHALMOLOGY

## 2021-10-05 PROCEDURE — 1159F MED LIST DOCD IN RCRD: CPT | Mod: HCNC,CPTII,S$GLB, | Performed by: OPHTHALMOLOGY

## 2021-10-05 PROCEDURE — 1159F PR MEDICATION LIST DOCUMENTED IN MEDICAL RECORD: ICD-10-PCS | Mod: HCNC,CPTII,S$GLB, | Performed by: OPHTHALMOLOGY

## 2021-10-05 PROCEDURE — 0003A COVID-19, MRNA, LNP-S, PF, 30 MCG/0.3 ML DOSE VACCINE: CPT | Mod: HCNC,PBBFAC | Performed by: FAMILY MEDICINE

## 2021-10-05 PROCEDURE — 1160F PR REVIEW ALL MEDS BY PRESCRIBER/CLIN PHARMACIST DOCUMENTED: ICD-10-PCS | Mod: HCNC,CPTII,S$GLB, | Performed by: OPHTHALMOLOGY

## 2021-10-05 PROCEDURE — 1126F PR PAIN SEVERITY QUANTIFIED, NO PAIN PRESENT: ICD-10-PCS | Mod: HCNC,CPTII,S$GLB, | Performed by: OPHTHALMOLOGY

## 2021-10-05 PROCEDURE — 1101F PR PT FALLS ASSESS DOC 0-1 FALLS W/OUT INJ PAST YR: ICD-10-PCS | Mod: HCNC,CPTII,S$GLB, | Performed by: OPHTHALMOLOGY

## 2021-10-05 PROCEDURE — 1157F ADVNC CARE PLAN IN RCRD: CPT | Mod: HCNC,CPTII,S$GLB, | Performed by: OPHTHALMOLOGY

## 2021-10-05 PROCEDURE — 1101F PT FALLS ASSESS-DOCD LE1/YR: CPT | Mod: HCNC,CPTII,S$GLB, | Performed by: OPHTHALMOLOGY

## 2021-10-05 PROCEDURE — 1160F RVW MEDS BY RX/DR IN RCRD: CPT | Mod: HCNC,CPTII,S$GLB, | Performed by: OPHTHALMOLOGY

## 2021-10-05 PROCEDURE — 99999 PR PBB SHADOW E&M-EST. PATIENT-LVL III: CPT | Mod: PBBFAC,HCNC,, | Performed by: OPHTHALMOLOGY

## 2021-10-05 PROCEDURE — 1157F PR ADVANCE CARE PLAN OR EQUIV PRESENT IN MEDICAL RECORD: ICD-10-PCS | Mod: HCNC,CPTII,S$GLB, | Performed by: OPHTHALMOLOGY

## 2021-10-13 ENCOUNTER — OFFICE VISIT (OUTPATIENT)
Dept: PODIATRY | Facility: CLINIC | Age: 81
End: 2021-10-13
Payer: MEDICARE

## 2021-10-13 DIAGNOSIS — B35.1 ONYCHOMYCOSIS DUE TO DERMATOPHYTE: ICD-10-CM

## 2021-10-13 DIAGNOSIS — L84 CORN OR CALLUS: ICD-10-CM

## 2021-10-13 DIAGNOSIS — I73.9 PERIPHERAL VASCULAR DISEASE: Primary | ICD-10-CM

## 2021-10-13 PROCEDURE — 1126F PR PAIN SEVERITY QUANTIFIED, NO PAIN PRESENT: ICD-10-PCS | Mod: HCNC,CPTII,S$GLB, | Performed by: PODIATRIST

## 2021-10-13 PROCEDURE — 11056 PARNG/CUTG B9 HYPRKR LES 2-4: CPT | Mod: Q9,HCNC,S$GLB, | Performed by: PODIATRIST

## 2021-10-13 PROCEDURE — 11056 PR TRIM BENIGN HYPERKERATOTIC SKIN LESION,2-4: ICD-10-PCS | Mod: Q9,HCNC,S$GLB, | Performed by: PODIATRIST

## 2021-10-13 PROCEDURE — 3288F FALL RISK ASSESSMENT DOCD: CPT | Mod: HCNC,CPTII,S$GLB, | Performed by: PODIATRIST

## 2021-10-13 PROCEDURE — 1157F ADVNC CARE PLAN IN RCRD: CPT | Mod: HCNC,CPTII,S$GLB, | Performed by: PODIATRIST

## 2021-10-13 PROCEDURE — 11721 DEBRIDE NAIL 6 OR MORE: CPT | Mod: 59,Q9,HCNC,S$GLB | Performed by: PODIATRIST

## 2021-10-13 PROCEDURE — 1160F PR REVIEW ALL MEDS BY PRESCRIBER/CLIN PHARMACIST DOCUMENTED: ICD-10-PCS | Mod: HCNC,CPTII,S$GLB, | Performed by: PODIATRIST

## 2021-10-13 PROCEDURE — 1159F MED LIST DOCD IN RCRD: CPT | Mod: HCNC,CPTII,S$GLB, | Performed by: PODIATRIST

## 2021-10-13 PROCEDURE — 1126F AMNT PAIN NOTED NONE PRSNT: CPT | Mod: HCNC,CPTII,S$GLB, | Performed by: PODIATRIST

## 2021-10-13 PROCEDURE — 99499 NO LOS: ICD-10-PCS | Mod: HCNC,S$GLB,, | Performed by: PODIATRIST

## 2021-10-13 PROCEDURE — 1160F RVW MEDS BY RX/DR IN RCRD: CPT | Mod: HCNC,CPTII,S$GLB, | Performed by: PODIATRIST

## 2021-10-13 PROCEDURE — 1159F PR MEDICATION LIST DOCUMENTED IN MEDICAL RECORD: ICD-10-PCS | Mod: HCNC,CPTII,S$GLB, | Performed by: PODIATRIST

## 2021-10-13 PROCEDURE — 3288F PR FALLS RISK ASSESSMENT DOCUMENTED: ICD-10-PCS | Mod: HCNC,CPTII,S$GLB, | Performed by: PODIATRIST

## 2021-10-13 PROCEDURE — 1101F PT FALLS ASSESS-DOCD LE1/YR: CPT | Mod: HCNC,CPTII,S$GLB, | Performed by: PODIATRIST

## 2021-10-13 PROCEDURE — 99499 UNLISTED E&M SERVICE: CPT | Mod: HCNC,S$GLB,, | Performed by: PODIATRIST

## 2021-10-13 PROCEDURE — 99999 PR PBB SHADOW E&M-EST. PATIENT-LVL III: CPT | Mod: PBBFAC,HCNC,, | Performed by: PODIATRIST

## 2021-10-13 PROCEDURE — 99999 PR PBB SHADOW E&M-EST. PATIENT-LVL III: ICD-10-PCS | Mod: PBBFAC,HCNC,, | Performed by: PODIATRIST

## 2021-10-13 PROCEDURE — 1157F PR ADVANCE CARE PLAN OR EQUIV PRESENT IN MEDICAL RECORD: ICD-10-PCS | Mod: HCNC,CPTII,S$GLB, | Performed by: PODIATRIST

## 2021-10-13 PROCEDURE — 11721 PR DEBRIDEMENT OF NAILS, 6 OR MORE: ICD-10-PCS | Mod: 59,Q9,HCNC,S$GLB | Performed by: PODIATRIST

## 2021-10-13 PROCEDURE — 1101F PR PT FALLS ASSESS DOC 0-1 FALLS W/OUT INJ PAST YR: ICD-10-PCS | Mod: HCNC,CPTII,S$GLB, | Performed by: PODIATRIST

## 2021-10-28 ENCOUNTER — IMMUNIZATION (OUTPATIENT)
Dept: PHARMACY | Facility: CLINIC | Age: 81
End: 2021-10-28
Payer: MEDICARE

## 2021-11-01 ENCOUNTER — PROCEDURE VISIT (OUTPATIENT)
Dept: OPHTHALMOLOGY | Facility: CLINIC | Age: 81
End: 2021-11-01
Payer: MEDICARE

## 2021-11-01 DIAGNOSIS — H35.3211 EXUDATIVE AGE-RELATED MACULAR DEGENERATION OF RIGHT EYE WITH ACTIVE CHOROIDAL NEOVASCULARIZATION: Primary | ICD-10-CM

## 2021-11-01 DIAGNOSIS — H35.3112 NONEXUDATIVE AGE-RELATED MACULAR DEGENERATION, RIGHT EYE, INTERMEDIATE DRY STAGE: ICD-10-CM

## 2021-11-01 DIAGNOSIS — H35.3221 EXUDATIVE AGE-RELATED MACULAR DEGENERATION OF LEFT EYE WITH ACTIVE CHOROIDAL NEOVASCULARIZATION: ICD-10-CM

## 2021-11-01 PROCEDURE — 67028 INJECTION EYE DRUG: CPT | Mod: HCNC,RT,S$GLB, | Performed by: OPHTHALMOLOGY

## 2021-11-01 PROCEDURE — 92014 COMPRE OPH EXAM EST PT 1/>: CPT | Mod: HCNC,S$GLB,, | Performed by: OPHTHALMOLOGY

## 2021-11-01 PROCEDURE — 92134 POSTERIOR SEGMENT OCT RETINA (OCULAR COHERENCE TOMOGRAPHY)-BOTH EYES: ICD-10-PCS | Mod: HCNC,S$GLB,, | Performed by: OPHTHALMOLOGY

## 2021-11-01 PROCEDURE — 67028 PR INJECT INTRAVITREAL PHARMCOLOGIC: ICD-10-PCS | Mod: HCNC,RT,S$GLB, | Performed by: OPHTHALMOLOGY

## 2021-11-01 PROCEDURE — 92134 CPTRZ OPH DX IMG PST SGM RTA: CPT | Mod: HCNC,S$GLB,, | Performed by: OPHTHALMOLOGY

## 2021-11-01 PROCEDURE — 92014 PR EYE EXAM, EST PATIENT,COMPREHESV: ICD-10-PCS | Mod: HCNC,S$GLB,, | Performed by: OPHTHALMOLOGY

## 2021-11-01 RX ADMIN — Medication 1.25 MG: at 11:11

## 2021-11-08 RX ORDER — METOPROLOL SUCCINATE 25 MG/1
TABLET, EXTENDED RELEASE ORAL
Qty: 180 TABLET | Refills: 2 | Status: SHIPPED | OUTPATIENT
Start: 2021-11-08 | End: 2022-06-09 | Stop reason: SDUPTHER

## 2021-12-02 ENCOUNTER — PROCEDURE VISIT (OUTPATIENT)
Dept: OPHTHALMOLOGY | Facility: CLINIC | Age: 81
End: 2021-12-02
Payer: MEDICARE

## 2021-12-02 DIAGNOSIS — H35.3211 EXUDATIVE AGE-RELATED MACULAR DEGENERATION OF RIGHT EYE WITH ACTIVE CHOROIDAL NEOVASCULARIZATION: Primary | ICD-10-CM

## 2021-12-02 DIAGNOSIS — H35.3221 EXUDATIVE AGE-RELATED MACULAR DEGENERATION OF LEFT EYE WITH ACTIVE CHOROIDAL NEOVASCULARIZATION: ICD-10-CM

## 2021-12-02 PROCEDURE — 67028 PR INJECT INTRAVITREAL PHARMCOLOGIC: ICD-10-PCS | Mod: HCNC,RT,S$GLB, | Performed by: OPHTHALMOLOGY

## 2021-12-02 PROCEDURE — 99024 PR POST-OP FOLLOW-UP VISIT: ICD-10-PCS | Mod: HCNC,S$GLB,, | Performed by: OPHTHALMOLOGY

## 2021-12-02 PROCEDURE — 67028 INJECTION EYE DRUG: CPT | Mod: HCNC,RT,S$GLB, | Performed by: OPHTHALMOLOGY

## 2021-12-02 PROCEDURE — 92134 OCT, RETINA - OU - BOTH EYES: ICD-10-PCS | Mod: HCNC,S$GLB,, | Performed by: OPHTHALMOLOGY

## 2021-12-02 PROCEDURE — 99024 POSTOP FOLLOW-UP VISIT: CPT | Mod: HCNC,S$GLB,, | Performed by: OPHTHALMOLOGY

## 2021-12-02 PROCEDURE — 92134 CPTRZ OPH DX IMG PST SGM RTA: CPT | Mod: HCNC,S$GLB,, | Performed by: OPHTHALMOLOGY

## 2021-12-02 RX ADMIN — Medication 1.25 MG: at 09:12

## 2022-01-10 ENCOUNTER — TELEPHONE (OUTPATIENT)
Dept: FAMILY MEDICINE | Facility: CLINIC | Age: 82
End: 2022-01-10
Payer: MEDICARE

## 2022-01-10 ENCOUNTER — PROCEDURE VISIT (OUTPATIENT)
Dept: OPHTHALMOLOGY | Facility: CLINIC | Age: 82
End: 2022-01-10
Payer: MEDICARE

## 2022-01-10 DIAGNOSIS — H35.3112 NONEXUDATIVE AGE-RELATED MACULAR DEGENERATION, RIGHT EYE, INTERMEDIATE DRY STAGE: ICD-10-CM

## 2022-01-10 DIAGNOSIS — H35.3221 EXUDATIVE AGE-RELATED MACULAR DEGENERATION OF LEFT EYE WITH ACTIVE CHOROIDAL NEOVASCULARIZATION: ICD-10-CM

## 2022-01-10 DIAGNOSIS — H35.3211 EXUDATIVE AGE-RELATED MACULAR DEGENERATION OF RIGHT EYE WITH ACTIVE CHOROIDAL NEOVASCULARIZATION: Primary | ICD-10-CM

## 2022-01-10 PROCEDURE — 92134 POSTERIOR SEGMENT OCT RETINA (OCULAR COHERENCE TOMOGRAPHY)-BOTH EYES: ICD-10-PCS | Mod: HCNC,S$GLB,, | Performed by: OPHTHALMOLOGY

## 2022-01-10 PROCEDURE — 99499 RISK ADDL DX/OHS AUDIT: ICD-10-PCS | Mod: S$GLB,,, | Performed by: OPHTHALMOLOGY

## 2022-01-10 PROCEDURE — 99499 UNLISTED E&M SERVICE: CPT | Mod: S$GLB,,, | Performed by: OPHTHALMOLOGY

## 2022-01-10 PROCEDURE — 92134 CPTRZ OPH DX IMG PST SGM RTA: CPT | Mod: HCNC,S$GLB,, | Performed by: OPHTHALMOLOGY

## 2022-01-10 PROCEDURE — 92014 PR EYE EXAM, EST PATIENT,COMPREHESV: ICD-10-PCS | Mod: 25,HCNC,S$GLB, | Performed by: OPHTHALMOLOGY

## 2022-01-10 PROCEDURE — 67028 PR INJECT INTRAVITREAL PHARMCOLOGIC: ICD-10-PCS | Mod: HCNC,RT,S$GLB, | Performed by: OPHTHALMOLOGY

## 2022-01-10 PROCEDURE — 67028 INJECTION EYE DRUG: CPT | Mod: HCNC,RT,S$GLB, | Performed by: OPHTHALMOLOGY

## 2022-01-10 PROCEDURE — 92014 COMPRE OPH EXAM EST PT 1/>: CPT | Mod: 25,HCNC,S$GLB, | Performed by: OPHTHALMOLOGY

## 2022-01-10 RX ADMIN — Medication 1.25 MG: at 09:01

## 2022-01-10 NOTE — PATIENT INSTRUCTIONS

## 2022-01-10 NOTE — PROGRESS NOTES
HPI     1 mo OCT   DLS: 12/02/2021 Dr. Lopez     Pt sts no change in vision still not any better.   Denies pain     (-)Flashes (-)Floaters  (-)Photophobia  (-)Glare    Eye Med(s) - Latanoprost QHS - OU            Cosopt BID OU              Systane PRN         OCT -OS stable cicatrix  OD - new SRF    A/P    1. Wet AMD OS  S/p Avastin OS x 15, Eylea x 14    Persistent SRF OS - improving  With RPE tear OS  Central fibrosis with atrophy - poor central potential  10/17 - increased SRH - will keep at 8 weeks for now  9/18 - stable cicatrix - try observation  12/18 - no activity  6/21 - some heme over cicatrix OS      2. New disease OD  Sx started 9/21  S/p Avastin OD x 3    Avastin OD today    Re approve Eylea - pt says she is approved    3. PCIOL OU  CTR OS - but saw 20/30 with correction, in spite of sub RPE fibrosis      4. POAG OU  Good IOP control on Cosopt, brimonidine, latanoprost OU  Small drance heme OD    5. Floaters OU        1 month OCT and dilate      Risks, benefits, and alternatives to treatment discussed in detail with the patient.  The patient voiced understanding and wished to proceed with the procedure    Injection Procedure Note:  Diagnosis: Wet AMD OD    Patient Identified and Time Out complete  Pt marked  Topical Proparacaine and Betadine.  Inject Avastin OD at 6:00 @ 3.5-4mm posterior to limbus  Post Operative Dx: Same  Complications: None  Follow up as above.

## 2022-01-10 NOTE — TELEPHONE ENCOUNTER
----- Message from Sabrina Em sent at 1/10/2022 10:54 AM CST -----  Regarding: would like annual lab orders  Pt came in today would like to get annual lab orders scheduled for 1/17/2022  Since she will be coming in that day with her .       Phone : [phone]

## 2022-01-13 ENCOUNTER — PATIENT MESSAGE (OUTPATIENT)
Dept: FAMILY MEDICINE | Facility: CLINIC | Age: 82
End: 2022-01-13
Payer: MEDICARE

## 2022-01-13 DIAGNOSIS — I10 ESSENTIAL HYPERTENSION: Primary | ICD-10-CM

## 2022-01-19 ENCOUNTER — LAB VISIT (OUTPATIENT)
Dept: LAB | Facility: HOSPITAL | Age: 82
End: 2022-01-19
Attending: FAMILY MEDICINE
Payer: MEDICARE

## 2022-01-19 DIAGNOSIS — I10 ESSENTIAL HYPERTENSION: ICD-10-CM

## 2022-01-19 LAB
ALBUMIN SERPL BCP-MCNC: 3.9 G/DL (ref 3.5–5.2)
ALP SERPL-CCNC: 60 U/L (ref 55–135)
ALT SERPL W/O P-5'-P-CCNC: 15 U/L (ref 10–44)
ANION GAP SERPL CALC-SCNC: 11 MMOL/L (ref 8–16)
AST SERPL-CCNC: 17 U/L (ref 10–40)
BASOPHILS # BLD AUTO: 0.06 K/UL (ref 0–0.2)
BASOPHILS NFR BLD: 1 % (ref 0–1.9)
BILIRUB SERPL-MCNC: 1 MG/DL (ref 0.1–1)
BUN SERPL-MCNC: 12 MG/DL (ref 8–23)
CALCIUM SERPL-MCNC: 9.4 MG/DL (ref 8.7–10.5)
CHLORIDE SERPL-SCNC: 103 MMOL/L (ref 95–110)
CHOLEST SERPL-MCNC: 216 MG/DL (ref 120–199)
CHOLEST/HDLC SERPL: 2.7 {RATIO} (ref 2–5)
CO2 SERPL-SCNC: 25 MMOL/L (ref 23–29)
CREAT SERPL-MCNC: 0.7 MG/DL (ref 0.5–1.4)
DIFFERENTIAL METHOD: NORMAL
EOSINOPHIL # BLD AUTO: 0.3 K/UL (ref 0–0.5)
EOSINOPHIL NFR BLD: 5.7 % (ref 0–8)
ERYTHROCYTE [DISTWIDTH] IN BLOOD BY AUTOMATED COUNT: 13.2 % (ref 11.5–14.5)
EST. GFR  (AFRICAN AMERICAN): >60 ML/MIN/1.73 M^2
EST. GFR  (NON AFRICAN AMERICAN): >60 ML/MIN/1.73 M^2
GLUCOSE SERPL-MCNC: 95 MG/DL (ref 70–110)
HCT VFR BLD AUTO: 41.7 % (ref 37–48.5)
HDLC SERPL-MCNC: 79 MG/DL (ref 40–75)
HDLC SERPL: 36.6 % (ref 20–50)
HGB BLD-MCNC: 13.4 G/DL (ref 12–16)
IMM GRANULOCYTES # BLD AUTO: 0.01 K/UL (ref 0–0.04)
IMM GRANULOCYTES NFR BLD AUTO: 0.2 % (ref 0–0.5)
LDLC SERPL CALC-MCNC: 112.2 MG/DL (ref 63–159)
LYMPHOCYTES # BLD AUTO: 1.4 K/UL (ref 1–4.8)
LYMPHOCYTES NFR BLD: 24.2 % (ref 18–48)
MCH RBC QN AUTO: 28.8 PG (ref 27–31)
MCHC RBC AUTO-ENTMCNC: 32.1 G/DL (ref 32–36)
MCV RBC AUTO: 90 FL (ref 82–98)
MONOCYTES # BLD AUTO: 0.5 K/UL (ref 0.3–1)
MONOCYTES NFR BLD: 9.2 % (ref 4–15)
NEUTROPHILS # BLD AUTO: 3.4 K/UL (ref 1.8–7.7)
NEUTROPHILS NFR BLD: 59.7 % (ref 38–73)
NONHDLC SERPL-MCNC: 137 MG/DL
NRBC BLD-RTO: 0 /100 WBC
PLATELET # BLD AUTO: 238 K/UL (ref 150–450)
PMV BLD AUTO: 11.2 FL (ref 9.2–12.9)
POTASSIUM SERPL-SCNC: 4 MMOL/L (ref 3.5–5.1)
PROT SERPL-MCNC: 7 G/DL (ref 6–8.4)
RBC # BLD AUTO: 4.65 M/UL (ref 4–5.4)
SODIUM SERPL-SCNC: 139 MMOL/L (ref 136–145)
TRIGL SERPL-MCNC: 124 MG/DL (ref 30–150)
WBC # BLD AUTO: 5.74 K/UL (ref 3.9–12.7)

## 2022-01-19 PROCEDURE — 80061 LIPID PANEL: CPT | Mod: HCNC | Performed by: FAMILY MEDICINE

## 2022-01-19 PROCEDURE — 85025 COMPLETE CBC W/AUTO DIFF WBC: CPT | Mod: HCNC | Performed by: FAMILY MEDICINE

## 2022-01-19 PROCEDURE — 36415 COLL VENOUS BLD VENIPUNCTURE: CPT | Mod: HCNC,PO | Performed by: FAMILY MEDICINE

## 2022-01-19 PROCEDURE — 80053 COMPREHEN METABOLIC PANEL: CPT | Mod: HCNC | Performed by: FAMILY MEDICINE

## 2022-01-26 ENCOUNTER — PATIENT OUTREACH (OUTPATIENT)
Dept: ADMINISTRATIVE | Facility: OTHER | Age: 82
End: 2022-01-26
Payer: MEDICARE

## 2022-01-26 NOTE — PROGRESS NOTES
Health Maintenance Due   Topic Date Due    Shingles Vaccine (1 of 2) Never done     Updates were requested from care everywhere.  Chart was reviewed for overdue Proactive [facility] Encounters (DERIK) topics (CRS, Breast Cancer Screening, Eye exam)  Health Maintenance has been updated.  LINKS immunization registry triggered.  Immunizations were reconciled.

## 2022-01-27 ENCOUNTER — OFFICE VISIT (OUTPATIENT)
Dept: PODIATRY | Facility: CLINIC | Age: 82
End: 2022-01-27
Payer: MEDICARE

## 2022-01-27 DIAGNOSIS — I73.9 PERIPHERAL VASCULAR DISEASE: Primary | ICD-10-CM

## 2022-01-27 DIAGNOSIS — B35.1 ONYCHOMYCOSIS DUE TO DERMATOPHYTE: ICD-10-CM

## 2022-01-27 DIAGNOSIS — G60.9 IDIOPATHIC PERIPHERAL NEUROPATHY: ICD-10-CM

## 2022-01-27 DIAGNOSIS — L84 CORN OR CALLUS: ICD-10-CM

## 2022-01-27 PROCEDURE — 1160F RVW MEDS BY RX/DR IN RCRD: CPT | Mod: HCNC,CPTII,S$GLB, | Performed by: PODIATRIST

## 2022-01-27 PROCEDURE — 1159F PR MEDICATION LIST DOCUMENTED IN MEDICAL RECORD: ICD-10-PCS | Mod: HCNC,CPTII,S$GLB, | Performed by: PODIATRIST

## 2022-01-27 PROCEDURE — 3288F PR FALLS RISK ASSESSMENT DOCUMENTED: ICD-10-PCS | Mod: HCNC,CPTII,S$GLB, | Performed by: PODIATRIST

## 2022-01-27 PROCEDURE — 1157F ADVNC CARE PLAN IN RCRD: CPT | Mod: HCNC,CPTII,S$GLB, | Performed by: PODIATRIST

## 2022-01-27 PROCEDURE — 1101F PT FALLS ASSESS-DOCD LE1/YR: CPT | Mod: HCNC,CPTII,S$GLB, | Performed by: PODIATRIST

## 2022-01-27 PROCEDURE — 11056 PARNG/CUTG B9 HYPRKR LES 2-4: CPT | Mod: Q9,HCNC,S$GLB, | Performed by: PODIATRIST

## 2022-01-27 PROCEDURE — 99499 RISK ADDL DX/OHS AUDIT: ICD-10-PCS | Mod: S$GLB,,, | Performed by: PODIATRIST

## 2022-01-27 PROCEDURE — 99213 PR OFFICE/OUTPT VISIT, EST, LEVL III, 20-29 MIN: ICD-10-PCS | Mod: 25,HCNC,S$GLB, | Performed by: PODIATRIST

## 2022-01-27 PROCEDURE — 1126F AMNT PAIN NOTED NONE PRSNT: CPT | Mod: HCNC,CPTII,S$GLB, | Performed by: PODIATRIST

## 2022-01-27 PROCEDURE — 99999 PR PBB SHADOW E&M-EST. PATIENT-LVL III: ICD-10-PCS | Mod: PBBFAC,HCNC,, | Performed by: PODIATRIST

## 2022-01-27 PROCEDURE — 1160F PR REVIEW ALL MEDS BY PRESCRIBER/CLIN PHARMACIST DOCUMENTED: ICD-10-PCS | Mod: HCNC,CPTII,S$GLB, | Performed by: PODIATRIST

## 2022-01-27 PROCEDURE — 11056 PR TRIM BENIGN HYPERKERATOTIC SKIN LESION,2-4: ICD-10-PCS | Mod: Q9,HCNC,S$GLB, | Performed by: PODIATRIST

## 2022-01-27 PROCEDURE — 99213 OFFICE O/P EST LOW 20 MIN: CPT | Mod: 25,HCNC,S$GLB, | Performed by: PODIATRIST

## 2022-01-27 PROCEDURE — 1159F MED LIST DOCD IN RCRD: CPT | Mod: HCNC,CPTII,S$GLB, | Performed by: PODIATRIST

## 2022-01-27 PROCEDURE — 1126F PR PAIN SEVERITY QUANTIFIED, NO PAIN PRESENT: ICD-10-PCS | Mod: HCNC,CPTII,S$GLB, | Performed by: PODIATRIST

## 2022-01-27 PROCEDURE — 99499 UNLISTED E&M SERVICE: CPT | Mod: S$GLB,,, | Performed by: PODIATRIST

## 2022-01-27 PROCEDURE — 1157F PR ADVANCE CARE PLAN OR EQUIV PRESENT IN MEDICAL RECORD: ICD-10-PCS | Mod: HCNC,CPTII,S$GLB, | Performed by: PODIATRIST

## 2022-01-27 PROCEDURE — 11721 DEBRIDE NAIL 6 OR MORE: CPT | Mod: 59,Q9,HCNC,S$GLB | Performed by: PODIATRIST

## 2022-01-27 PROCEDURE — 99999 PR PBB SHADOW E&M-EST. PATIENT-LVL III: CPT | Mod: PBBFAC,HCNC,, | Performed by: PODIATRIST

## 2022-01-27 PROCEDURE — 1101F PR PT FALLS ASSESS DOC 0-1 FALLS W/OUT INJ PAST YR: ICD-10-PCS | Mod: HCNC,CPTII,S$GLB, | Performed by: PODIATRIST

## 2022-01-27 PROCEDURE — 3288F FALL RISK ASSESSMENT DOCD: CPT | Mod: HCNC,CPTII,S$GLB, | Performed by: PODIATRIST

## 2022-01-27 PROCEDURE — 11721 PR DEBRIDEMENT OF NAILS, 6 OR MORE: ICD-10-PCS | Mod: 59,Q9,HCNC,S$GLB | Performed by: PODIATRIST

## 2022-01-27 NOTE — PROGRESS NOTES
Subjective:      Patient ID: Serenity Epps is a 81 y.o. female.    Chief Complaint: Nail Care    Serenity is a 81 y.o. female who presents to the clinic for evaluation and treatment of high risk feet. Serenity has a past medical history of Anxiety, Arthritis, Cataract - Both Eyes, CKD (chronic kidney disease), stage II (09/21/2016), Diarrhea, Diverticulosis, DVT (deep venous thrombosis), Exudative age-related macular degeneration of left eye (2/20/2014), Glaucoma, Hypertension, Irritable bowel syndrome, Left foot pain, Lymphocytic colitis, Macular degeneration, Osteopenia, Recurrent major depressive disorder (4/27/2018), Rhinitis, chronic, and Strabismus. The patient's chief complaint is routine care for high risk with PVD. Also relates that her neuropathy symptoms are getting worse and inquiring about options to help with this, mostly at night is when it is really bad          PCP: Hua Andersen MD    5/24/21    Current shoe gear:  Affected Foot: Casual shoes     Unaffected Foot: Casual shoes    History of Trauma: negative      No results found for: HGBA1C      Review of Systems   Constitutional: Negative for chills and fever.   Cardiovascular: Positive for leg swelling. Negative for claudication.   Respiratory: Negative for shortness of breath.    Skin: Positive for color change, nail changes and poor wound healing. Negative for itching and rash.   Musculoskeletal: Negative for muscle cramps, muscle weakness and myalgias.   Gastrointestinal: Negative for nausea and vomiting.   Neurological: Negative for focal weakness, loss of balance, numbness and paresthesias.           Objective:      Physical Exam  Constitutional:       General: She is not in acute distress.     Appearance: She is well-developed. She is not diaphoretic.   Cardiovascular:      Pulses:           Dorsalis pedis pulses are 1+ on the right side and 1+ on the left side.        Posterior tibial pulses are 1+ on the right side and 1+ on the left side.       Comments: < 3 sec capillary refill time to toes 1-5 bilateral. Feet are warm to touch proximally with distal cooling b/l. There is no hair growth on the feet and toes b/l. There is 1+ edema b/l. No spider veins or varicosities present b/l.     Musculoskeletal:      Comments: Equinus noted b/l ankles with < 10 deg DF noted. MMT 5/5 in DF/PF/Inv/Ev resistance with no reproduction of pain in any direction. Passive range of motion of ankle and pedal joints is painless b/l.     Skin:     General: Skin is warm and dry.      Coloration: Skin is not pale.      Findings: Erythema and lesion present. No abrasion, bruising, burn, ecchymosis, laceration, petechiae or rash.      Nails: There is no clubbing.      Comments: Skin is thin and atrophic    Ulcer Location: Medial aspect of left heel  Measurements: 0 x 0 x 0 cm  Periwound: Intact  Drainage: None.  Pus: None.  Malodor: None.  Base:  100% epithelial tissue.  Signs of infection: None.    Left plantar first metatarsal, midfoot and heel all on the left foot there is hyperkeratotic lesions    Nails 1-5 bilateral are thick 3-4 mm, long 3-6 mm, and discolored with subungual debris       Neurological:      Mental Status: She is alert and oriented to person, place, and time.      Sensory: No sensory deficit.      Motor: No tremor, atrophy or abnormal muscle tone.      Comments: Negative tinel sign bilateral.   Psychiatric:         Behavior: Behavior normal.               Assessment:       Encounter Diagnoses   Name Primary?    Peripheral vascular disease Yes    Idiopathic peripheral neuropathy     Onychomycosis due to dermatophyte     Corn or callus          Plan:       Serenity was seen today for nail care.    Diagnoses and all orders for this visit:    Peripheral vascular disease    Idiopathic peripheral neuropathy    Onychomycosis due to dermatophyte    Corn or callus      I counseled the patient on her conditions, their implications and medical management.    With patient's  verbal consent, nails were aggressively reduced and debrided x 10 to their soft tissue attachment mechanically removing all offending nail and debris. Patient relates relief following the procedure. No anesthesia or hemostasis required. No blood loss.     With patient's verbal consent the hyperkeratotic lesion x3 left foot were trimmed to healthy appearing skin using a # 15 scalpel. Patient relates relief of pain following the procedure. Patient tolerated the procedure well without complications. No anesthesia or hemostasis required. No blood loss.    For neuropathy symptoms I recommend increasing to 400 mg every morning and afternoon and increasing to 800 mg at night before bed, will let me know how this is going if we need to we can increase to 600 mg TID. Discussed if it is still bad I recommend seeing pain clinic for other options    Return in 3 months routine care    Shola Dawson DPM

## 2022-02-01 ENCOUNTER — OFFICE VISIT (OUTPATIENT)
Dept: OPHTHALMOLOGY | Facility: CLINIC | Age: 82
End: 2022-02-01
Payer: MEDICARE

## 2022-02-01 DIAGNOSIS — Z96.1 PSEUDOPHAKIA OF BOTH EYES: ICD-10-CM

## 2022-02-01 DIAGNOSIS — H35.3221 EXUDATIVE AGE-RELATED MACULAR DEGENERATION OF LEFT EYE WITH ACTIVE CHOROIDAL NEOVASCULARIZATION: ICD-10-CM

## 2022-02-01 DIAGNOSIS — H52.7 REFRACTIVE ERROR: ICD-10-CM

## 2022-02-01 DIAGNOSIS — H04.123 DRY EYES, BILATERAL: ICD-10-CM

## 2022-02-01 DIAGNOSIS — H40.1233 LOW-TENSION GLAUCOMA OF BOTH EYES, SEVERE STAGE: Primary | ICD-10-CM

## 2022-02-01 DIAGNOSIS — H35.3211 EXUDATIVE AGE-RELATED MACULAR DEGENERATION OF RIGHT EYE WITH ACTIVE CHOROIDAL NEOVASCULARIZATION: ICD-10-CM

## 2022-02-01 PROCEDURE — 99999 PR PBB SHADOW E&M-EST. PATIENT-LVL III: CPT | Mod: PBBFAC,HCNC,, | Performed by: OPHTHALMOLOGY

## 2022-02-01 PROCEDURE — 1101F PR PT FALLS ASSESS DOC 0-1 FALLS W/OUT INJ PAST YR: ICD-10-PCS | Mod: HCNC,CPTII,S$GLB, | Performed by: OPHTHALMOLOGY

## 2022-02-01 PROCEDURE — 1126F AMNT PAIN NOTED NONE PRSNT: CPT | Mod: HCNC,CPTII,S$GLB, | Performed by: OPHTHALMOLOGY

## 2022-02-01 PROCEDURE — 1157F PR ADVANCE CARE PLAN OR EQUIV PRESENT IN MEDICAL RECORD: ICD-10-PCS | Mod: HCNC,CPTII,S$GLB, | Performed by: OPHTHALMOLOGY

## 2022-02-01 PROCEDURE — 99213 PR OFFICE/OUTPT VISIT, EST, LEVL III, 20-29 MIN: ICD-10-PCS | Mod: HCNC,S$GLB,, | Performed by: OPHTHALMOLOGY

## 2022-02-01 PROCEDURE — 1101F PT FALLS ASSESS-DOCD LE1/YR: CPT | Mod: HCNC,CPTII,S$GLB, | Performed by: OPHTHALMOLOGY

## 2022-02-01 PROCEDURE — 99213 OFFICE O/P EST LOW 20 MIN: CPT | Mod: HCNC,S$GLB,, | Performed by: OPHTHALMOLOGY

## 2022-02-01 PROCEDURE — 1159F MED LIST DOCD IN RCRD: CPT | Mod: HCNC,CPTII,S$GLB, | Performed by: OPHTHALMOLOGY

## 2022-02-01 PROCEDURE — 1160F RVW MEDS BY RX/DR IN RCRD: CPT | Mod: HCNC,CPTII,S$GLB, | Performed by: OPHTHALMOLOGY

## 2022-02-01 PROCEDURE — 1157F ADVNC CARE PLAN IN RCRD: CPT | Mod: HCNC,CPTII,S$GLB, | Performed by: OPHTHALMOLOGY

## 2022-02-01 PROCEDURE — 1160F PR REVIEW ALL MEDS BY PRESCRIBER/CLIN PHARMACIST DOCUMENTED: ICD-10-PCS | Mod: HCNC,CPTII,S$GLB, | Performed by: OPHTHALMOLOGY

## 2022-02-01 PROCEDURE — 1159F PR MEDICATION LIST DOCUMENTED IN MEDICAL RECORD: ICD-10-PCS | Mod: HCNC,CPTII,S$GLB, | Performed by: OPHTHALMOLOGY

## 2022-02-01 PROCEDURE — 3288F FALL RISK ASSESSMENT DOCD: CPT | Mod: HCNC,CPTII,S$GLB, | Performed by: OPHTHALMOLOGY

## 2022-02-01 PROCEDURE — 3288F PR FALLS RISK ASSESSMENT DOCUMENTED: ICD-10-PCS | Mod: HCNC,CPTII,S$GLB, | Performed by: OPHTHALMOLOGY

## 2022-02-01 PROCEDURE — 1126F PR PAIN SEVERITY QUANTIFIED, NO PAIN PRESENT: ICD-10-PCS | Mod: HCNC,CPTII,S$GLB, | Performed by: OPHTHALMOLOGY

## 2022-02-01 PROCEDURE — 99999 PR PBB SHADOW E&M-EST. PATIENT-LVL III: ICD-10-PCS | Mod: PBBFAC,HCNC,, | Performed by: OPHTHALMOLOGY

## 2022-02-01 NOTE — PROGRESS NOTES
HPI     Glaucoma      Additional comments: 4 month iop ck              Comments     DLS: 1/10/22    Pt states no change in va since last seen.     Gtts: Latanoprost QHS, Cosopt BID OU          Last edited by Ashish Fragoso Jr., MD on 2/1/2022  8:10 AM. (History)        ROS     Negative for: Constitutional, Gastrointestinal, Neurological, Skin,   Genitourinary, Musculoskeletal, HENT, Endocrine, Cardiovascular, Eyes,   Respiratory, Psychiatric, Allergic/Imm, Heme/Lymph    Last edited by Ashish Fragoso Jr., MD on 2/1/2022  8:01 AM. (History)        Assessment /Plan     For exam results, see Encounter Report.    Low-tension glaucoma of both eyes, severe stage    Exudative age-related macular degeneration of right eye with active choroidal neovascularization    Exudative age-related macular degeneration of left eye with active choroidal neovascularization    Dry eyes, bilateral    Pseudophakia of both eyes    Refractive error      IOP OD 10 OS 8, doing well  Continue latanoprost qhs ou and cosopt bid OU  Continue AREDS2 1 tab BID  Continue to follow with retina for wet armd  Continue daily eye lubricants OU  Follow up in about 4 months (around 6/1/2022) for IOP and Medication check.

## 2022-02-14 ENCOUNTER — PROCEDURE VISIT (OUTPATIENT)
Dept: OPHTHALMOLOGY | Facility: CLINIC | Age: 82
End: 2022-02-14
Payer: MEDICARE

## 2022-02-14 DIAGNOSIS — H35.3211 EXUDATIVE AGE-RELATED MACULAR DEGENERATION OF RIGHT EYE WITH ACTIVE CHOROIDAL NEOVASCULARIZATION: Primary | ICD-10-CM

## 2022-02-14 DIAGNOSIS — H35.3221 EXUDATIVE AGE-RELATED MACULAR DEGENERATION OF LEFT EYE WITH ACTIVE CHOROIDAL NEOVASCULARIZATION: ICD-10-CM

## 2022-02-14 DIAGNOSIS — H35.3112 NONEXUDATIVE AGE-RELATED MACULAR DEGENERATION, RIGHT EYE, INTERMEDIATE DRY STAGE: ICD-10-CM

## 2022-02-14 PROCEDURE — 92014 COMPRE OPH EXAM EST PT 1/>: CPT | Mod: 25,HCNC,S$GLB, | Performed by: OPHTHALMOLOGY

## 2022-02-14 PROCEDURE — 92134 POSTERIOR SEGMENT OCT RETINA (OCULAR COHERENCE TOMOGRAPHY)-BOTH EYES: ICD-10-PCS | Mod: HCNC,S$GLB,, | Performed by: OPHTHALMOLOGY

## 2022-02-14 PROCEDURE — 92014 PR EYE EXAM, EST PATIENT,COMPREHESV: ICD-10-PCS | Mod: 25,HCNC,S$GLB, | Performed by: OPHTHALMOLOGY

## 2022-02-14 PROCEDURE — 67028 INJECTION EYE DRUG: CPT | Mod: HCNC,RT,S$GLB, | Performed by: OPHTHALMOLOGY

## 2022-02-14 PROCEDURE — 92134 CPTRZ OPH DX IMG PST SGM RTA: CPT | Mod: HCNC,S$GLB,, | Performed by: OPHTHALMOLOGY

## 2022-02-14 PROCEDURE — 67028 PR INJECT INTRAVITREAL PHARMCOLOGIC: ICD-10-PCS | Mod: HCNC,RT,S$GLB, | Performed by: OPHTHALMOLOGY

## 2022-02-14 RX ADMIN — Medication 1.25 MG: at 09:02

## 2022-02-14 NOTE — PROGRESS NOTES
HPI     Macular Degeneration      Additional comments: 1 mon amd chk              Comments       Eye Med(s) - Latanoprost QHS - OU            Cosopt BID OU              Systane PRN           OCT -OS stable cicatrix  OD - new SRF      A/P    1. Wet AMD OS  S/p Avastin OS x 15, Eylea x 14    Persistent SRF OS - improving  With RPE tear OS  Central fibrosis with atrophy - poor central potential  10/17 - increased SRH - will keep at 8 weeks for now  9/18 - stable cicatrix - try observation  12/18 - no activity  6/21 - some heme over cicatrix OS      2. New disease OD  Sx started 9/21  S/p Avastin OD x 4    Avastin OD today    Re approve Eylea - pt says she is approved    3. PCIOL OU  CTR OS - but saw 20/30 with correction, in spite of sub RPE fibrosis      4. POAG OU  Good IOP control on Cosopt, brimonidine, latanoprost OU  Small drance heme OD    5. Floaters OU        6-7 weeks OCT no dilate      Risks, benefits, and alternatives to treatment discussed in detail with the patient.  The patient voiced understanding and wished to proceed with the procedure    Injection Procedure Note:  Diagnosis: Wet AMD OD    Patient Identified and Time Out complete  Pt marked  Topical Proparacaine and Betadine.  Inject Avastin OD at 6:00 @ 3.5-4mm posterior to limbus  Post Operative Dx: Same  Complications: None  Follow up as above.

## 2022-02-14 NOTE — PATIENT INSTRUCTIONS

## 2022-02-21 ENCOUNTER — OFFICE VISIT (OUTPATIENT)
Dept: FAMILY MEDICINE | Facility: CLINIC | Age: 82
End: 2022-02-21
Payer: MEDICARE

## 2022-02-21 VITALS
BODY MASS INDEX: 25.71 KG/M2 | HEART RATE: 53 BPM | DIASTOLIC BLOOD PRESSURE: 74 MMHG | WEIGHT: 154.31 LBS | SYSTOLIC BLOOD PRESSURE: 132 MMHG | HEIGHT: 65 IN | OXYGEN SATURATION: 95 %

## 2022-02-21 DIAGNOSIS — H35.30 MACULAR DEGENERATION, UNSPECIFIED LATERALITY, UNSPECIFIED TYPE: Primary | ICD-10-CM

## 2022-02-21 DIAGNOSIS — F43.21 GRIEF: ICD-10-CM

## 2022-02-21 PROCEDURE — 99999 PR PBB SHADOW E&M-EST. PATIENT-LVL IV: ICD-10-PCS | Mod: PBBFAC,HCNC,, | Performed by: FAMILY MEDICINE

## 2022-02-21 PROCEDURE — 3078F DIAST BP <80 MM HG: CPT | Mod: HCNC,CPTII,S$GLB, | Performed by: FAMILY MEDICINE

## 2022-02-21 PROCEDURE — 1159F MED LIST DOCD IN RCRD: CPT | Mod: HCNC,CPTII,S$GLB, | Performed by: FAMILY MEDICINE

## 2022-02-21 PROCEDURE — 3288F FALL RISK ASSESSMENT DOCD: CPT | Mod: HCNC,CPTII,S$GLB, | Performed by: FAMILY MEDICINE

## 2022-02-21 PROCEDURE — 1160F RVW MEDS BY RX/DR IN RCRD: CPT | Mod: HCNC,CPTII,S$GLB, | Performed by: FAMILY MEDICINE

## 2022-02-21 PROCEDURE — 99999 PR PBB SHADOW E&M-EST. PATIENT-LVL IV: CPT | Mod: PBBFAC,HCNC,, | Performed by: FAMILY MEDICINE

## 2022-02-21 PROCEDURE — 3075F SYST BP GE 130 - 139MM HG: CPT | Mod: HCNC,CPTII,S$GLB, | Performed by: FAMILY MEDICINE

## 2022-02-21 PROCEDURE — 1101F PT FALLS ASSESS-DOCD LE1/YR: CPT | Mod: HCNC,CPTII,S$GLB, | Performed by: FAMILY MEDICINE

## 2022-02-21 PROCEDURE — 1157F ADVNC CARE PLAN IN RCRD: CPT | Mod: HCNC,CPTII,S$GLB, | Performed by: FAMILY MEDICINE

## 2022-02-21 PROCEDURE — 3288F PR FALLS RISK ASSESSMENT DOCUMENTED: ICD-10-PCS | Mod: HCNC,CPTII,S$GLB, | Performed by: FAMILY MEDICINE

## 2022-02-21 PROCEDURE — 1157F PR ADVANCE CARE PLAN OR EQUIV PRESENT IN MEDICAL RECORD: ICD-10-PCS | Mod: HCNC,CPTII,S$GLB, | Performed by: FAMILY MEDICINE

## 2022-02-21 PROCEDURE — 3075F PR MOST RECENT SYSTOLIC BLOOD PRESS GE 130-139MM HG: ICD-10-PCS | Mod: HCNC,CPTII,S$GLB, | Performed by: FAMILY MEDICINE

## 2022-02-21 PROCEDURE — 1159F PR MEDICATION LIST DOCUMENTED IN MEDICAL RECORD: ICD-10-PCS | Mod: HCNC,CPTII,S$GLB, | Performed by: FAMILY MEDICINE

## 2022-02-21 PROCEDURE — 1160F PR REVIEW ALL MEDS BY PRESCRIBER/CLIN PHARMACIST DOCUMENTED: ICD-10-PCS | Mod: HCNC,CPTII,S$GLB, | Performed by: FAMILY MEDICINE

## 2022-02-21 PROCEDURE — 3078F PR MOST RECENT DIASTOLIC BLOOD PRESSURE < 80 MM HG: ICD-10-PCS | Mod: HCNC,CPTII,S$GLB, | Performed by: FAMILY MEDICINE

## 2022-02-21 PROCEDURE — 99214 OFFICE O/P EST MOD 30 MIN: CPT | Mod: HCNC,S$GLB,, | Performed by: FAMILY MEDICINE

## 2022-02-21 PROCEDURE — 99214 PR OFFICE/OUTPT VISIT, EST, LEVL IV, 30-39 MIN: ICD-10-PCS | Mod: HCNC,S$GLB,, | Performed by: FAMILY MEDICINE

## 2022-02-21 PROCEDURE — 1101F PR PT FALLS ASSESS DOC 0-1 FALLS W/OUT INJ PAST YR: ICD-10-PCS | Mod: HCNC,CPTII,S$GLB, | Performed by: FAMILY MEDICINE

## 2022-02-21 NOTE — PROGRESS NOTES
Subjective:       Patient ID: Serenity Epps is a 81 y.o. female    Chief Complaint: Annual Exam    HPI  Here today for interval evaluation  Her primary issue is worsening of her loss of vision related to macular degeneration.  She has become progressively more isolated as a result.  This issue coupled with the loss of 3 sons has led to worsening grief.    Review of Systems     Objective:   Physical Exam  Vitals reviewed.   Constitutional:       Appearance: She is well-developed.   Neurological:      Mental Status: She is alert and oriented to person, place, and time.   Psychiatric:         Attention and Perception: Attention normal.         Mood and Affect: Affect is tearful.         Speech: Speech normal.         Behavior: Behavior normal.         Thought Content: Thought content normal.         Cognition and Memory: Cognition normal.          Assessment:       1. Macular degeneration, unspecified laterality, unspecified type     2. Grief  Ambulatory referral/consult to Psychology        Plan:       Macular degeneration, unspecified laterality, unspecified type  - Discussed vision impairment resources, will continue to work with Robbie.    - Will add Outpatient Case Management referral    Grief  -     Ambulatory referral/consult to Psychology; Future; Expected date: 02/28/2022

## 2022-02-25 ENCOUNTER — OUTPATIENT CASE MANAGEMENT (OUTPATIENT)
Dept: ADMINISTRATIVE | Facility: OTHER | Age: 82
End: 2022-02-25
Payer: MEDICARE

## 2022-02-25 NOTE — PROGRESS NOTES
[facility]  attempted to complete low risk assessment with pt due to referral from Dr. Andersen on 2/21/22.  Pt reports she has had a lot of loss in her life with the death of 3 sons and now losing her vision.  Pt is involved with [employer].  Referral to [facility] psychology on 2/21/22 and pt reports she has not heard anything about this referral yet.   informed that there could be a long waiting list to get in to see [facility] psych.  This  offered other mental health resources in the community.  Pt admits that she is fine waiting for an [facility] appt in that she likes the [facility] system.  Pt reports no other social work needs.  Will close case at this time.

## 2022-03-17 DIAGNOSIS — M25.561 ACUTE PAIN OF RIGHT KNEE: Primary | ICD-10-CM

## 2022-03-23 ENCOUNTER — HOSPITAL ENCOUNTER (OUTPATIENT)
Dept: RADIOLOGY | Facility: HOSPITAL | Age: 82
Discharge: HOME | End: 2022-03-23
Attending: ORTHOPAEDIC SURGERY
Payer: MEDICARE

## 2022-03-23 ENCOUNTER — OFFICE VISIT (OUTPATIENT)
Dept: ORTHOPEDICS | Facility: CLINIC | Age: 82
End: 2022-03-23
Payer: MEDICARE

## 2022-03-23 VITALS — HEIGHT: 65 IN | BODY MASS INDEX: 25.71 KG/M2 | WEIGHT: 154.31 LBS

## 2022-03-23 DIAGNOSIS — M17.11 OSTEOARTHRITIS OF RIGHT KNEE, UNSPECIFIED OSTEOARTHRITIS TYPE: ICD-10-CM

## 2022-03-23 DIAGNOSIS — M25.561 ACUTE PAIN OF RIGHT KNEE: ICD-10-CM

## 2022-03-23 DIAGNOSIS — M25.561 CHRONIC PAIN OF RIGHT KNEE: Primary | ICD-10-CM

## 2022-03-23 DIAGNOSIS — G89.29 CHRONIC PAIN OF RIGHT KNEE: Primary | ICD-10-CM

## 2022-03-23 PROCEDURE — 99999 PR PBB SHADOW E&M-EST. PATIENT-LVL III: ICD-10-PCS | Mod: PBBFAC,,, | Performed by: ORTHOPAEDIC SURGERY

## 2022-03-23 PROCEDURE — 3288F PR FALLS RISK ASSESSMENT DOCUMENTED: ICD-10-PCS | Mod: CPTII,S$GLB,, | Performed by: ORTHOPAEDIC SURGERY

## 2022-03-23 PROCEDURE — 1160F PR REVIEW ALL MEDS BY PRESCRIBER/CLIN PHARMACIST DOCUMENTED: ICD-10-PCS | Mod: CPTII,S$GLB,, | Performed by: ORTHOPAEDIC SURGERY

## 2022-03-23 PROCEDURE — 1157F PR ADVANCE CARE PLAN OR EQUIV PRESENT IN MEDICAL RECORD: ICD-10-PCS | Mod: CPTII,S$GLB,, | Performed by: ORTHOPAEDIC SURGERY

## 2022-03-23 PROCEDURE — 1160F RVW MEDS BY RX/DR IN RCRD: CPT | Mod: CPTII,S$GLB,, | Performed by: ORTHOPAEDIC SURGERY

## 2022-03-23 PROCEDURE — 20610 LARGE JOINT ASPIRATION/INJECTION: R KNEE: ICD-10-PCS | Mod: RT,S$GLB,, | Performed by: ORTHOPAEDIC SURGERY

## 2022-03-23 PROCEDURE — 73560 XR KNEE ORTHO RIGHT: ICD-10-PCS | Mod: 26,LT,, | Performed by: RADIOLOGY

## 2022-03-23 PROCEDURE — 73562 XR KNEE ORTHO RIGHT: ICD-10-PCS | Mod: 26,RT,, | Performed by: RADIOLOGY

## 2022-03-23 PROCEDURE — 1125F AMNT PAIN NOTED PAIN PRSNT: CPT | Mod: CPTII,S$GLB,, | Performed by: ORTHOPAEDIC SURGERY

## 2022-03-23 PROCEDURE — 1101F PR PT FALLS ASSESS DOC 0-1 FALLS W/OUT INJ PAST YR: ICD-10-PCS | Mod: CPTII,S$GLB,, | Performed by: ORTHOPAEDIC SURGERY

## 2022-03-23 PROCEDURE — 1159F MED LIST DOCD IN RCRD: CPT | Mod: CPTII,S$GLB,, | Performed by: ORTHOPAEDIC SURGERY

## 2022-03-23 PROCEDURE — 99204 PR OFFICE/OUTPT VISIT, NEW, LEVL IV, 45-59 MIN: ICD-10-PCS | Mod: 25,S$GLB,, | Performed by: ORTHOPAEDIC SURGERY

## 2022-03-23 PROCEDURE — 99204 OFFICE O/P NEW MOD 45 MIN: CPT | Mod: 25,S$GLB,, | Performed by: ORTHOPAEDIC SURGERY

## 2022-03-23 PROCEDURE — 1125F PR PAIN SEVERITY QUANTIFIED, PAIN PRESENT: ICD-10-PCS | Mod: CPTII,S$GLB,, | Performed by: ORTHOPAEDIC SURGERY

## 2022-03-23 PROCEDURE — 1101F PT FALLS ASSESS-DOCD LE1/YR: CPT | Mod: CPTII,S$GLB,, | Performed by: ORTHOPAEDIC SURGERY

## 2022-03-23 PROCEDURE — 73562 X-RAY EXAM OF KNEE 3: CPT | Mod: TC,PO,RT

## 2022-03-23 PROCEDURE — 73562 X-RAY EXAM OF KNEE 3: CPT | Mod: 26,RT,, | Performed by: RADIOLOGY

## 2022-03-23 PROCEDURE — 73560 X-RAY EXAM OF KNEE 1 OR 2: CPT | Mod: 26,LT,, | Performed by: RADIOLOGY

## 2022-03-23 PROCEDURE — 99999 PR PBB SHADOW E&M-EST. PATIENT-LVL III: CPT | Mod: PBBFAC,,, | Performed by: ORTHOPAEDIC SURGERY

## 2022-03-23 PROCEDURE — 20610 DRAIN/INJ JOINT/BURSA W/O US: CPT | Mod: RT,S$GLB,, | Performed by: ORTHOPAEDIC SURGERY

## 2022-03-23 PROCEDURE — 73560 X-RAY EXAM OF KNEE 1 OR 2: CPT | Mod: 59,TC,PO,LT

## 2022-03-23 PROCEDURE — 3288F FALL RISK ASSESSMENT DOCD: CPT | Mod: CPTII,S$GLB,, | Performed by: ORTHOPAEDIC SURGERY

## 2022-03-23 PROCEDURE — 1159F PR MEDICATION LIST DOCUMENTED IN MEDICAL RECORD: ICD-10-PCS | Mod: CPTII,S$GLB,, | Performed by: ORTHOPAEDIC SURGERY

## 2022-03-23 PROCEDURE — 1157F ADVNC CARE PLAN IN RCRD: CPT | Mod: CPTII,S$GLB,, | Performed by: ORTHOPAEDIC SURGERY

## 2022-03-23 RX ORDER — TRIAMCINOLONE ACETONIDE 40 MG/ML
40 INJECTION, SUSPENSION INTRA-ARTICULAR; INTRAMUSCULAR
Status: DISCONTINUED | OUTPATIENT
Start: 2022-03-23 | End: 2022-03-23 | Stop reason: HOSPADM

## 2022-03-23 RX ADMIN — TRIAMCINOLONE ACETONIDE 40 MG: 40 INJECTION, SUSPENSION INTRA-ARTICULAR; INTRAMUSCULAR at 08:03

## 2022-03-23 NOTE — PROGRESS NOTES
81 y.o. year old presents to the clinic today with recurring pain in the right knee.  Patient has had Kenlaog injections in the past and responded well.  Last injection was 36 months ago. Patient is requesting injection today into right knee    Exam shows tenderness at the joint line without signs of infection or instability    X-rays show arthritic changes    Assessment:  right knee arthrosis    Plan:  Kenlaog into the right knee.  Encourage strengthening over time.  Followup as needed.      Imaging studies ordered and reviewed by me    Further History  Aching pain  Worse with activity  Relieved with rest  No other associated symptoms  No other radiation    Further Exam  Alert and oriented  Pleasant  Contralateral limb has appropriate range of motion for age and condition  Contralateral limb has appropriate strength for age and condition  Contralateral limb has appropriate stability  for age and condition  No adenopathy  Pulses are appropriate for current condition  Skin is intact        Chief Complaint    Chief Complaint   Patient presents with    Right Knee - Pain       HPI  Serenity Epps is a 81 y.o.  female who presents with       Past Medical History  Past Medical History:   Diagnosis Date    Anxiety     Arthritis     Cataract - Both Eyes     OU done//    CKD (chronic kidney disease), stage II 09/21/2016    pt denies    Diarrhea     Diverticulosis     DVT (deep venous thrombosis)     left leg, after childbirth    Exudative age-related macular degeneration of left eye 2/20/2014    Glaucoma     Hypertension     Irritable bowel syndrome     Left foot pain     chronic    Lymphocytic colitis     Macular degeneration     bimonthly intraoccular injections    Osteopenia     Recurrent major depressive disorder 4/27/2018    Rhinitis, chronic     Strabismus     as child       Past Surgical History  Past Surgical History:   Procedure Laterality Date    APPENDECTOMY      age 38    CATARACT EXTRACTION       OU done//    CATARACT EXTRACTION W/ INTRAOCULAR LENS  IMPLANT, BILATERAL Bilateral 2016    COLONOSCOPY  9/26/2006  Shane    Diverticulosis.   Internal small hemorrhoids were found.     COLONOSCOPY  10/03/2012    Dr. Lynn, repeat in 7-8 years for surveillance    COLONOSCOPY N/A 5/2/2018    COLONOSCOPY  05/02/2018    Procedure: COLONOSCOPY;  Surgeon: Toby Lynn Jr., MD;  Location: [facility];  Service: Endoscopy;  Laterality: N/A;    EYE SURGERY Bilateral     Phaco with IOL (cataract extraction), rigth:[id number] 22.0 d//    FOOT HARDWARE REMOVAL Left 06/01/2016    Dr. Hammonds    FOOT SURGERY Left 2014    ERG and open reduction tallo tarsal dislocation (hyprocure implant)    JOINT REPLACEMENT      PARTIAL HYSTERECTOMY      age 38, ovaries conserved    PIP JOINT FUSION Right 06/01/2016    2nd-4th PIP joints of right foot; Dr. 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  Patulous lower esophageal sphincter.   Gastric mucosal atrophy.   PARAG Test  Negative.     VEIN LIGATION  1964    BLE       Medications  Current Outpatient Medications   Medication Sig    azelastine (ASTELIN) 137 mcg (0.1 %) nasal spray USE 1 SPRAY IN EACH NOSTRIL TWICE DAILY    BENEFIBER, WHEAT DEXTRIN, ORAL Take by mouth 2 (two) times daily.    biotin 1 mg tablet Take 1,000 mcg by mouth every evening.     calcium carbonate (OS-BLAKE) 600 mg (1,500 mg) Tab Take 600 mg by mouth 2 (two) times daily with meals.    CLEOCIN 150 mg/mL injection     docusate sodium (COLACE) 100 MG capsule Take 100 mg by mouth 2 (two) times daily.    dorzolamide-timolol 2-0.5% (COSOPT) 22.3-6.8 mg/mL ophthalmic solution INSTILL 1 DROP INTO BOTH EYES TWICE DAILY    fluticasone propionate (FLONASE) 50 mcg/actuation nasal spray 2 sprays (100 mcg total) by Each Nostril route once daily.    gabapentin  (NEURONTIN) 400 MG capsule Take 1 capsule (400 mg total) by mouth 3 (three) times daily.    Lactobacillus rhamnosus GG (CULTURELLE) 10 billion cell capsule Take 1 capsule by mouth once daily.    meloxicam (MOBIC) 7.5 MG tablet Take 1 tablet (7.5 mg total) by mouth once daily.    metoprolol succinate (TOPROL-XL) 25 MG 24 hr tablet TAKE 1 TABLET TWICE DAILY    mirabegron (MYRBETRIQ) 25 mg Tb24 ER tablet Take 1 tablet (25 mg total) by mouth once daily.    mirtazapine (REMERON) 15 MG tablet Take 1 tablet (15 mg total) by mouth every evening.    PREVIDENT 1.1 % Gel     VIT C/E/ZN/COPPR/LUTEIN/ZEAXAN (PRESERVISION AREDS 2 ORAL) Take 1 capsule by mouth 2 (two) times daily.     latanoprost 0.005 % ophthalmic solution Place 1 drop into both eyes every evening. INSTILL 1 DROP INTO EACH EYE IN THE EVENING     Current Facility-Administered Medications   Medication    bevacizumab (AVASTIN) 2.5 mg/0.10 mL 1.25 mg       Allergies  Review of patient's allergies indicates:   Allergen Reactions    Brimonidine Other (See Comments)     Burning and redness    Clindamycin Diarrhea       Family History  Family History   Problem Relation Age of Onset    No Known Problems Brother     Breast cancer Daughter     Cancer Daughter         breast    Coronary artery disease Mother 78    Glaucoma Mother     Diverticulitis Father     No Known Problems Maternal Grandmother     No Known Problems Maternal Grandfather     No Known Problems Paternal Grandmother     No Known Problems Paternal Grandfather     Glaucoma Brother     Ankylosing spondylitis Brother     Diabetes Brother     Macular degeneration Brother          twin brother wet mac//    Cancer Brother         prostate    Crohn's disease Brother     Amblyopia Neg Hx     Blindness Neg Hx     Cataracts Neg Hx     Hypertension Neg Hx     Strabismus Neg Hx     Stroke Neg Hx     Thyroid disease Neg Hx     Retinal detachment Neg Hx     Celiac disease Neg Hx         Social History  Social History     Socioeconomic History    Marital status:    Tobacco Use    Smoking status: Former Smoker     Packs/day: 2.00     Years: 35.00     Pack years: 70.00     Types: Cigarettes     Quit date: 2002     Years since quittin.2    Smokeless tobacco: Never Used   Substance and Sexual Activity    Alcohol use: Yes     Alcohol/week: 14.0 standard drinks     Types: 14 Glasses of wine per week     Comment: 2-3 glasses per night    Drug use: Yes     Types: Benzodiazepines    Sexual activity: Yes     Partners: Male     Social Determinants of Health     Financial Resource Strain: Low Risk     Difficulty of Paying Living Expenses: Not hard at all   Food Insecurity: Unknown    Ran Out of Food in the Last Year: Never true   Transportation Needs: Unknown    Lack of Transportation (Medical): No   Physical Activity: Unknown    Minutes of Exercise per Session: 60 min   Social Connections: Unknown    Frequency of Social Gatherings with Friends and Family: Once a week    Attends Club or Organization Meetings: Never    Marital Status:    Housing Stability: Unknown    Unable to Pay for Housing in the Last Year: No    Number of Places Lived in the Last Year: 1               Review of Systems     Constitutional: Negative    HENT: Negative  Eyes: Negative  Respiratory: Negative  Cardiovascular: Negative  Musculoskeletal: HPI  Skin: Negative  Neurological: Negative  Hematological: Negative  Endocrine: Negative                 Physical Exam    There were no vitals filed for this visit.  Body mass index is 25.68 kg/m².  Physical Examination:     General appearance -  well appearing, and in no distress  Mental status - awake  Neck - supple  Chest -  symmetric air entry  Heart - normal rate   Abdomen - soft      Assessment     1. Chronic pain of right knee    2. Osteoarthritis of right knee, unspecified osteoarthritis type          Plan

## 2022-03-23 NOTE — PROCEDURES
Large Joint Aspiration/Injection: R knee    Date/Time: 3/23/2022 8:45 AM  Performed by: Kamaljit Mane MD  Authorized by: Kamaljit Mane MD     Consent Done?:  Yes (Verbal)  Timeout: prior to procedure the correct patient, procedure, and site was verified    Prep: patient was prepped and draped in usual sterile fashion      Details:  Needle Size:  21 G  Approach:  Anterolateral  Location:  Knee  Site:  R knee  Medications:  40 mg triamcinolone acetonide 40 mg/mL  Patient tolerance:  Patient tolerated the procedure well with no immediate complications

## 2022-04-04 ENCOUNTER — PROCEDURE VISIT (OUTPATIENT)
Dept: OPHTHALMOLOGY | Facility: CLINIC | Age: 82
End: 2022-04-04
Payer: MEDICARE

## 2022-04-04 DIAGNOSIS — H35.3221 EXUDATIVE AGE-RELATED MACULAR DEGENERATION OF LEFT EYE WITH ACTIVE CHOROIDAL NEOVASCULARIZATION: ICD-10-CM

## 2022-04-04 DIAGNOSIS — H35.3211 EXUDATIVE AGE-RELATED MACULAR DEGENERATION OF RIGHT EYE WITH ACTIVE CHOROIDAL NEOVASCULARIZATION: Primary | ICD-10-CM

## 2022-04-04 PROCEDURE — 92012 PR EYE EXAM, EST PATIENT,INTERMED: ICD-10-PCS | Mod: 25,S$GLB,, | Performed by: OPHTHALMOLOGY

## 2022-04-04 PROCEDURE — 92134 POSTERIOR SEGMENT OCT RETINA (OCULAR COHERENCE TOMOGRAPHY)-BOTH EYES: ICD-10-PCS | Mod: S$GLB,,, | Performed by: OPHTHALMOLOGY

## 2022-04-04 PROCEDURE — 92134 CPTRZ OPH DX IMG PST SGM RTA: CPT | Mod: S$GLB,,, | Performed by: OPHTHALMOLOGY

## 2022-04-04 PROCEDURE — 67028 INJECTION EYE DRUG: CPT | Mod: RT,S$GLB,, | Performed by: OPHTHALMOLOGY

## 2022-04-04 PROCEDURE — 67028 PR INJECT INTRAVITREAL PHARMCOLOGIC: ICD-10-PCS | Mod: RT,S$GLB,, | Performed by: OPHTHALMOLOGY

## 2022-04-04 PROCEDURE — 92012 INTRM OPH EXAM EST PATIENT: CPT | Mod: 25,S$GLB,, | Performed by: OPHTHALMOLOGY

## 2022-04-04 NOTE — PROGRESS NOTES
HPI     Pt sts vision is not good.   Eye Med(s) - Latanoprost QHS - OU            Cosopt BID OU              Systane PRN         OCT -OD increased SRF/SRH  OS stable scar        A/P    1. Wet AMD OS  S/p Avastin OS x 15, Eylea x 14    Persistent SRF OS - improving  With RPE tear OS  Central fibrosis with atrophy - poor central potential  10/17 - increased SRH - will keep at 8 weeks for now  9/18 - stable cicatrix - try observation  12/18 - no activity  6/21 - some heme over cicatrix OS      2. New disease OD  Sx started 9/21  S/p Avastin OD x 5    Eylea OD today      3. PCIOL OU  CTR OS - but saw 20/30 with correction, in spite of sub RPE fibrosis      4. POAG OU  Good IOP control on Cosopt, brimonidine, latanoprost OU  Small drance heme OD    5. Floaters OU        6-7 weeks Eylea OD      Risks, benefits, and alternatives to treatment discussed in detail with the patient.  The patient voiced understanding and wished to proceed with the procedure    Injection Procedure Note:  Diagnosis: Wet AMD OD    Patient Identified and Time Out complete  Pt marked  Topical Proparacaine and Betadine.  Inject Eylea OD at 6:00 @ 3.5-4mm posterior to limbus  Post Operative Dx: Same  Complications: None  Follow up as above.

## 2022-04-04 NOTE — PATIENT INSTRUCTIONS

## 2022-05-09 ENCOUNTER — OFFICE VISIT (OUTPATIENT)
Dept: PODIATRY | Facility: CLINIC | Age: 82
End: 2022-05-09
Payer: MEDICARE

## 2022-05-09 ENCOUNTER — PATIENT MESSAGE (OUTPATIENT)
Dept: SMOKING CESSATION | Facility: CLINIC | Age: 82
End: 2022-05-09
Payer: MEDICARE

## 2022-05-09 DIAGNOSIS — G60.9 IDIOPATHIC PERIPHERAL NEUROPATHY: ICD-10-CM

## 2022-05-09 DIAGNOSIS — L84 CORN OR CALLUS: ICD-10-CM

## 2022-05-09 DIAGNOSIS — I73.9 PERIPHERAL VASCULAR DISEASE: Primary | ICD-10-CM

## 2022-05-09 DIAGNOSIS — B35.1 ONYCHOMYCOSIS DUE TO DERMATOPHYTE: ICD-10-CM

## 2022-05-09 PROCEDURE — 99499 NO LOS: ICD-10-PCS | Mod: S$GLB,,, | Performed by: PODIATRIST

## 2022-05-09 PROCEDURE — 99999 PR PBB SHADOW E&M-EST. PATIENT-LVL III: CPT | Mod: PBBFAC,,, | Performed by: PODIATRIST

## 2022-05-09 PROCEDURE — 1159F PR MEDICATION LIST DOCUMENTED IN MEDICAL RECORD: ICD-10-PCS | Mod: CPTII,S$GLB,, | Performed by: PODIATRIST

## 2022-05-09 PROCEDURE — 1126F PR PAIN SEVERITY QUANTIFIED, NO PAIN PRESENT: ICD-10-PCS | Mod: CPTII,S$GLB,, | Performed by: PODIATRIST

## 2022-05-09 PROCEDURE — 11056 PR TRIM BENIGN HYPERKERATOTIC SKIN LESION,2-4: ICD-10-PCS | Mod: Q9,S$GLB,, | Performed by: PODIATRIST

## 2022-05-09 PROCEDURE — 11721 DEBRIDE NAIL 6 OR MORE: CPT | Mod: 59,Q9,S$GLB, | Performed by: PODIATRIST

## 2022-05-09 PROCEDURE — 3288F PR FALLS RISK ASSESSMENT DOCUMENTED: ICD-10-PCS | Mod: CPTII,S$GLB,, | Performed by: PODIATRIST

## 2022-05-09 PROCEDURE — 99999 PR PBB SHADOW E&M-EST. PATIENT-LVL III: ICD-10-PCS | Mod: PBBFAC,,, | Performed by: PODIATRIST

## 2022-05-09 PROCEDURE — 99499 UNLISTED E&M SERVICE: CPT | Mod: S$GLB,,, | Performed by: PODIATRIST

## 2022-05-09 PROCEDURE — 1159F MED LIST DOCD IN RCRD: CPT | Mod: CPTII,S$GLB,, | Performed by: PODIATRIST

## 2022-05-09 PROCEDURE — 11721 PR DEBRIDEMENT OF NAILS, 6 OR MORE: ICD-10-PCS | Mod: 59,Q9,S$GLB, | Performed by: PODIATRIST

## 2022-05-09 PROCEDURE — 1101F PR PT FALLS ASSESS DOC 0-1 FALLS W/OUT INJ PAST YR: ICD-10-PCS | Mod: CPTII,S$GLB,, | Performed by: PODIATRIST

## 2022-05-09 PROCEDURE — 11056 PARNG/CUTG B9 HYPRKR LES 2-4: CPT | Mod: Q9,S$GLB,, | Performed by: PODIATRIST

## 2022-05-09 PROCEDURE — 1160F PR REVIEW ALL MEDS BY PRESCRIBER/CLIN PHARMACIST DOCUMENTED: ICD-10-PCS | Mod: CPTII,S$GLB,, | Performed by: PODIATRIST

## 2022-05-09 PROCEDURE — 3288F FALL RISK ASSESSMENT DOCD: CPT | Mod: CPTII,S$GLB,, | Performed by: PODIATRIST

## 2022-05-09 PROCEDURE — 1101F PT FALLS ASSESS-DOCD LE1/YR: CPT | Mod: CPTII,S$GLB,, | Performed by: PODIATRIST

## 2022-05-09 PROCEDURE — 1160F RVW MEDS BY RX/DR IN RCRD: CPT | Mod: CPTII,S$GLB,, | Performed by: PODIATRIST

## 2022-05-09 PROCEDURE — 1157F PR ADVANCE CARE PLAN OR EQUIV PRESENT IN MEDICAL RECORD: ICD-10-PCS | Mod: CPTII,S$GLB,, | Performed by: PODIATRIST

## 2022-05-09 PROCEDURE — 1157F ADVNC CARE PLAN IN RCRD: CPT | Mod: CPTII,S$GLB,, | Performed by: PODIATRIST

## 2022-05-09 PROCEDURE — 1126F AMNT PAIN NOTED NONE PRSNT: CPT | Mod: CPTII,S$GLB,, | Performed by: PODIATRIST

## 2022-05-09 NOTE — PROGRESS NOTES
Subjective:      Patient ID: Serenity Epps is a 81 y.o. female.    Chief Complaint: Nail Care    Serenity is a 81 y.o. female who presents to the clinic for evaluation and treatment of high risk feet. Serenity has a past medical history of Anxiety, Arthritis, Cataract - Both Eyes, CKD (chronic kidney disease), stage II (09/21/2016), Diarrhea, Diverticulosis, DVT (deep venous thrombosis), Exudative age-related macular degeneration of left eye (2/20/2014), Glaucoma, Hypertension, Irritable bowel syndrome, Left foot pain, Lymphocytic colitis, Macular degeneration, Osteopenia, Recurrent major depressive disorder (4/27/2018), Rhinitis, chronic, and Strabismus. The patient's chief complaint is routine care for high risk with PVD. Also relates that her neuropathy symptoms are getting worse and inquiring about options to help with this, mostly at night is when it is really bad          PCP: Hua Andersen MD    2/21/22    Current shoe gear:  Affected Foot: Casual shoes     Unaffected Foot: Casual shoes    History of Trauma: negative      No results found for: HGBA1C      Review of Systems   Constitutional: Negative for chills and fever.   Cardiovascular: Positive for leg swelling. Negative for claudication.   Respiratory: Negative for shortness of breath.    Skin: Positive for color change, nail changes and poor wound healing. Negative for itching and rash.   Musculoskeletal: Negative for muscle cramps, muscle weakness and myalgias.   Gastrointestinal: Negative for nausea and vomiting.   Neurological: Negative for focal weakness, loss of balance, numbness and paresthesias.           Objective:      Physical Exam  Constitutional:       General: She is not in acute distress.     Appearance: She is well-developed. She is not diaphoretic.   Cardiovascular:      Pulses:           Dorsalis pedis pulses are 1+ on the right side and 1+ on the left side.        Posterior tibial pulses are 1+ on the right side and 1+ on the left side.       Comments: < 3 sec capillary refill time to toes 1-5 bilateral. Feet are warm to touch proximally with distal cooling b/l. There is no hair growth on the feet and toes b/l. There is 1+ edema b/l. No spider veins or varicosities present b/l.     Musculoskeletal:      Comments: Equinus noted b/l ankles with < 10 deg DF noted. MMT 5/5 in DF/PF/Inv/Ev resistance with no reproduction of pain in any direction. Passive range of motion of ankle and pedal joints is painless b/l.     Skin:     General: Skin is warm and dry.      Coloration: Skin is not pale.      Findings: Erythema and lesion present. No abrasion, bruising, burn, ecchymosis, laceration, petechiae or rash.      Nails: There is no clubbing.      Comments: Skin is thin and atrophic    Left plantar first metatarsal, midfoot and heel all on the left foot there is hyperkeratotic lesions x2    Nails 1-5 bilateral are thick 3-4 mm, long 3-6 mm, and discolored with subungual debris       Neurological:      Mental Status: She is alert and oriented to person, place, and time.      Sensory: No sensory deficit.      Motor: No tremor, atrophy or abnormal muscle tone.      Comments: Negative tinel sign bilateral.   Psychiatric:         Behavior: Behavior normal.               Assessment:       Encounter Diagnoses   Name Primary?    Peripheral vascular disease Yes    Idiopathic peripheral neuropathy     Onychomycosis due to dermatophyte     Corn or callus          Plan:       Serenity was seen today for nail care.    Diagnoses and all orders for this visit:    Peripheral vascular disease    Idiopathic peripheral neuropathy    Onychomycosis due to dermatophyte    Corn or callus      I counseled the patient on her conditions, their implications and medical management.    With patient's verbal consent, nails were aggressively reduced and debrided x 10 to their soft tissue attachment mechanically removing all offending nail and debris. Patient relates relief following the  procedure. No anesthesia or hemostasis required. No blood loss.     With patient's verbal consent the hyperkeratotic lesion x2 left foot were trimmed to healthy appearing skin using a # 15 scalpel. Patient relates relief of pain following the procedure. Patient tolerated the procedure well without complications. No anesthesia or hemostasis required. No blood loss.      Return in 3 months routine care    Shola Dawson DPM

## 2022-05-16 ENCOUNTER — PROCEDURE VISIT (OUTPATIENT)
Dept: OPHTHALMOLOGY | Facility: CLINIC | Age: 82
End: 2022-05-16
Payer: MEDICARE

## 2022-05-16 DIAGNOSIS — H35.3112 NONEXUDATIVE AGE-RELATED MACULAR DEGENERATION, RIGHT EYE, INTERMEDIATE DRY STAGE: ICD-10-CM

## 2022-05-16 DIAGNOSIS — H35.3211 EXUDATIVE AGE-RELATED MACULAR DEGENERATION OF RIGHT EYE WITH ACTIVE CHOROIDAL NEOVASCULARIZATION: Primary | ICD-10-CM

## 2022-05-16 PROCEDURE — 67028 INJECTION EYE DRUG: CPT | Mod: RT,S$GLB,, | Performed by: OPHTHALMOLOGY

## 2022-05-16 PROCEDURE — 92134 POSTERIOR SEGMENT OCT RETINA (OCULAR COHERENCE TOMOGRAPHY)-BOTH EYES: ICD-10-PCS | Mod: S$GLB,,, | Performed by: OPHTHALMOLOGY

## 2022-05-16 PROCEDURE — 92012 PR EYE EXAM, EST PATIENT,INTERMED: ICD-10-PCS | Mod: 25,S$GLB,, | Performed by: OPHTHALMOLOGY

## 2022-05-16 PROCEDURE — 92134 CPTRZ OPH DX IMG PST SGM RTA: CPT | Mod: S$GLB,,, | Performed by: OPHTHALMOLOGY

## 2022-05-16 PROCEDURE — 67028 PR INJECT INTRAVITREAL PHARMCOLOGIC: ICD-10-PCS | Mod: RT,S$GLB,, | Performed by: OPHTHALMOLOGY

## 2022-05-16 PROCEDURE — 92012 INTRM OPH EXAM EST PATIENT: CPT | Mod: 25,S$GLB,, | Performed by: OPHTHALMOLOGY

## 2022-05-16 NOTE — PATIENT INSTRUCTIONS

## 2022-05-16 NOTE — PROGRESS NOTES
HPI     Patient here  today for 6 week Eylea OD. VA stable ou. No pain. No f/f.    Cosopt bid ou  Latanoprost ou qhs    Last edited by Sarah Wong on 5/16/2022  8:16 AM. (History)            OCT -OD increased SRF/SRH  OS stable scar        A/P    1. Wet AMD OS  S/p Avastin OS x 15, Eylea OS  x 14    Persistent SRF OS - improving  With RPE tear OS  Central fibrosis with atrophy - poor central potential  10/17 - increased SRH - will keep at 8 weeks for now  9/18 - stable cicatrix - try observation  12/18 - no activity  6/21 - some heme over cicatrix OS      2. New disease OD  Sx started 9/21  S/p Avastin OD x 5, Eylea OD x 1    Eylea OD today      3. PCIOL OU  CTR OS - but saw 20/30 with correction, in spite of sub RPE fibrosis      4. POAG OU  Good IOP control on Cosopt, brimonidine, latanoprost OU  Small drance heme OD    5. Floaters OU        4-5 weeks Eylea OD only      Risks, benefits, and alternatives to treatment discussed in detail with the patient.  The patient voiced understanding and wished to proceed with the procedure    Injection Procedure Note:  Diagnosis: Wet AMD OD    Patient Identified and Time Out complete  Pt marked  Topical Proparacaine and Betadine.  Inject Eylea OD at 6:00 @ 3.5-4mm posterior to limbus  Post Operative Dx: Same  Complications: None  Follow up as above.

## 2022-05-23 ENCOUNTER — OFFICE VISIT (OUTPATIENT)
Dept: FAMILY MEDICINE | Facility: CLINIC | Age: 82
End: 2022-05-23
Payer: MEDICARE

## 2022-05-23 VITALS
OXYGEN SATURATION: 96 % | HEIGHT: 65 IN | DIASTOLIC BLOOD PRESSURE: 72 MMHG | SYSTOLIC BLOOD PRESSURE: 134 MMHG | WEIGHT: 139.75 LBS | BODY MASS INDEX: 23.28 KG/M2 | HEART RATE: 54 BPM

## 2022-05-23 DIAGNOSIS — F32.1 MAJOR DEPRESSIVE DISORDER, SINGLE EPISODE, MODERATE: ICD-10-CM

## 2022-05-23 PROCEDURE — 1101F PT FALLS ASSESS-DOCD LE1/YR: CPT | Mod: CPTII,S$GLB,, | Performed by: FAMILY MEDICINE

## 2022-05-23 PROCEDURE — 1159F PR MEDICATION LIST DOCUMENTED IN MEDICAL RECORD: ICD-10-PCS | Mod: CPTII,S$GLB,, | Performed by: FAMILY MEDICINE

## 2022-05-23 PROCEDURE — 1160F RVW MEDS BY RX/DR IN RCRD: CPT | Mod: CPTII,S$GLB,, | Performed by: FAMILY MEDICINE

## 2022-05-23 PROCEDURE — 99999 PR PBB SHADOW E&M-EST. PATIENT-LVL III: ICD-10-PCS | Mod: PBBFAC,,, | Performed by: FAMILY MEDICINE

## 2022-05-23 PROCEDURE — 3075F SYST BP GE 130 - 139MM HG: CPT | Mod: CPTII,S$GLB,, | Performed by: FAMILY MEDICINE

## 2022-05-23 PROCEDURE — 1157F ADVNC CARE PLAN IN RCRD: CPT | Mod: CPTII,S$GLB,, | Performed by: FAMILY MEDICINE

## 2022-05-23 PROCEDURE — 1101F PR PT FALLS ASSESS DOC 0-1 FALLS W/OUT INJ PAST YR: ICD-10-PCS | Mod: CPTII,S$GLB,, | Performed by: FAMILY MEDICINE

## 2022-05-23 PROCEDURE — 1125F PR PAIN SEVERITY QUANTIFIED, PAIN PRESENT: ICD-10-PCS | Mod: CPTII,S$GLB,, | Performed by: FAMILY MEDICINE

## 2022-05-23 PROCEDURE — 99213 PR OFFICE/OUTPT VISIT, EST, LEVL III, 20-29 MIN: ICD-10-PCS | Mod: S$GLB,,, | Performed by: FAMILY MEDICINE

## 2022-05-23 PROCEDURE — 3288F PR FALLS RISK ASSESSMENT DOCUMENTED: ICD-10-PCS | Mod: CPTII,S$GLB,, | Performed by: FAMILY MEDICINE

## 2022-05-23 PROCEDURE — 1157F PR ADVANCE CARE PLAN OR EQUIV PRESENT IN MEDICAL RECORD: ICD-10-PCS | Mod: CPTII,S$GLB,, | Performed by: FAMILY MEDICINE

## 2022-05-23 PROCEDURE — 3075F PR MOST RECENT SYSTOLIC BLOOD PRESS GE 130-139MM HG: ICD-10-PCS | Mod: CPTII,S$GLB,, | Performed by: FAMILY MEDICINE

## 2022-05-23 PROCEDURE — 3288F FALL RISK ASSESSMENT DOCD: CPT | Mod: CPTII,S$GLB,, | Performed by: FAMILY MEDICINE

## 2022-05-23 PROCEDURE — 1160F PR REVIEW ALL MEDS BY PRESCRIBER/CLIN PHARMACIST DOCUMENTED: ICD-10-PCS | Mod: CPTII,S$GLB,, | Performed by: FAMILY MEDICINE

## 2022-05-23 PROCEDURE — 3078F PR MOST RECENT DIASTOLIC BLOOD PRESSURE < 80 MM HG: ICD-10-PCS | Mod: CPTII,S$GLB,, | Performed by: FAMILY MEDICINE

## 2022-05-23 PROCEDURE — 99499 UNLISTED E&M SERVICE: CPT | Mod: S$GLB,,, | Performed by: FAMILY MEDICINE

## 2022-05-23 PROCEDURE — 3078F DIAST BP <80 MM HG: CPT | Mod: CPTII,S$GLB,, | Performed by: FAMILY MEDICINE

## 2022-05-23 PROCEDURE — 99999 PR PBB SHADOW E&M-EST. PATIENT-LVL III: CPT | Mod: PBBFAC,,, | Performed by: FAMILY MEDICINE

## 2022-05-23 PROCEDURE — 1159F MED LIST DOCD IN RCRD: CPT | Mod: CPTII,S$GLB,, | Performed by: FAMILY MEDICINE

## 2022-05-23 PROCEDURE — 99213 OFFICE O/P EST LOW 20 MIN: CPT | Mod: S$GLB,,, | Performed by: FAMILY MEDICINE

## 2022-05-23 PROCEDURE — 99499 RISK ADDL DX/OHS AUDIT: ICD-10-PCS | Mod: S$GLB,,, | Performed by: FAMILY MEDICINE

## 2022-05-23 PROCEDURE — 1125F AMNT PAIN NOTED PAIN PRSNT: CPT | Mod: CPTII,S$GLB,, | Performed by: FAMILY MEDICINE

## 2022-05-23 NOTE — PROGRESS NOTES
Subjective:       Patient ID: Serenity Epps is a 81 y.o. female    Chief Complaint: Follow-up    HPI  Here today for interval evaluation  She has not yet seen Psychology.  She is receiving more vision support    Review of Systems     Objective:   Physical Exam  Vitals reviewed.   Constitutional:       Appearance: She is well-developed.   Neurological:      Mental Status: She is alert and oriented to person, place, and time.          Assessment:       1. Major depressive disorder, single episode, moderate          Plan:       Major depressive disorder, single episode, moderate  - Continue current therapy  - Obtain Psychology/LCSW evaluation  - Follow up in about 3 months (around 8/23/2022).

## 2022-06-01 ENCOUNTER — OFFICE VISIT (OUTPATIENT)
Dept: OPHTHALMOLOGY | Facility: CLINIC | Age: 82
End: 2022-06-01
Payer: MEDICARE

## 2022-06-01 DIAGNOSIS — H35.3221 EXUDATIVE AGE-RELATED MACULAR DEGENERATION OF LEFT EYE WITH ACTIVE CHOROIDAL NEOVASCULARIZATION: ICD-10-CM

## 2022-06-01 DIAGNOSIS — H40.1233 LOW-TENSION GLAUCOMA OF BOTH EYES, SEVERE STAGE: ICD-10-CM

## 2022-06-01 DIAGNOSIS — H04.123 DRY EYES, BILATERAL: ICD-10-CM

## 2022-06-01 DIAGNOSIS — H35.3211 EXUDATIVE AGE-RELATED MACULAR DEGENERATION OF RIGHT EYE WITH ACTIVE CHOROIDAL NEOVASCULARIZATION: Primary | ICD-10-CM

## 2022-06-01 DIAGNOSIS — H52.7 REFRACTIVE ERROR: ICD-10-CM

## 2022-06-01 DIAGNOSIS — Z96.1 PSEUDOPHAKIA OF BOTH EYES: ICD-10-CM

## 2022-06-01 PROCEDURE — 3288F FALL RISK ASSESSMENT DOCD: CPT | Mod: CPTII,S$GLB,, | Performed by: OPHTHALMOLOGY

## 2022-06-01 PROCEDURE — 99214 OFFICE O/P EST MOD 30 MIN: CPT | Mod: S$GLB,,, | Performed by: OPHTHALMOLOGY

## 2022-06-01 PROCEDURE — 1159F MED LIST DOCD IN RCRD: CPT | Mod: CPTII,S$GLB,, | Performed by: OPHTHALMOLOGY

## 2022-06-01 PROCEDURE — 1126F AMNT PAIN NOTED NONE PRSNT: CPT | Mod: CPTII,S$GLB,, | Performed by: OPHTHALMOLOGY

## 2022-06-01 PROCEDURE — 1160F PR REVIEW ALL MEDS BY PRESCRIBER/CLIN PHARMACIST DOCUMENTED: ICD-10-PCS | Mod: CPTII,S$GLB,, | Performed by: OPHTHALMOLOGY

## 2022-06-01 PROCEDURE — 3288F PR FALLS RISK ASSESSMENT DOCUMENTED: ICD-10-PCS | Mod: CPTII,S$GLB,, | Performed by: OPHTHALMOLOGY

## 2022-06-01 PROCEDURE — 1101F PT FALLS ASSESS-DOCD LE1/YR: CPT | Mod: CPTII,S$GLB,, | Performed by: OPHTHALMOLOGY

## 2022-06-01 PROCEDURE — 1157F PR ADVANCE CARE PLAN OR EQUIV PRESENT IN MEDICAL RECORD: ICD-10-PCS | Mod: CPTII,S$GLB,, | Performed by: OPHTHALMOLOGY

## 2022-06-01 PROCEDURE — 99999 PR PBB SHADOW E&M-EST. PATIENT-LVL III: CPT | Mod: PBBFAC,,, | Performed by: OPHTHALMOLOGY

## 2022-06-01 PROCEDURE — 99214 PR OFFICE/OUTPT VISIT, EST, LEVL IV, 30-39 MIN: ICD-10-PCS | Mod: S$GLB,,, | Performed by: OPHTHALMOLOGY

## 2022-06-01 PROCEDURE — 1159F PR MEDICATION LIST DOCUMENTED IN MEDICAL RECORD: ICD-10-PCS | Mod: CPTII,S$GLB,, | Performed by: OPHTHALMOLOGY

## 2022-06-01 PROCEDURE — 1101F PR PT FALLS ASSESS DOC 0-1 FALLS W/OUT INJ PAST YR: ICD-10-PCS | Mod: CPTII,S$GLB,, | Performed by: OPHTHALMOLOGY

## 2022-06-01 PROCEDURE — 1126F PR PAIN SEVERITY QUANTIFIED, NO PAIN PRESENT: ICD-10-PCS | Mod: CPTII,S$GLB,, | Performed by: OPHTHALMOLOGY

## 2022-06-01 PROCEDURE — 99999 PR PBB SHADOW E&M-EST. PATIENT-LVL III: ICD-10-PCS | Mod: PBBFAC,,, | Performed by: OPHTHALMOLOGY

## 2022-06-01 PROCEDURE — 1160F RVW MEDS BY RX/DR IN RCRD: CPT | Mod: CPTII,S$GLB,, | Performed by: OPHTHALMOLOGY

## 2022-06-01 PROCEDURE — 1157F ADVNC CARE PLAN IN RCRD: CPT | Mod: CPTII,S$GLB,, | Performed by: OPHTHALMOLOGY

## 2022-06-01 NOTE — PROGRESS NOTES
HPI     Glaucoma      Additional comments: 4 month iop ck              Comments     DLS: 5/16/22 by Dr Lopez    Pt states not doing as well OD but Dr Lopez is giving her Eylea   injections.     Gtts: Latanoprost QHS, Cosopt BID OU          Last edited by Cheryle Quintana on 6/1/2022  7:46 AM. (History)        ROS     Negative for: Constitutional, Gastrointestinal, Neurological, Skin,   Genitourinary, Musculoskeletal, HENT, Endocrine, Cardiovascular, Eyes,   Respiratory, Psychiatric, Allergic/Imm, Heme/Lymph    Last edited by Ashish Fragoso Jr., MD on 6/1/2022  8:11 AM. (History)        Assessment /Plan     For exam results, see Encounter Report.    Exudative age-related macular degeneration of right eye with active choroidal neovascularization    Exudative age-related macular degeneration of left eye with active choroidal neovascularization    Low-tension glaucoma of both eyes, severe stage    Dry eyes, bilateral    Pseudophakia of both eyes    Refractive error    stable IOP  IOP OD 13 OS 10, doing well  Continue latanoprost qhs ou and cosopt bid OU  Continue AREDS2 1 tab BID  Continue to follow with retina for wet armd  Continue daily eye lubricants OU  Follow up in about 4 months (around 10/1/2022) for IOP and Medication check.

## 2022-06-09 NOTE — TELEPHONE ENCOUNTER
No new care gaps identified.  [facility] Embedded Care Gaps. Reference number: [id number]. 6/09/2022   3:16:40 PM CDT

## 2022-06-09 NOTE — TELEPHONE ENCOUNTER
----- Message from Debra Napier sent at 6/9/2022  2:42 PM CDT -----  Regarding: refill  Can the clinic reply in [facility]:       Please refill the medication(s) listed below. Please call the patient when the prescription(s) is ready for  at this phone number JACK MCCLELLAND [[id number]] [phone] request for new script         Medication #1 metoprolol succinate (TOPROL-XL) 25 MG 24 hr tablet        Preferred Pharmacy: [facility] - [facility] [address] [facility]

## 2022-06-10 RX ORDER — METOPROLOL SUCCINATE 25 MG/1
25 TABLET, EXTENDED RELEASE ORAL 2 TIMES DAILY
Qty: 180 TABLET | Refills: 0 | Status: SHIPPED | OUTPATIENT
Start: 2022-06-10 | End: 2022-09-14 | Stop reason: SDUPTHER

## 2022-06-10 NOTE — TELEPHONE ENCOUNTER
Refill Authorization Note   Serenity Epps  is requesting a refill authorization.  Brief Assessment and Rationale for Refill:  Approve     Medication Therapy Plan:       Medication Reconciliation Completed: No   Comments:     No Care Gaps recommended.     Note composed:11:13 AM 06/10/2022

## 2022-06-16 ENCOUNTER — TELEPHONE (OUTPATIENT)
Dept: OPHTHALMOLOGY | Facility: CLINIC | Age: 82
End: 2022-06-16
Payer: MEDICARE

## 2022-06-16 NOTE — TELEPHONE ENCOUNTER
----- Message from Jane Avina sent at 6/16/2022  9:59 AM CDT -----  Contact: meggan  Type:  RX Refill Request    Who Called:  Meggan     Refill or New Rx: refill    RX Name and Strength: dorzolamide-timolol 2-0.5% (COSOPT) 22.3-6.8 mg/mL ophthalmic solution      How is the patient currently taking it? (ex. 1XDay)     Is this a 30 day or 90 day RX: 90    Preferred Pharmacy with phone number:     [id number] - [place] [address] [place] [address] [address] [place] [id number]  Phone: [phone] Fax: [phone]    Local  Pharmacy     Ordering Provider: Dr Fragoso    Would the patient rather a call back or a response via [facility]?  Call    Best Call Back Number: [phone] (home)     Additional Information:  Meggan is asking for this to be sent to local pharmacy just 1 month worth till they can get her order to her

## 2022-06-17 RX ORDER — DORZOLAMIDE HYDROCHLORIDE AND TIMOLOL MALEATE 20; 5 MG/ML; MG/ML
1 SOLUTION/ DROPS OPHTHALMIC 2 TIMES DAILY
Qty: 30 ML | Refills: 4 | Status: SHIPPED | OUTPATIENT
Start: 2022-06-17 | End: 2022-06-28 | Stop reason: SDUPTHER

## 2022-06-20 ENCOUNTER — PROCEDURE VISIT (OUTPATIENT)
Dept: OPHTHALMOLOGY | Facility: CLINIC | Age: 82
End: 2022-06-20
Payer: MEDICARE

## 2022-06-20 DIAGNOSIS — H35.3112 NONEXUDATIVE AGE-RELATED MACULAR DEGENERATION, RIGHT EYE, INTERMEDIATE DRY STAGE: ICD-10-CM

## 2022-06-20 DIAGNOSIS — H35.3211 EXUDATIVE AGE-RELATED MACULAR DEGENERATION OF RIGHT EYE WITH ACTIVE CHOROIDAL NEOVASCULARIZATION: Primary | ICD-10-CM

## 2022-06-20 PROCEDURE — 67028 PR INJECT INTRAVITREAL PHARMCOLOGIC: ICD-10-PCS | Mod: RT,S$GLB,, | Performed by: OPHTHALMOLOGY

## 2022-06-20 PROCEDURE — 67028 INJECTION EYE DRUG: CPT | Mod: RT,S$GLB,, | Performed by: OPHTHALMOLOGY

## 2022-06-20 PROCEDURE — 99499 NO LOS: ICD-10-PCS | Mod: S$GLB,,, | Performed by: OPHTHALMOLOGY

## 2022-06-20 PROCEDURE — 99499 UNLISTED E&M SERVICE: CPT | Mod: S$GLB,,, | Performed by: OPHTHALMOLOGY

## 2022-06-20 NOTE — PROGRESS NOTES
HPI     Patient here today for Eylea inject only OD. VA stable ou, no pain and no   f/f.    Cosopt bid ou  Latanoprost qhs ou    Last edited by Sarah Wong on 6/20/2022  8:01 AM. (History)            Prior OCT -OD increased SRF/SRH  OS stable scar        A/P    1. Wet AMD OS  S/p Avastin OS x 15, Eylea OS  x 14    Persistent SRF OS - improving  With RPE tear OS  Central fibrosis with atrophy - poor central potential  10/17 - increased SRH - will keep at 8 weeks for now  9/18 - stable cicatrix - try observation  12/18 - no activity  6/21 - some heme over cicatrix OS      2. New disease OD  Sx started 9/21  S/p Avastin OD x 5, Eylea OD x 2    Eylea OD today      3. PCIOL OU  CTR OS - but saw 20/30 with correction, in spite of sub RPE fibrosis      4. POAG OU  Good IOP control on Cosopt, brimonidine, latanoprost OU  Small drance heme OD    5. Floaters OU        4-5 weeks OCT and dilate      Risks, benefits, and alternatives to treatment discussed in detail with the patient.  The patient voiced understanding and wished to proceed with the procedure    Injection Procedure Note:  Diagnosis: Wet AMD OD    Patient Identified and Time Out complete  Pt marked  Topical Proparacaine and Betadine.  Inject Eylea OD at 6:00 @ 3.5-4mm posterior to limbus  Post Operative Dx: Same  Complications: None  Follow up as above.

## 2022-06-20 NOTE — PATIENT INSTRUCTIONS

## 2022-06-21 NOTE — PROGRESS NOTES
"  Date: 6/22/2022    Site: [facility]    Referral source: Hua Andersen MD    Clinical status of patient: Outpatient    Serenity Epps, a 81 y.o. female, for initial evaluation visit.  Met with patient.    Chief complaint/reason for encounter: depression, anxiety and grief. Patient reports she has been having symptoms of depression, anxiety and grief related to her current health and subsequent life changes as a result of the fact that she is loosing her eye sight and the ability to maintain her independence. In addition, she discussed losses as it relates to deaths of 3 of her children and one grand-daughter to an overdose. She indicated the only relative she had in this state a grand daughter, just recently moved out of state with her great grand children. She and her  are now reliant upon each other solely.    Patient indicated [facility] has loaned her an IPAD to enable her to be able to read some and a neighbor is teaching her daily to learn gmail and Face time. She has other Apps which enable her to speak to gain information. At age 81, she is learning technology to assist her in her in as a woman without sight, although she has not completely lost her vision at this point.     History of present illness: Reviewed chart.     Pain: noncontributory    Symptoms:   Mood: depressed mood and social isolation  Anxiety: excessive anxiety/worry  Substance abuse: will drink a couple of glasses of wine almost every night  Cognitive functioning: reports "nervous" thnking  Health behaviors: Noncontributory  How often to you feel depressed? Will feel depression most days, fleeting not all day   How often do you feel anxious? Will feel anxious most days  How's your sleeping? Sleeps good 9 hours and takes naps no problems with sleep        Psychiatric history: none   Hx of counseling denied history of counseling  Hx of psych inpatient denied  Psych Dx denied  Hx of violent behavior " "denied  Current SI? denied  Ever attempted suicide? denied Last time and how  Self-Harm? denied Cutting/Burning? denied  Seeing things, hearing things no one else does? denied  Homicidal Ideation? Denied    [place] Suicide Severity Rating Scale  1. Have you wished you were dead or wished you could go to sleep and not wake up?   __X____ Yes  ______ No  2.  Have you actually had any thoughts of killing yourself?   ______ Yes  ___X___ No  (If yes to 2, ask questions 3,4,5 and 6.  If No to 2, go directly to 6)  3. Have you been thinking about how you might do this?   ______ Yes  ______ No  4. Have you had these thoughts and had some intention of acting on them?   ______ Yes  ______ No  5.  Have you started to work out or worked out the details of how to kill yourself?  Do you intend to carry out this plan?   ______ Yes  ______ No  6. Have you ever done anything, started to do anything, or prepared to do anything to end your life?   ______ Yes  ______ No  If yes :  Were any of these in the past 3 months?   ______ Yes  ______ No    Patient reported she has had thoughts such as, "what if I went to sleep and didn't wake up." but has no desire to end her life and, "I would not kill myself."     Medical history: reviewed and included below.    Family history of psychiatric illness:   Family History   Problem Relation Age of Onset    No Known Problems Brother     Breast cancer Daughter     Cancer Daughter         breast    Coronary artery disease Mother 78    Glaucoma Mother     Diverticulitis Father     No Known Problems Maternal Grandmother     No Known Problems Maternal Grandfather     No Known Problems Paternal Grandmother     No Known Problems Paternal Grandfather     Glaucoma Brother     Ankylosing spondylitis Brother     Diabetes Brother     Macular degeneration Brother          twin brother wet mac//    Cancer Brother         prostate    Crohn's disease Brother     Amblyopia Neg Hx     Blindness Neg " Hx     Cataracts Neg Hx     Hypertension Neg Hx     Strabismus Neg Hx     Stroke Neg Hx     Thyroid disease Neg Hx     Retinal detachment Neg Hx     Celiac disease Neg Hx      Patient Active Problem List   Diagnosis    Glaucoma suspect of both eyes    Hyperopia with astigmatism and presbyopia    Essential hypertension    Anxiety    Osteopenia    Exudative age-related macular degeneration of left eye with active choroidal neovascularization    Nonexudative age-related macular degeneration, right eye, intermediate dry stage    Gastrocnemius equinus    Status post total left knee replacement    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    Neuropathy of left sural nerve    Equinus deformity of foot    Onychomycosis due to dermatophyte    Recurrent major depressive disorder    Lymphocytic colitis    Unsteady gait    BMI 23.0-23.9, adult    Exudative age-related macular degeneration of right eye with active choroidal neovascularization     Past Medical History:   Diagnosis Date    Anxiety     Arthritis     Cataract - Both Eyes     OU done//    CKD (chronic kidney disease), stage II 09/21/2016    pt denies    Diarrhea     Diverticulosis     DVT (deep venous thrombosis)     left leg, after childbirth    Exudative age-related macular degeneration of left eye 2/20/2014    Glaucoma     Hypertension     Irritable bowel syndrome     Left foot pain     chronic    Lymphocytic colitis     Macular degeneration     bimonthly intraoccular injections    Osteopenia     Recurrent major depressive disorder 4/27/2018    Rhinitis, chronic     Strabismus     as child     Past Surgical History:   Procedure Laterality Date    APPENDECTOMY      age 38    CATARACT EXTRACTION      OU done//    CATARACT EXTRACTION W/ INTRAOCULAR LENS  IMPLANT, BILATERAL Bilateral 2016    COLONOSCOPY  9/26/2006  Shane    Diverticulosis.   Internal small hemorrhoids were found.     COLONOSCOPY   10/03/2012    Dr. Lynn, repeat in 7-8 years for surveillance    COLONOSCOPY N/A 5/2/2018    COLONOSCOPY  05/02/2018    Procedure: COLONOSCOPY;  Surgeon: Toby Lynn Jr., MD;  Location: [facility];  Service: Endoscopy;  Laterality: N/A;    EYE SURGERY Bilateral     Phaco with IOL (cataract extraction), rigth:[id number] 22.0 d//    FOOT HARDWARE REMOVAL Left 06/01/2016    Dr. Hammonds    FOOT SURGERY Left 2014    ERG and open reduction tallo tarsal dislocation (hyprocure implant)    JOINT REPLACEMENT      PARTIAL HYSTERECTOMY      age 38, ovaries conserved    PIP JOINT FUSION Right 06/01/2016    2nd-4th PIP joints of right foot; Dr. 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  Patulous lower esophageal sphincter.   Gastric mucosal atrophy.   PARAG Test  Negative.     VEIN LIGATION  1964    BLE     Current Outpatient Medications on File Prior to Visit   Medication Sig Dispense Refill    azelastine (ASTELIN) 137 mcg (0.1 %) nasal spray USE 1 SPRAY IN EACH NOSTRIL TWICE DAILY 60 mL 6    BENEFIBER, WHEAT DEXTRIN, ORAL Take by mouth 2 (two) times daily.      biotin 1 mg tablet Take 1,000 mcg by mouth every evening.       calcium carbonate (OS-BLAKE) 600 mg (1,500 mg) Tab Take 600 mg by mouth 2 (two) times daily with meals.      CLEOCIN 150 mg/mL injection       docusate sodium (COLACE) 100 MG capsule Take 100 mg by mouth 2 (two) times daily.      dorzolamide-timolol 2-0.5% (COSOPT) 22.3-6.8 mg/mL ophthalmic solution Place 1 drop into both eyes 2 (two) times daily. 30 mL 4    fluticasone propionate (FLONASE) 50 mcg/actuation nasal spray USE 2 SPRAYS IN EACH NOSTRIL ONE TIME DAILY 48 g 3    gabapentin (NEURONTIN) 400 MG capsule Take 1 capsule (400 mg total) by mouth 3 (three) times daily. 270 capsule 2    Lactobacillus rhamnosus GG (CULTURELLE) 10 billion cell  capsule Take 1 capsule by mouth once daily.      latanoprost 0.005 % ophthalmic solution INSTILL 1 DROP INTO EACH EYE IN THE EVENING 2.5 mL 11    meloxicam (MOBIC) 7.5 MG tablet Take 1 tablet (7.5 mg total) by mouth once daily. 90 tablet 2    metoprolol succinate (TOPROL-XL) 25 MG 24 hr tablet Take 1 tablet (25 mg total) by mouth 2 (two) times daily. 180 tablet 0    mirabegron (MYRBETRIQ) 25 mg Tb24 ER tablet Take 1 tablet (25 mg total) by mouth once daily. 30 tablet 11    mirtazapine (REMERON) 15 MG tablet TAKE 1 TABLET (15 MG TOTAL) BY MOUTH EVERY EVENING. 90 tablet 3    PREVIDENT 1.1 % Gel       VIT C/E/ZN/COPPR/LUTEIN/ZEAXAN (PRESERVISION AREDS 2 ORAL) Take 1 capsule by mouth 2 (two) times daily.        Current Facility-Administered Medications on File Prior to Visit   Medication Dose Route Frequency Provider Last Rate Last Admin    bevacizumab (AVASTIN) 2.5 mg/0.10 mL 1.25 mg  1.25 mg Intraocular 1 time in Clinic/HOD DReanna Lopez MD           Trauma history:    Verbal/Emotional abuse Denied  Physical abuse Denied  Sexual abuse Denied  Any major losses in your life or events that you would consider traumatic? Patient reported there have been multiple deaths in her life to include 3 of her children and one grand child. Her oldest son   Inn  when he completed suicide and her grand daughter  by heroin over dose.  She states currently the most traumatic issue of her life, is loosing her eyesight and along with it, her ability to independently care for herself. She reports she must rely upon her , Alejandro for the majority of her care and is frustrated and sad about this loss. Additionally, she and  Her  have no other family in [place] to assist them. Alejandro still works part-time and up till recently, patient was working with him. She reports grieving her life and feeling guilty as he now has to take on her work and home responsibilities.        Social history (marriage,  "employment, etc.):   Born and raised in [place] and raised as well.  Raised by by both parents had 3 brothers, a twin brother and 2 others  Highest level of education: high school  Childhood history is described as "very sheltered." Father was a physician put his practice first was a strict disciplinarian. Mother was a nurse and deferred to my father.  Relationships / children: Currently  to Alejandro, her second  since . Had 5 children with first , children where my blessings. Oldest son  by completed suicide in , , another son , 2019 another son . 2 daughters are living. One grand daughter  of heroin od.  Primary support system: Nobody as a primary support person  Any family, loved ones, or friends you want involved in your treatment: I don't have anybody here daughters live out of state. No family here besides   Living situation: Alejandro  Source of income: Retired, Alejandro still works part-time  Hobbies:  does not currently have any  Baptism: grew up in a [employer]   history.no  Legal/Criminal history: no    Substance use:  Alcohol: drinks a couple of glasses of wine almost every nigth  Drugs: none  Tobacco: quit   Caffeine: 1-2 cups of coffee in the morning daily     Any other addictions: Denies  Sex, gambling, eating d/o  Are you in recovery from drug or alcohol use?   If so, how long and how do you maintain sobriety?  Are you utilizing MAT?  How often do you drink alcohol? (age 1st use, last use, how often, what are you drinking)  Do you use any illegal or illicit drugs - (Cocaine, Methamphetamines, Opiates, Heroin, Xanax, Synthetics, THC)? (Age first use, last use, route of administration)          If yes to gambling,   During the past 12 mos have you become restless, irritable, or anxious when trying to stop/cut down on gambling?   During the past 12 months, have tried to keep your family or friends from knowing how much you gambled?  During " the past 12 mos, did you have such financial trouble that you had to get help from family or friends?    Current medications and drug reactions (include OTC, herbal): see medication list     Strengths and liabilities: Strength: Patient accepts guidance/feedback    Strengths- What personal qualities do you have which we can build upon in treatment? Patience, very good listener, I care about people and I want to hear what is going on in their lives and understand and that makes me feel good.    Needs- What would help you achieve your goals?  To feel better about myself    Abilities- What skills do you possess? Willing to learn, is resourceful     Preference- How do you want your treatment? Virtual, in-person, any one on NAE  Patient would prefer virtual sessions at this time. She does not need a release of information at this time.      Current Evaluation:     Mental Status Exam:  General Appearance:  younger than stated age, casually dressed, neatly groomed   Speech: normal tone, normal rate, normal pitch, normal volume      Level of Cooperation: cooperative      Thought Processes: normal and logical   Mood: sad      Thought Content: normal, no suicidality, no homicidality, delusions, or paranoia   Affect: congruent and appropriate, sad   Orientation: Oriented x3   Memory: recent >  intact   Attention Span & Concentration: intact   Fund of General Knowledge: intact and appropriate to age and level of education   Abstract Reasoning: No concerns notes   Judgment & Insight: good     Language intact     Diagnostic Impression - Plan:       ICD-10-CM ICD-9-CM   1. Adjustment disorder with mixed anxiety and depressed mood  F43.23 309.28   2. Grief  F43.21 309.0       Plan:individual psychotherapy   Pt to go to ED or call 911 if symptoms worsen or if she has thoughts of harming self and/or others. Pt verbalized understanding.  Goal #1: Pt to learn CBT principles to learn how to identify and reframe maladaptive beliefs  affecting mood.  Goal #2: Pt to learn relaxation tools and techniques    Pt is to attend supportive psychotherapy sessions. PT was encouraged to rely upon her ashok readings which she discussed as a prior method of helpful coping. She was encouraged to obtain audio books. [facility] was suggested. Individual therapy sessions were recommended. Virtual session were explained as she felt those type of sessions would be less stressful.    Ct was provided MH resource packet which included local MH resources, the National Suicide Helpline number, coping skills/relaxation techniques, CBT information

## 2022-06-22 ENCOUNTER — OFFICE VISIT (OUTPATIENT)
Dept: PSYCHIATRY | Facility: CLINIC | Age: 82
End: 2022-06-22
Payer: MEDICARE

## 2022-06-22 DIAGNOSIS — F43.23 ADJUSTMENT DISORDER WITH MIXED ANXIETY AND DEPRESSED MOOD: Primary | ICD-10-CM

## 2022-06-22 DIAGNOSIS — F43.21 GRIEF: ICD-10-CM

## 2022-06-22 PROCEDURE — 90791 PR PSYCHIATRIC DIAGNOSTIC EVALUATION: ICD-10-PCS | Mod: S$GLB,,, | Performed by: SOCIAL WORKER

## 2022-06-22 PROCEDURE — 1159F PR MEDICATION LIST DOCUMENTED IN MEDICAL RECORD: ICD-10-PCS | Mod: CPTII,S$GLB,, | Performed by: SOCIAL WORKER

## 2022-06-22 PROCEDURE — 1157F ADVNC CARE PLAN IN RCRD: CPT | Mod: CPTII,S$GLB,, | Performed by: SOCIAL WORKER

## 2022-06-22 PROCEDURE — 90791 PSYCH DIAGNOSTIC EVALUATION: CPT | Mod: S$GLB,,, | Performed by: SOCIAL WORKER

## 2022-06-22 PROCEDURE — 1157F PR ADVANCE CARE PLAN OR EQUIV PRESENT IN MEDICAL RECORD: ICD-10-PCS | Mod: CPTII,S$GLB,, | Performed by: SOCIAL WORKER

## 2022-06-22 PROCEDURE — 1159F MED LIST DOCD IN RCRD: CPT | Mod: CPTII,S$GLB,, | Performed by: SOCIAL WORKER

## 2022-06-28 ENCOUNTER — TELEPHONE (OUTPATIENT)
Dept: OPTOMETRY | Facility: CLINIC | Age: 82
End: 2022-06-28
Payer: MEDICARE

## 2022-06-28 DIAGNOSIS — H40.1233 LOW-TENSION GLAUCOMA OF BOTH EYES, SEVERE STAGE: Primary | ICD-10-CM

## 2022-06-28 RX ORDER — DORZOLAMIDE HYDROCHLORIDE AND TIMOLOL MALEATE 20; 5 MG/ML; MG/ML
1 SOLUTION/ DROPS OPHTHALMIC 2 TIMES DAILY
Qty: 30 ML | Refills: 4 | Status: SHIPPED | OUTPATIENT
Start: 2022-06-28 | End: 2023-03-30 | Stop reason: SDUPTHER

## 2022-06-28 RX ORDER — DORZOLAMIDE HYDROCHLORIDE AND TIMOLOL MALEATE 20; 5 MG/ML; MG/ML
1 SOLUTION/ DROPS OPHTHALMIC 2 TIMES DAILY
Qty: 30 ML | Refills: 1 | Status: SHIPPED | OUTPATIENT
Start: 2022-06-28 | End: 2022-06-28 | Stop reason: SDUPTHER

## 2022-07-18 ENCOUNTER — PROCEDURE VISIT (OUTPATIENT)
Dept: OPHTHALMOLOGY | Facility: CLINIC | Age: 82
End: 2022-07-18
Payer: MEDICARE

## 2022-07-18 DIAGNOSIS — H35.3112 NONEXUDATIVE AGE-RELATED MACULAR DEGENERATION, RIGHT EYE, INTERMEDIATE DRY STAGE: ICD-10-CM

## 2022-07-18 DIAGNOSIS — H35.3211 EXUDATIVE AGE-RELATED MACULAR DEGENERATION OF RIGHT EYE WITH ACTIVE CHOROIDAL NEOVASCULARIZATION: Primary | ICD-10-CM

## 2022-07-18 DIAGNOSIS — H35.3221 EXUDATIVE AGE-RELATED MACULAR DEGENERATION OF LEFT EYE WITH ACTIVE CHOROIDAL NEOVASCULARIZATION: ICD-10-CM

## 2022-07-18 PROCEDURE — 92134 POSTERIOR SEGMENT OCT RETINA (OCULAR COHERENCE TOMOGRAPHY)-BOTH EYES: ICD-10-PCS | Mod: S$GLB,,, | Performed by: OPHTHALMOLOGY

## 2022-07-18 PROCEDURE — 92134 CPTRZ OPH DX IMG PST SGM RTA: CPT | Mod: S$GLB,,, | Performed by: OPHTHALMOLOGY

## 2022-07-18 PROCEDURE — 67028 INJECTION EYE DRUG: CPT | Mod: RT,S$GLB,, | Performed by: OPHTHALMOLOGY

## 2022-07-18 PROCEDURE — 92014 COMPRE OPH EXAM EST PT 1/>: CPT | Mod: 25,S$GLB,, | Performed by: OPHTHALMOLOGY

## 2022-07-18 PROCEDURE — 67028 PR INJECT INTRAVITREAL PHARMCOLOGIC: ICD-10-PCS | Mod: RT,S$GLB,, | Performed by: OPHTHALMOLOGY

## 2022-07-18 PROCEDURE — 92014 PR EYE EXAM, EST PATIENT,COMPREHESV: ICD-10-PCS | Mod: 25,S$GLB,, | Performed by: OPHTHALMOLOGY

## 2022-07-18 NOTE — PROGRESS NOTES
"HPI     4-5 wk f/u OCT and DFE    Pt. States no changes in VA since last eye exam and "I really cant see   well out of either eye."  Denies flashes, floaters, or pain.     Cosopt BID OU  Latanaprost QHS OU    Last edited by Mary Kern on 7/18/2022  9:04 AM. (History)          OCT -OD decreased SRF/SRH  OS stable scar        A/P    1. Wet AMD OS  S/p Avastin OS x 15, Eylea OS  x 14    Persistent SRF OS - improving  With RPE tear OS  Central fibrosis with atrophy - poor central potential  10/17 - increased SRH - will keep at 8 weeks for now  9/18 - stable cicatrix - try observation  12/18 - no activity  6/21 - some heme over cicatrix OS      2. New disease OD  Sx started 9/21  S/p Avastin OD x 5, Eylea OD x 3    Eylea OD today      3. PCIOL OU  CTR OS - but saw 20/30 with correction, in spite of sub RPE fibrosis      4. POAG OU  Good IOP control on Cosopt, brimonidine, latanoprost OU  Small drance heme OD    5. Floaters OU        4-5 weeks Eylea OD only      Risks, benefits, and alternatives to treatment discussed in detail with the patient.  The patient voiced understanding and wished to proceed with the procedure    Injection Procedure Note:  Diagnosis: Wet AMD OD    Patient Identified and Time Out complete  Pt marked  Topical Proparacaine and Betadine.  Inject Eylea OD at 6:00 @ 3.5-4mm posterior to limbus  Post Operative Dx: Same  Complications: None  Follow up as above.    "

## 2022-07-21 NOTE — PATIENT INSTRUCTIONS

## 2022-07-25 RX ORDER — METOPROLOL SUCCINATE 25 MG/1
25 TABLET, EXTENDED RELEASE ORAL 2 TIMES DAILY
Qty: 180 TABLET | Refills: 0 | Status: CANCELLED | OUTPATIENT
Start: 2022-07-25

## 2022-07-25 NOTE — TELEPHONE ENCOUNTER
No new care gaps identified.  [facility] Embedded Care Gaps. Reference number: [id number]. 7/25/2022   8:57:39 AM NICKT

## 2022-08-18 ENCOUNTER — PROCEDURE VISIT (OUTPATIENT)
Dept: OPHTHALMOLOGY | Facility: CLINIC | Age: 82
End: 2022-08-18
Payer: MEDICARE

## 2022-08-18 DIAGNOSIS — H35.3211 EXUDATIVE AGE-RELATED MACULAR DEGENERATION OF RIGHT EYE WITH ACTIVE CHOROIDAL NEOVASCULARIZATION: Primary | ICD-10-CM

## 2022-08-18 PROCEDURE — 67028 PR INJECT INTRAVITREAL PHARMCOLOGIC: ICD-10-PCS | Mod: RT,S$GLB,, | Performed by: OPHTHALMOLOGY

## 2022-08-18 PROCEDURE — 99499 NO LOS: ICD-10-PCS | Mod: S$GLB,,, | Performed by: OPHTHALMOLOGY

## 2022-08-18 PROCEDURE — 99499 UNLISTED E&M SERVICE: CPT | Mod: S$GLB,,, | Performed by: OPHTHALMOLOGY

## 2022-08-18 PROCEDURE — 67028 INJECTION EYE DRUG: CPT | Mod: RT,S$GLB,, | Performed by: OPHTHALMOLOGY

## 2022-08-18 RX ORDER — AFLIBERCEPT 40 MG/ML
1 INJECTION, SOLUTION INTRAVITREAL
COMMUNITY
End: 2024-04-01

## 2022-08-18 NOTE — PROGRESS NOTES
"HPI     Macular Degeneration      Additional comments: 2 month f/u               Comments     4-5 wk f/u OCT and DFE    Pt. States no changes in VA since last eye exam and "I really cant see   well out of either eye."  Denies flashes, floaters, or pain.     Cosopt BID OU  Latanaprost QHS OU          Last edited by Cheryle Quintana on 8/18/2022  7:41 AM. (History)            OCT -OD decreased SRF/SRH  OS stable scar        A/P    1. Wet AMD OS  S/p Avastin OS x 15, Eylea OS  x 14    Persistent SRF OS - improving  With RPE tear OS  Central fibrosis with atrophy - poor central potential  10/17 - increased SRH - will keep at 8 weeks for now  9/18 - stable cicatrix - try observation  12/18 - no activity  6/21 - some heme over cicatrix OS      2. New disease OD  Sx started 9/21  S/p Avastin OD x 5, Eylea OD x 4    Eylea OD today      3. PCIOL OU  CTR OS - but saw 20/30 with correction, in spite of sub RPE fibrosis      4. POAG OU  Good IOP control on Cosopt, brimonidine, latanoprost OU  Small drance heme OD    5. Floaters OU        4-5 weeks OCT no dilate      Risks, benefits, and alternatives to treatment discussed in detail with the patient.  The patient voiced understanding and wished to proceed with the procedure    Injection Procedure Note:  Diagnosis: Wet AMD OD    Patient Identified and Time Out complete  Pt marked  Topical Proparacaine and Betadine.  Inject Eylea OD at 6:00 @ 3.5-4mm posterior to limbus  Post Operative Dx: Same  Complications: None  Follow up as above.    "

## 2022-08-19 NOTE — PATIENT INSTRUCTIONS

## 2022-08-22 ENCOUNTER — OFFICE VISIT (OUTPATIENT)
Dept: ORTHOPEDICS | Facility: CLINIC | Age: 82
End: 2022-08-22
Payer: MEDICARE

## 2022-08-22 VITALS — WEIGHT: 139 LBS | HEIGHT: 65 IN | BODY MASS INDEX: 23.16 KG/M2

## 2022-08-22 DIAGNOSIS — M17.11 OSTEOARTHRITIS OF RIGHT KNEE, UNSPECIFIED OSTEOARTHRITIS TYPE: Primary | ICD-10-CM

## 2022-08-22 PROCEDURE — 99214 PR OFFICE/OUTPT VISIT, EST, LEVL IV, 30-39 MIN: ICD-10-PCS | Mod: 25,S$GLB,, | Performed by: ORTHOPAEDIC SURGERY

## 2022-08-22 PROCEDURE — 99214 OFFICE O/P EST MOD 30 MIN: CPT | Mod: 25,S$GLB,, | Performed by: ORTHOPAEDIC SURGERY

## 2022-08-22 PROCEDURE — 99999 PR PBB SHADOW E&M-EST. PATIENT-LVL III: CPT | Mod: PBBFAC,,, | Performed by: ORTHOPAEDIC SURGERY

## 2022-08-22 PROCEDURE — 1125F AMNT PAIN NOTED PAIN PRSNT: CPT | Mod: CPTII,S$GLB,, | Performed by: ORTHOPAEDIC SURGERY

## 2022-08-22 PROCEDURE — 3288F PR FALLS RISK ASSESSMENT DOCUMENTED: ICD-10-PCS | Mod: CPTII,S$GLB,, | Performed by: ORTHOPAEDIC SURGERY

## 2022-08-22 PROCEDURE — 20610 DRAIN/INJ JOINT/BURSA W/O US: CPT | Mod: RT,S$GLB,, | Performed by: ORTHOPAEDIC SURGERY

## 2022-08-22 PROCEDURE — 1157F ADVNC CARE PLAN IN RCRD: CPT | Mod: CPTII,S$GLB,, | Performed by: ORTHOPAEDIC SURGERY

## 2022-08-22 PROCEDURE — 1160F PR REVIEW ALL MEDS BY PRESCRIBER/CLIN PHARMACIST DOCUMENTED: ICD-10-PCS | Mod: CPTII,S$GLB,, | Performed by: ORTHOPAEDIC SURGERY

## 2022-08-22 PROCEDURE — 3288F FALL RISK ASSESSMENT DOCD: CPT | Mod: CPTII,S$GLB,, | Performed by: ORTHOPAEDIC SURGERY

## 2022-08-22 PROCEDURE — 99999 PR PBB SHADOW E&M-EST. PATIENT-LVL III: ICD-10-PCS | Mod: PBBFAC,,, | Performed by: ORTHOPAEDIC SURGERY

## 2022-08-22 PROCEDURE — 1159F PR MEDICATION LIST DOCUMENTED IN MEDICAL RECORD: ICD-10-PCS | Mod: CPTII,S$GLB,, | Performed by: ORTHOPAEDIC SURGERY

## 2022-08-22 PROCEDURE — 1101F PT FALLS ASSESS-DOCD LE1/YR: CPT | Mod: CPTII,S$GLB,, | Performed by: ORTHOPAEDIC SURGERY

## 2022-08-22 PROCEDURE — 1101F PR PT FALLS ASSESS DOC 0-1 FALLS W/OUT INJ PAST YR: ICD-10-PCS | Mod: CPTII,S$GLB,, | Performed by: ORTHOPAEDIC SURGERY

## 2022-08-22 PROCEDURE — 1125F PR PAIN SEVERITY QUANTIFIED, PAIN PRESENT: ICD-10-PCS | Mod: CPTII,S$GLB,, | Performed by: ORTHOPAEDIC SURGERY

## 2022-08-22 PROCEDURE — 20610 LARGE JOINT ASPIRATION/INJECTION: R KNEE: ICD-10-PCS | Mod: RT,S$GLB,, | Performed by: ORTHOPAEDIC SURGERY

## 2022-08-22 PROCEDURE — 1159F MED LIST DOCD IN RCRD: CPT | Mod: CPTII,S$GLB,, | Performed by: ORTHOPAEDIC SURGERY

## 2022-08-22 PROCEDURE — 1157F PR ADVANCE CARE PLAN OR EQUIV PRESENT IN MEDICAL RECORD: ICD-10-PCS | Mod: CPTII,S$GLB,, | Performed by: ORTHOPAEDIC SURGERY

## 2022-08-22 PROCEDURE — 1160F RVW MEDS BY RX/DR IN RCRD: CPT | Mod: CPTII,S$GLB,, | Performed by: ORTHOPAEDIC SURGERY

## 2022-08-22 RX ORDER — TRIAMCINOLONE ACETONIDE 40 MG/ML
40 INJECTION, SUSPENSION INTRA-ARTICULAR; INTRAMUSCULAR
Status: DISCONTINUED | OUTPATIENT
Start: 2022-08-22 | End: 2022-08-22 | Stop reason: HOSPADM

## 2022-08-22 RX ADMIN — TRIAMCINOLONE ACETONIDE 40 MG: 40 INJECTION, SUSPENSION INTRA-ARTICULAR; INTRAMUSCULAR at 09:08

## 2022-08-22 NOTE — PROGRESS NOTES
81 y.o. year old presents to the clinic today with recurring pain in the right knee.  Patient has had Kenlaog injections in the past and responded well.  Last injection was 5 months ago. Patient is requesting injection today into right knee    Exam shows tenderness at the joint line without signs of infection or instability    X-rays show arthritic changes    Assessment:  right knee arthrosis    Plan:  Kenlaog into the right knee.  Encourage strengthening over time.  Followup as needed.    Imaging studies ordered and reviewed by me    Further History  Aching pain  Worse with activity  Relieved with rest  No other associated symptoms  No other radiation    Further Exam  Alert and oriented  Pleasant  Contralateral limb has appropriate range of motion for age and condition  Contralateral limb has appropriate strength for age and condition  Contralateral limb has appropriate stability  for age and condition  No adenopathy  Pulses are appropriate for current condition  Skin is intact        Chief Complaint    Chief Complaint   Patient presents with    Right Knee - Pain       HPI  Serenity Epps is a 81 y.o.  female who presents with       Past Medical History  Past Medical History:   Diagnosis Date    Anxiety     Arthritis     Cataract - Both Eyes     OU done//    CKD (chronic kidney disease), stage II 09/21/2016    pt denies    Diarrhea     Diverticulosis     DVT (deep venous thrombosis)     left leg, after childbirth    Exudative age-related macular degeneration of left eye 2/20/2014    Glaucoma     Hypertension     Irritable bowel syndrome     Left foot pain     chronic    Lymphocytic colitis     Macular degeneration     bimonthly intraoccular injections    Osteopenia     Recurrent major depressive disorder 4/27/2018    Rhinitis, chronic     Strabismus     as child       Past Surgical History  Past Surgical History:   Procedure Laterality Date    APPENDECTOMY      age 38    CATARACT EXTRACTION       OU done//    CATARACT EXTRACTION W/ INTRAOCULAR LENS  IMPLANT, BILATERAL Bilateral 2016    COLONOSCOPY  9/26/2006  Shane    Diverticulosis.   Internal small hemorrhoids were found.     COLONOSCOPY  10/03/2012    Dr. Lynn, repeat in 7-8 years for surveillance    COLONOSCOPY N/A 5/2/2018    COLONOSCOPY  05/02/2018    Procedure: COLONOSCOPY;  Surgeon: Toby Lynn Jr., MD;  Location: [facility];  Service: Endoscopy;  Laterality: N/A;    EYE SURGERY Bilateral     Phaco with IOL (cataract extraction), rigth:sn60wf 22.0 d//    FOOT HARDWARE REMOVAL Left 06/01/2016    Dr. Hammonds    FOOT SURGERY Left 2014    ERG and open reduction tallo tarsal dislocation (hyprocure implant)    JOINT REPLACEMENT      PARTIAL HYSTERECTOMY      age 38, ovaries conserved    PIP JOINT FUSION Right 06/01/2016    2nd-4th PIP joints of right foot; Dr. 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  Patulous lower esophageal sphincter.   Gastric mucosal atrophy.   PARAG Test  Negative.     VEIN LIGATION  1964    BLE       Medications  Current Outpatient Medications   Medication Sig    azelastine (ASTELIN) 137 mcg (0.1 %) nasal spray USE 1 SPRAY IN EACH NOSTRIL TWICE DAILY    BENEFIBER, WHEAT DEXTRIN, ORAL Take by mouth 2 (two) times daily.    biotin 1 mg tablet Take 1,000 mcg by mouth every evening.     calcium carbonate (OS-BLAKE) 600 mg (1,500 mg) Tab Take 600 mg by mouth 2 (two) times daily with meals.    docusate sodium (COLACE) 100 MG capsule Take 100 mg by mouth 2 (two) times daily.    dorzolamide-timolol 2-0.5% (COSOPT) 22.3-6.8 mg/mL ophthalmic solution Place 1 drop into both eyes 2 (two) times daily.    EYLEA 2 mg/0.05 mL Syrg     fluticasone propionate (FLONASE) 50 mcg/actuation nasal spray USE 2 SPRAYS IN EACH NOSTRIL ONE TIME DAILY    gabapentin (NEURONTIN) 400 MG  capsule TAKE 1 CAPSULE THREE TIMES DAILY    Lactobacillus rhamnosus GG (CULTURELLE) 10 billion cell capsule Take 1 capsule by mouth once daily.    latanoprost 0.005 % ophthalmic solution INSTILL 1 DROP INTO EACH EYE IN THE EVENING    metoprolol succinate (TOPROL-XL) 25 MG 24 hr tablet Take 1 tablet (25 mg total) by mouth 2 (two) times daily.    mirabegron (MYRBETRIQ) 25 mg Tb24 ER tablet Take 1 tablet (25 mg total) by mouth once daily.    mirtazapine (REMERON) 15 MG tablet TAKE 1 TABLET (15 MG TOTAL) BY MOUTH EVERY EVENING.    PREVIDENT 1.1 % Gel     VIT C/E/ZN/COPPR/LUTEIN/ZEAXAN (PRESERVISION AREDS 2 ORAL) Take 1 capsule by mouth 2 (two) times daily.      Current Facility-Administered Medications   Medication    bevacizumab (AVASTIN) 2.5 mg/0.10 mL 1.25 mg       Allergies  Review of patient's allergies indicates:   Allergen Reactions    Brimonidine Other (See Comments)     Burning and redness    Clindamycin Diarrhea       Family History  Family History   Problem Relation Age of Onset    No Known Problems Brother     Breast cancer Daughter     Cancer Daughter         breast    Coronary artery disease Mother 78    Glaucoma Mother     Diverticulitis Father     No Known Problems Maternal Grandmother     No Known Problems Maternal Grandfather     No Known Problems Paternal Grandmother     No Known Problems Paternal Grandfather     Glaucoma Brother     Ankylosing spondylitis Brother     Diabetes Brother     Macular degeneration Brother          twin brother wet mac//    Cancer Brother         prostate    Crohn's disease Brother     Amblyopia Neg Hx     Blindness Neg Hx     Cataracts Neg Hx     Hypertension Neg Hx     Strabismus Neg Hx     Stroke Neg Hx     Thyroid disease Neg Hx     Retinal detachment Neg Hx     Celiac disease Neg Hx        Social History  Social History     Socioeconomic History    Marital status:    Tobacco Use    Smoking status: Former Smoker     Packs/day:  2.00     Years: 35.00     Pack years: 70.00     Types: Cigarettes     Quit date: 2002     Years since quittin.6    Smokeless tobacco: Never Used   Substance and Sexual Activity    Alcohol use: Yes     Alcohol/week: 14.0 standard drinks     Types: 14 Glasses of wine per week     Comment: 2-3 glasses per night    Drug use: Yes     Types: Benzodiazepines    Sexual activity: Yes     Partners: Male     Social Determinants of Health     Financial Resource Strain: Low Risk     Difficulty of Paying Living Expenses: Not hard at all   Food Insecurity: Unknown    Ran Out of Food in the Last Year: Never true   Transportation Needs: Unknown    Lack of Transportation (Medical): No   Physical Activity: Unknown    Minutes of Exercise per Session: 60 min   Social Connections: Unknown    Frequency of Social Gatherings with Friends and Family: Once a week    Attends Club or Organization Meetings: Never    Marital Status:    Housing Stability: Unknown    Unable to Pay for Housing in the Last Year: No    Number of Places Lived in the Last Year: 1               Review of Systems     Constitutional: Negative    HENT: Negative  Eyes: Negative  Respiratory: Negative  Cardiovascular: Negative  Musculoskeletal: HPI  Skin: Negative  Neurological: Negative  Hematological: Negative  Endocrine: Negative                 Physical Exam    There were no vitals filed for this visit.  Body mass index is 23.13 kg/m².  Physical Examination:     General appearance -  well appearing, and in no distress  Mental status - awake  Neck - supple  Chest -  symmetric air entry  Heart - normal rate   Abdomen - soft      Assessment     1. Osteoarthritis of right knee, unspecified osteoarthritis type          Plan

## 2022-08-22 NOTE — PROCEDURES
Large Joint Aspiration/Injection: R knee    Date/Time: 8/22/2022 9:00 AM  Performed by: Kamaljit Mane MD  Authorized by: Kamaljit Mane MD     Consent Done?:  Yes (Verbal)  Timeout: prior to procedure the correct patient, procedure, and site was verified    Prep: patient was prepped and draped in usual sterile fashion      Details:  Needle Size:  21 G  Approach:  Anterolateral  Location:  Knee  Site:  R knee  Medications:  40 mg triamcinolone acetonide 40 mg/mL  Patient tolerance:  Patient tolerated the procedure well with no immediate complications

## 2022-08-29 ENCOUNTER — OFFICE VISIT (OUTPATIENT)
Dept: FAMILY MEDICINE | Facility: CLINIC | Age: 82
End: 2022-08-29
Payer: MEDICARE

## 2022-08-29 VITALS
HEIGHT: 65 IN | BODY MASS INDEX: 24.61 KG/M2 | OXYGEN SATURATION: 96 % | DIASTOLIC BLOOD PRESSURE: 60 MMHG | WEIGHT: 147.69 LBS | HEART RATE: 96 BPM | SYSTOLIC BLOOD PRESSURE: 110 MMHG

## 2022-08-29 DIAGNOSIS — I10 ESSENTIAL HYPERTENSION: Primary | ICD-10-CM

## 2022-08-29 DIAGNOSIS — F33.0 MILD EPISODE OF RECURRENT MAJOR DEPRESSIVE DISORDER: ICD-10-CM

## 2022-08-29 PROCEDURE — 3074F PR MOST RECENT SYSTOLIC BLOOD PRESSURE < 130 MM HG: ICD-10-PCS | Mod: CPTII,S$GLB,, | Performed by: FAMILY MEDICINE

## 2022-08-29 PROCEDURE — 1126F PR PAIN SEVERITY QUANTIFIED, NO PAIN PRESENT: ICD-10-PCS | Mod: CPTII,S$GLB,, | Performed by: FAMILY MEDICINE

## 2022-08-29 PROCEDURE — 1159F MED LIST DOCD IN RCRD: CPT | Mod: CPTII,S$GLB,, | Performed by: FAMILY MEDICINE

## 2022-08-29 PROCEDURE — 99999 PR PBB SHADOW E&M-EST. PATIENT-LVL IV: ICD-10-PCS | Mod: PBBFAC,,, | Performed by: FAMILY MEDICINE

## 2022-08-29 PROCEDURE — 1157F PR ADVANCE CARE PLAN OR EQUIV PRESENT IN MEDICAL RECORD: ICD-10-PCS | Mod: CPTII,S$GLB,, | Performed by: FAMILY MEDICINE

## 2022-08-29 PROCEDURE — 3078F PR MOST RECENT DIASTOLIC BLOOD PRESSURE < 80 MM HG: ICD-10-PCS | Mod: CPTII,S$GLB,, | Performed by: FAMILY MEDICINE

## 2022-08-29 PROCEDURE — 99999 PR PBB SHADOW E&M-EST. PATIENT-LVL IV: CPT | Mod: PBBFAC,,, | Performed by: FAMILY MEDICINE

## 2022-08-29 PROCEDURE — 1160F RVW MEDS BY RX/DR IN RCRD: CPT | Mod: CPTII,S$GLB,, | Performed by: FAMILY MEDICINE

## 2022-08-29 PROCEDURE — 99213 PR OFFICE/OUTPT VISIT, EST, LEVL III, 20-29 MIN: ICD-10-PCS | Mod: S$GLB,,, | Performed by: FAMILY MEDICINE

## 2022-08-29 PROCEDURE — 3288F FALL RISK ASSESSMENT DOCD: CPT | Mod: CPTII,S$GLB,, | Performed by: FAMILY MEDICINE

## 2022-08-29 PROCEDURE — 1160F PR REVIEW ALL MEDS BY PRESCRIBER/CLIN PHARMACIST DOCUMENTED: ICD-10-PCS | Mod: CPTII,S$GLB,, | Performed by: FAMILY MEDICINE

## 2022-08-29 PROCEDURE — 1157F ADVNC CARE PLAN IN RCRD: CPT | Mod: CPTII,S$GLB,, | Performed by: FAMILY MEDICINE

## 2022-08-29 PROCEDURE — 1126F AMNT PAIN NOTED NONE PRSNT: CPT | Mod: CPTII,S$GLB,, | Performed by: FAMILY MEDICINE

## 2022-08-29 PROCEDURE — 1159F PR MEDICATION LIST DOCUMENTED IN MEDICAL RECORD: ICD-10-PCS | Mod: CPTII,S$GLB,, | Performed by: FAMILY MEDICINE

## 2022-08-29 PROCEDURE — 99213 OFFICE O/P EST LOW 20 MIN: CPT | Mod: S$GLB,,, | Performed by: FAMILY MEDICINE

## 2022-08-29 PROCEDURE — 3074F SYST BP LT 130 MM HG: CPT | Mod: CPTII,S$GLB,, | Performed by: FAMILY MEDICINE

## 2022-08-29 PROCEDURE — 3288F PR FALLS RISK ASSESSMENT DOCUMENTED: ICD-10-PCS | Mod: CPTII,S$GLB,, | Performed by: FAMILY MEDICINE

## 2022-08-29 PROCEDURE — 1101F PT FALLS ASSESS-DOCD LE1/YR: CPT | Mod: CPTII,S$GLB,, | Performed by: FAMILY MEDICINE

## 2022-08-29 PROCEDURE — 3078F DIAST BP <80 MM HG: CPT | Mod: CPTII,S$GLB,, | Performed by: FAMILY MEDICINE

## 2022-08-29 PROCEDURE — 1101F PR PT FALLS ASSESS DOC 0-1 FALLS W/OUT INJ PAST YR: ICD-10-PCS | Mod: CPTII,S$GLB,, | Performed by: FAMILY MEDICINE

## 2022-08-29 NOTE — PROGRESS NOTES
Subjective:       Patient ID: Serenity Epps is a 81 y.o. female    Chief Complaint: Follow-up    Follow-up    Here today for interval evaluation  Her depression has improved after finding low vision resources at [facility].  She reports seeing Social Work.  She has had such good success, she is considering being a volunteer for them.    Review of Systems     Objective:   Physical Exam  Vitals reviewed.   Constitutional:       Appearance: She is well-developed.   Neurological:      Mental Status: She is alert and oriented to person, place, and time.        Assessment:       1. Essential hypertension        2. Mild episode of recurrent major depressive disorder             Plan:       Essential hypertension  - Continue current therapy  - Serial blood pressure monitoring  - Diet and exercise education.    Mild episode of recurrent major depressive disorder  - Stable, Continue current therapy   - Follow up in about 3 months (around 11/29/2022).

## 2022-09-14 RX ORDER — METOPROLOL SUCCINATE 25 MG/1
25 TABLET, EXTENDED RELEASE ORAL 2 TIMES DAILY
Qty: 180 TABLET | Refills: 3 | Status: SHIPPED | OUTPATIENT
Start: 2022-09-14 | End: 2023-05-01 | Stop reason: SDUPTHER

## 2022-09-14 NOTE — TELEPHONE ENCOUNTER
No new care gaps identified.  [facility] Embedded Care Gaps. Reference number: [id number]. 9/14/2022   10:57:27 AM NICKT

## 2022-09-14 NOTE — TELEPHONE ENCOUNTER
Refill Decision Note   Serenity Epps  is requesting a refill authorization.  Brief Assessment and Rationale for Refill:  Approve     Medication Therapy Plan:       Medication Reconciliation Completed: No   Comments:     No Care Gaps recommended.     Note composed:4:03 PM 09/14/2022

## 2022-09-19 ENCOUNTER — PROCEDURE VISIT (OUTPATIENT)
Dept: OPHTHALMOLOGY | Facility: CLINIC | Age: 82
End: 2022-09-19
Payer: MEDICARE

## 2022-09-19 DIAGNOSIS — H35.3221 EXUDATIVE AGE-RELATED MACULAR DEGENERATION OF LEFT EYE WITH ACTIVE CHOROIDAL NEOVASCULARIZATION: Primary | ICD-10-CM

## 2022-09-19 DIAGNOSIS — H35.3112 NONEXUDATIVE AGE-RELATED MACULAR DEGENERATION, RIGHT EYE, INTERMEDIATE DRY STAGE: ICD-10-CM

## 2022-09-19 DIAGNOSIS — H35.3211 EXUDATIVE AGE-RELATED MACULAR DEGENERATION OF RIGHT EYE WITH ACTIVE CHOROIDAL NEOVASCULARIZATION: ICD-10-CM

## 2022-09-19 PROCEDURE — 92012 PR EYE EXAM, EST PATIENT,INTERMED: ICD-10-PCS | Mod: 25,S$GLB,, | Performed by: OPHTHALMOLOGY

## 2022-09-19 PROCEDURE — 67028 PR INJECT INTRAVITREAL PHARMCOLOGIC: ICD-10-PCS | Mod: RT,S$GLB,, | Performed by: OPHTHALMOLOGY

## 2022-09-19 PROCEDURE — 92134 POSTERIOR SEGMENT OCT RETINA (OCULAR COHERENCE TOMOGRAPHY)-BOTH EYES: ICD-10-PCS | Mod: S$GLB,,, | Performed by: OPHTHALMOLOGY

## 2022-09-19 PROCEDURE — 67028 INJECTION EYE DRUG: CPT | Mod: RT,S$GLB,, | Performed by: OPHTHALMOLOGY

## 2022-09-19 PROCEDURE — 92134 CPTRZ OPH DX IMG PST SGM RTA: CPT | Mod: S$GLB,,, | Performed by: OPHTHALMOLOGY

## 2022-09-19 PROCEDURE — 92012 INTRM OPH EXAM EST PATIENT: CPT | Mod: 25,S$GLB,, | Performed by: OPHTHALMOLOGY

## 2022-09-19 NOTE — PROGRESS NOTES
"HPI    4-5 wk f/u OCT     Pt. States no changes in VA since last eye exam and "I really cant see   well out of either eye."  Denies flashes, floaters, or pain.     Cosopt BID OU  Latanaprost QHS OU  Last edited by Nena Lee on 9/19/2022  7:56 AM.          OCT -OD decreased SRF/SRH  OS stable scar        A/P    1. Wet AMD OS  S/p Avastin OS x 15, Eylea OS  x 14    Persistent SRF OS - improving  With RPE tear OS  Central fibrosis with atrophy - poor central potential  10/17 - increased SRH - will keep at 8 weeks for now  9/18 - stable cicatrix - try observation  12/18 - no activity  6/21 - some heme over cicatrix OS      2. New disease OD  Sx started 9/21  S/p Avastin OD x 5, Eylea OD x 5    Eylea OD today      3. PCIOL OU  CTR OS - but saw 20/30 with correction, in spite of sub RPE fibrosis      4. POAG OU  Good IOP control on Cosopt, brimonidine, latanoprost OU  Small drance heme OD    5. Floaters OU        4-5 weeks OCT and dilate      Risks, benefits, and alternatives to treatment discussed in detail with the patient.  The patient voiced understanding and wished to proceed with the procedure    Injection Procedure Note:  Diagnosis: Wet AMD OD    Patient Identified and Time Out complete  Pt marked  Topical Proparacaine and Betadine.  Inject Eylea OD at 6:00 @ 3.5-4mm posterior to limbus  Post Operative Dx: Same  Complications: None  Follow up as above.    "

## 2022-09-19 NOTE — PATIENT INSTRUCTIONS

## 2022-10-04 ENCOUNTER — OFFICE VISIT (OUTPATIENT)
Dept: OPHTHALMOLOGY | Facility: CLINIC | Age: 82
End: 2022-10-04
Payer: MEDICARE

## 2022-10-04 DIAGNOSIS — H40.1233 LOW-TENSION GLAUCOMA OF BOTH EYES, SEVERE STAGE: Primary | ICD-10-CM

## 2022-10-04 DIAGNOSIS — H35.3211 EXUDATIVE AGE-RELATED MACULAR DEGENERATION OF RIGHT EYE WITH ACTIVE CHOROIDAL NEOVASCULARIZATION: ICD-10-CM

## 2022-10-04 DIAGNOSIS — H04.123 DRY EYES, BILATERAL: ICD-10-CM

## 2022-10-04 DIAGNOSIS — Z96.1 PSEUDOPHAKIA OF BOTH EYES: ICD-10-CM

## 2022-10-04 DIAGNOSIS — H35.3221 EXUDATIVE AGE-RELATED MACULAR DEGENERATION OF LEFT EYE WITH ACTIVE CHOROIDAL NEOVASCULARIZATION: ICD-10-CM

## 2022-10-04 PROCEDURE — 99999 PR PBB SHADOW E&M-EST. PATIENT-LVL III: ICD-10-PCS | Mod: PBBFAC,,, | Performed by: OPHTHALMOLOGY

## 2022-10-04 PROCEDURE — 1159F PR MEDICATION LIST DOCUMENTED IN MEDICAL RECORD: ICD-10-PCS | Mod: CPTII,S$GLB,, | Performed by: OPHTHALMOLOGY

## 2022-10-04 PROCEDURE — 1157F PR ADVANCE CARE PLAN OR EQUIV PRESENT IN MEDICAL RECORD: ICD-10-PCS | Mod: CPTII,S$GLB,, | Performed by: OPHTHALMOLOGY

## 2022-10-04 PROCEDURE — 1101F PT FALLS ASSESS-DOCD LE1/YR: CPT | Mod: CPTII,S$GLB,, | Performed by: OPHTHALMOLOGY

## 2022-10-04 PROCEDURE — 1160F RVW MEDS BY RX/DR IN RCRD: CPT | Mod: CPTII,S$GLB,, | Performed by: OPHTHALMOLOGY

## 2022-10-04 PROCEDURE — 99213 OFFICE O/P EST LOW 20 MIN: CPT | Mod: S$GLB,,, | Performed by: OPHTHALMOLOGY

## 2022-10-04 PROCEDURE — 1160F PR REVIEW ALL MEDS BY PRESCRIBER/CLIN PHARMACIST DOCUMENTED: ICD-10-PCS | Mod: CPTII,S$GLB,, | Performed by: OPHTHALMOLOGY

## 2022-10-04 PROCEDURE — 3288F FALL RISK ASSESSMENT DOCD: CPT | Mod: CPTII,S$GLB,, | Performed by: OPHTHALMOLOGY

## 2022-10-04 PROCEDURE — 1159F MED LIST DOCD IN RCRD: CPT | Mod: CPTII,S$GLB,, | Performed by: OPHTHALMOLOGY

## 2022-10-04 PROCEDURE — 99999 PR PBB SHADOW E&M-EST. PATIENT-LVL III: CPT | Mod: PBBFAC,,, | Performed by: OPHTHALMOLOGY

## 2022-10-04 PROCEDURE — 1126F PR PAIN SEVERITY QUANTIFIED, NO PAIN PRESENT: ICD-10-PCS | Mod: CPTII,S$GLB,, | Performed by: OPHTHALMOLOGY

## 2022-10-04 PROCEDURE — 1126F AMNT PAIN NOTED NONE PRSNT: CPT | Mod: CPTII,S$GLB,, | Performed by: OPHTHALMOLOGY

## 2022-10-04 PROCEDURE — 3288F PR FALLS RISK ASSESSMENT DOCUMENTED: ICD-10-PCS | Mod: CPTII,S$GLB,, | Performed by: OPHTHALMOLOGY

## 2022-10-04 PROCEDURE — 99213 PR OFFICE/OUTPT VISIT, EST, LEVL III, 20-29 MIN: ICD-10-PCS | Mod: S$GLB,,, | Performed by: OPHTHALMOLOGY

## 2022-10-04 PROCEDURE — 1157F ADVNC CARE PLAN IN RCRD: CPT | Mod: CPTII,S$GLB,, | Performed by: OPHTHALMOLOGY

## 2022-10-04 PROCEDURE — 1101F PR PT FALLS ASSESS DOC 0-1 FALLS W/OUT INJ PAST YR: ICD-10-PCS | Mod: CPTII,S$GLB,, | Performed by: OPHTHALMOLOGY

## 2022-10-04 NOTE — PROGRESS NOTES
HPI     Glaucoma     Additional comments: 4 month iop ck           Comments    DLS: 9/19/22 by Jessica    Pt states no changes in va since last visit. Eyes itch a lot.     Gtts: Cosopt BID, Latanoprost QHS OU          Last edited by Cheryle Quintana on 10/4/2022  7:47 AM.        ROS    Negative for: Constitutional, Gastrointestinal, Neurological, Skin,   Genitourinary, Musculoskeletal, HENT, Endocrine, Cardiovascular, Eyes,   Respiratory, Psychiatric, Allergic/Imm, Heme/Lymph  Last edited by Ashish Fragoso Jr., MD on 10/4/2022  7:59 AM.        Assessment /Plan     For exam results, see Encounter Report.    Low-tension glaucoma of both eyes, severe stage    Exudative age-related macular degeneration of left eye with active choroidal neovascularization    Exudative age-related macular degeneration of right eye with active choroidal neovascularization    Dry eyes, bilateral    Pseudophakia of both eyes  stable IOP  IOP OD 10 OS 10, doing well  Continue latanoprost qhs ou and cosopt bid OU  Continue AREDS2 1 tab BID  Continue to follow with retina for wet armd  Continue daily eye lubricants OU  Follow up in about 4 months (around 2/4/2023) for IOP and Medication check.

## 2022-10-31 ENCOUNTER — PROCEDURE VISIT (OUTPATIENT)
Dept: OPHTHALMOLOGY | Facility: CLINIC | Age: 82
End: 2022-10-31
Payer: MEDICARE

## 2022-10-31 DIAGNOSIS — H35.3112 NONEXUDATIVE AGE-RELATED MACULAR DEGENERATION, RIGHT EYE, INTERMEDIATE DRY STAGE: ICD-10-CM

## 2022-10-31 DIAGNOSIS — H35.3221 EXUDATIVE AGE-RELATED MACULAR DEGENERATION OF LEFT EYE WITH ACTIVE CHOROIDAL NEOVASCULARIZATION: Primary | ICD-10-CM

## 2022-10-31 PROCEDURE — 67028 INJECTION EYE DRUG: CPT | Mod: RT,S$GLB,, | Performed by: OPHTHALMOLOGY

## 2022-10-31 PROCEDURE — 92134 CPTRZ OPH DX IMG PST SGM RTA: CPT | Mod: S$GLB,,, | Performed by: OPHTHALMOLOGY

## 2022-10-31 PROCEDURE — 92014 PR EYE EXAM, EST PATIENT,COMPREHESV: ICD-10-PCS | Mod: 25,S$GLB,, | Performed by: OPHTHALMOLOGY

## 2022-10-31 PROCEDURE — 92134 POSTERIOR SEGMENT OCT RETINA (OCULAR COHERENCE TOMOGRAPHY)-BOTH EYES: ICD-10-PCS | Mod: S$GLB,,, | Performed by: OPHTHALMOLOGY

## 2022-10-31 PROCEDURE — 67028 PR INJECT INTRAVITREAL PHARMCOLOGIC: ICD-10-PCS | Mod: RT,S$GLB,, | Performed by: OPHTHALMOLOGY

## 2022-10-31 PROCEDURE — 92014 COMPRE OPH EXAM EST PT 1/>: CPT | Mod: 25,S$GLB,, | Performed by: OPHTHALMOLOGY

## 2022-10-31 NOTE — PROGRESS NOTES
HPI    4-5 wk f/u OCT     Pt. States no changes in VA since last eye exam and   Denies flashes, floaters, or pain.     Cosopt BID OU  Latanaprost QHS OU  Last edited by Nena Lee on 10/31/2022  7:40 AM.       OCT -OD decreased SRF/SRH  OS scar with some increase activity superiorly        A/P    1. Wet AMD OS  S/p Avastin OS x 15, Eylea OS  x 14    Persistent SRF OS - improving  With RPE tear OS  Central fibrosis with atrophy - poor central potential  10/17 - increased SRH - will keep at 8 weeks for now  9/18 - stable cicatrix - try observation  12/18 - no activity  6/21 - some heme over cicatrix OS      2. New disease OD  Sx started 9/21  S/p Avastin OD x 5, Eylea OD x 6    Eylea OD today      3. PCIOL OU  CTR OS - but saw 20/30 with correction, in spite of sub RPE fibrosis      4. POAG OU  Good IOP control on Cosopt, brimonidine, latanoprost OU  Small drance heme OD    5. Floaters OU        4-5 weeks Eylea OD only (last DFE 10/22)      Risks, benefits, and alternatives to treatment discussed in detail with the patient.  The patient voiced understanding and wished to proceed with the procedure    Injection Procedure Note:  Diagnosis: Wet AMD OD    Patient Identified and Time Out complete  Pt marked  Topical Proparacaine and Betadine.  Inject Eylea OD at 6:00 @ 3.5-4mm posterior to limbus  Post Operative Dx: Same  Complications: None  Follow up as above.

## 2022-11-02 NOTE — PATIENT INSTRUCTIONS

## 2022-12-05 ENCOUNTER — OFFICE VISIT (OUTPATIENT)
Dept: FAMILY MEDICINE | Facility: CLINIC | Age: 82
End: 2022-12-05
Payer: MEDICARE

## 2022-12-05 VITALS
DIASTOLIC BLOOD PRESSURE: 72 MMHG | WEIGHT: 151.44 LBS | HEART RATE: 54 BPM | BODY MASS INDEX: 25.23 KG/M2 | HEIGHT: 65 IN | TEMPERATURE: 98 F | OXYGEN SATURATION: 54 % | SYSTOLIC BLOOD PRESSURE: 132 MMHG

## 2022-12-05 DIAGNOSIS — I10 ESSENTIAL HYPERTENSION: Primary | ICD-10-CM

## 2022-12-05 DIAGNOSIS — Z78.0 ASYMPTOMATIC MENOPAUSE: ICD-10-CM

## 2022-12-05 PROCEDURE — 99214 OFFICE O/P EST MOD 30 MIN: CPT | Mod: S$GLB,,, | Performed by: NURSE PRACTITIONER

## 2022-12-05 PROCEDURE — 1159F PR MEDICATION LIST DOCUMENTED IN MEDICAL RECORD: ICD-10-PCS | Mod: CPTII,S$GLB,, | Performed by: NURSE PRACTITIONER

## 2022-12-05 PROCEDURE — 1157F PR ADVANCE CARE PLAN OR EQUIV PRESENT IN MEDICAL RECORD: ICD-10-PCS | Mod: CPTII,S$GLB,, | Performed by: NURSE PRACTITIONER

## 2022-12-05 PROCEDURE — 1159F MED LIST DOCD IN RCRD: CPT | Mod: CPTII,S$GLB,, | Performed by: NURSE PRACTITIONER

## 2022-12-05 PROCEDURE — 1126F PR PAIN SEVERITY QUANTIFIED, NO PAIN PRESENT: ICD-10-PCS | Mod: CPTII,S$GLB,, | Performed by: NURSE PRACTITIONER

## 2022-12-05 PROCEDURE — 99214 PR OFFICE/OUTPT VISIT, EST, LEVL IV, 30-39 MIN: ICD-10-PCS | Mod: S$GLB,,, | Performed by: NURSE PRACTITIONER

## 2022-12-05 PROCEDURE — 3078F DIAST BP <80 MM HG: CPT | Mod: CPTII,S$GLB,, | Performed by: NURSE PRACTITIONER

## 2022-12-05 PROCEDURE — 1160F RVW MEDS BY RX/DR IN RCRD: CPT | Mod: CPTII,S$GLB,, | Performed by: NURSE PRACTITIONER

## 2022-12-05 PROCEDURE — 1157F ADVNC CARE PLAN IN RCRD: CPT | Mod: CPTII,S$GLB,, | Performed by: NURSE PRACTITIONER

## 2022-12-05 PROCEDURE — 1160F PR REVIEW ALL MEDS BY PRESCRIBER/CLIN PHARMACIST DOCUMENTED: ICD-10-PCS | Mod: CPTII,S$GLB,, | Performed by: NURSE PRACTITIONER

## 2022-12-05 PROCEDURE — 99999 PR PBB SHADOW E&M-EST. PATIENT-LVL V: CPT | Mod: PBBFAC,,, | Performed by: NURSE PRACTITIONER

## 2022-12-05 PROCEDURE — 3075F SYST BP GE 130 - 139MM HG: CPT | Mod: CPTII,S$GLB,, | Performed by: NURSE PRACTITIONER

## 2022-12-05 PROCEDURE — 3075F PR MOST RECENT SYSTOLIC BLOOD PRESS GE 130-139MM HG: ICD-10-PCS | Mod: CPTII,S$GLB,, | Performed by: NURSE PRACTITIONER

## 2022-12-05 PROCEDURE — 99999 PR PBB SHADOW E&M-EST. PATIENT-LVL V: ICD-10-PCS | Mod: PBBFAC,,, | Performed by: NURSE PRACTITIONER

## 2022-12-05 PROCEDURE — 3078F PR MOST RECENT DIASTOLIC BLOOD PRESSURE < 80 MM HG: ICD-10-PCS | Mod: CPTII,S$GLB,, | Performed by: NURSE PRACTITIONER

## 2022-12-05 PROCEDURE — 1126F AMNT PAIN NOTED NONE PRSNT: CPT | Mod: CPTII,S$GLB,, | Performed by: NURSE PRACTITIONER

## 2022-12-05 RX ORDER — TRIPROLIDINE/PSEUDOEPHEDRINE 2.5MG-60MG
TABLET ORAL EVERY 6 HOURS PRN
COMMUNITY
End: 2022-12-24 | Stop reason: CLARIF

## 2022-12-05 NOTE — PROGRESS NOTES
Subjective:       Patient ID: Serenity Epps is a 82 y.o. female.    Chief Complaint: Follow-up (Hasnt taken bp med this am yet )      Patient is here for 3 months follow up. HTN stable. Due for bone density exam, compliant with medications.     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 eye  No date: Glaucoma  No date: Hypertension  No date: Irritable bowel syndrome  No date: Left foot pain      Comment:  chronic  No date: Lymphocytic colitis  No date: Macular degeneration      Comment:  bimonthly intraoccular injections  No date: Osteopenia  4/27/2018: Recurrent major depressive disorder  No date: Rhinitis, chronic  No date: Strabismus      Comment:  as child    Past Surgical History:  No date: APPENDECTOMY      Comment:  age 38  No date: CATARACT EXTRACTION      Comment:  OU done// 2016: CATARACT EXTRACTION W/ INTRAOCULAR LENS  IMPLANT, BILATERAL;   Bilateral  9/26/2006  Shane: COLONOSCOPY      Comment:  Diverticulosis.   Internal small hemorrhoids were found.  10/03/2012: COLONOSCOPY      Comment:  Dr. Lynn, repeat in 7-8 years for surveillance  5/2/2018: COLONOSCOPY; N/A  05/02/2018: COLONOSCOPY      Comment:  Procedure: COLONOSCOPY;  Surgeon: Toby Lynn Jr., MD;  Location: [facility];  Service: Endoscopy;                 Laterality: N/A;  No date: EYE SURGERY; Bilateral      Comment:  Phaco with IOL (cataract extraction), rigth:sn60wf 22.0                d//  06/01/2016: FOOT HARDWARE REMOVAL; Left      Comment:  Dr. Hammonds  2014: FOOT SURGERY; Left      Comment:  ERG and open reduction tallo tarsal dislocation                (hyprocure implant)  No date:  JOINT REPLACEMENT  No date: PARTIAL HYSTERECTOMY      Comment:  age 38, ovaries conserved  2016: PIP JOINT FUSION; Right      Comment:  2nd-4th PIP joints of right foot; Dr. 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                 Patulous lower esophageal sphincter.   Gastric mucosal                atrophy.   PARAG Test  Negative.   1964: VEIN LIGATION      Comment:  BLE    Review of patient's family history indicates:      Social History    Socioeconomic History      Marital status:     Tobacco Use      Smoking status: Former        Packs/day: 2.00        Years: 35.00        Pack years: 70        Types: Cigarettes        Quit date: 2002        Years since quittin.9      Smokeless tobacco: Never    Substance and Sexual Activity      Alcohol use: Yes        Alcohol/week: 14.0 standard drinks        Types: 14 Glasses of wine per week        Comment: 2-3 glasses per night      Drug use: Yes        Types: Benzodiazepines      Sexual activity: Yes        Partners: Male    Social Determinants of Health  Financial Resource Strain: Low Risk       Difficulty of Paying Living Expenses: Not hard at all  Food Insecurity: Unknown      Ran Out of Food in the Last Year: Never true  Transportation Needs: Unknown      Lack of Transportation (Medical): No  Physical Activity: Unknown      Minutes of Exercise per Session: 60 min  Social Connections: Unknown      Frequency of Social Gatherings with Friends and Family: Once a week      Attends Club or Organization Meetings: Never      Marital Status:   Housing Stability: Unknown      Unable to Pay for Housing in the Last Year: No      Number of Places Lived in the Last Year: 1    Current Outpatient Medications:  azelastine (ASTELIN) 137 mcg (0.1 %) nasal spray, USE 1 SPRAY IN EACH  NOSTRIL TWICE DAILY, Disp: 60 mL, Rfl: 6  BENEFIBER, WHEAT DEXTRIN, ORAL, Take by mouth 2 (two) times daily., Disp: , Rfl:   biotin 1 mg tablet, Take 1,000 mcg by mouth every evening. , Disp: , Rfl:   calcium carbonate (OS-BLAKE) 600 mg (1,500 mg) Tab, Take 600 mg by mouth 2 (two) times daily with meals., Disp: , Rfl:   docusate sodium (COLACE) 100 MG capsule, Take 100 mg by mouth 2 (two) times daily., Disp: , Rfl:   EYLEA 2 mg/0.05 mL Syrg, , Disp: , Rfl:   fluticasone propionate (FLONASE) 50 mcg/actuation nasal spray, USE 2 SPRAYS IN EACH NOSTRIL ONE TIME DAILY, Disp: 48 g, Rfl: 3  gabapentin (NEURONTIN) 400 MG capsule, TAKE 1 CAPSULE THREE TIMES DAILY, Disp: 270 capsule, Rfl: 0  ibuprofen (ADVIL,MOTRIN) 100 mg/5 mL suspension, Take by mouth every 6 (six) hours as needed for Temperature greater than., Disp: , Rfl:   Lactobacillus rhamnosus GG (CULTURELLE) 10 billion cell capsule, Take 1 capsule by mouth once daily., Disp: , Rfl:   latanoprost 0.005 % ophthalmic solution, INSTILL 1 DROP INTO EACH EYE IN THE EVENING, Disp: 2.5 mL, Rfl: 11  metoprolol succinate (TOPROL-XL) 25 MG 24 hr tablet, Take 1 tablet (25 mg total) by mouth 2 (two) times daily., Disp: 180 tablet, Rfl: 3  mirtazapine (REMERON) 15 MG tablet, TAKE 1 TABLET (15 MG TOTAL) BY MOUTH EVERY EVENING., Disp: 90 tablet, Rfl: 3  PREVIDENT 1.1 % Gel, , Disp: , Rfl:   VIT C/E/ZN/COPPR/LUTEIN/ZEAXAN (PRESERVISION AREDS 2 ORAL), Take 1 capsule by mouth 2 (two) times daily. , Disp: , Rfl:   dorzolamide-timolol 2-0.5% (COSOPT) 22.3-6.8 mg/mL ophthalmic solution, Place 1 drop into both eyes 2 (two) times daily., Disp: 30 mL, Rfl: 4    Current Facility-Administered Medications:  bevacizumab (AVASTIN) 2.5 mg/0.10 mL 1.25 mg, 1.25 mg, Intraocular, 1 time in Clinic/HOD, NATHANIEL Lopez MD        Review of patient's allergies indicates:   -- Brimonidine -- Other (See Comments)    --  Burning and redness     Review of Systems   Constitutional: Negative.     Respiratory: Negative.     Cardiovascular: Negative.    Gastrointestinal: Negative.    Genitourinary: Negative.    Neurological: Negative.        Objective:      Physical Exam  Constitutional:       General: She is not in acute distress.     Appearance: Normal appearance.   HENT:      Head: Normocephalic and atraumatic.   Cardiovascular:      Rate and Rhythm: Normal rate and regular rhythm.      Heart sounds: No murmur heard.  Pulmonary:      Effort: Pulmonary effort is normal. No respiratory distress.      Breath sounds: Normal breath sounds.   Abdominal:      General: Bowel sounds are normal.      Tenderness: There is no abdominal tenderness.   Neurological:      Mental Status: She is alert and oriented to person, place, and time.   Psychiatric:         Mood and Affect: Mood normal.         Behavior: Behavior normal.       Assessment:       Problem List Items Addressed This Visit          Unprioritized    Essential hypertension - Primary    Relevant Orders    CBC W/ AUTO DIFFERENTIAL    Comprehensive Metabolic Panel    Lipid Panel     Other Visit Diagnoses       Asymptomatic menopause        Relevant Orders    DXA Bone Density Spine And Hip              Plan:       1. Essential hypertension  Stable, continue current medications, labs as ordered, PCP in 3 months.  - CBC W/ AUTO DIFFERENTIAL; Future  - Comprehensive Metabolic Panel; Future  - Lipid Panel; Future    2. Asymptomatic menopause    - DXA Bone Density Spine And Hip; Future

## 2022-12-12 ENCOUNTER — PROCEDURE VISIT (OUTPATIENT)
Dept: OPHTHALMOLOGY | Facility: CLINIC | Age: 82
End: 2022-12-12
Payer: MEDICARE

## 2022-12-12 DIAGNOSIS — H35.3211 EXUDATIVE AGE-RELATED MACULAR DEGENERATION OF RIGHT EYE WITH ACTIVE CHOROIDAL NEOVASCULARIZATION: Primary | ICD-10-CM

## 2022-12-12 PROCEDURE — 67028 PR INJECT INTRAVITREAL PHARMCOLOGIC: ICD-10-PCS | Mod: RT,S$GLB,, | Performed by: OPHTHALMOLOGY

## 2022-12-12 PROCEDURE — 99499 UNLISTED E&M SERVICE: CPT | Mod: S$GLB,,, | Performed by: OPHTHALMOLOGY

## 2022-12-12 PROCEDURE — 67028 INJECTION EYE DRUG: CPT | Mod: RT,S$GLB,, | Performed by: OPHTHALMOLOGY

## 2022-12-12 PROCEDURE — 99499 NO LOS: ICD-10-PCS | Mod: S$GLB,,, | Performed by: OPHTHALMOLOGY

## 2022-12-12 NOTE — PROGRESS NOTES
HPI    4-5 wk     Pt. States no changes in VA since last eye exam and   Denies flashes, floaters, or pain.     Cosopt BID OU  Latanaprost QHS OU  Last edited by Nena Lee on 12/12/2022  7:58 AM.          A/P    1. Wet AMD OS  S/p Avastin OS x 15, Eylea OS  x 14    Persistent SRF OS - improving  With RPE tear OS  Central fibrosis with atrophy - poor central potential  10/17 - increased SRH - will keep at 8 weeks for now  9/18 - stable cicatrix - try observation  12/18 - no activity  6/21 - some heme over cicatrix OS      2. New disease OD  Sx started 9/21  S/p Avastin OD x 5, Eylea OD x 7    Eylea OD today      3. PCIOL OU  CTR OS - but saw 20/30 with correction, in spite of sub RPE fibrosis      4. POAG OU  Good IOP control on Cosopt, brimonidine, latanoprost OU  Small drance heme OD    5. Floaters OU        4-5 weeks OCT No dilate (last DFE 10/22)      Risks, benefits, and alternatives to treatment discussed in detail with the patient.  The patient voiced understanding and wished to proceed with the procedure    Injection Procedure Note:  Diagnosis: Wet AMD OD    Patient Identified and Time Out complete  Pt marked  Topical Proparacaine and Betadine.  Inject Eylea OD at 6:00 @ 3.5-4mm posterior to limbus  Post Operative Dx: Same  Complications: None  Follow up as above.

## 2022-12-15 NOTE — PATIENT INSTRUCTIONS

## 2022-12-24 PROBLEM — Z71.89 ACP (ADVANCE CARE PLANNING): Status: ACTIVE | Noted: 2022-12-24

## 2022-12-24 PROBLEM — I63.311 STROKE DUE TO THROMBOSIS OF RIGHT MIDDLE CEREBRAL ARTERY: Status: ACTIVE | Noted: 2022-12-24

## 2022-12-24 PROBLEM — R74.01 TRANSAMINITIS: Status: ACTIVE | Noted: 2022-12-24

## 2022-12-25 PROBLEM — E78.5 HYPERLIPIDEMIA: Status: ACTIVE | Noted: 2022-12-25

## 2022-12-25 PROBLEM — Z87.891 EX-SMOKER: Status: ACTIVE | Noted: 2022-12-25

## 2022-12-27 ENCOUNTER — TELEPHONE (OUTPATIENT)
Dept: NEUROLOGY | Facility: CLINIC | Age: 82
End: 2022-12-27
Payer: MEDICARE

## 2022-12-27 ENCOUNTER — TELEPHONE (OUTPATIENT)
Dept: FAMILY MEDICINE | Facility: CLINIC | Age: 82
End: 2022-12-27
Payer: MEDICARE

## 2022-12-27 NOTE — TELEPHONE ENCOUNTER
Spoke to pt spouse. Informed him next available apt is 03/07/2023 at 11am. States would like to be placed on waiting list for earlier apt but will accept this one.

## 2022-12-27 NOTE — TELEPHONE ENCOUNTER
Left message to call in regards to scheduling a hospital f/u appointment on home and cell numbers. Portal message sent.

## 2022-12-27 NOTE — TELEPHONE ENCOUNTER
----- Message from Andree Dudleyandrew sent at 12/27/2022 11:16 AM CST -----  Regarding: Appointment  Contact: Angélica Jordan  Type:  Sooner Appointment Request    Caller is requesting a sooner appointment.  Caller declined first available appointment listed below.  Caller will not accept being placed on the waitlist and is requesting a message be sent to doctor.    Name of Caller:  Angélica Jordan   When is the first available appointment?    Symptoms:  f/u for stroke  Best Call Back Number:  [phone] (home)     Additional Information:  She states she would like to be seen by Dr. Jono Linton. Please call to schedule. Thanks!

## 2022-12-28 ENCOUNTER — TELEPHONE (OUTPATIENT)
Dept: FAMILY MEDICINE | Facility: CLINIC | Age: 82
End: 2022-12-28
Payer: MEDICARE

## 2022-12-28 NOTE — TELEPHONE ENCOUNTER
----- Message from Javid Roberts sent at 12/27/2022 11:39 AM CST -----  Contact: pt at [phone]  Type: Needs Medical Advice  Who Called:  pt's     Best Call Back Number: [phone] or [phone]  Additional Information: pt's  is calling the office requesting a call back. He states he need recommendations for a neurologist. Please advise.

## 2022-12-28 NOTE — TELEPHONE ENCOUNTER
----- Message from Najma Mcdaniel sent at 12/27/2022 10:42 AM CST -----  Regarding: [facility]  Type:  Sooner Appointment Request    Caller is requesting a sooner appointment.  Caller declined first available appointment listed below.  Caller will not accept being placed on the waitlist and is requesting a message be sent to doctor.    Name of Caller:  Alejandro   When is the first available appointment?  April 2023    Best Call Back Number:  [phone] - Alejandro   Additional Information:  Pat was discharged from the hospital on 12/25 and was told to schedule one week hospital follow up with Dr. Andersen please call to schedule

## 2022-12-29 ENCOUNTER — OFFICE VISIT (OUTPATIENT)
Dept: FAMILY MEDICINE | Facility: CLINIC | Age: 82
End: 2022-12-29
Payer: MEDICARE

## 2022-12-29 ENCOUNTER — TELEPHONE (OUTPATIENT)
Dept: FAMILY MEDICINE | Facility: CLINIC | Age: 82
End: 2022-12-29

## 2022-12-29 ENCOUNTER — HOSPITAL ENCOUNTER (OUTPATIENT)
Dept: RADIOLOGY | Facility: HOSPITAL | Age: 82
Discharge: HOME | End: 2022-12-29
Attending: NURSE PRACTITIONER
Payer: MEDICARE

## 2022-12-29 VITALS
BODY MASS INDEX: 31.03 KG/M2 | OXYGEN SATURATION: 95 % | HEIGHT: 60 IN | SYSTOLIC BLOOD PRESSURE: 130 MMHG | DIASTOLIC BLOOD PRESSURE: 82 MMHG | HEART RATE: 64 BPM | TEMPERATURE: 98 F | WEIGHT: 158.06 LBS

## 2022-12-29 DIAGNOSIS — M54.2 NECK PAIN: ICD-10-CM

## 2022-12-29 DIAGNOSIS — J30.9 ALLERGIC RHINITIS, UNSPECIFIED SEASONALITY, UNSPECIFIED TRIGGER: ICD-10-CM

## 2022-12-29 DIAGNOSIS — I69.328 SPEECH AND LANGUAGE DEFICIT AS LATE EFFECT OF CEREBROVASCULAR ACCIDENT (CVA): Primary | ICD-10-CM

## 2022-12-29 PROCEDURE — 99999 PR PBB SHADOW E&M-EST. PATIENT-LVL V: ICD-10-PCS | Mod: PBBFAC,HCNC,, | Performed by: NURSE PRACTITIONER

## 2022-12-29 PROCEDURE — 99214 OFFICE O/P EST MOD 30 MIN: CPT | Mod: HCNC,S$GLB,, | Performed by: NURSE PRACTITIONER

## 2022-12-29 PROCEDURE — 1159F MED LIST DOCD IN RCRD: CPT | Mod: HCNC,CPTII,S$GLB, | Performed by: NURSE PRACTITIONER

## 2022-12-29 PROCEDURE — 99999 PR PBB SHADOW E&M-EST. PATIENT-LVL V: CPT | Mod: PBBFAC,HCNC,, | Performed by: NURSE PRACTITIONER

## 2022-12-29 PROCEDURE — 72040 X-RAY EXAM NECK SPINE 2-3 VW: CPT | Mod: TC,HCNC,FY,PO

## 2022-12-29 PROCEDURE — 72040 X-RAY EXAM NECK SPINE 2-3 VW: CPT | Mod: 26,HCNC,, | Performed by: RADIOLOGY

## 2022-12-29 PROCEDURE — 1159F PR MEDICATION LIST DOCUMENTED IN MEDICAL RECORD: ICD-10-PCS | Mod: HCNC,CPTII,S$GLB, | Performed by: NURSE PRACTITIONER

## 2022-12-29 PROCEDURE — 3075F SYST BP GE 130 - 139MM HG: CPT | Mod: HCNC,CPTII,S$GLB, | Performed by: NURSE PRACTITIONER

## 2022-12-29 PROCEDURE — 1160F RVW MEDS BY RX/DR IN RCRD: CPT | Mod: HCNC,CPTII,S$GLB, | Performed by: NURSE PRACTITIONER

## 2022-12-29 PROCEDURE — 3079F PR MOST RECENT DIASTOLIC BLOOD PRESSURE 80-89 MM HG: ICD-10-PCS | Mod: HCNC,CPTII,S$GLB, | Performed by: NURSE PRACTITIONER

## 2022-12-29 PROCEDURE — 1125F AMNT PAIN NOTED PAIN PRSNT: CPT | Mod: HCNC,CPTII,S$GLB, | Performed by: NURSE PRACTITIONER

## 2022-12-29 PROCEDURE — 1160F PR REVIEW ALL MEDS BY PRESCRIBER/CLIN PHARMACIST DOCUMENTED: ICD-10-PCS | Mod: HCNC,CPTII,S$GLB, | Performed by: NURSE PRACTITIONER

## 2022-12-29 PROCEDURE — 1157F ADVNC CARE PLAN IN RCRD: CPT | Mod: HCNC,CPTII,S$GLB, | Performed by: NURSE PRACTITIONER

## 2022-12-29 PROCEDURE — 1157F PR ADVANCE CARE PLAN OR EQUIV PRESENT IN MEDICAL RECORD: ICD-10-PCS | Mod: HCNC,CPTII,S$GLB, | Performed by: NURSE PRACTITIONER

## 2022-12-29 PROCEDURE — 3079F DIAST BP 80-89 MM HG: CPT | Mod: HCNC,CPTII,S$GLB, | Performed by: NURSE PRACTITIONER

## 2022-12-29 PROCEDURE — 99214 PR OFFICE/OUTPT VISIT, EST, LEVL IV, 30-39 MIN: ICD-10-PCS | Mod: HCNC,S$GLB,, | Performed by: NURSE PRACTITIONER

## 2022-12-29 PROCEDURE — 1125F PR PAIN SEVERITY QUANTIFIED, PAIN PRESENT: ICD-10-PCS | Mod: HCNC,CPTII,S$GLB, | Performed by: NURSE PRACTITIONER

## 2022-12-29 PROCEDURE — 1111F DSCHRG MED/CURRENT MED MERGE: CPT | Mod: HCNC,CPTII,S$GLB, | Performed by: NURSE PRACTITIONER

## 2022-12-29 PROCEDURE — 3075F PR MOST RECENT SYSTOLIC BLOOD PRESS GE 130-139MM HG: ICD-10-PCS | Mod: HCNC,CPTII,S$GLB, | Performed by: NURSE PRACTITIONER

## 2022-12-29 PROCEDURE — 1111F PR DISCHARGE MEDS RECONCILED W/ CURRENT OUTPATIENT MED LIST: ICD-10-PCS | Mod: HCNC,CPTII,S$GLB, | Performed by: NURSE PRACTITIONER

## 2022-12-29 PROCEDURE — 72040 XR CERVICAL SPINE AP LATERAL: ICD-10-PCS | Mod: 26,HCNC,, | Performed by: RADIOLOGY

## 2022-12-29 RX ORDER — LEVOCETIRIZINE DIHYDROCHLORIDE 5 MG/1
5 TABLET, FILM COATED ORAL NIGHTLY
Qty: 30 TABLET | Refills: 0 | Status: SHIPPED | OUTPATIENT
Start: 2022-12-29 | End: 2024-01-10 | Stop reason: CLARIF

## 2022-12-29 RX ORDER — METHOCARBAMOL 500 MG/1
500 TABLET, FILM COATED ORAL NIGHTLY
Qty: 7 TABLET | Refills: 0 | Status: SHIPPED | OUTPATIENT
Start: 2022-12-29 | End: 2023-01-05

## 2022-12-29 RX ORDER — DICLOFENAC SODIUM 10 MG/G
2 GEL TOPICAL 3 TIMES DAILY
Qty: 450 G | Refills: 0 | Status: SHIPPED | OUTPATIENT
Start: 2022-12-29

## 2022-12-29 NOTE — TELEPHONE ENCOUNTER
Spoke with [facility] appt scheduled for 3/1 /23 at 1 pm with Dr Jc ,  verbalized understanding

## 2022-12-29 NOTE — PATIENT INSTRUCTIONS
Dr Linton  [facility] [place] [facility]  [address] [place] [address], [address]  [place], [phone]

## 2022-12-29 NOTE — PROGRESS NOTES
Subjective:       Patient ID: Serenity Epps is a 82 y.o. female.    Chief Complaint: Follow-up    Patient had a CVA on 12/24/22. She is here today with her , whom she lives with. They are scheduled for hospital follow up with her PCP on 1/11/23, but she has been having severe neck pain since the end of the hospital stay they would like evaluated. She does not have home health, they says they were told home health would not be paid for by their insurance. She is scheduled for cardiology and neurology follow up on 1/3/23. Patient's  is interested in possibly following with the outside neurologist that saw her in hospital.   She has had no fall or injury to cause the neck pain, but has been having to use her arms to pull herself out of bed and walk with the walker more than usual since the strike. Pain does not radiate. Has taken nothing for symnptoms. Severe pain when turning her head or with any movement of her neck.    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 eye  No date: Glaucoma  12/25/2022: Hyperlipidemia  No date: Hypertension  No date: Irritable bowel syndrome  No date: Left foot pain      Comment:  chronic  No date: Lymphocytic colitis  No date: Macular degeneration      Comment:  bimonthly intraoccular injections  No date: Osteopenia  4/27/2018: Recurrent major depressive disorder  No date: Rhinitis, chronic  No date: Strabismus      Comment:  as child  12/24/2022: Stroke due to thrombosis of right middle cerebral artery    Past Surgical History:  No date: APPENDECTOMY      Comment:  age 38  No date: CATARACT EXTRACTION      Comment:  OU done// 2016: CATARACT  EXTRACTION W/ INTRAOCULAR LENS  IMPLANT, BILATERAL;   Bilateral  2006  [place]: COLONOSCOPY      Comment:  Diverticulosis.   Internal small hemorrhoids were found.  10/03/2012: COLONOSCOPY      Comment:  Dr. Lynn, repeat in 7-8 years for surveillance  2018: COLONOSCOPY; N/A  2018: COLONOSCOPY      Comment:  Procedure: COLONOSCOPY;  Surgeon: Toby Lynn Jr., MD;  Location: [facility];  Service: Endoscopy;                 Laterality: N/A;  No date: EYE SURGERY; Bilateral      Comment:  Phaco with IOL (cataract extraction), rigth:[id number] 22.0                d//  2016: FOOT HARDWARE REMOVAL; Left      Comment:  Dr. Hammonds  2014: FOOT SURGERY; Left      Comment:  ERG and open reduction tallo tarsal dislocation                (hyprocure implant)  No date: JOINT REPLACEMENT  No date: PARTIAL HYSTERECTOMY      Comment:  age 38, ovaries conserved  2016: PIP JOINT FUSION; Right      Comment:  2nd-4th PIP joints of right foot; Dr. 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                 Patulous lower esophageal sphincter.   Gastric mucosal                atrophy.   PARAG Test  Negative.   1964: VEIN LIGATION      Comment:  BLE    Review of patient's family history indicates:      Social History    Socioeconomic History      Marital status:     Tobacco Use      Smoking status: Former        Packs/day: 2.00        Years: 35.00        Pack years: 70        Types: Cigarettes        Quit date: 2002        Years since quittin.0      Smokeless tobacco: Never    Substance and Sexual Activity      Alcohol use: Yes        Alcohol/week: 14.0 standard drinks        Types: 14 Glasses of wine per week        Comment: 2-3 glasses per night      Drug use: Yes        Types: Benzodiazepines      Sexual  activity: Yes        Partners: Male    Social Determinants of Health  Financial Resource Strain: Low Risk       Difficulty of Paying Living Expenses: Not hard at all  Food Insecurity: Unknown      Ran Out of Food in the Last Year: Never true  Transportation Needs: Unknown      Lack of Transportation (Medical): No  Physical Activity: Unknown      Minutes of Exercise per Session: 60 min  Social Connections: Unknown      Frequency of Social Gatherings with Friends and Family: Once a week      Attends Club or Organization Meetings: Never      Marital Status:   Housing Stability: Unknown      Unable to Pay for Housing in the Last Year: No      Number of Places Lived in the Last Year: 1    Current Outpatient Medications:  aspirin 81 MG Chew, Take 1 tablet (81 mg total) by mouth once daily., Disp: 60 tablet, Rfl: 0  atorvastatin (LIPITOR) 40 MG tablet, Take 1 tablet (40 mg total) by mouth once daily., Disp: 60 tablet, Rfl: 0  azelastine (ASTELIN) 137 mcg (0.1 %) nasal spray, USE 1 SPRAY IN EACH NOSTRIL TWICE DAILY (Patient taking differently: 1 spray by Nasal route 2 (two) times daily.), Disp: 60 mL, Rfl: 6  BENEFIBER, WHEAT DEXTRIN, ORAL, Take 1 Dose by mouth every morning., Disp: , Rfl:   biotin 1 mg tablet, Take 1,000 mcg by mouth every evening. , Disp: , Rfl:   calcium carbonate (OS-BLAKE) 600 mg (1,500 mg) Tab, Take 600 mg by mouth 2 (two) times daily with meals., Disp: , Rfl:   docusate sodium (COLACE) 100 MG capsule, Take 100 mg by mouth 2 (two) times daily., Disp: , Rfl:   EYLEA 2 mg/0.05 mL Syrg, Inject 1 mL into the skin every 30 days., Disp: , Rfl:   fluticasone propionate (FLONASE) 50 mcg/actuation nasal spray, USE 2 SPRAYS IN EACH NOSTRIL ONE TIME DAILY (Patient taking differently: 2 sprays by Each Nostril route once daily.), Disp: 48 g, Rfl: 3  gabapentin (NEURONTIN) 400 MG capsule, TAKE 1 CAPSULE THREE TIMES DAILY (Patient taking differently: Take 400 mg by mouth 2 (two) times daily.), Disp: 270  capsule, Rfl: 0  latanoprost 0.005 % ophthalmic solution, INSTILL 1 DROP INTO EACH EYE IN THE EVENING (Patient taking differently: Place 1 drop into both eyes every evening.), Disp: 2.5 mL, Rfl: 11  metoprolol succinate (TOPROL-XL) 25 MG 24 hr tablet, Take 1 tablet (25 mg total) by mouth 2 (two) times daily., Disp: 180 tablet, Rfl: 3  mirtazapine (REMERON) 15 MG tablet, TAKE 1 TABLET (15 MG TOTAL) BY MOUTH EVERY EVENING., Disp: 90 tablet, Rfl: 3  VIT C/E/ZN/COPPR/LUTEIN/ZEAXAN (PRESERVISION AREDS 2 ORAL), Take 1 capsule by mouth 2 (two) times daily. , Disp: , Rfl:   diclofenac sodium (VOLTAREN) 1 % Gel, Apply 2 g topically 3 (three) times daily. To painful area of neck, Disp: 450 g, Rfl: 0  dorzolamide-timolol 2-0.5% (COSOPT) 22.3-6.8 mg/mL ophthalmic solution, Place 1 drop into both eyes 2 (two) times daily., Disp: 30 mL, Rfl: 4  Current Facility-Administered Medications:  bevacizumab (AVASTIN) 2.5 mg/0.10 mL 1.25 mg, 1.25 mg, Intraocular, 1 time in Clinic/HODNATHANIEL MD        Review of patient's allergies indicates:   -- Brimonidine -- Other (See Comments)    --  Burning and redness     Review of Systems   Constitutional:  Negative for diaphoresis, fatigue and fever.   HENT:  Positive for postnasal drip and rhinorrhea.    Eyes: Negative.    Respiratory:  Negative for cough and shortness of breath.    Cardiovascular: Negative.    Gastrointestinal: Negative.    Genitourinary: Negative.    Integumentary:  Negative for rash and wound.   Neurological:  Positive for facial asymmetry and weakness (left arm/hand, leg/foot). Negative for vertigo.   Psychiatric/Behavioral: Negative.         Objective:      Physical Exam  Constitutional:       General: She is not in acute distress.     Appearance: Normal appearance.   HENT:      Head: Normocephalic and atraumatic.   Eyes:      Pupils: Pupils are equal, round, and reactive to light.   Neck:      Thyroid: No thyroid mass or thyroid tenderness.      Trachea:  Trachea and phonation normal.   Cardiovascular:      Rate and Rhythm: Normal rate and regular rhythm.      Heart sounds: No murmur heard.  Pulmonary:      Effort: Pulmonary effort is normal. No respiratory distress.      Breath sounds: Normal breath sounds.   Musculoskeletal:      Cervical back: Torticollis present. No erythema or crepitus. Muscular tenderness present. No spinous process tenderness. Decreased range of motion.   Lymphadenopathy:      Cervical: No cervical adenopathy.   Neurological:      Mental Status: She is alert and oriented to person, place, and time.      Motor: Weakness present.      Comments: Left sided facial droop, left UE and LE weakness, 2/4 comparatively   Psychiatric:         Mood and Affect: Mood normal.         Behavior: Behavior normal.       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  1. 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 Neck pain  I believe this is musculoskeletal related to increased dependence on upper body strength for moveemnt, but will xray, follow up if not resolving.   - X-Ray Cervical Spine AP And Lateral; Future  - diclofenac sodium (VOLTAREN) 1 % Gel; Apply 2 g topically 3 (three) times daily. To painful area of neck  Dispense: 450 g; Refill: 0  - methocarbamoL (ROBAXIN) 500 MG Tab; Take 1  tablet (500 mg total) by mouth every evening. for 7 days  Dispense: 7 tablet; Refill: 0    3. Allergic rhinitis, unspecified seasonality, unspecified trigger  Follow up if not resolving.   - levocetirizine (XYZAL) 5 MG tablet; Take 1 tablet (5 mg total) by mouth every evening.  Dispense: 30 tablet; Refill: 0

## 2023-01-03 ENCOUNTER — OFFICE VISIT (OUTPATIENT)
Dept: CARDIOLOGY | Facility: CLINIC | Age: 83
End: 2023-01-03
Payer: MEDICARE

## 2023-01-03 ENCOUNTER — OFFICE VISIT (OUTPATIENT)
Dept: NEUROLOGY | Facility: CLINIC | Age: 83
End: 2023-01-03
Payer: MEDICARE

## 2023-01-03 VITALS
HEIGHT: 65 IN | BODY MASS INDEX: 24.43 KG/M2 | WEIGHT: 146.63 LBS | SYSTOLIC BLOOD PRESSURE: 159 MMHG | DIASTOLIC BLOOD PRESSURE: 91 MMHG | HEART RATE: 66 BPM

## 2023-01-03 VITALS
BODY MASS INDEX: 24.46 KG/M2 | SYSTOLIC BLOOD PRESSURE: 155 MMHG | WEIGHT: 146.81 LBS | HEIGHT: 65 IN | DIASTOLIC BLOOD PRESSURE: 73 MMHG

## 2023-01-03 DIAGNOSIS — I10 ESSENTIAL HYPERTENSION: Primary | ICD-10-CM

## 2023-01-03 DIAGNOSIS — I63.30 CEREBROVASCULAR ACCIDENT (CVA) DUE TO THROMBOSIS OF CEREBRAL ARTERY: ICD-10-CM

## 2023-01-03 DIAGNOSIS — R74.01 TRANSAMINITIS: ICD-10-CM

## 2023-01-03 DIAGNOSIS — I65.22 STENOSIS OF LEFT INTERNAL CAROTID ARTERY: ICD-10-CM

## 2023-01-03 DIAGNOSIS — I25.10 CAD (CORONARY ARTERY DISEASE): ICD-10-CM

## 2023-01-03 DIAGNOSIS — I63.311 STROKE DUE TO THROMBOSIS OF RIGHT MIDDLE CEREBRAL ARTERY: ICD-10-CM

## 2023-01-03 DIAGNOSIS — E78.2 MIXED HYPERLIPIDEMIA: ICD-10-CM

## 2023-01-03 PROCEDURE — 1125F PR PAIN SEVERITY QUANTIFIED, PAIN PRESENT: ICD-10-PCS | Mod: CPTII,S$GLB,, | Performed by: NURSE PRACTITIONER

## 2023-01-03 PROCEDURE — 3077F PR MOST RECENT SYSTOLIC BLOOD PRESSURE >= 140 MM HG: ICD-10-PCS | Mod: CPTII,S$GLB,, | Performed by: INTERNAL MEDICINE

## 2023-01-03 PROCEDURE — 3078F PR MOST RECENT DIASTOLIC BLOOD PRESSURE < 80 MM HG: ICD-10-PCS | Mod: CPTII,S$GLB,, | Performed by: NURSE PRACTITIONER

## 2023-01-03 PROCEDURE — 3288F PR FALLS RISK ASSESSMENT DOCUMENTED: ICD-10-PCS | Mod: CPTII,S$GLB,, | Performed by: INTERNAL MEDICINE

## 2023-01-03 PROCEDURE — 1159F MED LIST DOCD IN RCRD: CPT | Mod: CPTII,S$GLB,, | Performed by: NURSE PRACTITIONER

## 2023-01-03 PROCEDURE — 3288F FALL RISK ASSESSMENT DOCD: CPT | Mod: CPTII,S$GLB,, | Performed by: NURSE PRACTITIONER

## 2023-01-03 PROCEDURE — 1111F DSCHRG MED/CURRENT MED MERGE: CPT | Mod: CPTII,S$GLB,, | Performed by: NURSE PRACTITIONER

## 2023-01-03 PROCEDURE — 1101F PT FALLS ASSESS-DOCD LE1/YR: CPT | Mod: CPTII,S$GLB,, | Performed by: INTERNAL MEDICINE

## 2023-01-03 PROCEDURE — 3077F PR MOST RECENT SYSTOLIC BLOOD PRESSURE >= 140 MM HG: ICD-10-PCS | Mod: CPTII,S$GLB,, | Performed by: NURSE PRACTITIONER

## 2023-01-03 PROCEDURE — 99999 PR PBB SHADOW E&M-EST. PATIENT-LVL III: CPT | Mod: PBBFAC,,, | Performed by: INTERNAL MEDICINE

## 2023-01-03 PROCEDURE — 1126F AMNT PAIN NOTED NONE PRSNT: CPT | Mod: CPTII,S$GLB,, | Performed by: INTERNAL MEDICINE

## 2023-01-03 PROCEDURE — 1157F ADVNC CARE PLAN IN RCRD: CPT | Mod: CPTII,S$GLB,, | Performed by: NURSE PRACTITIONER

## 2023-01-03 PROCEDURE — 3080F PR MOST RECENT DIASTOLIC BLOOD PRESSURE >= 90 MM HG: ICD-10-PCS | Mod: CPTII,S$GLB,, | Performed by: INTERNAL MEDICINE

## 2023-01-03 PROCEDURE — 3077F SYST BP >= 140 MM HG: CPT | Mod: CPTII,S$GLB,, | Performed by: INTERNAL MEDICINE

## 2023-01-03 PROCEDURE — 3077F SYST BP >= 140 MM HG: CPT | Mod: CPTII,S$GLB,, | Performed by: NURSE PRACTITIONER

## 2023-01-03 PROCEDURE — 3078F DIAST BP <80 MM HG: CPT | Mod: CPTII,S$GLB,, | Performed by: NURSE PRACTITIONER

## 2023-01-03 PROCEDURE — 1157F PR ADVANCE CARE PLAN OR EQUIV PRESENT IN MEDICAL RECORD: ICD-10-PCS | Mod: CPTII,S$GLB,, | Performed by: NURSE PRACTITIONER

## 2023-01-03 PROCEDURE — 3288F PR FALLS RISK ASSESSMENT DOCUMENTED: ICD-10-PCS | Mod: CPTII,S$GLB,, | Performed by: NURSE PRACTITIONER

## 2023-01-03 PROCEDURE — 1101F PR PT FALLS ASSESS DOC 0-1 FALLS W/OUT INJ PAST YR: ICD-10-PCS | Mod: CPTII,S$GLB,, | Performed by: NURSE PRACTITIONER

## 2023-01-03 PROCEDURE — 1125F AMNT PAIN NOTED PAIN PRSNT: CPT | Mod: CPTII,S$GLB,, | Performed by: NURSE PRACTITIONER

## 2023-01-03 PROCEDURE — 1126F PR PAIN SEVERITY QUANTIFIED, NO PAIN PRESENT: ICD-10-PCS | Mod: CPTII,S$GLB,, | Performed by: INTERNAL MEDICINE

## 2023-01-03 PROCEDURE — 99999 PR PBB SHADOW E&M-EST. PATIENT-LVL IV: ICD-10-PCS | Mod: PBBFAC,,, | Performed by: NURSE PRACTITIONER

## 2023-01-03 PROCEDURE — 1111F PR DISCHARGE MEDS RECONCILED W/ CURRENT OUTPATIENT MED LIST: ICD-10-PCS | Mod: CPTII,S$GLB,, | Performed by: NURSE PRACTITIONER

## 2023-01-03 PROCEDURE — 1101F PR PT FALLS ASSESS DOC 0-1 FALLS W/OUT INJ PAST YR: ICD-10-PCS | Mod: CPTII,S$GLB,, | Performed by: INTERNAL MEDICINE

## 2023-01-03 PROCEDURE — 99205 PR OFFICE/OUTPT VISIT, NEW, LEVL V, 60-74 MIN: ICD-10-PCS | Mod: S$GLB,,, | Performed by: INTERNAL MEDICINE

## 2023-01-03 PROCEDURE — 99215 PR OFFICE/OUTPT VISIT, EST, LEVL V, 40-54 MIN: ICD-10-PCS | Mod: S$GLB,,, | Performed by: NURSE PRACTITIONER

## 2023-01-03 PROCEDURE — 99215 OFFICE O/P EST HI 40 MIN: CPT | Mod: S$GLB,,, | Performed by: NURSE PRACTITIONER

## 2023-01-03 PROCEDURE — 1111F DSCHRG MED/CURRENT MED MERGE: CPT | Mod: CPTII,S$GLB,, | Performed by: INTERNAL MEDICINE

## 2023-01-03 PROCEDURE — 99999 PR PBB SHADOW E&M-EST. PATIENT-LVL IV: CPT | Mod: PBBFAC,,, | Performed by: NURSE PRACTITIONER

## 2023-01-03 PROCEDURE — 1159F PR MEDICATION LIST DOCUMENTED IN MEDICAL RECORD: ICD-10-PCS | Mod: CPTII,S$GLB,, | Performed by: NURSE PRACTITIONER

## 2023-01-03 PROCEDURE — 99205 OFFICE O/P NEW HI 60 MIN: CPT | Mod: S$GLB,,, | Performed by: INTERNAL MEDICINE

## 2023-01-03 PROCEDURE — 3288F FALL RISK ASSESSMENT DOCD: CPT | Mod: CPTII,S$GLB,, | Performed by: INTERNAL MEDICINE

## 2023-01-03 PROCEDURE — 93005 ELECTROCARDIOGRAM TRACING: CPT | Mod: PO

## 2023-01-03 PROCEDURE — 93010 EKG 12-LEAD: ICD-10-PCS | Mod: S$GLB,,, | Performed by: INTERNAL MEDICINE

## 2023-01-03 PROCEDURE — 93010 ELECTROCARDIOGRAM REPORT: CPT | Mod: S$GLB,,, | Performed by: INTERNAL MEDICINE

## 2023-01-03 PROCEDURE — 1157F PR ADVANCE CARE PLAN OR EQUIV PRESENT IN MEDICAL RECORD: ICD-10-PCS | Mod: CPTII,S$GLB,, | Performed by: INTERNAL MEDICINE

## 2023-01-03 PROCEDURE — 3080F DIAST BP >= 90 MM HG: CPT | Mod: CPTII,S$GLB,, | Performed by: INTERNAL MEDICINE

## 2023-01-03 PROCEDURE — 99999 PR PBB SHADOW E&M-EST. PATIENT-LVL III: ICD-10-PCS | Mod: PBBFAC,,, | Performed by: INTERNAL MEDICINE

## 2023-01-03 PROCEDURE — 1157F ADVNC CARE PLAN IN RCRD: CPT | Mod: CPTII,S$GLB,, | Performed by: INTERNAL MEDICINE

## 2023-01-03 PROCEDURE — 1101F PT FALLS ASSESS-DOCD LE1/YR: CPT | Mod: CPTII,S$GLB,, | Performed by: NURSE PRACTITIONER

## 2023-01-03 PROCEDURE — 1111F PR DISCHARGE MEDS RECONCILED W/ CURRENT OUTPATIENT MED LIST: ICD-10-PCS | Mod: CPTII,S$GLB,, | Performed by: INTERNAL MEDICINE

## 2023-01-03 NOTE — PATIENT INSTRUCTIONS
SWETHA/Loop recorder  1/11/23 at 2 pm    Arrive for your procedure at:  [facility]      FASTING:  You MAY NOT have anything to eat or drink AFTER MIDNIGHT.  If your procedure is scheduled in the afternoon, you may have a LIGHT BREAKFAST BEFORE 6:00 A.M.  For example: Two slices of toast; black coffee or black tea.    MEDICATIONS:  You may take your regular morning medications with a small sip of water.     Hold or adjust the following:  Fluid pills.  Diabetes medications.    Continue: Coumadin, Plavix, Effient, Aspirin, Anti-coagulants, Blood thinners    Please refer to pre-op instructions received from [facility].

## 2023-01-03 NOTE — PATIENT INSTRUCTIONS
"Continue taking the aspirin 81 mg daily, as well as the atorvastatin 40 mg daily.     We want the LDL cholesterol to be < 70 to prevent future strokes.     Follow up as planned with Dr. Neri in cardiology. He will discuss inserting a cardiac monitor called a loop recorder to monitor for an abnormal heart rhythm that could lead to future strokes.     Remember, OLD STROKES DO NOT CAUSE NEW PROBLEMS!!    STROKE EDUCATION:    During a stroke, blood stops flowing to part of the brain. This can damage areas in the brain that control the rest of the body. Symptoms after a stroke depend on which part of the brain has been affected. A TIA is often called a "mini stroke" and can be a warning sign for future strokes.     Stroke Risk Factors:  - Previous stroke  - High blood pressure  - High cholesterol  - Cigarette/cigar smoking  - Diabetes  - Carotid or other artery disease  - Atrial fibrillation/flutter  - Obesity  - Blood clotting disorders  - Excessive alcohol use  - Street drug use  - Family history of stroke    Call 911 right away if you have any of the following symptoms of stroke:  Weakness, tingling, or loss of feeling on one side of your face or body  Sudden double vision or trouble seeing in one or both eyes  Sudden trouble talking or slurred speech  Trouble understanding others  Sudden, severe headache  Dizziness, loss of balance, or a sense of falling  Blackouts or seizures     F.A.S.T. is an easy way to remember the signs of stroke. When you see these signs, you know that you need to call 911 FAST!   F is for face drooping. One side of the face is drooping or numb. When the person smiles, the smile is uneven.   A is for arm weakness. One arm is weak or numb. When the person lifts both arms at the same time, one arm may drift downward.   S is for speech difficulty. You may notice slurred speech or trouble speaking. The person can't repeat a simple sentence correctly when asked.   T is for time to call 911. If " someone shows any of these symptoms, even if they go away, call 911 immediately. Make note of the time the symptoms first appeared.

## 2023-01-03 NOTE — PROGRESS NOTES
"NEUROLOGY  Outpatient Follow Up Visit     [facility]  [address] [facility] [address] [place] [address]  [phone] (office) / [phone] (fax)    Patient Name:  Serenity Epps  :  1940  MR #:  [id number]  Acct #:  [id number]    Date of  Visit: 2023    Other Physicians:  Hua Andersen MD (Primary Care Physician); Abhijit Trinh* (Referring)      CHIEF COMPLAINT: Fall and Stroke (Falling last week. Had a stroke 2 weeks ago)      INTERVAL HISTORY:  1/3/23:  Serenity Epps is a 82 y.o. R-handed female seen in hospital follow up for CVA.     The patient is new to me today, but was evaluated by Dr. Linton during a recent admission to [facility].     Medical history is otherwise significant for HTN, macular degeneration, depression, insomnia    Presented with L facial droop, dysarthria and L extremity weakness. She was outside of the window for thrombolytic.     Was not taking any AP agents or statin at the time of event. She was discharged on ASA 81 mg daily due to some hemorrhagic transformation noted. She is tolerating this well. Also now on Lipitor 40 mg without SE. LFTs slightly elevated during admission, but evaluation was unremarkable.     No cardiac history. A few weeks prior, did note some new onset SOB, mainly with exertion. Will be seeing cardiology later today to discuss ILR.     Establishing with new PCP soon.     Still struggling with eating due to facial weakness. No choking. Speech continues to improve, near baseline. She still has some weakness in the L hand (mainly thumb and index finger). The LLE seems back to baseline. She uses a walker at baseline. No falls since d/c.     She will start outpatient PT next week. ST has also been ordered, but she hasn't heard to schedule yet.     Former smoker, quit ~ . Not diabetic. No neuro FH.     Prior history:  [facility] Neurology Consultation:  " Serenity Epps is a 82 y.o. year old RH female who presented with a new stroke. Patient was a " "wake up stroke and yesterday when she started her day, she noticed that she was weak on the left side and had slurred speech.  noticed a left facial droop. Work up so far has revealed an embolic infarcts in the right hemisphere. No etiology so far as Echo was unremarkable and CTA/dopplers did not show any significant stenosis. Patient reports she feels a bit better today with her weakness and her words are not as slurred."       Allergies:  Review of patient's allergies indicates:   Allergen Reactions    Brimonidine Other (See Comments)     Burning and redness       Current Medications:  Current Outpatient Medications   Medication Sig Dispense Refill    aspirin 81 MG Chew Take 1 tablet (81 mg total) by mouth once daily. 60 tablet 0    atorvastatin (LIPITOR) 40 MG tablet Take 1 tablet (40 mg total) by mouth once daily. 60 tablet 0    azelastine (ASTELIN) 137 mcg (0.1 %) nasal spray USE 1 SPRAY IN EACH NOSTRIL TWICE DAILY (Patient taking differently: 1 spray by Nasal route 2 (two) times daily.) 60 mL 6    BENEFIBER, WHEAT DEXTRIN, ORAL Take 1 Dose by mouth every morning.      biotin 1 mg tablet Take 1,000 mcg by mouth every evening.       calcium carbonate (OS-BLAKE) 600 mg (1,500 mg) Tab Take 600 mg by mouth 2 (two) times daily with meals.      diclofenac sodium (VOLTAREN) 1 % Gel Apply 2 g topically 3 (three) times daily. To painful area of neck 450 g 0    docusate sodium (COLACE) 100 MG capsule Take 100 mg by mouth 2 (two) times daily.      dorzolamide-timolol 2-0.5% (COSOPT) 22.3-6.8 mg/mL ophthalmic solution Place 1 drop into both eyes 2 (two) times daily. 30 mL 4    EYLEA 2 mg/0.05 mL Syrg Inject 1 mL into the skin every 30 days.      fluticasone propionate (FLONASE) 50 mcg/actuation nasal spray USE 2 SPRAYS IN EACH NOSTRIL ONE TIME DAILY (Patient taking differently: 2 sprays by Each Nostril route once daily.) 48 g 3    gabapentin (NEURONTIN) 400 MG capsule TAKE 1 CAPSULE THREE TIMES DAILY (Patient taking " differently: Take 400 mg by mouth 2 (two) times daily.) 270 capsule 0    latanoprost 0.005 % ophthalmic solution INSTILL 1 DROP INTO EACH EYE IN THE EVENING (Patient taking differently: Place 1 drop into both eyes every evening.) 2.5 mL 11    levocetirizine (XYZAL) 5 MG tablet Take 1 tablet (5 mg total) by mouth every evening. 30 tablet 0    methocarbamoL (ROBAXIN) 500 MG Tab Take 1 tablet (500 mg total) by mouth every evening. for 7 days 7 tablet 0    metoprolol succinate (TOPROL-XL) 25 MG 24 hr tablet Take 1 tablet (25 mg total) by mouth 2 (two) times daily. 180 tablet 3    mirtazapine (REMERON) 15 MG tablet TAKE 1 TABLET (15 MG TOTAL) BY MOUTH EVERY EVENING. 90 tablet 3    VIT C/E/ZN/COPPR/LUTEIN/ZEAXAN (PRESERVISION AREDS 2 ORAL) Take 1 capsule by mouth 2 (two) times daily.        Current Facility-Administered Medications   Medication Dose Route Frequency Provider Last Rate Last Admin    bevacizumab (AVASTIN) 2.5 mg/0.10 mL 1.25 mg  1.25 mg Intraocular 1 time in Clinic/HOD NATHANIEL Lopez MD           Past Medical History:  Past Medical History:   Diagnosis Date    Anxiety     Arthritis     Cataract - Both Eyes     OU done//    CKD (chronic kidney disease), stage II 09/21/2016    pt denies    Diarrhea     Diverticulosis     DVT (deep venous thrombosis)     left leg, after childbirth    Exudative age-related macular degeneration of left eye 2/20/2014    Glaucoma     Hyperlipidemia 12/25/2022    Hypertension     Irritable bowel syndrome     Left foot pain     chronic    Lymphocytic colitis     Macular degeneration     bimonthly intraoccular injections    Osteopenia     Recurrent major depressive disorder 4/27/2018    Rhinitis, chronic     Strabismus     as child    Stroke due to thrombosis of right middle cerebral artery 12/24/2022       Past Surgical History:  Past Surgical History:   Procedure Laterality Date    APPENDECTOMY      age 38    CATARACT EXTRACTION      OU done//    CATARACT EXTRACTION W/  INTRAOCULAR LENS  IMPLANT, BILATERAL Bilateral 2016    COLONOSCOPY  9/26/2006  [facility]    Diverticulosis.   Internal small hemorrhoids were found.     COLONOSCOPY  10/03/2012    Dr. Lynn, repeat in 7-8 years for surveillance    COLONOSCOPY N/A 5/2/2018    COLONOSCOPY  05/02/2018    Procedure: COLONOSCOPY;  Surgeon: Toby Lynn Jr., MD;  Location: [facility];  Service: Endoscopy;  Laterality: N/A;    EYE SURGERY Bilateral     Phaco with IOL (cataract extraction), rigth:[id number] 22.0 d//    FOOT HARDWARE REMOVAL Left 06/01/2016    Dr. Hammonds    FOOT SURGERY Left 2014    ERG and open reduction tallo tarsal dislocation (hyprocure implant)    JOINT REPLACEMENT      PARTIAL HYSTERECTOMY      age 38, ovaries conserved    PIP JOINT FUSION Right 06/01/2016    2nd-4th PIP joints of right foot; Dr. Hammonds    TONSILLECTOMY      as a child    TOTAL KNEE ARTHROPLASTY Left 09/2015    UPPER GASTROINTESTINAL ENDOSCOPY  9/26/2006  [facility]    Erythema in the lower third of the esophagus (NERD).  Patulous lower esophageal sphincter.   Gastric mucosal atrophy.   PARAG Test  Negative.     VEIN LIGATION  1964    BLE       Family History:  family history includes Ankylosing spondylitis in her brother; Breast cancer in her daughter; Cancer in her brother and daughter; Coronary artery disease (age of onset: 78) in her mother; Crohn's disease in her brother; Diabetes in her brother; Diverticulitis in her father; Glaucoma in her brother and mother; Macular degeneration in her brother; No Known Problems in her brother, maternal grandfather, maternal grandmother, paternal grandfather, and paternal grandmother.    Social History:   reports that she quit smoking about 21 years ago. Her smoking use included cigarettes. She has a 70.00 pack-year smoking history. She has never used smokeless tobacco. She reports current alcohol use of about 14.0 standard drinks per week. She reports current drug use. Drug: Benzodiazepines.      PHYSICAL EXAM:  BP  "(!) 155/73 (BP Location: Right arm, Patient Position: Sitting, BP Method: Large (Automatic))   Ht 5' 5" (1.651 m)   Wt 66.6 kg (146 lb 13.2 oz)   BMI 24.43 kg/m²     General: Well groomed. NAD.  Cardiovascular: Regular rate and rhythm.   Pulmonary: Normal effort and rate.   Musculoskeletal: No obvious joint deformities, moves all extremities well.  Extremities: No clubbing, cyanosis or edema.     Neurological exam:  Mental status: Awake and alert.  Oriented to person, place, time and situation. Recent and remote memory appear to be intact.  Fund of knowledge normal. Normal attention and concentration.   Speech/Language: Very subtle dysarthria, but overall fluent and appropriate. Able to follow complex commands.   Cranial nerves (II-XII): Visual fields full to confrontation. Pupils equal round and reactive to light, extraocular movements intact, no ptosis, no nystagmus. Facial sensation intact. L central facial weakness. Hearing grossly intact. Palate mobile and midline. Shoulder shrug normal bilaterally. Normal tongue protrusion.   Motor: 5 out of 5 strength throughout the upper and lower extremities bilaterally except 4/5 in the L IO. Normal bulk and tone. No abnormal movements noted. No drift appreciated.   Sensation: Intact to light touch& vibration  bilaterally.   DTR: 1+ at the knees and biceps bilaterally.  Coordination: Finger-nose-finger testing intact bilaterally. No tremor.   Gait: Uses walker, mild shuffling of the LLE      DIAGNOSTIC DATA:  I have personally reviewed provider notes, labs and imaging made available to me today.     Imaging:  [facility] 12/24/22:  Impression:  1. There is no acute hemorrhage.  2. There is right frontal high convexity subcortical white matter hypoattenuation which could be seen with subacute ischemia.    MRI brain wo 12/24/22:    FINDINGS:  Acute diffusion restriction is in the right frontal lobe cortex with dropout on the ADC map.  Mild edema is present without significant " mass effect.  Mild changes of chronic microangiopathy are present elsewhere.  There is associated blooming artifact on the SWI sequence.  Ventricles and sulci are proportionate.     No abnormal extra-axial fluid collections.     Flow voids are maintained in the intracranial arteries and dural venous sinuses.     Skull base and calvarium have a normal signal.     Impression:  Acute right MCA territory infarct.  Associated blooming artifact may represent a small amount of hemorrhage.    CTA stroke MP 12/24/22:  FINDINGS:  There is calcified plaque within the visualized thoracic aorta and origins of the great vessels without flow-limiting stenosis identified.  The left vertebral artery is slightly dominant over the right with no flow-limiting stenosis, aneurysm, dissection, or occlusion identified.  There is calcified plaque within the right carotid bulb and proximal internal carotid artery with no flow-limiting stenosis identified.  There is calcified and noncalcified plaque within the proximal left internal carotid artery with approximately 60% stenosis demonstrated.  There is non flow limiting calcified plaque within the bilateral intracranial internal carotid arteries.  There is diminutive left TASH A1 segment noting robust right TASH A1 segment, likely developmental.  No occlusion identified.  No aneurysm seen.  The middle cerebral arteries, posterior cerebral arteries, and basilar artery demonstrate no flow-limiting stenosis or occlusion.  No aneurysm seen.  On the delayed imaging the dural venous sinuses demonstrate no thrombosis.  Included portions of the lungs demonstrate emphysematous changes.     Impression:  1. Approximately 60% stenosis proximal left cervical internal carotid artery.  2. No occlusion or dissection identified.  3. Diminutive left TASH A1 segment likely developmental.    Cardiac:  EKG 5/2018:  Sinus bradycardia with premature atrial complexes     TTE 12/24/22:  The left ventricle is normal in  size with eccentric hypertrophy and normal systolic function.  The estimated ejection fraction is 60%.  Indeterminate left ventricular diastolic function.  Normal right ventricular size with normal right ventricular systolic function.  Mild left atrial enlargement.  Mild tricuspid regurgitation.  Normal central venous pressure (3 mmHg).  The estimated PA systolic pressure is 29 mmHg.  No bubble study images were available for review.    Labs:  CBC:   Lab Results   Component Value Date    WBC 7.19 12/25/2022    HGB 13.2 12/25/2022    HCT 39.2 12/25/2022     12/25/2022    MCV 87 12/25/2022    RDW 13.0 12/25/2022     BMP:   Lab Results   Component Value Date     12/25/2022    K 3.4 (L) 12/25/2022     12/25/2022    CO2 28 12/25/2022    BUN 7 12/25/2022    CREATININE 0.51 12/25/2022    GLU 90 12/25/2022    CALCIUM 8.4 12/25/2022    MG 1.8 12/25/2022    PHOS 3.5 12/25/2022     LFTS;   Lab Results   Component Value Date    PROT 6.6 12/25/2022    ALBUMIN 3.9 12/25/2022    BILITOT 0.9 12/25/2022    AST 38 (H) 12/25/2022    ALKPHOS 76 12/25/2022    ALT 56 (H) 12/25/2022     COAGS:   Lab Results   Component Value Date    INR 1.1 12/25/2022     FLP:   Lab Results   Component Value Date    CHOL 212 (H) 12/24/2022    HDL 83 (H) 12/24/2022    LDLCALC 108.8 12/24/2022    TRIG 101 12/24/2022    CHOLHDL 39.2 12/24/2022     Lab Results   Component Value Date    HGBA1C 5.3 12/24/2022       ASSESSMENT & PLAN:  Serenity Epps is a 82 y.o. R-handed female seen in hospital follow up for CVA.     Problem List Items Addressed This Visit          Neuro    Stroke due to thrombosis of right middle cerebral artery    Overview     Dec 2022  No thrombolytic          Current Assessment & Plan     Diagnostic testing from admission reviewed with pt and    - MRI brain w/acute to subacute R MCA ischemic stroke with some hemorrhagic transformation    - CTA MP with unrelated LEFT ICA stenosis (~60%)   - TTE with mild LAE, no bubble  study done, normal EF; no EKG done during admission, no past arrhythmia  - A1C NL,     Continue ASA 81 mg daily (no DAPT at dc due to hemorrhagic transformation)  Continue statin for LDL goal < 70 -- new PCP can f/u LFTs after statin initiation  BP elevated today, but hasn't taken Rx -- typically at goal   Has f/u with cardiology later today to discuss ILR to monitor for occult Afib -- discussed how this would change stroke prevention regimen if discovered   To start outpatient PT and ST soon  CVA education provided            Cardiac/Vascular    Essential hypertension - Primary    Mixed hyperlipidemia    Stenosis of left internal carotid artery    Overview     12/24/2022  60 % seen on CTA         Current Assessment & Plan     Unrelated to R MCA stroke   Medical mgmt -- ASA, statin             GI    Transaminitis       Follow up: PRN    I spent a total of 53 minutes on the day of the visit.    This includes face to face time with the patient, as well as non-face to face time preparing for and completing the visit (review of prior diagnostic testing and clinical notes, obtaining or reviewing history, documenting clinical information in the EMR, independently interpreting and communicating results to the patient/family and coordinating ongoing care).               I appreciate the opportunity to participate in the care of this patient. Please feel free to contact me with any questions or concerns.       Ivana Schuler, ACNPC-AG  [facility]  [address] [facility] [place] [address]

## 2023-01-03 NOTE — ASSESSMENT & PLAN NOTE
Diagnostic testing from admission reviewed with pt and    - MRI brain w/acute to subacute R MCA ischemic stroke with some hemorrhagic transformation    - CTA MP with unrelated LEFT ICA stenosis (~60%)   - TTE with mild LAE, no bubble study done, normal EF; no EKG done during admission, no past arrhythmia  - A1C NL,     Continue ASA 81 mg daily (no DAPT at dc due to hemorrhagic transformation)  Continue statin for LDL goal < 70 -- new PCP can f/u LFTs after statin initiation  BP elevated today, but hasn't taken Rx -- typically at goal   Has f/u with cardiology later today to discuss ILR to monitor for occult Afib -- discussed how this would change stroke prevention regimen if discovered   To start outpatient PT and ST soon  CVA education provided

## 2023-01-03 NOTE — PROGRESS NOTES
Subjective:    Patient ID:  Serenity Epps is a 82 y.o. 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  Was not evaluated by Cardiology at that time.  Referred as an outpatient for evaluation for SWETHA with bubble and loop recorder implantation to evaluate for subclinical atrial fibrillation.    The patient states that she feels OK, still tired    Had some shortness of breath 2 weeks prior to stroke    AFib history - None    Personal history of heart attack or stroke - None that she is aware of other than acute MCA stroke    Past Medical History:   Diagnosis Date    Anxiety     Arthritis     Cataract - Both Eyes     OU done//    CKD (chronic kidney disease), stage II 09/21/2016    pt denies    Diarrhea     Diverticulosis     DVT (deep venous thrombosis)     left leg, after childbirth    Exudative age-related macular degeneration of left eye 2/20/2014    Glaucoma     Hyperlipidemia 12/25/2022    Hypertension     Irritable bowel syndrome     Left foot pain     chronic    Lymphocytic colitis     Macular degeneration     bimonthly intraoccular injections    Osteopenia     Recurrent major depressive disorder 4/27/2018    Rhinitis, chronic     Strabismus     as child    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   Internal small hemorrhoids were found.     COLONOSCOPY  10/03/2012    Dr. Lynn, repeat in 7-8 years for surveillance    COLONOSCOPY N/A 5/2/2018    COLONOSCOPY  05/02/2018    Procedure: COLONOSCOPY;  Surgeon: Toby Lynn Jr., MD;  Location: [facility];  Service: Endoscopy;  Laterality: N/A;    EYE SURGERY Bilateral     Phaco with IOL (cataract extraction), rigth:[id number] 22.0 d//    FOOT HARDWARE REMOVAL Left 06/01/2016    Dr. Hammonds    FOOT SURGERY Left 2014    ERG and open reduction tallo tarsal dislocation (hyprocure implant)    JOINT REPLACEMENT      PARTIAL HYSTERECTOMY      age 38, ovaries conserved    PIP JOINT FUSION Right 06/01/2016    2nd-4th PIP joints of right foot; Dr. Hammonds    TONSILLECTOMY      as a child    TOTAL KNEE ARTHROPLASTY Left 09/2015    UPPER GASTROINTESTINAL ENDOSCOPY  9/26/2006  [facility]    Erythema in the lower third of the esophagus (NERD).  Patulous lower esophageal sphincter.   Gastric mucosal atrophy.   PARAG Test  Negative.     VEIN LIGATION  1964    BLE       Family History   Problem Relation Age of Onset    No Known Problems Brother     Breast cancer Daughter     Cancer Daughter         breast    Coronary artery disease Mother 78    Glaucoma Mother     Diverticulitis Father     No Known Problems Maternal Grandmother     No Known Problems Maternal Grandfather     No Known Problems Paternal Grandmother     No Known Problems Paternal Grandfather     Glaucoma Brother     Ankylosing spondylitis Brother     Diabetes Brother     Macular degeneration Brother          twin brother wet mac//    Cancer Brother         prostate    Crohn's disease Brother     Amblyopia Neg Hx     Blindness Neg Hx     Cataracts Neg Hx     Hypertension Neg Hx     Strabismus Neg Hx     Stroke Neg Hx     Thyroid disease Neg Hx      Retinal detachment Neg Hx     Celiac disease Neg Hx        Social History     Socioeconomic History    Marital status:    Tobacco Use    Smoking status: Former     Packs/day: 2.00     Years: 35.00     Pack years: 70.00     Types: Cigarettes     Quit date: 2002     Years since quittin.0    Smokeless tobacco: Never   Substance and Sexual Activity    Alcohol use: Yes     Alcohol/week: 14.0 standard drinks     Types: 14 Glasses of wine per week     Comment: 2-3 glasses per night    Drug use: Yes     Types: Benzodiazepines    Sexual activity: Yes     Partners: Male     Social Determinants of Health     Financial Resource Strain: Low Risk     Difficulty of Paying Living Expenses: Not hard at all   Food Insecurity: Unknown    Ran Out of Food in the Last Year: Never true   Transportation Needs: Unknown    Lack of Transportation (Medical): No   Physical Activity: Unknown    Minutes of Exercise per Session: 60 min   Social Connections: Unknown    Frequency of Social Gatherings with Friends and Family: Once a week    Attends Club or Organization Meetings: Never    Marital Status:    Housing Stability: Unknown    Unable to Pay for Housing in the Last Year: No    Number of Places Lived in the Last Year: 1       Review of patient's allergies indicates:   Allergen Reactions    Brimonidine Other (See Comments)     Burning and redness       Review of Systems   Constitutional: Negative for decreased appetite, fever and malaise/fatigue.   Eyes:  Negative for blurred vision.   Cardiovascular:  Negative for chest pain, dyspnea on exertion, irregular heartbeat and leg swelling.   Respiratory:  Negative for cough, hemoptysis, shortness of breath and wheezing.    Endocrine: Negative for cold intolerance and heat intolerance.   Hematologic/Lymphatic: Negative for bleeding problem.   Musculoskeletal:  Negative for muscle weakness and myalgias.   Gastrointestinal:  Negative for abdominal pain, constipation and diarrhea.  "  Genitourinary:  Negative for bladder incontinence.   Neurological:  Negative for dizziness and weakness.   Psychiatric/Behavioral:  Negative for depression.       Objective:     Vitals:    01/03/23 0930   BP: (!) 159/91   BP Location: Left arm   Patient Position: Sitting   BP Method: Medium (Automatic)   Pulse: 66   Weight: 66.5 kg (146 lb 9.7 oz)   Height: 5' 5" (1.651 m)        Physical Exam  Vitals and nursing note reviewed.   Constitutional:       General: She is not in acute distress.     Appearance: She is well-developed.   HENT:      Head: Normocephalic and atraumatic.   Neck:      Vascular: No JVD.   Cardiovascular:      Rate and Rhythm: Normal rate and regular rhythm.      Heart sounds: Normal heart sounds. No murmur heard.    No friction rub. No gallop.   Pulmonary:      Effort: Pulmonary effort is normal. No respiratory distress.      Breath sounds: Normal breath sounds. No wheezing or rales.   Abdominal:      General: Bowel sounds are normal.      Palpations: Abdomen is soft.      Tenderness: There is no abdominal tenderness. There is no guarding or rebound.   Musculoskeletal:         General: No tenderness.      Cervical back: Neck supple.   Skin:     General: Skin is warm and dry.   Neurological:      Mental Status: She is alert and oriented to person, place, and time.   Psychiatric:         Behavior: Behavior normal.           Current Outpatient Medications   Medication Instructions    aspirin 81 mg, Oral, Daily    atorvastatin (LIPITOR) 40 mg, Oral, Daily    azelastine (ASTELIN) 137 mcg (0.1 %) nasal spray USE 1 SPRAY IN EACH NOSTRIL TWICE DAILY    BENEFALEJANDRA WHEAT DEXTRIN, ORAL 1 Dose, Oral, Every morning    biotin 1,000 mcg, Oral, Nightly    calcium carbonate (OS-BLAKE) 600 mg, Oral, 2 times daily with meals    diclofenac sodium (VOLTAREN) 2 g, Topical (Top), 3 times daily, To painful area of neck    docusate sodium (COLACE) 100 mg, Oral, 2 times daily    dorzolamide-timolol 2-0.5% (COSOPT) " 22.3-6.8 mg/mL ophthalmic solution 1 drop, Both Eyes, 2 times daily    EYLEA 2 mg/0.05 mL Syrg 1 mL, Subcutaneous, Every 30 days    fluticasone propionate (FLONASE) 50 mcg/actuation nasal spray USE 2 SPRAYS IN EACH NOSTRIL ONE TIME DAILY    gabapentin (NEURONTIN) 400 MG capsule TAKE 1 CAPSULE THREE TIMES DAILY    latanoprost 0.005 % ophthalmic solution INSTILL 1 DROP INTO EACH EYE IN THE EVENING    levocetirizine (XYZAL) 5 mg, Oral, Nightly    methocarbamoL (ROBAXIN) 500 mg, Oral, Nightly    metoprolol succinate (TOPROL-XL) 25 mg, Oral, 2 times daily    mirtazapine (REMERON) 15 mg, Oral, Nightly    VIT C/E/ZN/COPPR/LUTEIN/ZEAXAN (PRESERVISION AREDS 2 ORAL) 1 capsule, Oral, 2 times daily       Lipid Panel:   Lab Results   Component Value Date    CHOL 212 (H) 12/24/2022    HDL 83 (H) 12/24/2022    LDLCALC 108.8 12/24/2022    TRIG 101 12/24/2022    CHOLHDL 39.2 12/24/2022         The ASCVD Risk score (Bakari RAMOS, et al., 2019) failed to calculate for the following reasons:    The 2019 ASCVD risk score is only valid for ages 40 to 79    The patient has a prior MI or stroke diagnosis    All pertinent labs, imaging, and EKGs reviewed.  Patient's most recent EKG tracing was personally interpreted by this provider.    Most Recent Echocardiogram Results  Results for orders placed during the hospital encounter of 12/24/22    Echo    Interpretation Summary  · The left ventricle is normal in size with eccentric hypertrophy and normal systolic function.  · The estimated ejection fraction is 60%.  · Indeterminate left ventricular diastolic function.  · Normal right ventricular size with normal right ventricular systolic function.  · Mild left atrial enlargement.  · Mild tricuspid regurgitation.  · Normal central venous pressure (3 mmHg).  · The estimated PA systolic pressure is 29 mmHg.  · No bubble study images were available for review.        Most Recent EKG  Results for orders placed or performed during the hospital encounter  of 18   EKG 12-lead    Collection Time: 18  7:37 PM    Narrative                             [facility]                                                                                  Test Date:    2018  Pat Name:     JACK MCCLELLAND                 Department:     Patient ID:   [id number]                  Room:         EXAM [address] [place] [address]  Gender:       Female                   Technician:   MD  :          [id number]               Requested By:   Order Number:                          Reading MD:   Sohail Sal MD                                   Measurements  Intervals                              Axis            Rate:         58                       P:            81  SC:           160                      QRS:          93  QRSD:         90                       T:            62  QT:           474                                      QTc:          465                                                                 Interpretive Statements  Sinus bradycardia with premature atrial complexes  Rightward axis  Nonspecific ST and T wave abnormality  Abnormal ECG  Compared to ECG 2018 18:00:06  No significant changes  Electronically Signed On 2018 6:33:33 CDT by Sohail Sal MD         No valid procedures specified.   No results found for this or any previous visit.    No valid procedures specified.    Assessment:       1. Essential hypertension    2. Cerebrovascular accident (CVA) due to thrombosis of cerebral artery    3. CAD (coronary artery disease)    4. Mixed hyperlipidemia    5. 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 Stenosis of left internal carotid artery         Plan:     Symptoms OK today, need to rule out PFO and subclinical aFib  BP elevated today  Most recent echocardiogram reviewed personally     Schedule SWETHA with bubble study    Schedule loop recorder implantation    Okay if procedures occur on the same day    This procedures have been fully  reviewed with the patient.   Home BP log   Continue aspirin 81 mg PO Daily   Continue atorvastatin 40 mg PO Daily   Carotid Doppler in 1 year, will order at next visit    Continue other cardiac medications  Mediterranean Diet/Cardiovascular Exercise Program    Patient queried and all questions were answered.    F/u in 3 months to reassess      Signed:    Daron Kinsey MD  1/3/2023 7:35 AM

## 2023-01-11 ENCOUNTER — DOCUMENTATION ONLY (OUTPATIENT)
Dept: CARDIOLOGY | Facility: HOSPITAL | Age: 83
End: 2023-01-11
Payer: MEDICARE

## 2023-01-11 DIAGNOSIS — Z95.818 STATUS POST PLACEMENT OF IMPLANTABLE LOOP RECORDER: Primary | ICD-10-CM

## 2023-01-11 DIAGNOSIS — I63.30 CEREBROVASCULAR ACCIDENT (CVA) DUE TO THROMBOSIS OF CEREBRAL ARTERY: ICD-10-CM

## 2023-01-17 ENCOUNTER — HOSPITAL ENCOUNTER (OUTPATIENT)
Dept: RADIOLOGY | Facility: HOSPITAL | Age: 83
Discharge: HOME | End: 2023-01-17
Attending: NURSE PRACTITIONER
Payer: MEDICARE

## 2023-01-17 DIAGNOSIS — Z78.0 ASYMPTOMATIC MENOPAUSE: ICD-10-CM

## 2023-01-17 PROCEDURE — 77080 DEXA BONE DENSITY SPINE HIP: ICD-10-PCS | Mod: 26,,, | Performed by: RADIOLOGY

## 2023-01-17 PROCEDURE — 77080 DXA BONE DENSITY AXIAL: CPT | Mod: 26,,, | Performed by: RADIOLOGY

## 2023-01-17 PROCEDURE — 77080 DXA BONE DENSITY AXIAL: CPT | Mod: TC,PO

## 2023-01-18 ENCOUNTER — CLINICAL SUPPORT (OUTPATIENT)
Dept: CARDIOLOGY | Facility: HOSPITAL | Age: 83
End: 2023-01-18
Attending: INTERNAL MEDICINE
Payer: MEDICARE

## 2023-01-18 DIAGNOSIS — Z95.818 STATUS POST PLACEMENT OF IMPLANTABLE LOOP RECORDER: ICD-10-CM

## 2023-01-18 DIAGNOSIS — I63.30 CEREBROVASCULAR ACCIDENT (CVA) DUE TO THROMBOSIS OF CEREBRAL ARTERY: ICD-10-CM

## 2023-01-18 PROCEDURE — 93285 CARDIAC DEVICE CHECK - IN CLINIC & HOSPITAL: ICD-10-PCS | Mod: 26,,, | Performed by: INTERNAL MEDICINE

## 2023-01-18 PROCEDURE — 93285 PRGRMG DEV EVAL SCRMS IP: CPT | Mod: 26,,, | Performed by: INTERNAL MEDICINE

## 2023-01-27 ENCOUNTER — TELEPHONE (OUTPATIENT)
Dept: CARDIOLOGY | Facility: CLINIC | Age: 83
End: 2023-01-27
Payer: MEDICARE

## 2023-01-27 ENCOUNTER — TELEPHONE (OUTPATIENT)
Dept: FAMILY MEDICINE | Facility: CLINIC | Age: 83
End: 2023-01-27
Payer: MEDICARE

## 2023-01-27 NOTE — TELEPHONE ENCOUNTER
----- Message from Usha Woodard sent at 1/27/2023  1:28 PM CST -----  Regarding: Self/  [phone]  Type: Patient Call Back    Who called:  Patient    What is the request in detail:  Patient is requesting a call back from staff regarding her BR readings.  Patient stated they are higher than normal.  Thank you    Would the patient rather a call back or a response via [facility]?   Call back    Best call back number:  [phone]           Thank you

## 2023-01-27 NOTE — TELEPHONE ENCOUNTER
Spoke to pt and advised to take blood pressure twice daily for next few days and call with average number

## 2023-01-27 NOTE — TELEPHONE ENCOUNTER
----- Message from Usha Woodard sent at 1/27/2023  1:31 PM CST -----  Regarding: Self/ [phone]  Type: Patient Call Back    Who called:  Patient    What is the request in detail:   Patient is requesting a call back from staff regarding her BR readings.  Patient stated they are higher than normal.  Thank you    Would the patient rather a call back or a response via [facility]?  Call back    Best call back number:  [phone]         Thank you

## 2023-01-27 NOTE — TELEPHONE ENCOUNTER
Please advise: pt reports her blood pressure this morning 175/91. States is usually runs 135-150 systolic. She denies eating more sodium that usual, stress or pain

## 2023-01-30 ENCOUNTER — PROCEDURE VISIT (OUTPATIENT)
Dept: OPHTHALMOLOGY | Facility: CLINIC | Age: 83
End: 2023-01-30
Payer: MEDICARE

## 2023-01-30 DIAGNOSIS — H35.3211 EXUDATIVE AGE-RELATED MACULAR DEGENERATION OF RIGHT EYE WITH ACTIVE CHOROIDAL NEOVASCULARIZATION: Primary | ICD-10-CM

## 2023-01-30 PROCEDURE — 92012 INTRM OPH EXAM EST PATIENT: CPT | Mod: 25,S$GLB,, | Performed by: OPHTHALMOLOGY

## 2023-01-30 PROCEDURE — 92012 PR EYE EXAM, EST PATIENT,INTERMED: ICD-10-PCS | Mod: 25,S$GLB,, | Performed by: OPHTHALMOLOGY

## 2023-01-30 PROCEDURE — 67028 PR INJECT INTRAVITREAL PHARMCOLOGIC: ICD-10-PCS | Mod: RT,S$GLB,, | Performed by: OPHTHALMOLOGY

## 2023-01-30 PROCEDURE — 92134 POSTERIOR SEGMENT OCT RETINA (OCULAR COHERENCE TOMOGRAPHY)-BOTH EYES: ICD-10-PCS | Mod: S$GLB,,, | Performed by: OPHTHALMOLOGY

## 2023-01-30 PROCEDURE — 92134 CPTRZ OPH DX IMG PST SGM RTA: CPT | Mod: S$GLB,,, | Performed by: OPHTHALMOLOGY

## 2023-01-30 PROCEDURE — 67028 INJECTION EYE DRUG: CPT | Mod: RT,S$GLB,, | Performed by: OPHTHALMOLOGY

## 2023-01-31 NOTE — PROGRESS NOTES
HPI    4-5 wk     Pt. States she had a stroke nathan kaminski and she feels like it has   effected her vision.   Denies flashes, floaters, or pain.     Cosopt BID OU  Latanaprost QHS OU  Last edited by Nena Lee on 1/30/2023  7:59 AM.         OCT - no SRF OU  Atrophy OU    A/P    1. Wet AMD OS  S/p Avastin OS x 15, Eylea OS  x 14    Persistent SRF OS - improving  With RPE tear OS  Central fibrosis with atrophy - poor central potential  10/17 - increased SRH - will keep at 8 weeks for now  9/18 - stable cicatrix - try observation  12/18 - no activity  6/21 - some heme over cicatrix OS  1/23 - given extensive atrophy, try extension      2. New disease OD  Sx started 9/21  S/p Avastin OD x 5, Eylea OD x 7    Eylea OD today      3. PCIOL OU  CTR OS - but saw 20/30 with correction, in spite of sub RPE fibrosis      4. POAG OU  Good IOP control on Cosopt, brimonidine, latanoprost OU  Small drance heme OD    5. Floaters OU        8 weeks OCT and  dilate (last DFE 10/22)      Risks, benefits, and alternatives to treatment discussed in detail with the patient.  The patient voiced understanding and wished to proceed with the procedure    Injection Procedure Note:  Diagnosis: Wet AMD OD    Patient Identified and Time Out complete  Pt marked  Topical Proparacaine and Betadine.  Inject Eylea OD at 6:00 @ 3.5-4mm posterior to limbus  Post Operative Dx: Same  Complications: None  Follow up as above.

## 2023-02-01 NOTE — PATIENT INSTRUCTIONS

## 2023-02-02 ENCOUNTER — TELEPHONE (OUTPATIENT)
Dept: CARDIOLOGY | Facility: CLINIC | Age: 83
End: 2023-02-02
Payer: MEDICARE

## 2023-02-02 NOTE — TELEPHONE ENCOUNTER
Pt called in with recent blood pressures (see phone note from last week)  An hour after taking meds 120-130s/60-70s. Later in the afternoon it will get in the 150s/80-90    Also after her recent hospitalization she was sent home on aspirin and atorvastatin but she is concerned that she is not on a blood thinner or Plavix

## 2023-02-02 NOTE — TELEPHONE ENCOUNTER
----- Message from Mojgan Grover sent at 2/2/2023  3:30 PM CST -----  Contact: [phone]  Type: Needs Medical Advice  Who Called:  Pt     Best Call Back Number: [phone] (home) [phone]    Additional Information: Pt requesting a call back to go over her BP meds and her current bp reading. Pt stated this is not urgent she just has some questions

## 2023-02-03 NOTE — TELEPHONE ENCOUNTER
Spoke to pt and advised per Dr Kinsey (Would minimize sodium throughout the day and monitor blood pressure.  Please let us know what it is running in 2 weeks.  With regard to the Plavix, would discuss with Neurology if they feel that Plavix is necessary)

## 2023-02-06 ENCOUNTER — TELEPHONE (OUTPATIENT)
Dept: OPHTHALMOLOGY | Facility: CLINIC | Age: 83
End: 2023-02-06
Payer: MEDICARE

## 2023-02-06 NOTE — TELEPHONE ENCOUNTER
----- Message from Mary Hooker sent at 2/6/2023  2:33 PM CST -----  Regarding: SAME DAY CALL BACK; APPT TMRW  Contact: Patient  Type: Patient Call Back         Who called: Patient         What is the request in detail: has an appt tmrw morning and is requesting a call back today discuss a Rx request; please          Best call back number: [phone]         Additional Information:            Thank You

## 2023-02-06 NOTE — TELEPHONE ENCOUNTER
Called pt and she states she will need a refill on her latanoprost sent to [facility] at her appt tomorrow with Dr Fragoso.

## 2023-02-07 ENCOUNTER — OFFICE VISIT (OUTPATIENT)
Dept: OPHTHALMOLOGY | Facility: CLINIC | Age: 83
End: 2023-02-07
Payer: MEDICARE

## 2023-02-07 DIAGNOSIS — H35.3221 EXUDATIVE AGE-RELATED MACULAR DEGENERATION OF LEFT EYE WITH ACTIVE CHOROIDAL NEOVASCULARIZATION: ICD-10-CM

## 2023-02-07 DIAGNOSIS — H35.3211 EXUDATIVE AGE-RELATED MACULAR DEGENERATION OF RIGHT EYE WITH ACTIVE CHOROIDAL NEOVASCULARIZATION: ICD-10-CM

## 2023-02-07 DIAGNOSIS — H40.1233 LOW-TENSION GLAUCOMA OF BOTH EYES, SEVERE STAGE: Primary | ICD-10-CM

## 2023-02-07 PROCEDURE — 1157F PR ADVANCE CARE PLAN OR EQUIV PRESENT IN MEDICAL RECORD: ICD-10-PCS | Mod: CPTII,S$GLB,, | Performed by: OPHTHALMOLOGY

## 2023-02-07 PROCEDURE — 1101F PR PT FALLS ASSESS DOC 0-1 FALLS W/OUT INJ PAST YR: ICD-10-PCS | Mod: CPTII,S$GLB,, | Performed by: OPHTHALMOLOGY

## 2023-02-07 PROCEDURE — 99214 OFFICE O/P EST MOD 30 MIN: CPT | Mod: S$GLB,,, | Performed by: OPHTHALMOLOGY

## 2023-02-07 PROCEDURE — 1126F PR PAIN SEVERITY QUANTIFIED, NO PAIN PRESENT: ICD-10-PCS | Mod: CPTII,S$GLB,, | Performed by: OPHTHALMOLOGY

## 2023-02-07 PROCEDURE — 99999 PR PBB SHADOW E&M-EST. PATIENT-LVL III: ICD-10-PCS | Mod: PBBFAC,,, | Performed by: OPHTHALMOLOGY

## 2023-02-07 PROCEDURE — 99214 PR OFFICE/OUTPT VISIT, EST, LEVL IV, 30-39 MIN: ICD-10-PCS | Mod: S$GLB,,, | Performed by: OPHTHALMOLOGY

## 2023-02-07 PROCEDURE — 1159F MED LIST DOCD IN RCRD: CPT | Mod: CPTII,S$GLB,, | Performed by: OPHTHALMOLOGY

## 2023-02-07 PROCEDURE — 1126F AMNT PAIN NOTED NONE PRSNT: CPT | Mod: CPTII,S$GLB,, | Performed by: OPHTHALMOLOGY

## 2023-02-07 PROCEDURE — 3288F PR FALLS RISK ASSESSMENT DOCUMENTED: ICD-10-PCS | Mod: CPTII,S$GLB,, | Performed by: OPHTHALMOLOGY

## 2023-02-07 PROCEDURE — 1101F PT FALLS ASSESS-DOCD LE1/YR: CPT | Mod: CPTII,S$GLB,, | Performed by: OPHTHALMOLOGY

## 2023-02-07 PROCEDURE — 1160F PR REVIEW ALL MEDS BY PRESCRIBER/CLIN PHARMACIST DOCUMENTED: ICD-10-PCS | Mod: CPTII,S$GLB,, | Performed by: OPHTHALMOLOGY

## 2023-02-07 PROCEDURE — 99999 PR PBB SHADOW E&M-EST. PATIENT-LVL III: CPT | Mod: PBBFAC,,, | Performed by: OPHTHALMOLOGY

## 2023-02-07 PROCEDURE — 3288F FALL RISK ASSESSMENT DOCD: CPT | Mod: CPTII,S$GLB,, | Performed by: OPHTHALMOLOGY

## 2023-02-07 PROCEDURE — 1157F ADVNC CARE PLAN IN RCRD: CPT | Mod: CPTII,S$GLB,, | Performed by: OPHTHALMOLOGY

## 2023-02-07 PROCEDURE — 1160F RVW MEDS BY RX/DR IN RCRD: CPT | Mod: CPTII,S$GLB,, | Performed by: OPHTHALMOLOGY

## 2023-02-07 PROCEDURE — 1159F PR MEDICATION LIST DOCUMENTED IN MEDICAL RECORD: ICD-10-PCS | Mod: CPTII,S$GLB,, | Performed by: OPHTHALMOLOGY

## 2023-02-07 RX ORDER — LATANOPROST 50 UG/ML
1 SOLUTION/ DROPS OPHTHALMIC NIGHTLY
Qty: 7.5 ML | Refills: 3 | Status: SHIPPED | OUTPATIENT
Start: 2023-02-07 | End: 2023-12-14

## 2023-02-07 NOTE — PROGRESS NOTES
HPI     Glaucoma     Additional comments: 4 month iop ck           Comments    DLS: 1/30/23    Pt states no changes since last visit. Nees refill on latanoprost and send   to [facility].     Gtts: Latanoprost QHS, Cosopt BID OU          Last edited by Cheryle Quintana on 2/7/2023  8:02 AM.        ROS    Negative for: Constitutional, Gastrointestinal, Neurological, Skin,   Genitourinary, Musculoskeletal, HENT, Endocrine, Cardiovascular, Eyes,   Respiratory, Psychiatric, Allergic/Imm, Heme/Lymph  Last edited by Ashish Fragoso Jr., MD on 2/7/2023  8:10 AM.        Assessment /Plan     For exam results, see Encounter Report.    Low-tension glaucoma of both eyes, severe stage    Exudative age-related macular degeneration of right eye with active choroidal neovascularization    Exudative age-related macular degeneration of left eye with active choroidal neovascularization    stable IOP  IOP OD 11 OS 10, doing well  Continue latanoprost qhs ou and cosopt bid OU  Continue AREDS2 1 tab BID  Continue to follow with retina for wet armd  Continue daily eye lubricants OU  Follow up in about 4 months (around 6/7/2023) for IOP and Medication check.

## 2023-02-07 NOTE — TELEPHONE ENCOUNTER
----- Message from Chloe Castro RN sent at 2/6/2023  4:43 PM CST -----  Regarding: FW: refill    ----- Message -----  From: Tamara Gaines  Sent: 2/6/2023   4:42 PM CST  To: [facility] Staff  Subject: refill                                           Please refill the medication(s) listed below. Please call the patient when the prescription(s) is ready for  at this phone number             Medication #1 atorvastatin (LIPITOR) 40 MG tablet        Medication #2aspirin 81 MG Chew          Preferred Pharmacy:    [facility] [address] [facility] [address] [place] [address]  Phone: [phone] Fax: [phone]

## 2023-02-07 NOTE — TELEPHONE ENCOUNTER
Refill Routing Note   Medication(s) are not appropriate for processing by [facility] for the following reason(s):       ED/Hospital Visit since last OV with PCP    [facility] action(s):  Defer           Appointments  past 12m or future 3m with PCP    Date Provider   Last Visit   8/29/2022 Hua Andersen MD   Next Visit   Visit date not found Hua Andersen MD   ED visits in past 90 days: 0        Note composed:8:02 AM 02/07/2023

## 2023-02-07 NOTE — TELEPHONE ENCOUNTER
No new care gaps identified.  [facility] Embedded Care Gaps. Reference number: [id number]. 2/06/2023   6:11:11 PM CST

## 2023-02-09 ENCOUNTER — PES CALL (OUTPATIENT)
Dept: ADMINISTRATIVE | Facility: CLINIC | Age: 83
End: 2023-02-09
Payer: MEDICARE

## 2023-02-09 RX ORDER — ATORVASTATIN CALCIUM 40 MG/1
40 TABLET, FILM COATED ORAL DAILY
Qty: 60 TABLET | Refills: 9 | Status: SHIPPED | OUTPATIENT
Start: 2023-02-09 | End: 2024-05-04

## 2023-02-10 ENCOUNTER — TELEPHONE (OUTPATIENT)
Dept: CARDIOLOGY | Facility: HOSPITAL | Age: 83
End: 2023-02-10
Payer: MEDICARE

## 2023-02-10 NOTE — TELEPHONE ENCOUNTER
New onset AF noted during device interrogation/device remote check:      AT/AF burden: 2.4% from 2/7/2023    Max duration seen: 1 hr. 42 secs. Median V rate 109 bpm      Most recent episode: 2/8/2023    Ventricular rates:             Anticoagulation status:  none    Patient symptoms: Pt. Asymptomatic    Meds:  Aspirin 81 mg daily  Toprol XL 25 mg bid    Most recent /80 before taking meds. She reports last night her BP was in the 90's      Message sent to Dr. Kinsey for review of AF not on OAC

## 2023-02-10 NOTE — TELEPHONE ENCOUNTER
Called pt. and notified her of Dr. Kinsey's orders Xarelto 20 mg daily with largest meal of the day. Pt. verbalized understanding. Prescription sent to [facility] per pt. Request.

## 2023-02-19 ENCOUNTER — PATIENT MESSAGE (OUTPATIENT)
Dept: CARDIOLOGY | Facility: CLINIC | Age: 83
End: 2023-02-19
Payer: MEDICARE

## 2023-02-23 ENCOUNTER — OFFICE VISIT (OUTPATIENT)
Dept: FAMILY MEDICINE | Facility: CLINIC | Age: 83
End: 2023-02-23
Payer: MEDICARE

## 2023-02-23 ENCOUNTER — LAB VISIT (OUTPATIENT)
Dept: LAB | Facility: HOSPITAL | Age: 83
End: 2023-02-23
Attending: FAMILY MEDICINE
Payer: MEDICARE

## 2023-02-23 ENCOUNTER — TELEPHONE (OUTPATIENT)
Dept: FAMILY MEDICINE | Facility: CLINIC | Age: 83
End: 2023-02-23
Payer: MEDICARE

## 2023-02-23 VITALS
WEIGHT: 146.38 LBS | HEART RATE: 86 BPM | DIASTOLIC BLOOD PRESSURE: 72 MMHG | BODY MASS INDEX: 24.39 KG/M2 | SYSTOLIC BLOOD PRESSURE: 130 MMHG | OXYGEN SATURATION: 96 % | HEIGHT: 65 IN

## 2023-02-23 DIAGNOSIS — R30.0 DYSURIA: ICD-10-CM

## 2023-02-23 DIAGNOSIS — R30.0 DYSURIA: Primary | ICD-10-CM

## 2023-02-23 LAB
BACTERIA #/AREA URNS HPF: ABNORMAL /HPF
BILIRUB UR QL STRIP: NEGATIVE
CLARITY UR: ABNORMAL
COLOR UR: YELLOW
GLUCOSE UR QL STRIP: NEGATIVE
HGB UR QL STRIP: ABNORMAL
HYALINE CASTS #/AREA URNS LPF: 1 /LPF
KETONES UR QL STRIP: NEGATIVE
LEUKOCYTE ESTERASE UR QL STRIP: ABNORMAL
MICROSCOPIC COMMENT: ABNORMAL
NITRITE UR QL STRIP: NEGATIVE
PH UR STRIP: 7 [PH] (ref 5–8)
PROT UR QL STRIP: NEGATIVE
RBC #/AREA URNS HPF: 1 /HPF (ref 0–4)
SP GR UR STRIP: 1.01 (ref 1–1.03)
SQUAMOUS #/AREA URNS HPF: 1 /HPF
URN SPEC COLLECT METH UR: ABNORMAL
WBC #/AREA URNS HPF: 40 /HPF (ref 0–5)
WBC CLUMPS URNS QL MICRO: ABNORMAL

## 2023-02-23 PROCEDURE — 1157F PR ADVANCE CARE PLAN OR EQUIV PRESENT IN MEDICAL RECORD: ICD-10-PCS | Mod: CPTII,S$GLB,, | Performed by: FAMILY MEDICINE

## 2023-02-23 PROCEDURE — 1157F ADVNC CARE PLAN IN RCRD: CPT | Mod: CPTII,S$GLB,, | Performed by: FAMILY MEDICINE

## 2023-02-23 PROCEDURE — 1126F PR PAIN SEVERITY QUANTIFIED, NO PAIN PRESENT: ICD-10-PCS | Mod: CPTII,S$GLB,, | Performed by: FAMILY MEDICINE

## 2023-02-23 PROCEDURE — 3075F SYST BP GE 130 - 139MM HG: CPT | Mod: CPTII,S$GLB,, | Performed by: FAMILY MEDICINE

## 2023-02-23 PROCEDURE — 1160F PR REVIEW ALL MEDS BY PRESCRIBER/CLIN PHARMACIST DOCUMENTED: ICD-10-PCS | Mod: CPTII,S$GLB,, | Performed by: FAMILY MEDICINE

## 2023-02-23 PROCEDURE — 87088 URINE BACTERIA CULTURE: CPT | Performed by: FAMILY MEDICINE

## 2023-02-23 PROCEDURE — 1159F PR MEDICATION LIST DOCUMENTED IN MEDICAL RECORD: ICD-10-PCS | Mod: CPTII,S$GLB,, | Performed by: FAMILY MEDICINE

## 2023-02-23 PROCEDURE — 3288F FALL RISK ASSESSMENT DOCD: CPT | Mod: CPTII,S$GLB,, | Performed by: FAMILY MEDICINE

## 2023-02-23 PROCEDURE — 99999 PR PBB SHADOW E&M-EST. PATIENT-LVL IV: ICD-10-PCS | Mod: PBBFAC,,, | Performed by: FAMILY MEDICINE

## 2023-02-23 PROCEDURE — 3288F PR FALLS RISK ASSESSMENT DOCUMENTED: ICD-10-PCS | Mod: CPTII,S$GLB,, | Performed by: FAMILY MEDICINE

## 2023-02-23 PROCEDURE — 3078F DIAST BP <80 MM HG: CPT | Mod: CPTII,S$GLB,, | Performed by: FAMILY MEDICINE

## 2023-02-23 PROCEDURE — 1126F AMNT PAIN NOTED NONE PRSNT: CPT | Mod: CPTII,S$GLB,, | Performed by: FAMILY MEDICINE

## 2023-02-23 PROCEDURE — 99213 PR OFFICE/OUTPT VISIT, EST, LEVL III, 20-29 MIN: ICD-10-PCS | Mod: S$GLB,,, | Performed by: FAMILY MEDICINE

## 2023-02-23 PROCEDURE — 3075F PR MOST RECENT SYSTOLIC BLOOD PRESS GE 130-139MM HG: ICD-10-PCS | Mod: CPTII,S$GLB,, | Performed by: FAMILY MEDICINE

## 2023-02-23 PROCEDURE — 1101F PR PT FALLS ASSESS DOC 0-1 FALLS W/OUT INJ PAST YR: ICD-10-PCS | Mod: CPTII,S$GLB,, | Performed by: FAMILY MEDICINE

## 2023-02-23 PROCEDURE — 1101F PT FALLS ASSESS-DOCD LE1/YR: CPT | Mod: CPTII,S$GLB,, | Performed by: FAMILY MEDICINE

## 2023-02-23 PROCEDURE — 3078F PR MOST RECENT DIASTOLIC BLOOD PRESSURE < 80 MM HG: ICD-10-PCS | Mod: CPTII,S$GLB,, | Performed by: FAMILY MEDICINE

## 2023-02-23 PROCEDURE — 87077 CULTURE AEROBIC IDENTIFY: CPT | Performed by: FAMILY MEDICINE

## 2023-02-23 PROCEDURE — 81000 URINALYSIS NONAUTO W/SCOPE: CPT | Mod: PO | Performed by: FAMILY MEDICINE

## 2023-02-23 PROCEDURE — 87186 SC STD MICRODIL/AGAR DIL: CPT | Performed by: FAMILY MEDICINE

## 2023-02-23 PROCEDURE — 87086 URINE CULTURE/COLONY COUNT: CPT | Performed by: FAMILY MEDICINE

## 2023-02-23 PROCEDURE — 1159F MED LIST DOCD IN RCRD: CPT | Mod: CPTII,S$GLB,, | Performed by: FAMILY MEDICINE

## 2023-02-23 PROCEDURE — 1160F RVW MEDS BY RX/DR IN RCRD: CPT | Mod: CPTII,S$GLB,, | Performed by: FAMILY MEDICINE

## 2023-02-23 PROCEDURE — 99213 OFFICE O/P EST LOW 20 MIN: CPT | Mod: S$GLB,,, | Performed by: FAMILY MEDICINE

## 2023-02-23 PROCEDURE — 99999 PR PBB SHADOW E&M-EST. PATIENT-LVL IV: CPT | Mod: PBBFAC,,, | Performed by: FAMILY MEDICINE

## 2023-02-23 RX ORDER — NITROFURANTOIN 25; 75 MG/1; MG/1
100 CAPSULE ORAL 2 TIMES DAILY
Qty: 14 CAPSULE | Refills: 0 | Status: SHIPPED | OUTPATIENT
Start: 2023-02-23 | End: 2023-03-02

## 2023-02-23 NOTE — PROGRESS NOTES
"Subjective:       Patient ID: Serenity Epps is a 82 y.o. female.    Chief Complaint: Urinary Frequency (Increased incontinence and painful urination )     Here today for an acute visit.  She is a patient of Dr. Andersen, new to me today.   She is here today c/o urinary incontinence and dysuria x 1 week.  She started xarelto recently and was concerned.  Denies hematuria, abd pain, nausea, fevers or back pain.    Urinary Frequency   Associated symptoms include frequency. Pertinent negatives include no hematuria, nausea, constipation or rash.   Review of Systems   Constitutional:  Negative for activity change, appetite change, fatigue and fever.   Respiratory:  Negative for cough, shortness of breath and wheezing.    Cardiovascular:  Negative for chest pain and palpitations.   Gastrointestinal:  Negative for abdominal pain, constipation, diarrhea and nausea.   Genitourinary:  Positive for dysuria and frequency. Negative for hematuria, vaginal bleeding and vaginal pain.   Skin:  Negative for pallor and rash.   Neurological:  Negative for dizziness, syncope, light-headedness and headaches.     Objective:      Vitals:    02/23/23 1303   BP: 130/72   Pulse: 86   SpO2: 96%   Weight: 66.4 kg (146 lb 6.2 oz)   Height: 5' 5" (1.651 m)      Physical Exam  Constitutional:       General: She is not in acute distress.     Comments: Using walker to ambulate   Cardiovascular:      Rate and Rhythm: Normal rate and regular rhythm.      Heart sounds: Normal heart sounds. No murmur heard.  Pulmonary:      Effort: Pulmonary effort is normal. No respiratory distress.      Breath sounds: Normal breath sounds. No wheezing, rhonchi or rales.   Neurological:      Mental Status: She is alert.   Psychiatric:         Mood and Affect: Mood normal.         Behavior: Behavior normal.         Thought Content: Thought content normal.             Assessment:       1. Dysuria        Plan:       Dysuria  -     Cancel: Urinalysis, Reflex to Urine Culture " Urine, Clean Catch; Future; Expected date: 02/23/2023  -     Urinalysis, Reflex to Urine Culture Urine, Clean Catch; Future; Expected date: 02/23/2023  -     nitrofurantoin, macrocrystal-monohydrate, (MACROBID) 100 MG capsule; Take 1 capsule (100 mg total) by mouth 2 (two) times daily. for 7 days  Dispense: 14 capsule; Refill: 0    Urinalysis shows blood, leukocytes and bacteria.  Will treat with Macrobid and f/u on the culture results.      Medication List with Changes/Refills   New Medications    NITROFURANTOIN, MACROCRYSTAL-MONOHYDRATE, (MACROBID) 100 MG CAPSULE    Take 1 capsule (100 mg total) by mouth 2 (two) times daily. for 7 days   Current Medications    ASPIRIN 81 MG CHEW    Take 1 tablet (81 mg total) by mouth once daily.    ATORVASTATIN (LIPITOR) 40 MG TABLET    Take 1 tablet (40 mg total) by mouth once daily.    AZELASTINE (ASTELIN) 137 MCG (0.1 %) NASAL SPRAY    USE 1 SPRAY IN EACH NOSTRIL TWICE DAILY    BENEFIBER, WHEAT DEXTRIN, ORAL    Take 1 Dose by mouth every morning.    BIOTIN 1 MG TABLET    Take 1,000 mcg by mouth every evening.     CALCIUM CARBONATE (OS-BLAKE) 600 MG (1,500 MG) TAB    Take 600 mg by mouth 2 (two) times daily with meals.    DICLOFENAC SODIUM (VOLTAREN) 1 % GEL    Apply 2 g topically 3 (three) times daily. To painful area of neck    DOCUSATE SODIUM (COLACE) 100 MG CAPSULE    Take 100 mg by mouth 2 (two) times daily.    DORZOLAMIDE-TIMOLOL 2-0.5% (COSOPT) 22.3-6.8 MG/ML OPHTHALMIC SOLUTION    Place 1 drop into both eyes 2 (two) times daily.    EYLEA 2 MG/0.05 ML SYRG    Inject 1 mL into the skin every 30 days.    FLUTICASONE PROPIONATE (FLONASE) 50 MCG/ACTUATION NASAL SPRAY    USE 2 SPRAYS IN EACH NOSTRIL ONE TIME DAILY    GABAPENTIN (NEURONTIN) 400 MG CAPSULE    TAKE 1 CAPSULE THREE TIMES DAILY    LATANOPROST 0.005 % OPHTHALMIC SOLUTION    Place 1 drop into both eyes every evening.    LEVOCETIRIZINE (XYZAL) 5 MG TABLET    Take 1 tablet (5 mg total) by mouth every evening.     METOPROLOL SUCCINATE (TOPROL-XL) 25 MG 24 HR TABLET    Take 1 tablet (25 mg total) by mouth 2 (two) times daily.    MIRTAZAPINE (REMERON) 15 MG TABLET    TAKE 1 TABLET (15 MG TOTAL) BY MOUTH EVERY EVENING.    RIVAROXABAN (XARELTO) 20 MG TAB    Take 1 tablet (20 mg total) by mouth daily with dinner or evening meal. (Take with the largest meal of the day)    VIT C/E/ZN/COPPR/LUTEIN/ZEAXAN (PRESERVISION AREDS 2 ORAL)    Take 1 capsule by mouth 2 (two) times daily.

## 2023-02-23 NOTE — TELEPHONE ENCOUNTER
----- Message from Sergio Martell sent at 2/22/2023  4:15 PM CST -----  Contact: pt at [phone]  Type: Needs Medical Advice  Who Called:  pt  Pharmacy name and phone #:    [id number] - [place] [id number] [facility] [address]  [address]  [facility] [address]  Phone: [phone] Fax: [phone]  Best Call Back Number: [phone]  Additional Information: pt is calling the office to see about getting a urinalysis or an antibiotic as she has a UTI. She has burning when urinating and is having confusion at this point. Pt has been trying to reach someone at the office for 3 days. Please call back to assist. Thanks!

## 2023-02-23 NOTE — TELEPHONE ENCOUNTER
----- Message from Ernesto You sent at 2/22/2023  7:45 AM CST -----  Type: Needs Medical Advice  Who Called:  Patient  Symptoms (please be specific):  Frequent urination, burning-incontinence  How long has patient had these symptoms:  1 week    Pharmacy name and phone #:    [facility] #[id number] - [place] [id number] [facility] [address] & [facility] [place] [address] [facility] [address] [place] [id number]  Phone: [phone] Fax: [phone]      Best Call Back Number: [phone]  Additional Information: Please call to advise

## 2023-02-24 ENCOUNTER — TELEPHONE (OUTPATIENT)
Dept: FAMILY MEDICINE | Facility: CLINIC | Age: 83
End: 2023-02-24
Payer: MEDICARE

## 2023-02-24 ENCOUNTER — PES CALL (OUTPATIENT)
Dept: ADMINISTRATIVE | Facility: CLINIC | Age: 83
End: 2023-02-24
Payer: MEDICARE

## 2023-02-24 ENCOUNTER — CLINICAL SUPPORT (OUTPATIENT)
Dept: CARDIOLOGY | Facility: HOSPITAL | Age: 83
End: 2023-02-24
Payer: MEDICARE

## 2023-02-24 DIAGNOSIS — Z95.818 PRESENCE OF OTHER CARDIAC IMPLANTS AND GRAFTS: ICD-10-CM

## 2023-02-24 PROCEDURE — 93298 CARDIAC DEVICE CHECK - REMOTE: ICD-10-PCS | Mod: ,,, | Performed by: INTERNAL MEDICINE

## 2023-02-24 PROCEDURE — G2066 INTER DEVC REMOTE 30D: HCPCS | Mod: PO | Performed by: INTERNAL MEDICINE

## 2023-02-24 PROCEDURE — 93298 REM INTERROG DEV EVAL SCRMS: CPT | Mod: ,,, | Performed by: INTERNAL MEDICINE

## 2023-02-24 NOTE — TELEPHONE ENCOUNTER
Spoke with patient and informed her that her urine shows signs of a UTI, and that Dr Danielson sent an antibiotic (Macrobid) to her pharmacy. Ms Benton stated Angel called her last night with a RX ready for her; antibiotic has been picked up and she started her first dose last night and will continue antibiotic until completed.

## 2023-02-26 LAB — BACTERIA UR CULT: ABNORMAL

## 2023-03-01 ENCOUNTER — OFFICE VISIT (OUTPATIENT)
Dept: PRIMARY CARE CLINIC | Facility: CLINIC | Age: 83
End: 2023-03-01
Payer: MEDICARE

## 2023-03-01 VITALS
RESPIRATION RATE: 18 BRPM | OXYGEN SATURATION: 95 % | HEART RATE: 57 BPM | DIASTOLIC BLOOD PRESSURE: 84 MMHG | WEIGHT: 144.5 LBS | TEMPERATURE: 98 F | BODY MASS INDEX: 24.07 KG/M2 | SYSTOLIC BLOOD PRESSURE: 138 MMHG | HEIGHT: 65 IN

## 2023-03-01 DIAGNOSIS — I10 ESSENTIAL HYPERTENSION: Chronic | ICD-10-CM

## 2023-03-01 DIAGNOSIS — I65.22 STENOSIS OF LEFT INTERNAL CAROTID ARTERY: Chronic | ICD-10-CM

## 2023-03-01 DIAGNOSIS — G57.82 NEUROPATHY OF LEFT SURAL NERVE: ICD-10-CM

## 2023-03-01 DIAGNOSIS — T78.40XS ALLERGY, SEQUELA: Primary | ICD-10-CM

## 2023-03-01 DIAGNOSIS — Z86.73 HISTORY OF ISCHEMIC RIGHT MCA STROKE: ICD-10-CM

## 2023-03-01 DIAGNOSIS — F32.1 MAJOR DEPRESSIVE DISORDER, SINGLE EPISODE, MODERATE: ICD-10-CM

## 2023-03-01 PROBLEM — Z87.891 EX-SMOKER: Chronic | Status: ACTIVE | Noted: 2022-12-25

## 2023-03-01 PROBLEM — Z71.89 ACP (ADVANCE CARE PLANNING): Chronic | Status: ACTIVE | Noted: 2022-12-24

## 2023-03-01 PROBLEM — T78.40XA ALLERGIES: Status: ACTIVE | Noted: 2023-03-01

## 2023-03-01 PROBLEM — F33.9 RECURRENT MAJOR DEPRESSIVE DISORDER: Chronic | Status: ACTIVE | Noted: 2018-04-27

## 2023-03-01 PROBLEM — I63.311 STROKE DUE TO THROMBOSIS OF RIGHT MIDDLE CEREBRAL ARTERY: Chronic | Status: ACTIVE | Noted: 2022-12-24

## 2023-03-01 PROBLEM — E78.2 MIXED HYPERLIPIDEMIA: Chronic | Status: ACTIVE | Noted: 2022-12-25

## 2023-03-01 PROCEDURE — 99215 PR OFFICE/OUTPT VISIT, EST, LEVL V, 40-54 MIN: ICD-10-PCS | Mod: S$GLB,,, | Performed by: FAMILY MEDICINE

## 2023-03-01 PROCEDURE — 3075F PR MOST RECENT SYSTOLIC BLOOD PRESS GE 130-139MM HG: ICD-10-PCS | Mod: CPTII,S$GLB,, | Performed by: FAMILY MEDICINE

## 2023-03-01 PROCEDURE — 1101F PT FALLS ASSESS-DOCD LE1/YR: CPT | Mod: CPTII,S$GLB,, | Performed by: FAMILY MEDICINE

## 2023-03-01 PROCEDURE — 3288F FALL RISK ASSESSMENT DOCD: CPT | Mod: CPTII,S$GLB,, | Performed by: FAMILY MEDICINE

## 2023-03-01 PROCEDURE — 3288F PR FALLS RISK ASSESSMENT DOCUMENTED: ICD-10-PCS | Mod: CPTII,S$GLB,, | Performed by: FAMILY MEDICINE

## 2023-03-01 PROCEDURE — 99999 PR PBB SHADOW E&M-EST. PATIENT-LVL V: ICD-10-PCS | Mod: PBBFAC,,, | Performed by: FAMILY MEDICINE

## 2023-03-01 PROCEDURE — 1159F PR MEDICATION LIST DOCUMENTED IN MEDICAL RECORD: ICD-10-PCS | Mod: CPTII,S$GLB,, | Performed by: FAMILY MEDICINE

## 2023-03-01 PROCEDURE — 1125F PR PAIN SEVERITY QUANTIFIED, PAIN PRESENT: ICD-10-PCS | Mod: CPTII,S$GLB,, | Performed by: FAMILY MEDICINE

## 2023-03-01 PROCEDURE — 1157F ADVNC CARE PLAN IN RCRD: CPT | Mod: CPTII,S$GLB,, | Performed by: FAMILY MEDICINE

## 2023-03-01 PROCEDURE — 1160F RVW MEDS BY RX/DR IN RCRD: CPT | Mod: CPTII,S$GLB,, | Performed by: FAMILY MEDICINE

## 2023-03-01 PROCEDURE — 1157F PR ADVANCE CARE PLAN OR EQUIV PRESENT IN MEDICAL RECORD: ICD-10-PCS | Mod: CPTII,S$GLB,, | Performed by: FAMILY MEDICINE

## 2023-03-01 PROCEDURE — 99999 PR PBB SHADOW E&M-EST. PATIENT-LVL V: CPT | Mod: PBBFAC,,, | Performed by: FAMILY MEDICINE

## 2023-03-01 PROCEDURE — 3079F DIAST BP 80-89 MM HG: CPT | Mod: CPTII,S$GLB,, | Performed by: FAMILY MEDICINE

## 2023-03-01 PROCEDURE — 1101F PR PT FALLS ASSESS DOC 0-1 FALLS W/OUT INJ PAST YR: ICD-10-PCS | Mod: CPTII,S$GLB,, | Performed by: FAMILY MEDICINE

## 2023-03-01 PROCEDURE — 99215 OFFICE O/P EST HI 40 MIN: CPT | Mod: S$GLB,,, | Performed by: FAMILY MEDICINE

## 2023-03-01 PROCEDURE — 1125F AMNT PAIN NOTED PAIN PRSNT: CPT | Mod: CPTII,S$GLB,, | Performed by: FAMILY MEDICINE

## 2023-03-01 PROCEDURE — 3075F SYST BP GE 130 - 139MM HG: CPT | Mod: CPTII,S$GLB,, | Performed by: FAMILY MEDICINE

## 2023-03-01 PROCEDURE — 1159F MED LIST DOCD IN RCRD: CPT | Mod: CPTII,S$GLB,, | Performed by: FAMILY MEDICINE

## 2023-03-01 PROCEDURE — 1160F PR REVIEW ALL MEDS BY PRESCRIBER/CLIN PHARMACIST DOCUMENTED: ICD-10-PCS | Mod: CPTII,S$GLB,, | Performed by: FAMILY MEDICINE

## 2023-03-01 PROCEDURE — 3079F PR MOST RECENT DIASTOLIC BLOOD PRESSURE 80-89 MM HG: ICD-10-PCS | Mod: CPTII,S$GLB,, | Performed by: FAMILY MEDICINE

## 2023-03-01 RX ORDER — GABAPENTIN 400 MG/1
400 CAPSULE ORAL 2 TIMES DAILY
Qty: 180 CAPSULE | Refills: 1 | Status: SHIPPED | OUTPATIENT
Start: 2023-03-01 | End: 2023-09-12 | Stop reason: SDUPTHER

## 2023-03-01 RX ORDER — VALSARTAN 80 MG/1
80 TABLET ORAL NIGHTLY
Qty: 90 TABLET | Refills: 3 | Status: SHIPPED | OUTPATIENT
Start: 2023-03-01 | End: 2024-02-06 | Stop reason: SDUPTHER

## 2023-03-01 RX ORDER — MIRTAZAPINE 15 MG/1
15 TABLET, FILM COATED ORAL NIGHTLY
Qty: 90 TABLET | Refills: 3 | Status: SHIPPED | OUTPATIENT
Start: 2023-03-01 | End: 2023-05-01 | Stop reason: SDUPTHER

## 2023-03-01 NOTE — PROGRESS NOTES
THIS DOCUMENT WAS MADE IN PART WITH VOICE RECOGNITION SOFTWARE.  OCCASIONALLY THIS SOFTWARE WILL MISINTERPRET WORDS OR PHRASES.      Primary Care Provider Appointment   [facility]       Patient ID: Serenity Epps is a 82 y.o. female.    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  No sure if sprays helping, will stop astelin, consider ent          Other Visit Diagnoses       Major depressive disorder, single episode, moderate        Relevant Medications    mirtazapine (REMERON) 15 MG tablet            Follow Up:  Two months    Approximately 46 minutes today spent reviewing chart, discussing conditions as well as on the initial exam.     Health Maintenance         Date Due Completion Date    Shingles Vaccine (1 of 2) Never done ---    COVID-19 Vaccine (4 - Booster for Pfizer series) 11/30/2021 10/5/2021    DEXA Scan 01/17/2026 1/17/2023    TETANUS VACCINE 09/21/2026 9/21/2016    Lipid Panel 12/24/2027 12/24/2022            Subjective:     Chief Complaint   Patient presents with    Establish Care     Wants to discuss nasal spray     I have reviewed the information entered by the ancillary staff regarding the chief complaint as well as the related history.    HPI    Patient is a/an 82 y.o.  female     New patient, establish [facility].  Discussed multiple topics today, see above for topics that were reviewed and discussed and monitored and felt to be stable  S/p CVA  Stopped ST, mild  "dysarthria, currently denies swallowing  problem.    Still in PT, working on gait, mild numbness left hand    For complete problem list, past medical history, surgical history, social history, etc., see appropriate section in the electronic medical record    Review of Systems   Constitutional:  Negative for activity change and unexpected weight change.   HENT:  Positive for voice change. Negative for trouble swallowing.    Respiratory: Negative.  Negative for shortness of breath.    Cardiovascular: Negative.  Negative for chest pain.   Gastrointestinal: Negative.    Musculoskeletal:  Positive for gait problem.   Neurological:  Positive for weakness.     Objective     Physical Exam  Vitals reviewed.   Constitutional:       General: She is not in acute distress.     Appearance: She is well-developed. She is not ill-appearing.   HENT:      Head: Normocephalic and atraumatic.   Eyes:      General: No scleral icterus.     Conjunctiva/sclera: Conjunctivae normal.   Cardiovascular:      Rate and Rhythm: Normal rate and regular rhythm.      Heart sounds: Normal heart sounds. No murmur heard.  Pulmonary:      Effort: Pulmonary effort is normal. No respiratory distress.      Breath sounds: Normal breath sounds. No wheezing or rales.   Skin:     General: Skin is dry.      Findings: No rash.   Neurological:      Mental Status: She is alert and oriented to person, place, and time.   Psychiatric:         Behavior: Behavior normal.     Vitals:    03/01/23 1255 03/01/23 1354   BP: (!) 146/88 138/84   BP Location: Left arm    Patient Position: Sitting    BP Method: Medium (Manual)    Pulse: (!) 57    Resp: 18    Temp: 97.6 °F (36.4 °C)    TempSrc: Oral    SpO2: 95%    Weight: 65.5 kg (144 lb 8.2 oz)    Height: 5' 5" (1.651 m)        RECENT LABS:    Lab Results   Component Value Date    WBC 7.19 12/25/2022    HGB 13.2 12/25/2022    HCT 39.2 12/25/2022     12/25/2022    CHOL 212 (H) 12/24/2022    TRIG 101 12/24/2022    HDL 83 " (H) 12/24/2022    ALT 56 (H) 12/25/2022    AST 38 (H) 12/25/2022     12/25/2022    K 3.4 (L) 12/25/2022     12/25/2022    CREATININE 0.51 12/25/2022    BUN 7 12/25/2022    CO2 28 12/25/2022    TSH 1.920 12/24/2022    INR 1.1 12/25/2022    HGBA1C 5.3 12/24/2022       Results for orders placed or performed in visit on 02/23/23   Urine culture    Specimen: Urine   Result Value Ref Range    Urine Culture, Routine ESCHERICHIA COLI  10,000 - 49,999 cfu/ml   (A)        Susceptibility    Escherichia coli - CULTURE, URINE     Amp/Sulbactam <=8/4 Sensitive mcg/mL     Ampicillin <=8 Sensitive mcg/mL     Amox/K Clav'ate <=8/4 Sensitive mcg/mL     Ceftriaxone <=1 Sensitive mcg/mL     Cefazolin <=2 Sensitive mcg/mL     Ciprofloxacin <=1 Sensitive mcg/mL     Cefepime <=2 Sensitive mcg/mL     Ertapenem <=0.5 Sensitive mcg/mL     Nitrofurantoin <=32 Sensitive mcg/mL     Gentamicin <=4 Sensitive mcg/mL     Levofloxacin <=2 Sensitive mcg/mL     Meropenem <=1 Sensitive mcg/mL     Piperacillin/Tazo <=16 Sensitive mcg/mL     Trimeth/Sulfa <=2/38 Sensitive mcg/mL     Tobramycin <=4 Sensitive mcg/mL   Urinalysis, Reflex to Urine Culture Urine, Clean Catch    Specimen: Urine   Result Value Ref Range    Specimen UA Urine, Clean Catch     Color, UA Yellow Yellow, Straw, Heena    Appearance, UA Hazy (A) Clear    pH, UA 7.0 5.0 - 8.0    Specific Gravity, UA 1.010 1.005 - 1.030    Protein, UA Negative Negative    Glucose, UA Negative Negative    Ketones, UA Negative Negative    Bilirubin (UA) Negative Negative    Occult Blood UA Trace (A) Negative    Nitrite, UA Negative Negative    Leukocytes, UA 1+ (A) Negative   Urinalysis Microscopic   Result Value Ref Range    RBC, UA 1 0 - 4 /hpf    WBC, UA 40 (H) 0 - 5 /hpf    WBC Clumps, UA Many (A) None-Rare    Bacteria Moderate (A) None-Occ /hpf    Squam Epithel, UA 1 /hpf    Hyaline Casts, UA 1 0-1/lpf /lpf    Microscopic Comment SEE COMMENT      *Note: Due to a large number of results  and/or encounters for the requested time period, some results have not been displayed. A complete set of results can be found in Results Review.

## 2023-03-01 NOTE — ASSESSMENT & PLAN NOTE
States chronic hoarseness, possible post nasal drainage, denies heartburn.  No sure if sprays helping, will stop astelin, consider ent

## 2023-03-12 PROBLEM — Z86.73 HISTORY OF ISCHEMIC RIGHT MCA STROKE: Chronic | Status: ACTIVE | Noted: 2022-12-24

## 2023-03-12 PROBLEM — Z86.73 HISTORY OF ISCHEMIC RIGHT MCA STROKE: Status: ACTIVE | Noted: 2022-12-24

## 2023-03-15 ENCOUNTER — LAB VISIT (OUTPATIENT)
Dept: LAB | Facility: HOSPITAL | Age: 83
End: 2023-03-15
Attending: FAMILY MEDICINE
Payer: MEDICARE

## 2023-03-15 DIAGNOSIS — I10 ESSENTIAL HYPERTENSION: Chronic | ICD-10-CM

## 2023-03-15 LAB
ANION GAP SERPL CALC-SCNC: 7 MMOL/L (ref 8–16)
BUN SERPL-MCNC: 11 MG/DL (ref 8–23)
CALCIUM SERPL-MCNC: 9.3 MG/DL (ref 8.7–10.5)
CHLORIDE SERPL-SCNC: 105 MMOL/L (ref 95–110)
CO2 SERPL-SCNC: 28 MMOL/L (ref 23–29)
CREAT SERPL-MCNC: 0.7 MG/DL (ref 0.5–1.4)
EST. GFR  (NO RACE VARIABLE): >60 ML/MIN/1.73 M^2
GLUCOSE SERPL-MCNC: 95 MG/DL (ref 70–110)
POTASSIUM SERPL-SCNC: 4.1 MMOL/L (ref 3.5–5.1)
SODIUM SERPL-SCNC: 140 MMOL/L (ref 136–145)

## 2023-03-15 PROCEDURE — 80048 BASIC METABOLIC PNL TOTAL CA: CPT | Performed by: FAMILY MEDICINE

## 2023-03-15 PROCEDURE — 36415 COLL VENOUS BLD VENIPUNCTURE: CPT | Mod: PO | Performed by: FAMILY MEDICINE

## 2023-03-20 ENCOUNTER — PROCEDURE VISIT (OUTPATIENT)
Dept: OPHTHALMOLOGY | Facility: CLINIC | Age: 83
End: 2023-03-20
Payer: MEDICARE

## 2023-03-20 DIAGNOSIS — H35.3221 EXUDATIVE AGE-RELATED MACULAR DEGENERATION OF LEFT EYE WITH ACTIVE CHOROIDAL NEOVASCULARIZATION: Primary | ICD-10-CM

## 2023-03-20 DIAGNOSIS — H35.3112 NONEXUDATIVE AGE-RELATED MACULAR DEGENERATION, RIGHT EYE, INTERMEDIATE DRY STAGE: ICD-10-CM

## 2023-03-20 DIAGNOSIS — H35.3211 EXUDATIVE AGE-RELATED MACULAR DEGENERATION OF RIGHT EYE WITH ACTIVE CHOROIDAL NEOVASCULARIZATION: ICD-10-CM

## 2023-03-20 PROCEDURE — 92134 CPTRZ OPH DX IMG PST SGM RTA: CPT | Mod: S$GLB,,, | Performed by: OPHTHALMOLOGY

## 2023-03-20 PROCEDURE — 92201 PR OPHTHALMOSCOPY, EXT, W/RET DRAW/SCLERAL DEPR, I&R, UNI/BI: ICD-10-PCS | Mod: S$GLB,,, | Performed by: OPHTHALMOLOGY

## 2023-03-20 PROCEDURE — 92014 PR EYE EXAM, EST PATIENT,COMPREHESV: ICD-10-PCS | Mod: S$GLB,,, | Performed by: OPHTHALMOLOGY

## 2023-03-20 PROCEDURE — 92014 COMPRE OPH EXAM EST PT 1/>: CPT | Mod: S$GLB,,, | Performed by: OPHTHALMOLOGY

## 2023-03-20 PROCEDURE — 92201 OPSCPY EXTND RTA DRAW UNI/BI: CPT | Mod: S$GLB,,, | Performed by: OPHTHALMOLOGY

## 2023-03-20 PROCEDURE — 92134 POSTERIOR SEGMENT OCT RETINA (OCULAR COHERENCE TOMOGRAPHY)-BOTH EYES: ICD-10-PCS | Mod: S$GLB,,, | Performed by: OPHTHALMOLOGY

## 2023-03-20 RX ORDER — RIVAROXABAN 20 MG/1
20 TABLET, FILM COATED ORAL DAILY
Status: ON HOLD | COMMUNITY
Start: 2023-03-12 | End: 2024-01-12

## 2023-03-20 NOTE — PROGRESS NOTES
HPI     Macular Degeneration     Additional comments: 8 wk chk           Comments    8 wk chk    Pt. States no changes in VA since last eye exam and   Denies flashes, floaters, or pain.     Cosopt BID OU  Latanaprost QHS OU            OCT - no SRF OU  Atrophy OU    A/P    1. Wet AMD OS  S/p Avastin OS x 15, Eylea OS  x 14    Persistent SRF OS - improving  With RPE tear OS  Central fibrosis with atrophy - poor central potential  10/17 - increased SRH - will keep at 8 weeks for now  9/18 - stable cicatrix - try observation  12/18 - no activity  6/21 - some heme over cicatrix OS  1/23 - given extensive atrophy, try extension      2. New disease OD  Sx started 9/21  S/p Avastin OD x 5, Eylea OD x 8  3/23 - sig atrophy without activity    Try obs      3. PCIOL OU  CTR OS - but saw 20/30 with correction, in spite of sub RPE fibrosis      4. POAG OU  Good IOP control on Cosopt, brimonidine, latanoprost OU  Small drance heme OD    5. Floaters OU        8 weeks OCT no dilate (last DFE 3/23)

## 2023-03-26 ENCOUNTER — CLINICAL SUPPORT (OUTPATIENT)
Dept: CARDIOLOGY | Facility: HOSPITAL | Age: 83
End: 2023-03-26
Payer: MEDICARE

## 2023-03-26 DIAGNOSIS — Z95.818 PRESENCE OF OTHER CARDIAC IMPLANTS AND GRAFTS: ICD-10-CM

## 2023-03-26 PROCEDURE — G2066 INTER DEVC REMOTE 30D: HCPCS | Mod: PO | Performed by: INTERNAL MEDICINE

## 2023-03-30 DIAGNOSIS — H40.1233 LOW-TENSION GLAUCOMA OF BOTH EYES, SEVERE STAGE: Primary | ICD-10-CM

## 2023-03-30 RX ORDER — DORZOLAMIDE HYDROCHLORIDE AND TIMOLOL MALEATE 20; 5 MG/ML; MG/ML
1 SOLUTION/ DROPS OPHTHALMIC 2 TIMES DAILY
Qty: 30 ML | Refills: 4 | Status: SHIPPED | OUTPATIENT
Start: 2023-03-30 | End: 2024-07-03

## 2023-04-02 NOTE — PROGRESS NOTES
"Subjective:    Patient ID:  Serenity Epps is a 82 y.o. female who presents for follow-up of Hypertension      Problem List Items Addressed This Visit          Neuro    History of stroke due to thrombosis of right middle cerebral artery (Chronic)    Overview     Dec 2022  No thrombolytic               Cardiac/Vascular    Essential hypertension - Primary (Chronic)    Mixed hyperlipidemia (Chronic)    Stenosis of left internal carotid artery (Chronic)    Overview     12/24/2022  60 % seen on CTA              HPI    Patient was last seen on 1/3/2023 at which time she was evaluated post stroke. SWETHA was performed with negative bubble study and ILR was implanted. aFib was seen on ILR. Xarelto was started.    On assessment today, the patient states that she feels OK.    No chest pain.  No shortness of breath.  No palpitations    Still going to physical therapy       Objective:     Vitals:    04/03/23 0901   BP: (!) 157/78   BP Location: Left arm   Patient Position: Sitting   BP Method: Medium (Automatic)   Pulse: 60   Weight: 65 kg (143 lb 4.8 oz)   Height: 5' 5" (1.651 m)        Physical Exam  Vitals and nursing note reviewed.   Constitutional:       General: She is not in acute distress.     Appearance: She is well-developed.   HENT:      Head: Normocephalic and atraumatic.   Neck:      Vascular: No JVD.   Cardiovascular:      Rate and Rhythm: Normal rate and regular rhythm.      Heart sounds: Normal heart sounds. No murmur heard.    No friction rub. No gallop.   Pulmonary:      Effort: Pulmonary effort is normal. No respiratory distress.      Breath sounds: Normal breath sounds. No wheezing or rales.   Abdominal:      General: Bowel sounds are normal.      Palpations: Abdomen is soft.      Tenderness: There is no abdominal tenderness. There is no guarding or rebound.   Musculoskeletal:         General: No tenderness.      Cervical back: Neck supple.   Skin:     General: Skin is warm and dry.   Neurological:      Mental " Status: She is alert and oriented to person, place, and time.   Psychiatric:         Behavior: Behavior normal.           Current Outpatient Medications   Medication Instructions    atorvastatin (LIPITOR) 40 mg, Oral, Daily    azelastine (ASTELIN) 137 mcg (0.1 %) nasal spray USE 1 SPRAY IN EACH NOSTRIL TWICE DAILY    BENEFIBER, WHEAT DEXTRIN, ORAL 1 Dose, Oral, Every morning    biotin 1,000 mcg, Oral, Nightly    calcium carbonate (OS-BLAKE) 600 mg, Oral, 2 times daily with meals    diclofenac sodium (VOLTAREN) 2 g, Topical (Top), 3 times daily, To painful area of neck    docusate sodium (COLACE) 100 mg, Oral, 2 times daily    dorzolamide-timolol 2-0.5% (COSOPT) 22.3-6.8 mg/mL ophthalmic solution 1 drop, Both Eyes, 2 times daily    EYLEA 2 mg/0.05 mL Syrg 1 mL, Subcutaneous, Every 30 days    fluticasone propionate (FLONASE) 50 mcg/actuation nasal spray USE 2 SPRAYS IN EACH NOSTRIL ONE TIME DAILY    gabapentin (NEURONTIN) 400 mg, Oral, 2 times daily    latanoprost 0.005 % ophthalmic solution 1 drop, Both Eyes, Nightly    levocetirizine (XYZAL) 5 mg, Oral, Nightly    metoprolol succinate (TOPROL-XL) 25 mg, Oral, 2 times daily    mirtazapine (REMERON) 15 mg, Oral, Nightly    valsartan (DIOVAN) 80 mg, Oral, Nightly    VIT C/E/ZN/COPPR/LUTEIN/ZEAXAN (PRESERVISION AREDS 2 ORAL) 1 capsule, Oral, 2 times daily    XARELTO 20 mg Tab No dose, route, or frequency recorded.       Lipid Panel:   Lab Results   Component Value Date    CHOL 212 (H) 12/24/2022    HDL 83 (H) 12/24/2022    LDLCALC 108.8 12/24/2022    TRIG 101 12/24/2022    CHOLHDL 39.2 12/24/2022       The ASCVD Risk score (Bakari RAMOS, et al., 2019) failed to calculate for the following reasons:    The 2019 ASCVD risk score is only valid for ages 40 to 79    The patient has a prior MI or stroke diagnosis    All pertinent labs, imaging, and EKGs reviewed.  Patient's most recent EKG tracing was personally interpreted by this provider.    Most Recent EKG Results  Results for  orders placed or performed in visit on 01/03/23   IN OFFICE EKG 12-LEAD (to [place])    Collection Time: 01/03/23  9:36 AM    Narrative    Test Reason : I63.30,I25.10,I10,E78.2,I63.311,I65.22,    Vent. Rate : 054 BPM     Atrial Rate : 054 BPM     P-R Int : 176 ms          QRS Dur : 104 ms      QT Int : 478 ms       P-R-T Axes : 073 092 059 degrees     QTc Int : 453 ms    Sinus bradycardia  Rightward axis  Borderline Abnormal ECG  When compared with ECG of 10-MAY-2016 14:47,  Previous ECG has undetermined rhythm, needs review  Confirmed by Tio COOLEY, Daron NAVA ([id number]) on 1/6/2023 11:33:37 AM    Referred By: CHELSEY GAMEZ           Confirmed By:Daron Kinsey MD       Most Recent Echocardiogram Results  Results for orders placed during the hospital encounter of 01/11/23    Transesophageal echo (SWETHA) with possible cardioversion    Interpretation Summary  · Normal systolic function.  · Normal right ventricular size with normal right ventricular systolic function.  · Mild tricuspid regurgitation.  · Normal appearing left atrial appendage. No thrombus is present in the appendage or left atrium.  · Saline (bubble) contrast was used during the study. No evidence of interatrial connection appreciated.      Most Recent Nuclear Stress Test Results  No results found for this or any previous visit.      Most Recent Cardiac PET Stress Test Results  No results found for this or any previous visit.      Most Recent Cardiovascular Angiogram results  No results found for this or any previous visit.      Other Most Recent Cardiology Results  Results for orders placed in visit on 03/26/23    Cardiac device check - Remote    Narrative  Battery and Leads (BL)  Normal battery parameters    Presenting Rhythm (RI)  Normal Sinus Rhythm    Arrhythmic events (AE)  Indeterminate rhythm due to noise limiting interpretation  Noise Oversensing is noted  Indeterminate rhythm due to lack of consistent P-waves  Premature Ventricular  Contraction(s)  Paroxysmal atrial fibrillation and/or flutter  AF event(s) recorded    Anticoagulation  (AC)  Patient prescribed Rivaroxaban (Xarelto)  Patient on anticoagulant therapy    Cardiovascular Physiologic Parameters (CPP)  A significant decrease in activity level noted on trending    Transmission Information (TI)  Implant indication: Cryptogenic Stroke  Device Summary Report    Follow Up (FU)  Continue remote monitoring with monthly reporting    Additional Comments  ILR Report  Monitoring period:2/5/23 - 3/7/23    Battery Status: Good    Device Defined Counters:  AF Events: 3  EGMs illustrate noise oversensing, PVC, AF/AFL, Indeterminate rhythm due to lack of consistent P-waves, Indeterminate rhythm due to noise limiting interpretation can not rule out true AF/AFL, longest available 1hr 42mins in duration.  AF burden 0.3%        Assessment:       1. Essential hypertension    2. Mixed hyperlipidemia    3. Stenosis of left internal carotid artery    4. History of stroke due to thrombosis of right middle cerebral artery         Plan:     Symptoms OK today  BP elevated today  Most recent echocardiogram reviewed personally     Continue atorvastatin 40mg PO Daily  Continue metoprolol succinate 25mg PO BID  Continue valsartan 80mg PO Daily  Continue Xarelto 20mg PO Daily to be taken with the largest meal of the day    Continue other cardiac medications  Mediterranean Diet/Cardiovascular Exercise Program    Patient queried and all questions were answered.    F/u in 6 months to reassess      Signed:    Daron Kinsey MD  4/3/2023 1:53 PM

## 2023-04-03 ENCOUNTER — OFFICE VISIT (OUTPATIENT)
Dept: CARDIOLOGY | Facility: CLINIC | Age: 83
End: 2023-04-03
Payer: MEDICARE

## 2023-04-03 VITALS
SYSTOLIC BLOOD PRESSURE: 157 MMHG | HEIGHT: 65 IN | WEIGHT: 143.31 LBS | DIASTOLIC BLOOD PRESSURE: 78 MMHG | BODY MASS INDEX: 23.88 KG/M2 | HEART RATE: 60 BPM

## 2023-04-03 DIAGNOSIS — Z86.73 HISTORY OF ISCHEMIC RIGHT MCA STROKE: Chronic | ICD-10-CM

## 2023-04-03 DIAGNOSIS — I65.22 STENOSIS OF LEFT INTERNAL CAROTID ARTERY: Chronic | ICD-10-CM

## 2023-04-03 DIAGNOSIS — I10 ESSENTIAL HYPERTENSION: Primary | Chronic | ICD-10-CM

## 2023-04-03 DIAGNOSIS — E78.2 MIXED HYPERLIPIDEMIA: Chronic | ICD-10-CM

## 2023-04-03 PROCEDURE — 3078F PR MOST RECENT DIASTOLIC BLOOD PRESSURE < 80 MM HG: ICD-10-PCS | Mod: CPTII,S$GLB,, | Performed by: INTERNAL MEDICINE

## 2023-04-03 PROCEDURE — 99999 PR PBB SHADOW E&M-EST. PATIENT-LVL IV: ICD-10-PCS | Mod: PBBFAC,,, | Performed by: INTERNAL MEDICINE

## 2023-04-03 PROCEDURE — 1126F AMNT PAIN NOTED NONE PRSNT: CPT | Mod: CPTII,S$GLB,, | Performed by: INTERNAL MEDICINE

## 2023-04-03 PROCEDURE — 1160F RVW MEDS BY RX/DR IN RCRD: CPT | Mod: CPTII,S$GLB,, | Performed by: INTERNAL MEDICINE

## 2023-04-03 PROCEDURE — 3077F PR MOST RECENT SYSTOLIC BLOOD PRESSURE >= 140 MM HG: ICD-10-PCS | Mod: CPTII,S$GLB,, | Performed by: INTERNAL MEDICINE

## 2023-04-03 PROCEDURE — 1157F ADVNC CARE PLAN IN RCRD: CPT | Mod: CPTII,S$GLB,, | Performed by: INTERNAL MEDICINE

## 2023-04-03 PROCEDURE — 1101F PT FALLS ASSESS-DOCD LE1/YR: CPT | Mod: CPTII,S$GLB,, | Performed by: INTERNAL MEDICINE

## 2023-04-03 PROCEDURE — 3288F FALL RISK ASSESSMENT DOCD: CPT | Mod: CPTII,S$GLB,, | Performed by: INTERNAL MEDICINE

## 2023-04-03 PROCEDURE — 99214 PR OFFICE/OUTPT VISIT, EST, LEVL IV, 30-39 MIN: ICD-10-PCS | Mod: S$GLB,,, | Performed by: INTERNAL MEDICINE

## 2023-04-03 PROCEDURE — 1160F PR REVIEW ALL MEDS BY PRESCRIBER/CLIN PHARMACIST DOCUMENTED: ICD-10-PCS | Mod: CPTII,S$GLB,, | Performed by: INTERNAL MEDICINE

## 2023-04-03 PROCEDURE — 1159F MED LIST DOCD IN RCRD: CPT | Mod: CPTII,S$GLB,, | Performed by: INTERNAL MEDICINE

## 2023-04-03 PROCEDURE — 3078F DIAST BP <80 MM HG: CPT | Mod: CPTII,S$GLB,, | Performed by: INTERNAL MEDICINE

## 2023-04-03 PROCEDURE — 1126F PR PAIN SEVERITY QUANTIFIED, NO PAIN PRESENT: ICD-10-PCS | Mod: CPTII,S$GLB,, | Performed by: INTERNAL MEDICINE

## 2023-04-03 PROCEDURE — 3077F SYST BP >= 140 MM HG: CPT | Mod: CPTII,S$GLB,, | Performed by: INTERNAL MEDICINE

## 2023-04-03 PROCEDURE — 1157F PR ADVANCE CARE PLAN OR EQUIV PRESENT IN MEDICAL RECORD: ICD-10-PCS | Mod: CPTII,S$GLB,, | Performed by: INTERNAL MEDICINE

## 2023-04-03 PROCEDURE — 99214 OFFICE O/P EST MOD 30 MIN: CPT | Mod: S$GLB,,, | Performed by: INTERNAL MEDICINE

## 2023-04-03 PROCEDURE — 1101F PR PT FALLS ASSESS DOC 0-1 FALLS W/OUT INJ PAST YR: ICD-10-PCS | Mod: CPTII,S$GLB,, | Performed by: INTERNAL MEDICINE

## 2023-04-03 PROCEDURE — 99999 PR PBB SHADOW E&M-EST. PATIENT-LVL IV: CPT | Mod: PBBFAC,,, | Performed by: INTERNAL MEDICINE

## 2023-04-03 PROCEDURE — 1159F PR MEDICATION LIST DOCUMENTED IN MEDICAL RECORD: ICD-10-PCS | Mod: CPTII,S$GLB,, | Performed by: INTERNAL MEDICINE

## 2023-04-03 PROCEDURE — 3288F PR FALLS RISK ASSESSMENT DOCUMENTED: ICD-10-PCS | Mod: CPTII,S$GLB,, | Performed by: INTERNAL MEDICINE

## 2023-04-25 ENCOUNTER — CLINICAL SUPPORT (OUTPATIENT)
Dept: CARDIOLOGY | Facility: HOSPITAL | Age: 83
End: 2023-04-25
Payer: MEDICARE

## 2023-04-25 DIAGNOSIS — Z95.818 PRESENCE OF OTHER CARDIAC IMPLANTS AND GRAFTS: ICD-10-CM

## 2023-04-25 PROCEDURE — 93298 CARDIAC DEVICE CHECK - REMOTE: ICD-10-PCS | Mod: ,,, | Performed by: INTERNAL MEDICINE

## 2023-04-25 PROCEDURE — 93298 REM INTERROG DEV EVAL SCRMS: CPT | Mod: ,,, | Performed by: INTERNAL MEDICINE

## 2023-04-25 PROCEDURE — G2066 INTER DEVC REMOTE 30D: HCPCS | Mod: PO | Performed by: INTERNAL MEDICINE

## 2023-04-30 NOTE — PROGRESS NOTES
THIS DOCUMENT WAS MADE IN PART WITH VOICE RECOGNITION SOFTWARE.  OCCASIONALLY THIS SOFTWARE WILL MISINTERPRET WORDS OR PHRASES.      Primary Care Provider Appointment   [facility]       Patient ID: Serenity Epps is a 82 y.o. female.    ASSESSMENT/PLAN by Problem List:    1. Essential hypertension  Assessment & Plan:  Somewhat improved but borderline.  I encouraged her to start checking at home so we can have a better idea of her average blood pressure.  Continue current medications for now.  Follow back in three months.  But notify me if home readings are not averaging 130s or below systolic.      2. Mixed hyperlipidemia  Assessment & Plan:  Currently on atorvastatin 40 mg.  Due for labs, will schedule.    Orders:  -     Lipid Panel; Future; Expected date: 05/01/2023  -     Comprehensive Metabolic Panel; Future; Expected date: 05/01/2023    3. Abdominal aortic atherosclerosis  Assessment & Plan:  Noted on previous imaging.  Continue atorvastatin and blood pressure control.      4. Major depressive disorder, single episode, moderate  -     mirtazapine (REMERON) 15 MG tablet; Take 1-2 tablets by mouth at bedtime  Dispense: 120 tablet; Refill: 3    Other orders  -     metoprolol succinate (TOPROL-XL) 25 MG 24 hr tablet; Take 1 tablet (25 mg total) by mouth 2 (two) times daily.  Dispense: 180 tablet; Refill: 3         Follow Up:  3 months      Advance Care Planning     Date: 05/01/2023  Living will and HPA on file.             Subjective:     Chief Complaint   Patient presents with    Follow-up     I have reviewed the information entered by the ancillary staff regarding the chief complaint as well as the related history.    HPI    Patient is a/an 82 y.o.  female     Follow-up hypertension  Not checking BP at home.    Stopped PT, but planning on continuing regular exercise either here or at the [facility].  No falls.      See above for all other details.    For complete problem list, past  "medical history, surgical history, social history, etc., see appropriate section in the electronic medical record    Review of Systems   Constitutional:  Negative for activity change and unexpected weight change.   HENT:  Negative for trouble swallowing.    Respiratory: Negative.  Negative for shortness of breath.    Cardiovascular: Negative.  Negative for chest pain.   Gastrointestinal: Negative.    Genitourinary: Negative.    Musculoskeletal:  Positive for gait problem.   Neurological:  Positive for weakness.     Objective     Physical Exam  Vitals reviewed.   Constitutional:       General: She is not in acute distress.     Appearance: She is well-developed. She is not ill-appearing.   HENT:      Head: Normocephalic and atraumatic.   Eyes:      General: No scleral icterus.     Conjunctiva/sclera: Conjunctivae normal.   Cardiovascular:      Rate and Rhythm: Normal rate and regular rhythm.      Heart sounds: Normal heart sounds. No murmur heard.  Pulmonary:      Effort: Pulmonary effort is normal. No respiratory distress.      Breath sounds: Normal breath sounds. No wheezing or rales.   Skin:     General: Skin is dry.      Findings: No rash.   Neurological:      Mental Status: She is alert and oriented to person, place, and time.      Comments: Ambulatory with a walker.   Psychiatric:         Behavior: Behavior normal.     Vitals:    05/01/23 0724 05/01/23 0801   BP: (!) 146/82 136/68   Pulse: (!) 58    Resp: 16    SpO2: 98%    Weight: 65.5 kg (144 lb 6.4 oz)    Height: 5' 5" (1.651 m)        "

## 2023-05-01 ENCOUNTER — OFFICE VISIT (OUTPATIENT)
Dept: PRIMARY CARE CLINIC | Facility: CLINIC | Age: 83
End: 2023-05-01
Payer: MEDICARE

## 2023-05-01 VITALS
HEART RATE: 58 BPM | HEIGHT: 65 IN | WEIGHT: 144.38 LBS | DIASTOLIC BLOOD PRESSURE: 68 MMHG | BODY MASS INDEX: 24.06 KG/M2 | SYSTOLIC BLOOD PRESSURE: 136 MMHG | OXYGEN SATURATION: 98 % | RESPIRATION RATE: 16 BRPM

## 2023-05-01 DIAGNOSIS — F32.1 MAJOR DEPRESSIVE DISORDER, SINGLE EPISODE, MODERATE: ICD-10-CM

## 2023-05-01 DIAGNOSIS — I10 ESSENTIAL HYPERTENSION: Primary | ICD-10-CM

## 2023-05-01 DIAGNOSIS — I70.0 ABDOMINAL AORTIC ATHEROSCLEROSIS: ICD-10-CM

## 2023-05-01 DIAGNOSIS — E78.2 MIXED HYPERLIPIDEMIA: Chronic | ICD-10-CM

## 2023-05-01 PROCEDURE — 1160F PR REVIEW ALL MEDS BY PRESCRIBER/CLIN PHARMACIST DOCUMENTED: ICD-10-PCS | Mod: CPTII,S$GLB,, | Performed by: FAMILY MEDICINE

## 2023-05-01 PROCEDURE — 1159F MED LIST DOCD IN RCRD: CPT | Mod: CPTII,S$GLB,, | Performed by: FAMILY MEDICINE

## 2023-05-01 PROCEDURE — 3288F PR FALLS RISK ASSESSMENT DOCUMENTED: ICD-10-PCS | Mod: CPTII,S$GLB,, | Performed by: FAMILY MEDICINE

## 2023-05-01 PROCEDURE — 1101F PR PT FALLS ASSESS DOC 0-1 FALLS W/OUT INJ PAST YR: ICD-10-PCS | Mod: CPTII,S$GLB,, | Performed by: FAMILY MEDICINE

## 2023-05-01 PROCEDURE — 1125F PR PAIN SEVERITY QUANTIFIED, PAIN PRESENT: ICD-10-PCS | Mod: CPTII,S$GLB,, | Performed by: FAMILY MEDICINE

## 2023-05-01 PROCEDURE — 1157F ADVNC CARE PLAN IN RCRD: CPT | Mod: CPTII,S$GLB,, | Performed by: FAMILY MEDICINE

## 2023-05-01 PROCEDURE — 1101F PT FALLS ASSESS-DOCD LE1/YR: CPT | Mod: CPTII,S$GLB,, | Performed by: FAMILY MEDICINE

## 2023-05-01 PROCEDURE — 1160F RVW MEDS BY RX/DR IN RCRD: CPT | Mod: CPTII,S$GLB,, | Performed by: FAMILY MEDICINE

## 2023-05-01 PROCEDURE — 3078F PR MOST RECENT DIASTOLIC BLOOD PRESSURE < 80 MM HG: ICD-10-PCS | Mod: CPTII,S$GLB,, | Performed by: FAMILY MEDICINE

## 2023-05-01 PROCEDURE — 1159F PR MEDICATION LIST DOCUMENTED IN MEDICAL RECORD: ICD-10-PCS | Mod: CPTII,S$GLB,, | Performed by: FAMILY MEDICINE

## 2023-05-01 PROCEDURE — 99999 PR PBB SHADOW E&M-EST. PATIENT-LVL III: CPT | Mod: PBBFAC,,, | Performed by: FAMILY MEDICINE

## 2023-05-01 PROCEDURE — 3075F SYST BP GE 130 - 139MM HG: CPT | Mod: CPTII,S$GLB,, | Performed by: FAMILY MEDICINE

## 2023-05-01 PROCEDURE — 3288F FALL RISK ASSESSMENT DOCD: CPT | Mod: CPTII,S$GLB,, | Performed by: FAMILY MEDICINE

## 2023-05-01 PROCEDURE — 1125F AMNT PAIN NOTED PAIN PRSNT: CPT | Mod: CPTII,S$GLB,, | Performed by: FAMILY MEDICINE

## 2023-05-01 PROCEDURE — 99214 PR OFFICE/OUTPT VISIT, EST, LEVL IV, 30-39 MIN: ICD-10-PCS | Mod: S$GLB,,, | Performed by: FAMILY MEDICINE

## 2023-05-01 PROCEDURE — 3078F DIAST BP <80 MM HG: CPT | Mod: CPTII,S$GLB,, | Performed by: FAMILY MEDICINE

## 2023-05-01 PROCEDURE — 3075F PR MOST RECENT SYSTOLIC BLOOD PRESS GE 130-139MM HG: ICD-10-PCS | Mod: CPTII,S$GLB,, | Performed by: FAMILY MEDICINE

## 2023-05-01 PROCEDURE — 99999 PR PBB SHADOW E&M-EST. PATIENT-LVL III: ICD-10-PCS | Mod: PBBFAC,,, | Performed by: FAMILY MEDICINE

## 2023-05-01 PROCEDURE — 99214 OFFICE O/P EST MOD 30 MIN: CPT | Mod: S$GLB,,, | Performed by: FAMILY MEDICINE

## 2023-05-01 PROCEDURE — 1157F PR ADVANCE CARE PLAN OR EQUIV PRESENT IN MEDICAL RECORD: ICD-10-PCS | Mod: CPTII,S$GLB,, | Performed by: FAMILY MEDICINE

## 2023-05-01 RX ORDER — METOPROLOL SUCCINATE 25 MG/1
25 TABLET, EXTENDED RELEASE ORAL 2 TIMES DAILY
Qty: 180 TABLET | Refills: 3 | Status: SHIPPED | OUTPATIENT
Start: 2023-05-01

## 2023-05-01 RX ORDER — MIRTAZAPINE 15 MG/1
TABLET, FILM COATED ORAL
Qty: 120 TABLET | Refills: 3 | Status: SHIPPED | OUTPATIENT
Start: 2023-05-01 | End: 2023-12-13 | Stop reason: SDUPTHER

## 2023-05-01 NOTE — ASSESSMENT & PLAN NOTE
Somewhat improved but borderline.  I encouraged her to start checking at home so we can have a better idea of her average blood pressure.  Continue current medications for now.  Follow back in three months.  But notify me if home readings are not averaging 130s or below systolic.

## 2023-05-03 ENCOUNTER — LAB VISIT (OUTPATIENT)
Dept: LAB | Facility: HOSPITAL | Age: 83
End: 2023-05-03
Attending: FAMILY MEDICINE
Payer: MEDICARE

## 2023-05-03 DIAGNOSIS — E78.2 MIXED HYPERLIPIDEMIA: Chronic | ICD-10-CM

## 2023-05-03 LAB
ALBUMIN SERPL BCP-MCNC: 4 G/DL (ref 3.5–5.2)
ALP SERPL-CCNC: 68 U/L (ref 55–135)
ALT SERPL W/O P-5'-P-CCNC: 26 U/L (ref 10–44)
ANION GAP SERPL CALC-SCNC: 4 MMOL/L (ref 8–16)
AST SERPL-CCNC: 26 U/L (ref 10–40)
BILIRUB SERPL-MCNC: 0.6 MG/DL (ref 0.1–1)
BUN SERPL-MCNC: 10 MG/DL (ref 8–23)
CALCIUM SERPL-MCNC: 9.5 MG/DL (ref 8.7–10.5)
CHLORIDE SERPL-SCNC: 104 MMOL/L (ref 95–110)
CHOLEST SERPL-MCNC: 160 MG/DL (ref 120–199)
CHOLEST/HDLC SERPL: 2.1 {RATIO} (ref 2–5)
CO2 SERPL-SCNC: 31 MMOL/L (ref 23–29)
CREAT SERPL-MCNC: 0.7 MG/DL (ref 0.5–1.4)
EST. GFR  (NO RACE VARIABLE): >60 ML/MIN/1.73 M^2
GLUCOSE SERPL-MCNC: 74 MG/DL (ref 70–110)
HDLC SERPL-MCNC: 76 MG/DL (ref 40–75)
HDLC SERPL: 47.5 % (ref 20–50)
LDLC SERPL CALC-MCNC: 60 MG/DL (ref 63–159)
NONHDLC SERPL-MCNC: 84 MG/DL
POTASSIUM SERPL-SCNC: 4.7 MMOL/L (ref 3.5–5.1)
PROT SERPL-MCNC: 7 G/DL (ref 6–8.4)
SODIUM SERPL-SCNC: 139 MMOL/L (ref 136–145)
TRIGL SERPL-MCNC: 120 MG/DL (ref 30–150)

## 2023-05-03 PROCEDURE — 80053 COMPREHEN METABOLIC PANEL: CPT | Performed by: FAMILY MEDICINE

## 2023-05-03 PROCEDURE — 36415 COLL VENOUS BLD VENIPUNCTURE: CPT | Mod: PO | Performed by: FAMILY MEDICINE

## 2023-05-03 PROCEDURE — 80061 LIPID PANEL: CPT | Performed by: FAMILY MEDICINE

## 2023-05-10 ENCOUNTER — TELEPHONE (OUTPATIENT)
Dept: FAMILY MEDICINE | Facility: CLINIC | Age: 83
End: 2023-05-10
Payer: MEDICARE

## 2023-05-10 NOTE — TELEPHONE ENCOUNTER
----- Message from Ozzie Whitten sent at 5/10/2023 10:33 AM CDT -----      Name of Who is Calling:Shant/[facility]          What is the request in detail:Shant from  [facility] is requesting a call back to discuss if your office received the faxed paperwork for PT medical equipment. Please be Advised!          [facility]          What Number to Call Back if not in [phone] ref# [id number]

## 2023-05-10 NOTE — TELEPHONE ENCOUNTER
----- Message from Ozzie Whitten sent at 5/10/2023 10:33 AM CDT -----      Name of Who is Calling:[facility]          What is the request in detail:Shant from  [facility] is requesting a call back to discuss if your office received the faxed paperwork for PT medical equipment. Please be Advised!          [facility]no          What Number to Call Back if not in [phone] ref# [id number]

## 2023-05-10 NOTE — TELEPHONE ENCOUNTER
Spoke with kristyn at [facility].  Kristyn stated that they do not have any information on this pt.  Please advise.

## 2023-05-15 NOTE — TELEPHONE ENCOUNTER
Attempted to return the call, but was placed on hold with music. After  5 minutes with no answer hung up.

## 2023-05-18 ENCOUNTER — OFFICE VISIT (OUTPATIENT)
Dept: OPHTHALMOLOGY | Facility: CLINIC | Age: 83
End: 2023-05-18
Payer: MEDICARE

## 2023-05-18 DIAGNOSIS — H35.3221 EXUDATIVE AGE-RELATED MACULAR DEGENERATION OF LEFT EYE WITH ACTIVE CHOROIDAL NEOVASCULARIZATION: ICD-10-CM

## 2023-05-18 DIAGNOSIS — H35.3211 EXUDATIVE AGE-RELATED MACULAR DEGENERATION OF RIGHT EYE WITH ACTIVE CHOROIDAL NEOVASCULARIZATION: Primary | ICD-10-CM

## 2023-05-18 PROCEDURE — 1160F PR REVIEW ALL MEDS BY PRESCRIBER/CLIN PHARMACIST DOCUMENTED: ICD-10-PCS | Mod: CPTII,,, | Performed by: OPHTHALMOLOGY

## 2023-05-18 PROCEDURE — 3288F FALL RISK ASSESSMENT DOCD: CPT | Mod: CPTII,,, | Performed by: OPHTHALMOLOGY

## 2023-05-18 PROCEDURE — 3288F PR FALLS RISK ASSESSMENT DOCUMENTED: ICD-10-PCS | Mod: CPTII,,, | Performed by: OPHTHALMOLOGY

## 2023-05-18 PROCEDURE — 92014 COMPRE OPH EXAM EST PT 1/>: CPT | Mod: ,,, | Performed by: OPHTHALMOLOGY

## 2023-05-18 PROCEDURE — 99999 PR PBB SHADOW E&M-EST. PATIENT-LVL III: ICD-10-PCS | Mod: PBBFAC,,, | Performed by: OPHTHALMOLOGY

## 2023-05-18 PROCEDURE — 92201 PR OPHTHALMOSCOPY, EXT, W/RET DRAW/SCLERAL DEPR, I&R, UNI/BI: ICD-10-PCS | Mod: ,,, | Performed by: OPHTHALMOLOGY

## 2023-05-18 PROCEDURE — 92201 OPSCPY EXTND RTA DRAW UNI/BI: CPT | Mod: ,,, | Performed by: OPHTHALMOLOGY

## 2023-05-18 PROCEDURE — 1159F PR MEDICATION LIST DOCUMENTED IN MEDICAL RECORD: ICD-10-PCS | Mod: CPTII,,, | Performed by: OPHTHALMOLOGY

## 2023-05-18 PROCEDURE — 92134 POSTERIOR SEGMENT OCT RETINA (OCULAR COHERENCE TOMOGRAPHY)-BOTH EYES: ICD-10-PCS | Mod: ,,, | Performed by: OPHTHALMOLOGY

## 2023-05-18 PROCEDURE — 92134 CPTRZ OPH DX IMG PST SGM RTA: CPT | Mod: ,,, | Performed by: OPHTHALMOLOGY

## 2023-05-18 PROCEDURE — 92014 PR EYE EXAM, EST PATIENT,COMPREHESV: ICD-10-PCS | Mod: ,,, | Performed by: OPHTHALMOLOGY

## 2023-05-18 PROCEDURE — 1101F PR PT FALLS ASSESS DOC 0-1 FALLS W/OUT INJ PAST YR: ICD-10-PCS | Mod: CPTII,,, | Performed by: OPHTHALMOLOGY

## 2023-05-18 PROCEDURE — 99999 PR PBB SHADOW E&M-EST. PATIENT-LVL III: CPT | Mod: PBBFAC,,, | Performed by: OPHTHALMOLOGY

## 2023-05-18 PROCEDURE — 1157F ADVNC CARE PLAN IN RCRD: CPT | Mod: CPTII,,, | Performed by: OPHTHALMOLOGY

## 2023-05-18 PROCEDURE — 99213 OFFICE O/P EST LOW 20 MIN: CPT | Mod: PO | Performed by: OPHTHALMOLOGY

## 2023-05-18 PROCEDURE — 1157F PR ADVANCE CARE PLAN OR EQUIV PRESENT IN MEDICAL RECORD: ICD-10-PCS | Mod: CPTII,,, | Performed by: OPHTHALMOLOGY

## 2023-05-18 PROCEDURE — 1101F PT FALLS ASSESS-DOCD LE1/YR: CPT | Mod: CPTII,,, | Performed by: OPHTHALMOLOGY

## 2023-05-18 PROCEDURE — 1159F MED LIST DOCD IN RCRD: CPT | Mod: CPTII,,, | Performed by: OPHTHALMOLOGY

## 2023-05-18 PROCEDURE — 1160F RVW MEDS BY RX/DR IN RCRD: CPT | Mod: CPTII,,, | Performed by: OPHTHALMOLOGY

## 2023-05-18 NOTE — PROGRESS NOTES
HPI     Macular Degeneration     Additional comments: 2 mon amd chk           Comments    8 wk chk    Pt. States no changes in VA since last eye exam and   Denies flashes, floaters, or pain.     Cosopt BID OU  Latanaprost QHS OU          OCT - no SRF OU  Atrophy OU    A/P    1. Wet AMD OS  S/p Avastin OS x 15, Eylea OS  x 14    Persistent SRF OS - improving  With RPE tear OS  Central fibrosis with atrophy - poor central potential  10/17 - increased SRH - will keep at 8 weeks for now  9/18 - stable cicatrix - try observation  12/18 - no activity  6/21 - some heme over cicatrix OS  1/23 - given extensive atrophy, try extension      2. New disease OD  Sx started 9/21  S/p Avastin OD x 5, Eylea OD x 8  3/23 - sig atrophy without activity    continue obs      3. PCIOL OU  CTR OS - but saw 20/30 with correction, in spite of sub RPE fibrosis      4. POAG OU  Good IOP control on Cosopt, brimonidine, latanoprost OU  Small drance heme OD    5. Floaters OU        4 month  OCT and dilate (last DFE 3/23)

## 2023-05-25 ENCOUNTER — CLINICAL SUPPORT (OUTPATIENT)
Dept: CARDIOLOGY | Facility: HOSPITAL | Age: 83
End: 2023-05-25
Payer: MEDICARE

## 2023-05-25 DIAGNOSIS — Z95.818 PRESENCE OF OTHER CARDIAC IMPLANTS AND GRAFTS: ICD-10-CM

## 2023-05-25 PROCEDURE — G2066 INTER DEVC REMOTE 30D: HCPCS | Mod: PO | Performed by: INTERNAL MEDICINE

## 2023-06-02 ENCOUNTER — OFFICE VISIT (OUTPATIENT)
Dept: OPHTHALMOLOGY | Facility: CLINIC | Age: 83
End: 2023-06-02
Payer: MEDICARE

## 2023-06-02 DIAGNOSIS — H35.3211 EXUDATIVE AGE-RELATED MACULAR DEGENERATION OF RIGHT EYE WITH ACTIVE CHOROIDAL NEOVASCULARIZATION: ICD-10-CM

## 2023-06-02 DIAGNOSIS — H40.1233 LOW-TENSION GLAUCOMA OF BOTH EYES, SEVERE STAGE: Primary | ICD-10-CM

## 2023-06-02 DIAGNOSIS — Z96.1 PSEUDOPHAKIA OF BOTH EYES: ICD-10-CM

## 2023-06-02 DIAGNOSIS — H04.123 DRY EYES, BILATERAL: ICD-10-CM

## 2023-06-02 DIAGNOSIS — H35.3221 EXUDATIVE AGE-RELATED MACULAR DEGENERATION OF LEFT EYE WITH ACTIVE CHOROIDAL NEOVASCULARIZATION: ICD-10-CM

## 2023-06-02 DIAGNOSIS — H52.7 REFRACTIVE ERROR: ICD-10-CM

## 2023-06-02 PROCEDURE — 1126F AMNT PAIN NOTED NONE PRSNT: CPT | Mod: CPTII,S$GLB,, | Performed by: OPHTHALMOLOGY

## 2023-06-02 PROCEDURE — 99214 OFFICE O/P EST MOD 30 MIN: CPT | Mod: S$GLB,,, | Performed by: OPHTHALMOLOGY

## 2023-06-02 PROCEDURE — 1101F PT FALLS ASSESS-DOCD LE1/YR: CPT | Mod: CPTII,S$GLB,, | Performed by: OPHTHALMOLOGY

## 2023-06-02 PROCEDURE — 1159F MED LIST DOCD IN RCRD: CPT | Mod: CPTII,S$GLB,, | Performed by: OPHTHALMOLOGY

## 2023-06-02 PROCEDURE — 1160F RVW MEDS BY RX/DR IN RCRD: CPT | Mod: CPTII,S$GLB,, | Performed by: OPHTHALMOLOGY

## 2023-06-02 PROCEDURE — 1160F PR REVIEW ALL MEDS BY PRESCRIBER/CLIN PHARMACIST DOCUMENTED: ICD-10-PCS | Mod: CPTII,S$GLB,, | Performed by: OPHTHALMOLOGY

## 2023-06-02 PROCEDURE — 1157F ADVNC CARE PLAN IN RCRD: CPT | Mod: CPTII,S$GLB,, | Performed by: OPHTHALMOLOGY

## 2023-06-02 PROCEDURE — 3288F FALL RISK ASSESSMENT DOCD: CPT | Mod: CPTII,S$GLB,, | Performed by: OPHTHALMOLOGY

## 2023-06-02 PROCEDURE — 1159F PR MEDICATION LIST DOCUMENTED IN MEDICAL RECORD: ICD-10-PCS | Mod: CPTII,S$GLB,, | Performed by: OPHTHALMOLOGY

## 2023-06-02 PROCEDURE — 1157F PR ADVANCE CARE PLAN OR EQUIV PRESENT IN MEDICAL RECORD: ICD-10-PCS | Mod: CPTII,S$GLB,, | Performed by: OPHTHALMOLOGY

## 2023-06-02 PROCEDURE — 99999 PR PBB SHADOW E&M-EST. PATIENT-LVL III: ICD-10-PCS | Mod: PBBFAC,,, | Performed by: OPHTHALMOLOGY

## 2023-06-02 PROCEDURE — 99999 PR PBB SHADOW E&M-EST. PATIENT-LVL III: CPT | Mod: PBBFAC,,, | Performed by: OPHTHALMOLOGY

## 2023-06-02 PROCEDURE — 99214 PR OFFICE/OUTPT VISIT, EST, LEVL IV, 30-39 MIN: ICD-10-PCS | Mod: S$GLB,,, | Performed by: OPHTHALMOLOGY

## 2023-06-02 PROCEDURE — 1101F PR PT FALLS ASSESS DOC 0-1 FALLS W/OUT INJ PAST YR: ICD-10-PCS | Mod: CPTII,S$GLB,, | Performed by: OPHTHALMOLOGY

## 2023-06-02 PROCEDURE — 3288F PR FALLS RISK ASSESSMENT DOCUMENTED: ICD-10-PCS | Mod: CPTII,S$GLB,, | Performed by: OPHTHALMOLOGY

## 2023-06-02 PROCEDURE — 1126F PR PAIN SEVERITY QUANTIFIED, NO PAIN PRESENT: ICD-10-PCS | Mod: CPTII,S$GLB,, | Performed by: OPHTHALMOLOGY

## 2023-06-02 NOTE — PROGRESS NOTES
HPI     Glaucoma     Additional comments: 4 month iop ck           Comments    DLS: 5/18/23 by Dr Lopez    Pt states no changes since last.     Gtts: Cosopt BID, Latanoprost QHS OU          Last edited by Cheryle Quintana on 6/2/2023  7:41 AM.        ROS    Negative for: Constitutional, Gastrointestinal, Neurological, Skin,   Genitourinary, Musculoskeletal, HENT, Endocrine, Cardiovascular, Eyes,   Respiratory, Psychiatric, Allergic/Imm, Heme/Lymph  Last edited by Ashish Fragoso Jr., MD on 6/2/2023  8:26 AM.        Assessment /Plan     For exam results, see Encounter Report.    Low-tension glaucoma of both eyes, severe stage    Exudative age-related macular degeneration of left eye with active choroidal neovascularization    Exudative age-related macular degeneration of right eye with active choroidal neovascularization    Dry eyes, bilateral    Pseudophakia of both eyes    Refractive error      IOP OD 12 OS 10, doing well  Continue latanoprost qhs ou and cosopt bid OU  Continue AREDS2 2 tab BID  Continue to follow with retina for wet armd  Continue daily eye lubricants OU  No follow-ups on file.

## 2023-06-22 ENCOUNTER — OFFICE VISIT (OUTPATIENT)
Dept: HOME HEALTH SERVICES | Facility: CLINIC | Age: 83
End: 2023-06-22
Payer: MEDICARE

## 2023-06-22 VITALS
WEIGHT: 145 LBS | OXYGEN SATURATION: 96 % | SYSTOLIC BLOOD PRESSURE: 112 MMHG | DIASTOLIC BLOOD PRESSURE: 69 MMHG | HEIGHT: 65 IN | HEART RATE: 57 BPM | BODY MASS INDEX: 24.16 KG/M2

## 2023-06-22 DIAGNOSIS — R26.81 UNSTEADY GAIT: ICD-10-CM

## 2023-06-22 DIAGNOSIS — Z86.73 HISTORY OF ISCHEMIC RIGHT MCA STROKE: Chronic | ICD-10-CM

## 2023-06-22 DIAGNOSIS — E78.2 MIXED HYPERLIPIDEMIA: Chronic | ICD-10-CM

## 2023-06-22 DIAGNOSIS — Z87.891 EX-SMOKER: Chronic | ICD-10-CM

## 2023-06-22 DIAGNOSIS — I10 ESSENTIAL HYPERTENSION: Chronic | ICD-10-CM

## 2023-06-22 DIAGNOSIS — I70.0 ABDOMINAL AORTIC ATHEROSCLEROSIS: ICD-10-CM

## 2023-06-22 DIAGNOSIS — H35.3211 EXUDATIVE AGE-RELATED MACULAR DEGENERATION OF RIGHT EYE WITH ACTIVE CHOROIDAL NEOVASCULARIZATION: ICD-10-CM

## 2023-06-22 DIAGNOSIS — M85.80 OSTEOPENIA, UNSPECIFIED LOCATION: ICD-10-CM

## 2023-06-22 DIAGNOSIS — H35.3221 EXUDATIVE AGE-RELATED MACULAR DEGENERATION OF LEFT EYE WITH ACTIVE CHOROIDAL NEOVASCULARIZATION: ICD-10-CM

## 2023-06-22 DIAGNOSIS — J43.9 PULMONARY EMPHYSEMA, UNSPECIFIED EMPHYSEMA TYPE: ICD-10-CM

## 2023-06-22 DIAGNOSIS — F33.0 MILD EPISODE OF RECURRENT MAJOR DEPRESSIVE DISORDER: Chronic | ICD-10-CM

## 2023-06-22 DIAGNOSIS — H40.003 GLAUCOMA SUSPECT OF BOTH EYES: ICD-10-CM

## 2023-06-22 DIAGNOSIS — Z00.00 ENCOUNTER FOR PREVENTIVE HEALTH EXAMINATION: Primary | ICD-10-CM

## 2023-06-22 PROCEDURE — 1157F PR ADVANCE CARE PLAN OR EQUIV PRESENT IN MEDICAL RECORD: ICD-10-PCS | Mod: CPTII,S$GLB,, | Performed by: NURSE PRACTITIONER

## 2023-06-22 PROCEDURE — 1157F ADVNC CARE PLAN IN RCRD: CPT | Mod: CPTII,S$GLB,, | Performed by: NURSE PRACTITIONER

## 2023-06-22 PROCEDURE — 3288F PR FALLS RISK ASSESSMENT DOCUMENTED: ICD-10-PCS | Mod: CPTII,S$GLB,, | Performed by: NURSE PRACTITIONER

## 2023-06-22 PROCEDURE — 1101F PR PT FALLS ASSESS DOC 0-1 FALLS W/OUT INJ PAST YR: ICD-10-PCS | Mod: CPTII,S$GLB,, | Performed by: NURSE PRACTITIONER

## 2023-06-22 PROCEDURE — G0439 PR MEDICARE ANNUAL WELLNESS SUBSEQUENT VISIT: ICD-10-PCS | Mod: S$GLB,,, | Performed by: NURSE PRACTITIONER

## 2023-06-22 PROCEDURE — 3078F PR MOST RECENT DIASTOLIC BLOOD PRESSURE < 80 MM HG: ICD-10-PCS | Mod: CPTII,S$GLB,, | Performed by: NURSE PRACTITIONER

## 2023-06-22 PROCEDURE — 3074F SYST BP LT 130 MM HG: CPT | Mod: CPTII,S$GLB,, | Performed by: NURSE PRACTITIONER

## 2023-06-22 PROCEDURE — 3078F DIAST BP <80 MM HG: CPT | Mod: CPTII,S$GLB,, | Performed by: NURSE PRACTITIONER

## 2023-06-22 PROCEDURE — G0439 PPPS, SUBSEQ VISIT: HCPCS | Mod: S$GLB,,, | Performed by: NURSE PRACTITIONER

## 2023-06-22 PROCEDURE — 1160F PR REVIEW ALL MEDS BY PRESCRIBER/CLIN PHARMACIST DOCUMENTED: ICD-10-PCS | Mod: CPTII,S$GLB,, | Performed by: NURSE PRACTITIONER

## 2023-06-22 PROCEDURE — 3288F FALL RISK ASSESSMENT DOCD: CPT | Mod: CPTII,S$GLB,, | Performed by: NURSE PRACTITIONER

## 2023-06-22 PROCEDURE — 1160F RVW MEDS BY RX/DR IN RCRD: CPT | Mod: CPTII,S$GLB,, | Performed by: NURSE PRACTITIONER

## 2023-06-22 PROCEDURE — 1159F MED LIST DOCD IN RCRD: CPT | Mod: CPTII,S$GLB,, | Performed by: NURSE PRACTITIONER

## 2023-06-22 PROCEDURE — 1159F PR MEDICATION LIST DOCUMENTED IN MEDICAL RECORD: ICD-10-PCS | Mod: CPTII,S$GLB,, | Performed by: NURSE PRACTITIONER

## 2023-06-22 PROCEDURE — 1101F PT FALLS ASSESS-DOCD LE1/YR: CPT | Mod: CPTII,S$GLB,, | Performed by: NURSE PRACTITIONER

## 2023-06-22 PROCEDURE — 3074F PR MOST RECENT SYSTOLIC BLOOD PRESSURE < 130 MM HG: ICD-10-PCS | Mod: CPTII,S$GLB,, | Performed by: NURSE PRACTITIONER

## 2023-06-24 ENCOUNTER — CLINICAL SUPPORT (OUTPATIENT)
Dept: CARDIOLOGY | Facility: HOSPITAL | Age: 83
End: 2023-06-24
Payer: MEDICARE

## 2023-06-24 DIAGNOSIS — Z95.818 PRESENCE OF OTHER CARDIAC IMPLANTS AND GRAFTS: ICD-10-CM

## 2023-06-24 PROBLEM — J43.9 PULMONARY EMPHYSEMA, UNSPECIFIED EMPHYSEMA TYPE: Status: ACTIVE | Noted: 2023-06-24

## 2023-06-24 PROCEDURE — 93298 REM INTERROG DEV EVAL SCRMS: CPT | Mod: ,,, | Performed by: INTERNAL MEDICINE

## 2023-06-24 PROCEDURE — G2066 INTER DEVC REMOTE 30D: HCPCS | Mod: PO | Performed by: INTERNAL MEDICINE

## 2023-06-24 PROCEDURE — 93298 CARDIAC DEVICE CHECK - REMOTE: ICD-10-PCS | Mod: ,,, | Performed by: INTERNAL MEDICINE

## 2023-06-24 NOTE — PATIENT INSTRUCTIONS
Counseling and Referral of Other Preventative  (Italic type indicates deductible and co-insurance are waived)    Patient Name: Serenity Epps  Today's Date: 6/24/2023    Health Maintenance       Date Due Completion Date    Shingles Vaccine (1 of 2) Never done ---    COVID-19 Vaccine (4 - Booster for Pfizer series) 11/30/2021 10/5/2021    DEXA Scan 01/17/2026 1/17/2023    TETANUS VACCINE 09/21/2026 9/21/2016    Lipid Panel 05/03/2028 5/3/2023        No orders of the defined types were placed in this encounter.    The following information is provided to all patients.  This information is to help you find resources for any of the problems found today that may be affecting your health:                Living healthy guide: www.[facility].[place].gov      Understanding Diabetes: www.diabetes.org      Eating healthy: www.cdc.gov/healthyweight      [facility] home safety checklist: www.cdc.gov/steadi/patient.html      Agency on Aging: www.goea.[place].gov      Alcoholics anonymous (AA): www.aa.org      Physical Activity: www.divina.nih.gov/tz9vqls      Tobacco use: www.quitwithusla.org

## 2023-06-24 NOTE — PROGRESS NOTES
"  Serenity Epps presented for a  Medicare AWV and comprehensive Health Risk Assessment today. The following components were reviewed and updated:    Medical history  Family History  Social history  Allergies and Current Medications  Health Risk Assessment  Health Maintenance  Care Team         ** See Completed Assessments for Annual Wellness Visit within the encounter summary.**         The following assessments were completed:  Living Situation  CAGE  Depression Screening  Timed Get Up and Go  Whisper Test  Cognitive Function Screening  Nutrition Screening  ADL Screening  PAQ Screening        Vitals:    06/22/23 0930   BP: 112/69   Pulse: (!) 57   SpO2: 96%   Weight: 65.8 kg (145 lb)   Height: 5' 5" (1.651 m)     Body mass index is 24.13 kg/m².  Physical Exam  Constitutional:       Appearance: Normal appearance.   HENT:      Head: Normocephalic and atraumatic.      Nose: Nose normal.   Eyes:      Extraocular Movements: Extraocular movements intact.      Pupils: Pupils are equal, round, and reactive to light.   Cardiovascular:      Rate and Rhythm: Normal rate.      Pulses: Normal pulses.   Pulmonary:      Effort: Pulmonary effort is normal.   Musculoskeletal:      Cervical back: Normal range of motion and neck supple.   Neurological:      General: No focal deficit present.      Mental Status: She is alert and oriented to person, place, and time.             Diagnoses and health risks identified today and associated recommendations/orders:    1. Encounter for preventive health examination  Awv completed      2. Pulmonary emphysema, unspecified emphysema type  Chronic and stable. Continue current treatment. Follow with PCP.      3. History of stroke due to thrombosis of right middle cerebral artery  Chronic and stable. Continue current treatment. Follow with PCP.  Stable  Statin and asa      4. Mild episode of recurrent major depressive disorder  Chronic and stable. Continue current treatment. Follow with PCP.  On " meds    5. Exudative age-related macular degeneration of left eye with active choroidal neovascularization  Chronic and stable. Continue current treatment. Follow with PCP.  Seeing opthamology    6. Exudative age-related macular degeneration of right eye with active choroidal neovascularization  Chronic and stable. Continue current treatment. Follow with PCP.      7. Glaucoma suspect of both eyes  Chronic and stable. Continue current treatment. Follow with PCP.      8. Abdominal aortic atherosclerosis  Chronic and stable. Continue current treatment. Follow with PCP.      9. Essential hypertension  Chronic and stable. Continue current treatment. Follow with PCP.  On meds      10. Mixed hyperlipidemia  Chronic and stable. Continue current treatment. Follow with PCP.  Statin        11. Body mass index (BMI) of 24.0-24.9 in adult  Chronic and stable. Continue current treatment. Follow with PCP.      12. Osteopenia, unspecified location  Chronic and stable. Continue current treatment. Follow with PCP.  Vit d and calcium      13. Ex-smoker  Chronic and stable. Continue current treatment. Follow with PCP.      14. Unsteady gait  Chronic and stable. Continue current treatment. Follow with PCP.  Using walker       Provided Serenity with a 5-10 year written screening schedule and personal prevention plan. Recommendations were developed using the USPSTF age appropriate recommendations. Education, counseling, and referrals were provided as needed. After Visit Summary printed and given to patient which includes a list of additional screenings\tests needed.    Fu in 1 yr for jake Key NP  I offered to discuss advanced care planning, including how to pick a person who would make decisions for you if you were unable to make them for yourself, called a health care power of , and what kind of decisions you might make such as use of life sustaining treatments such as ventilators and tube feeding when faced  with a life limiting illness recorded on a living will that they will need to know. (How you want to be cared for as you near the end of your natural life)     X  Patient has advanced directives on file, which we reviewed, and they do not wish to make changes.

## 2023-06-30 ENCOUNTER — PATIENT MESSAGE (OUTPATIENT)
Dept: FAMILY MEDICINE | Facility: CLINIC | Age: 83
End: 2023-06-30
Payer: MEDICARE

## 2023-07-24 ENCOUNTER — CLINICAL SUPPORT (OUTPATIENT)
Dept: CARDIOLOGY | Facility: HOSPITAL | Age: 83
End: 2023-07-24
Payer: MEDICARE

## 2023-07-24 DIAGNOSIS — Z95.818 PRESENCE OF OTHER CARDIAC IMPLANTS AND GRAFTS: ICD-10-CM

## 2023-07-24 PROCEDURE — G2066 INTER DEVC REMOTE 30D: HCPCS | Mod: PO | Performed by: INTERNAL MEDICINE

## 2023-07-28 ENCOUNTER — OFFICE VISIT (OUTPATIENT)
Dept: ORTHOPEDICS | Facility: CLINIC | Age: 83
End: 2023-07-28
Payer: MEDICARE

## 2023-07-28 VITALS — WEIGHT: 145 LBS | BODY MASS INDEX: 24.16 KG/M2 | HEIGHT: 65 IN

## 2023-07-28 DIAGNOSIS — M25.561 CHRONIC PAIN OF RIGHT KNEE: ICD-10-CM

## 2023-07-28 DIAGNOSIS — G89.29 CHRONIC PAIN OF RIGHT KNEE: ICD-10-CM

## 2023-07-28 DIAGNOSIS — M17.11 OSTEOARTHRITIS OF RIGHT KNEE, UNSPECIFIED OSTEOARTHRITIS TYPE: Primary | ICD-10-CM

## 2023-07-28 PROCEDURE — 99999 PR PBB SHADOW E&M-EST. PATIENT-LVL III: ICD-10-PCS | Mod: PBBFAC,,, | Performed by: ORTHOPAEDIC SURGERY

## 2023-07-28 PROCEDURE — 20610 LARGE JOINT ASPIRATION/INJECTION: R KNEE: ICD-10-PCS | Mod: RT,S$GLB,, | Performed by: ORTHOPAEDIC SURGERY

## 2023-07-28 PROCEDURE — 1160F PR REVIEW ALL MEDS BY PRESCRIBER/CLIN PHARMACIST DOCUMENTED: ICD-10-PCS | Mod: CPTII,S$GLB,, | Performed by: ORTHOPAEDIC SURGERY

## 2023-07-28 PROCEDURE — 99214 OFFICE O/P EST MOD 30 MIN: CPT | Mod: 25,S$GLB,, | Performed by: ORTHOPAEDIC SURGERY

## 2023-07-28 PROCEDURE — 3288F FALL RISK ASSESSMENT DOCD: CPT | Mod: CPTII,S$GLB,, | Performed by: ORTHOPAEDIC SURGERY

## 2023-07-28 PROCEDURE — 1157F ADVNC CARE PLAN IN RCRD: CPT | Mod: CPTII,S$GLB,, | Performed by: ORTHOPAEDIC SURGERY

## 2023-07-28 PROCEDURE — 99214 PR OFFICE/OUTPT VISIT, EST, LEVL IV, 30-39 MIN: ICD-10-PCS | Mod: 25,S$GLB,, | Performed by: ORTHOPAEDIC SURGERY

## 2023-07-28 PROCEDURE — 1101F PR PT FALLS ASSESS DOC 0-1 FALLS W/OUT INJ PAST YR: ICD-10-PCS | Mod: CPTII,S$GLB,, | Performed by: ORTHOPAEDIC SURGERY

## 2023-07-28 PROCEDURE — 99999 PR PBB SHADOW E&M-EST. PATIENT-LVL III: CPT | Mod: PBBFAC,,, | Performed by: ORTHOPAEDIC SURGERY

## 2023-07-28 PROCEDURE — 1157F PR ADVANCE CARE PLAN OR EQUIV PRESENT IN MEDICAL RECORD: ICD-10-PCS | Mod: CPTII,S$GLB,, | Performed by: ORTHOPAEDIC SURGERY

## 2023-07-28 PROCEDURE — 1125F PR PAIN SEVERITY QUANTIFIED, PAIN PRESENT: ICD-10-PCS | Mod: CPTII,S$GLB,, | Performed by: ORTHOPAEDIC SURGERY

## 2023-07-28 PROCEDURE — 20610 DRAIN/INJ JOINT/BURSA W/O US: CPT | Mod: RT,S$GLB,, | Performed by: ORTHOPAEDIC SURGERY

## 2023-07-28 PROCEDURE — 1159F PR MEDICATION LIST DOCUMENTED IN MEDICAL RECORD: ICD-10-PCS | Mod: CPTII,S$GLB,, | Performed by: ORTHOPAEDIC SURGERY

## 2023-07-28 PROCEDURE — 1159F MED LIST DOCD IN RCRD: CPT | Mod: CPTII,S$GLB,, | Performed by: ORTHOPAEDIC SURGERY

## 2023-07-28 PROCEDURE — 1101F PT FALLS ASSESS-DOCD LE1/YR: CPT | Mod: CPTII,S$GLB,, | Performed by: ORTHOPAEDIC SURGERY

## 2023-07-28 PROCEDURE — 1160F RVW MEDS BY RX/DR IN RCRD: CPT | Mod: CPTII,S$GLB,, | Performed by: ORTHOPAEDIC SURGERY

## 2023-07-28 PROCEDURE — 1125F AMNT PAIN NOTED PAIN PRSNT: CPT | Mod: CPTII,S$GLB,, | Performed by: ORTHOPAEDIC SURGERY

## 2023-07-28 PROCEDURE — 3288F PR FALLS RISK ASSESSMENT DOCUMENTED: ICD-10-PCS | Mod: CPTII,S$GLB,, | Performed by: ORTHOPAEDIC SURGERY

## 2023-07-28 RX ORDER — TRIAMCINOLONE ACETONIDE 40 MG/ML
40 INJECTION, SUSPENSION INTRA-ARTICULAR; INTRAMUSCULAR
Status: DISCONTINUED | OUTPATIENT
Start: 2023-07-28 | End: 2023-07-28 | Stop reason: HOSPADM

## 2023-07-28 RX ADMIN — TRIAMCINOLONE ACETONIDE 40 MG: 40 INJECTION, SUSPENSION INTRA-ARTICULAR; INTRAMUSCULAR at 08:07

## 2023-07-28 NOTE — PROGRESS NOTES
82 y.o. year old presents to the clinic today with recurring pain in the right knee.  Patient has had Kenlaog injections in the past and responded well.  Last injection was 11 months ago. Patient is requesting injection today into right knee    Exam shows tenderness at the joint line without signs of infection or instability    X-rays show arthritic changes    Assessment:  right knee arthrosis    Plan:  Kenlaog into the right knee.  Encourage strengthening over time.  Followup as needed.    Imaging studies ordered and reviewed by me    Further History  Aching pain  Worse with activity  Relieved with rest  No other associated symptoms  No other radiation    Further Exam  Alert and oriented  Pleasant  Contralateral limb has appropriate range of motion for age and condition  Contralateral limb has appropriate strength for age and condition  Contralateral limb has appropriate stability  for age and condition  No adenopathy  Pulses are appropriate for current condition  Skin is intact        Chief Complaint    Chief Complaint   Patient presents with    Right Knee - Pain       HPI  Serenity Epps is a 82 y.o.  female who presents with       Past Medical History  Past Medical History:   Diagnosis Date    Anxiety     Arthritis     Cataract - Both Eyes     OU done//    CKD (chronic kidney disease), stage II 09/21/2016    pt denies    Diarrhea     Diverticulosis     DVT (deep venous thrombosis)     left leg, after childbirth    Exudative age-related macular degeneration of left eye 02/20/2014    Glaucoma     Hyperlipidemia 12/25/2022    Hypertension     Irritable bowel syndrome     Left foot pain     chronic    Lymphocytic colitis     Macular degeneration     bimonthly intraoccular injections    Osteopenia     Recurrent major depressive disorder 04/27/2018    Rhinitis, chronic     Strabismus     as child    Stroke due to thrombosis of right middle cerebral artery 12/24/2022       Past Surgical History  Past Surgical History:    Procedure Laterality Date    APPENDECTOMY      age 38    CATARACT EXTRACTION      OU done//    CATARACT EXTRACTION W/ INTRAOCULAR LENS  IMPLANT, BILATERAL Bilateral 2016    COLONOSCOPY  9/26/2006  [facility]    Diverticulosis.   Internal small hemorrhoids were found.     COLONOSCOPY  10/03/2012    Dr. Lynn, repeat in 7-8 years for surveillance    COLONOSCOPY N/A 05/02/2018    COLONOSCOPY  05/02/2018    Procedure: COLONOSCOPY;  Surgeon: Toby Lynn Jr., MD;  Location: [facility];  Service: Endoscopy;  Laterality: N/A;    ECHOCARDIOGRAM,TRANSESOPHAGEAL N/A 1/11/2023    Procedure: ECHOCARDIOGRAM,TRANSESOPHAGEAL;  Surgeon: Daron Kinsey MD;  Location: [facility];  Service: Cardiology;  Laterality: N/A;    EYE SURGERY Bilateral     Phaco with IOL (cataract extraction), rigth:[id number] 22.0 d//    FOOT HARDWARE REMOVAL Left 06/01/2016    Dr. Hammonds    FOOT SURGERY Left 2014    ERG and open reduction tallo tarsal dislocation (hyprocure implant)    FRACTURE SURGERY Left     HIP    INSERTION OF IMPLANTABLE LOOP RECORDER N/A 1/11/2023    Procedure: INSERTION, IMPLANTABLE LOOP RECORDER;  Surgeon: Daron Kinsey MD;  Location: [facility];  Service: Cardiology;  Laterality: N/A;    INSERTION OF IMPLANTABLE LOOP RECORDER N/A 1/11/2023    Procedure: INSERTION, IMPLANTABLE LOOP RECORDER;  Surgeon: Daron Kinsey MD;  Location: [facility];  Service: Cardiology;  Laterality: N/A;    JOINT REPLACEMENT      PARTIAL HYSTERECTOMY      age 38, ovaries conserved    PIP JOINT FUSION Right 06/01/2016    2nd-4th PIP joints of right foot; Dr. Hammonds    TONSILLECTOMY      as a child    TOTAL KNEE ARTHROPLASTY Left 09/2015    UPPER GASTROINTESTINAL ENDOSCOPY  9/26/2006  [facility]    Erythema in the lower third of the esophagus (NERD).  Patulous lower esophageal sphincter.   Gastric mucosal atrophy.   PARAG Test  Negative.     VEIN LIGATION  1964    BLE       Medications  Current Outpatient Medications   Medication Sig    atorvastatin  (LIPITOR) 40 MG tablet Take 1 tablet (40 mg total) by mouth once daily.    azelastine (ASTELIN) 137 mcg (0.1 %) nasal spray USE 1 SPRAY IN EACH NOSTRIL TWICE DAILY (Patient taking differently: 1 spray by Nasal route 2 (two) times daily.)    BENEFIBER, WHEAT DEXTRIN, ORAL Take 1 Dose by mouth every morning.    biotin 1 mg tablet Take 1,000 mcg by mouth every evening.     calcium carbonate (OS-BLAKE) 600 mg (1,500 mg) Tab Take 600 mg by mouth 2 (two) times daily with meals.    diclofenac sodium (VOLTAREN) 1 % Gel Apply 2 g topically 3 (three) times daily. To painful area of neck    docusate sodium (COLACE) 100 MG capsule Take 100 mg by mouth 2 (two) times daily.    dorzolamide-timolol 2-0.5% (COSOPT) 22.3-6.8 mg/mL ophthalmic solution Place 1 drop into both eyes 2 (two) times daily.    EYLEA 2 mg/0.05 mL Syrg Inject 1 mL into the skin every 30 days.    fluticasone propionate (FLONASE) 50 mcg/actuation nasal spray USE 2 SPRAYS IN EACH NOSTRIL ONE TIME DAILY (Patient taking differently: 2 sprays by Each Nostril route once daily.)    gabapentin (NEURONTIN) 400 MG capsule Take 1 capsule (400 mg total) by mouth 2 (two) times daily.    latanoprost 0.005 % ophthalmic solution Place 1 drop into both eyes every evening.    levocetirizine (XYZAL) 5 MG tablet Take 1 tablet (5 mg total) by mouth every evening.    metoprolol succinate (TOPROL-XL) 25 MG 24 hr tablet Take 1 tablet (25 mg total) by mouth 2 (two) times daily.    mirtazapine (REMERON) 15 MG tablet Take 1-2 tablets by mouth at bedtime    valsartan (DIOVAN) 80 MG tablet Take 1 tablet (80 mg total) by mouth every evening.    VIT C/E/ZN/COPPR/LUTEIN/ZEAXAN (PRESERVISION AREDS 2 ORAL) Take 1 capsule by mouth 2 (two) times daily.     XARELTO 20 mg Tab      Current Facility-Administered Medications   Medication    bevacizumab (AVASTIN) 2.5 mg/0.10 mL 1.25 mg       Allergies  Review of patient's allergies indicates:   Allergen Reactions    Brimonidine Other (See Comments)      Burning and redness       Family History  Family History   Problem Relation Age of Onset    No Known Problems Brother     Breast cancer Daughter     Cancer Daughter         breast    Coronary artery disease Mother 78    Glaucoma Mother     Diverticulitis Father     No Known Problems Maternal Grandmother     No Known Problems Maternal Grandfather     No Known Problems Paternal Grandmother     No Known Problems Paternal Grandfather     Glaucoma Brother     Ankylosing spondylitis Brother     Diabetes Brother     Macular degeneration Brother          twin brother wet mac//    Cancer Brother         prostate    Crohn's disease Brother     Amblyopia Neg Hx     Blindness Neg Hx     Cataracts Neg Hx     Hypertension Neg Hx     Strabismus Neg Hx     Stroke Neg Hx     Thyroid disease Neg Hx     Retinal detachment Neg Hx     Celiac disease Neg Hx        Social History  Social History     Socioeconomic History    Marital status:    Tobacco Use    Smoking status: Former     Packs/day: 2.00     Years: 35.00     Pack years: 70.00     Types: Cigarettes     Quit date: 2011     Years since quittin.5    Smokeless tobacco: Never   Substance and Sexual Activity    Alcohol use: Yes     Alcohol/week: 14.0 standard drinks     Types: 14 Glasses of wine per week     Comment: 2-3 glasses per night; NONE SINCE 2022    Drug use: Yes     Types: Benzodiazepines    Sexual activity: Yes     Partners: Male               Review of Systems     Constitutional: Negative    HENT: Negative  Eyes: Negative  Respiratory: Negative  Cardiovascular: Negative  Musculoskeletal: HPI  Skin: Negative  Neurological: Negative  Hematological: Negative  Endocrine: Negative                 Physical Exam    There were no vitals filed for this visit.  Body mass index is 24.13 kg/m².  Physical Examination:     General appearance -  well appearing, and in no distress  Mental status - awake  Neck - supple  Chest -  symmetric air entry  Heart - normal  rate   Abdomen - soft      Assessment     1. Osteoarthritis of right knee, unspecified osteoarthritis type    2. Chronic pain of right knee          Plan

## 2023-07-28 NOTE — PROCEDURES
Large Joint Aspiration/Injection: R knee    Date/Time: 7/28/2023 8:15 AM  Performed by: Kamaljit Mane MD  Authorized by: Kamaljit Mane MD     Consent Done?:  Yes (Verbal)  Timeout: prior to procedure the correct patient, procedure, and site was verified    Prep: patient was prepped and draped in usual sterile fashion      Details:  Needle Size:  21 G  Approach:  Anterolateral  Location:  Knee  Site:  R knee  Medications:  40 mg triamcinolone acetonide 40 mg/mL  Patient tolerance:  Patient tolerated the procedure well with no immediate complications

## 2023-08-04 ENCOUNTER — OFFICE VISIT (OUTPATIENT)
Dept: PRIMARY CARE CLINIC | Facility: CLINIC | Age: 83
End: 2023-08-04
Payer: MEDICARE

## 2023-08-04 VITALS
TEMPERATURE: 98 F | RESPIRATION RATE: 18 BRPM | SYSTOLIC BLOOD PRESSURE: 130 MMHG | BODY MASS INDEX: 25.12 KG/M2 | HEIGHT: 63 IN | OXYGEN SATURATION: 95 % | DIASTOLIC BLOOD PRESSURE: 76 MMHG | WEIGHT: 141.75 LBS | HEART RATE: 91 BPM

## 2023-08-04 DIAGNOSIS — J30.9 ALLERGIC RHINITIS, UNSPECIFIED SEASONALITY, UNSPECIFIED TRIGGER: ICD-10-CM

## 2023-08-04 DIAGNOSIS — I10 ESSENTIAL HYPERTENSION: Primary | Chronic | ICD-10-CM

## 2023-08-04 DIAGNOSIS — E78.2 MIXED HYPERLIPIDEMIA: Chronic | ICD-10-CM

## 2023-08-04 PROCEDURE — 1123F PR ADV CARE PLAN DISCUSSED, PLAN OR SURROGATE DOCUMENTED: ICD-10-PCS | Mod: CPTII,S$GLB,, | Performed by: FAMILY MEDICINE

## 2023-08-04 PROCEDURE — 1159F MED LIST DOCD IN RCRD: CPT | Mod: CPTII,S$GLB,, | Performed by: FAMILY MEDICINE

## 2023-08-04 PROCEDURE — 1160F RVW MEDS BY RX/DR IN RCRD: CPT | Mod: CPTII,S$GLB,, | Performed by: FAMILY MEDICINE

## 2023-08-04 PROCEDURE — 3078F DIAST BP <80 MM HG: CPT | Mod: CPTII,S$GLB,, | Performed by: FAMILY MEDICINE

## 2023-08-04 PROCEDURE — 99214 PR OFFICE/OUTPT VISIT, EST, LEVL IV, 30-39 MIN: ICD-10-PCS | Mod: S$GLB,,, | Performed by: FAMILY MEDICINE

## 2023-08-04 PROCEDURE — 99214 OFFICE O/P EST MOD 30 MIN: CPT | Mod: S$GLB,,, | Performed by: FAMILY MEDICINE

## 2023-08-04 PROCEDURE — 1123F ACP DISCUSS/DSCN MKR DOCD: CPT | Mod: CPTII,S$GLB,, | Performed by: FAMILY MEDICINE

## 2023-08-04 PROCEDURE — 3078F PR MOST RECENT DIASTOLIC BLOOD PRESSURE < 80 MM HG: ICD-10-PCS | Mod: CPTII,S$GLB,, | Performed by: FAMILY MEDICINE

## 2023-08-04 PROCEDURE — 1125F PR PAIN SEVERITY QUANTIFIED, PAIN PRESENT: ICD-10-PCS | Mod: CPTII,S$GLB,, | Performed by: FAMILY MEDICINE

## 2023-08-04 PROCEDURE — 1101F PT FALLS ASSESS-DOCD LE1/YR: CPT | Mod: CPTII,S$GLB,, | Performed by: FAMILY MEDICINE

## 2023-08-04 PROCEDURE — 1160F PR REVIEW ALL MEDS BY PRESCRIBER/CLIN PHARMACIST DOCUMENTED: ICD-10-PCS | Mod: CPTII,S$GLB,, | Performed by: FAMILY MEDICINE

## 2023-08-04 PROCEDURE — 99999 PR PBB SHADOW E&M-EST. PATIENT-LVL IV: ICD-10-PCS | Mod: PBBFAC,,, | Performed by: FAMILY MEDICINE

## 2023-08-04 PROCEDURE — 3075F SYST BP GE 130 - 139MM HG: CPT | Mod: CPTII,S$GLB,, | Performed by: FAMILY MEDICINE

## 2023-08-04 PROCEDURE — 1125F AMNT PAIN NOTED PAIN PRSNT: CPT | Mod: CPTII,S$GLB,, | Performed by: FAMILY MEDICINE

## 2023-08-04 PROCEDURE — 1101F PR PT FALLS ASSESS DOC 0-1 FALLS W/OUT INJ PAST YR: ICD-10-PCS | Mod: CPTII,S$GLB,, | Performed by: FAMILY MEDICINE

## 2023-08-04 PROCEDURE — 99999 PR PBB SHADOW E&M-EST. PATIENT-LVL IV: CPT | Mod: PBBFAC,,, | Performed by: FAMILY MEDICINE

## 2023-08-04 PROCEDURE — 1159F PR MEDICATION LIST DOCUMENTED IN MEDICAL RECORD: ICD-10-PCS | Mod: CPTII,S$GLB,, | Performed by: FAMILY MEDICINE

## 2023-08-04 PROCEDURE — 3075F PR MOST RECENT SYSTOLIC BLOOD PRESS GE 130-139MM HG: ICD-10-PCS | Mod: CPTII,S$GLB,, | Performed by: FAMILY MEDICINE

## 2023-08-04 PROCEDURE — 3288F PR FALLS RISK ASSESSMENT DOCUMENTED: ICD-10-PCS | Mod: CPTII,S$GLB,, | Performed by: FAMILY MEDICINE

## 2023-08-04 PROCEDURE — 3288F FALL RISK ASSESSMENT DOCD: CPT | Mod: CPTII,S$GLB,, | Performed by: FAMILY MEDICINE

## 2023-08-04 NOTE — PROGRESS NOTES
THIS DOCUMENT WAS MADE IN PART WITH VOICE RECOGNITION SOFTWARE.  OCCASIONALLY THIS SOFTWARE WILL MISINTERPRET WORDS OR PHRASES.      Primary Care Provider Appointment   [facility]       Patient ID: Serenity Epps is a 82 y.o. female.    ASSESSMENT/PLAN by Problem List:    1. Essential hypertension  -     Comprehensive Metabolic Panel; Future; Expected date: 08/04/2023  -     Lipid Panel; Future; Expected date: 08/04/2023  -     CBC Auto Differential; Future; Expected date: 08/04/2023    2. Mixed hyperlipidemia  -     Comprehensive Metabolic Panel; Future; Expected date: 08/04/2023  -     Lipid Panel; Future; Expected date: 08/04/2023  -     CBC Auto Differential; Future; Expected date: 08/04/2023    3. Allergic rhinitis, unspecified seasonality, unspecified trigger  Assessment & Plan:  Intermittent, using astelin and flonase.  May add antihistamine       Overall she is doing very well.  Blood pressure and lipids are controlled.  Continue current medication.  Follow back in 4-5 months with labs.    Health Maintenance         Date Due Completion Date    Shingles Vaccine (1 of 2) Never done ---    COVID-19 Vaccine (4 - Pfizer risk series) 11/30/2021 10/5/2021    Influenza Vaccine (1) 09/01/2023 10/31/2022    DEXA Scan 01/17/2026 1/17/2023    TETANUS VACCINE 09/21/2026 9/21/2016    Lipid Panel 05/03/2028 5/3/2023        Recommended shingles vaccine.  Recommended COVID vaccine     Advance Care Planning     Date: 08/04/2023    Living Will  Power of   I initiated the process of voluntary advance care planning today and explained the importance of this process to the patient.  I introduced the concept of advance directives to the patient, as well. Then the patient received detailed information about the importance of designating a Health Care Power of  (HCPOA). She was also instructed to communicate with this person about their wishes for future healthcare, should she become  sick and lose decision-making capacity. The patient has previously appointed a HCPOA. After our discussion, the patient has decided to complete a HCPOA and has appointed her significant other, health care agent:  Alejandro Epps           Subjective:     Chief Complaint   Patient presents with    Follow-up     I have reviewed the information entered by the ancillary staff regarding the chief complaint as well as the related history.    HPI    Patient is a/an 82 y.o.  female     She is here with her .  Overall she is been doing well.  She does complain of some allergies and postnasal drainage.  She does use the Flonase and Astelin but not continuously.  I advised her to use regularly when she is having symptoms, may add an antihistamine such as Claritin.  May also consider intermittent nasal saline Neti pot or steam inhaler     For complete problem list, past medical history, surgical history, social history, etc., see appropriate section in the electronic medical record    Review of Systems   HENT:  Positive for congestion and postnasal drip.    Respiratory: Negative.     Cardiovascular: Negative.    Psychiatric/Behavioral:  Negative for dysphoric mood.        Objective     Physical Exam  Vitals reviewed.   Constitutional:       General: She is not in acute distress.     Appearance: She is well-developed. She is not ill-appearing.   HENT:      Head: Normocephalic and atraumatic.   Eyes:      General: No scleral icterus.     Conjunctiva/sclera: Conjunctivae normal.   Cardiovascular:      Rate and Rhythm: Normal rate and regular rhythm.      Heart sounds: Normal heart sounds. No murmur heard.  Pulmonary:      Effort: Pulmonary effort is normal. No respiratory distress.      Breath sounds: Normal breath sounds. No wheezing or rales.   Skin:     General: Skin is dry.      Findings: No rash.   Neurological:      Mental Status: She is alert and oriented to person, place, and time.      Comments: Ambulatory with a  "walker.   Psychiatric:         Behavior: Behavior normal.       Vitals:    08/04/23 0734   BP: 130/76   BP Location: Right arm   Patient Position: Sitting   BP Method: Medium (Manual)   Pulse: 91   Resp: 18   Temp: 98.4 °F (36.9 °C)   TempSrc: Oral   SpO2: 95%   Weight: 64.3 kg (141 lb 12.1 oz)   Height: 5' 3" (1.6 m)       "

## 2023-08-23 ENCOUNTER — CLINICAL SUPPORT (OUTPATIENT)
Dept: CARDIOLOGY | Facility: HOSPITAL | Age: 83
End: 2023-08-23
Payer: MEDICARE

## 2023-08-23 DIAGNOSIS — Z95.818 PRESENCE OF OTHER CARDIAC IMPLANTS AND GRAFTS: ICD-10-CM

## 2023-08-23 PROCEDURE — G2066 INTER DEVC REMOTE 30D: HCPCS | Mod: PO | Performed by: INTERNAL MEDICINE

## 2023-08-23 PROCEDURE — 93298 REM INTERROG DEV EVAL SCRMS: CPT | Mod: ,,, | Performed by: INTERNAL MEDICINE

## 2023-08-23 PROCEDURE — 93298 CARDIAC DEVICE CHECK - REMOTE: ICD-10-PCS | Mod: ,,, | Performed by: INTERNAL MEDICINE

## 2023-09-12 DIAGNOSIS — G57.82 NEUROPATHY OF LEFT SURAL NERVE: ICD-10-CM

## 2023-09-12 RX ORDER — GABAPENTIN 400 MG/1
400 CAPSULE ORAL 2 TIMES DAILY
Qty: 180 CAPSULE | Refills: 1 | Status: SHIPPED | OUTPATIENT
Start: 2023-09-12 | End: 2023-12-13 | Stop reason: SDUPTHER

## 2023-09-12 NOTE — TELEPHONE ENCOUNTER
Refill Routing Note   Medication(s) are not appropriate for processing by [facility] for the following reason(s):      Medication outside of protocol    [facility] action(s):  Route Care Due:  None identified            Appointments  past 12m or future 3m with PCP    Date Provider   Last Visit   8/4/2023 Shant Jc MD   Next Visit   12/13/2023 Shant Jc MD   ED visits in past 90 days: 0        Note composed:10:36 AM 09/12/2023

## 2023-09-18 ENCOUNTER — OFFICE VISIT (OUTPATIENT)
Dept: OPHTHALMOLOGY | Facility: CLINIC | Age: 83
End: 2023-09-18
Payer: MEDICARE

## 2023-09-18 DIAGNOSIS — H35.3221 EXUDATIVE AGE-RELATED MACULAR DEGENERATION OF LEFT EYE WITH ACTIVE CHOROIDAL NEOVASCULARIZATION: ICD-10-CM

## 2023-09-18 DIAGNOSIS — H35.3211 EXUDATIVE AGE-RELATED MACULAR DEGENERATION OF RIGHT EYE WITH ACTIVE CHOROIDAL NEOVASCULARIZATION: Primary | ICD-10-CM

## 2023-09-18 DIAGNOSIS — H35.3134 NONEXUDATIVE AGE-RELATED MACULAR DEGENERATION, BILATERAL, ADVANCED ATROPHIC WITH SUBFOVEAL INVOLVEMENT: ICD-10-CM

## 2023-09-18 PROCEDURE — 92134 CPTRZ OPH DX IMG PST SGM RTA: CPT | Mod: S$GLB,,, | Performed by: OPHTHALMOLOGY

## 2023-09-18 PROCEDURE — 1157F PR ADVANCE CARE PLAN OR EQUIV PRESENT IN MEDICAL RECORD: ICD-10-PCS | Mod: CPTII,S$GLB,, | Performed by: OPHTHALMOLOGY

## 2023-09-18 PROCEDURE — 92201 PR OPHTHALMOSCOPY, EXT, W/RET DRAW/SCLERAL DEPR, I&R, UNI/BI: ICD-10-PCS | Mod: S$GLB,,, | Performed by: OPHTHALMOLOGY

## 2023-09-18 PROCEDURE — 99999 PR PBB SHADOW E&M-EST. PATIENT-LVL III: ICD-10-PCS | Mod: PBBFAC,,, | Performed by: OPHTHALMOLOGY

## 2023-09-18 PROCEDURE — 1126F PR PAIN SEVERITY QUANTIFIED, NO PAIN PRESENT: ICD-10-PCS | Mod: CPTII,S$GLB,, | Performed by: OPHTHALMOLOGY

## 2023-09-18 PROCEDURE — 92014 COMPRE OPH EXAM EST PT 1/>: CPT | Mod: S$GLB,,, | Performed by: OPHTHALMOLOGY

## 2023-09-18 PROCEDURE — 1126F AMNT PAIN NOTED NONE PRSNT: CPT | Mod: CPTII,S$GLB,, | Performed by: OPHTHALMOLOGY

## 2023-09-18 PROCEDURE — 1101F PR PT FALLS ASSESS DOC 0-1 FALLS W/OUT INJ PAST YR: ICD-10-PCS | Mod: CPTII,S$GLB,, | Performed by: OPHTHALMOLOGY

## 2023-09-18 PROCEDURE — 99999 PR PBB SHADOW E&M-EST. PATIENT-LVL III: CPT | Mod: PBBFAC,,, | Performed by: OPHTHALMOLOGY

## 2023-09-18 PROCEDURE — 1159F MED LIST DOCD IN RCRD: CPT | Mod: CPTII,S$GLB,, | Performed by: OPHTHALMOLOGY

## 2023-09-18 PROCEDURE — 3288F FALL RISK ASSESSMENT DOCD: CPT | Mod: CPTII,S$GLB,, | Performed by: OPHTHALMOLOGY

## 2023-09-18 PROCEDURE — 92134 POSTERIOR SEGMENT OCT RETINA (OCULAR COHERENCE TOMOGRAPHY)-BOTH EYES: ICD-10-PCS | Mod: S$GLB,,, | Performed by: OPHTHALMOLOGY

## 2023-09-18 PROCEDURE — 1157F ADVNC CARE PLAN IN RCRD: CPT | Mod: CPTII,S$GLB,, | Performed by: OPHTHALMOLOGY

## 2023-09-18 PROCEDURE — 92201 OPSCPY EXTND RTA DRAW UNI/BI: CPT | Mod: S$GLB,,, | Performed by: OPHTHALMOLOGY

## 2023-09-18 PROCEDURE — 1101F PT FALLS ASSESS-DOCD LE1/YR: CPT | Mod: CPTII,S$GLB,, | Performed by: OPHTHALMOLOGY

## 2023-09-18 PROCEDURE — 3288F PR FALLS RISK ASSESSMENT DOCUMENTED: ICD-10-PCS | Mod: CPTII,S$GLB,, | Performed by: OPHTHALMOLOGY

## 2023-09-18 PROCEDURE — 92014 PR EYE EXAM, EST PATIENT,COMPREHESV: ICD-10-PCS | Mod: S$GLB,,, | Performed by: OPHTHALMOLOGY

## 2023-09-18 PROCEDURE — 1159F PR MEDICATION LIST DOCUMENTED IN MEDICAL RECORD: ICD-10-PCS | Mod: CPTII,S$GLB,, | Performed by: OPHTHALMOLOGY

## 2023-09-18 NOTE — PROGRESS NOTES
HPI     Macular Degeneration     Additional comments: 3 mon amd            Comments    3 mon amd chk    Pt. States no changes in VA since last eye exam and   Denies flashes, floaters, or pain.     Cosopt BID OU  Latanaprost QHS OU          Last edited by Vahe Hay on 9/18/2023  7:40 AM.           OCT - no SRF OU  Atrophy OU        A/P    1. Wet AMD OS  S/p Avastin OS x 15, Eylea OS  x 14    Persistent SRF OS - improving  With RPE tear OS  Central fibrosis with atrophy - poor central potential  10/17 - increased SRH - will keep at 8 weeks for now  9/18 - stable cicatrix - try observation  12/18 - no activity  6/21 - some heme over cicatrix OS  1/23 - given extensive atrophy, try extension      2. New disease OD  Sx started 9/21  S/p Avastin OD x 5, Eylea OD x 8  3/23 - sig atrophy without activity  9/23 heme OS, but ASx    continue obs      3. PCIOL OU  CTR OS - but saw 20/30 with correction, in spite of sub RPE fibrosis      4. POAG OU  Good IOP control on Cosopt, brimonidine, latanoprost OU  Small drance heme OD    5. Floaters OU        6 month  OCT and dilate (last DFE 9/23)

## 2023-09-22 ENCOUNTER — CLINICAL SUPPORT (OUTPATIENT)
Dept: CARDIOLOGY | Facility: HOSPITAL | Age: 83
End: 2023-09-22
Payer: MEDICARE

## 2023-09-22 DIAGNOSIS — Z95.818 PRESENCE OF OTHER CARDIAC IMPLANTS AND GRAFTS: ICD-10-CM

## 2023-09-22 PROCEDURE — G2066 INTER DEVC REMOTE 30D: HCPCS | Mod: PO | Performed by: INTERNAL MEDICINE

## 2023-09-26 ENCOUNTER — TELEPHONE (OUTPATIENT)
Dept: PRIMARY CARE CLINIC | Facility: CLINIC | Age: 83
End: 2023-09-26

## 2023-09-26 DIAGNOSIS — M25.539 PAIN IN WRIST, UNSPECIFIED LATERALITY: Primary | ICD-10-CM

## 2023-09-26 NOTE — TELEPHONE ENCOUNTER
----- Message from Dariela Ortiz sent at 9/26/2023  2:48 PM CDT -----  Contact: Pt [phone]  [email or web address]     Patient is calling for Medical Advice regarding: Pain in left hand     How long has patient had these symptoms: 4-5 days     Pharmacy name and phone#:   [facility] #[id number] - [place] [address] [address] [place] [id number]  Phone: [phone] Fax: [phone]    Would like response via Rhapsohart:  call back     Comments: Pt did take a low dose Asprin

## 2023-09-26 NOTE — TELEPHONE ENCOUNTER
Regarding the message from her .  I certainly hope the stroke he is talking about is one that occurred last year and not anything more recent and we were not notified.  If he thinks she had a recent event then he needs to get her to the emergency room immediately.      If there has been any injury or even possible injury she will need an x-ray of that wrist and hand.  If there is any fever or reason to suspect infection to the ER.      Otherwise I am concerned about acute inflammation/arthritis or even gout.  So I entered an order to check a uric acid.  I agree with the Voltaren gel.  Because she is on Xarelto she should not take high dose aspirin nor can she take NSAIDs.  She can use topical Voltaren gel just no oral NSAID    Tylenol and ice ok as well     I would recommend a soft wrist splint that they can  at a pharmacy and consider wearing this for the next few days.  Even if there is no injury, sometimes temporary immobilization for acute arthritis can help.  If not improving or if symptoms are severe I would recommend Orthopedics and x-ray.

## 2023-09-26 NOTE — TELEPHONE ENCOUNTER
Spoke with Mr. Allen, pt spouse.    He stated pt is in excruciating pain  Pt had a stroke, her left side was effected. It resolved within a day. Except, pt thumb and forefinger is was effected by stroke and the nails on each of these fingers are not growing.    Pt denies swelling. Pt left hand is not as bright a color but her hand is pink    Most of the pain seems to be in wrist. Clay asprin is effective. Pt took an asprin 350mg earlier today, around  am.    Fingers are not cold.   Pt will try voltaren gel.     Pharmacy, [facility] on [address], [facility].

## 2023-09-27 ENCOUNTER — LAB VISIT (OUTPATIENT)
Dept: LAB | Facility: HOSPITAL | Age: 83
End: 2023-09-27
Attending: FAMILY MEDICINE
Payer: MEDICARE

## 2023-09-27 DIAGNOSIS — M25.539 PAIN IN WRIST, UNSPECIFIED LATERALITY: ICD-10-CM

## 2023-09-27 LAB — URATE SERPL-MCNC: 4.4 MG/DL (ref 2.4–5.7)

## 2023-09-27 PROCEDURE — 36415 COLL VENOUS BLD VENIPUNCTURE: CPT | Mod: PO | Performed by: FAMILY MEDICINE

## 2023-09-27 PROCEDURE — 84550 ASSAY OF BLOOD/URIC ACID: CPT | Performed by: FAMILY MEDICINE

## 2023-09-27 NOTE — TELEPHONE ENCOUNTER
Spoke with pt's , Mr. Allen, and he reports that the CVA occurred last December.  No recent acute injury, denies fever or any suspicion of infection.     Pt to have uric acid level checked today.  Pt will avoid ASA and NSAIDS.  Pt was educated that max dose of APAP is 3,000 mg per day according to PCP.  Pt will use ice, voltaren gel and a soft wrist splint.  Informed pt's  that if pt is not improving or if symptoms are severe, Dr. Dupree would recommend Orthopedics and x-ray.  Mr. Allen verbalized understanding to all.

## 2023-10-05 ENCOUNTER — OFFICE VISIT (OUTPATIENT)
Dept: OPTOMETRY | Facility: CLINIC | Age: 83
End: 2023-10-05
Payer: MEDICARE

## 2023-10-05 DIAGNOSIS — H35.3134 NONEXUDATIVE AGE-RELATED MACULAR DEGENERATION, BILATERAL, ADVANCED ATROPHIC WITH SUBFOVEAL INVOLVEMENT: ICD-10-CM

## 2023-10-05 DIAGNOSIS — H40.1233 LOW-TENSION GLAUCOMA OF BOTH EYES, SEVERE STAGE: Primary | ICD-10-CM

## 2023-10-05 PROCEDURE — 1160F PR REVIEW ALL MEDS BY PRESCRIBER/CLIN PHARMACIST DOCUMENTED: ICD-10-PCS | Mod: CPTII,S$GLB,, | Performed by: OPTOMETRIST

## 2023-10-05 PROCEDURE — 1157F PR ADVANCE CARE PLAN OR EQUIV PRESENT IN MEDICAL RECORD: ICD-10-PCS | Mod: CPTII,S$GLB,, | Performed by: OPTOMETRIST

## 2023-10-05 PROCEDURE — 99203 OFFICE O/P NEW LOW 30 MIN: CPT | Mod: S$GLB,,, | Performed by: OPTOMETRIST

## 2023-10-05 PROCEDURE — 1101F PT FALLS ASSESS-DOCD LE1/YR: CPT | Mod: CPTII,S$GLB,, | Performed by: OPTOMETRIST

## 2023-10-05 PROCEDURE — 1126F PR PAIN SEVERITY QUANTIFIED, NO PAIN PRESENT: ICD-10-PCS | Mod: CPTII,S$GLB,, | Performed by: OPTOMETRIST

## 2023-10-05 PROCEDURE — 1157F ADVNC CARE PLAN IN RCRD: CPT | Mod: CPTII,S$GLB,, | Performed by: OPTOMETRIST

## 2023-10-05 PROCEDURE — 99999 PR PBB SHADOW E&M-EST. PATIENT-LVL III: ICD-10-PCS | Mod: PBBFAC,,, | Performed by: OPTOMETRIST

## 2023-10-05 PROCEDURE — 3288F PR FALLS RISK ASSESSMENT DOCUMENTED: ICD-10-PCS | Mod: CPTII,S$GLB,, | Performed by: OPTOMETRIST

## 2023-10-05 PROCEDURE — 3288F FALL RISK ASSESSMENT DOCD: CPT | Mod: CPTII,S$GLB,, | Performed by: OPTOMETRIST

## 2023-10-05 PROCEDURE — 1159F PR MEDICATION LIST DOCUMENTED IN MEDICAL RECORD: ICD-10-PCS | Mod: CPTII,S$GLB,, | Performed by: OPTOMETRIST

## 2023-10-05 PROCEDURE — 1160F RVW MEDS BY RX/DR IN RCRD: CPT | Mod: CPTII,S$GLB,, | Performed by: OPTOMETRIST

## 2023-10-05 PROCEDURE — 99203 PR OFFICE/OUTPT VISIT, NEW, LEVL III, 30-44 MIN: ICD-10-PCS | Mod: S$GLB,,, | Performed by: OPTOMETRIST

## 2023-10-05 PROCEDURE — 99999 PR PBB SHADOW E&M-EST. PATIENT-LVL III: CPT | Mod: PBBFAC,,, | Performed by: OPTOMETRIST

## 2023-10-05 PROCEDURE — 1126F AMNT PAIN NOTED NONE PRSNT: CPT | Mod: CPTII,S$GLB,, | Performed by: OPTOMETRIST

## 2023-10-05 PROCEDURE — 1159F MED LIST DOCD IN RCRD: CPT | Mod: CPTII,S$GLB,, | Performed by: OPTOMETRIST

## 2023-10-05 PROCEDURE — 1101F PR PT FALLS ASSESS DOC 0-1 FALLS W/OUT INJ PAST YR: ICD-10-PCS | Mod: CPTII,S$GLB,, | Performed by: OPTOMETRIST

## 2023-10-05 NOTE — PROGRESS NOTES
HPI     Glaucoma    In both eyes.  Side effects of treatment include none.  Treatment   compliance is always.           Comments    Pt here for 4 month IOP check for LTG.    Pt using Combigran BID OU and Latanoprost QHS OU. Pt denies any visual   changes. Pt denies flashes/floaters/pain.   Does not need refills          Last edited by Salo Bean, OD on 10/5/2023  8:25 AM.            Assessment /Plan     For exam results, see Encounter Report.    Low-tension glaucoma of both eyes, severe stage    Nonexudative age-related macular degeneration, bilateral, advanced atrophic with subfoveal involvement      [place] pt, last vf and oct 2021, new to me, IOP low normal and stable, nerve eval stable, pachy thin, overdue for HVF and OCT rnfl, RTC 3-4 mos HVf(24-2)felicita fast and OCT rnfl, IOP ck after  Keep appts with [facility].

## 2023-10-09 NOTE — PROGRESS NOTES
"Subjective:    Patient ID:  Serenity Epps is a 83 y.o. female who presents for follow-up of Hyperlipidemia and Hypertension      Problem List Items Addressed This Visit          Cardiac/Vascular    Essential hypertension (Chronic)    Mixed hyperlipidemia (Chronic)    Stenosis of left internal carotid artery (Chronic)    Overview     12/24/2022  60 % seen on CTA         Abdominal aortic atherosclerosis - Primary       HPI    Patient was last seen on 04/03/2023 at which time she was doing okay from a cardiac standpoint.    On assessment today, the patient states that she feels OK.    No chest pain.  Goes to the gym several times a week    Issues with Xarelto - Cost, concern about bleeding risk, had hemorrhagic transformation with past stroke  Indication for Watchman - Medication is unaffordable, concerned about bleeding risk        Objective:     Vitals:    10/10/23 0928   BP: (!) 159/83   Pulse: 65   Weight: 64.7 kg (142 lb 10.2 oz)   Height: 5' 3" (1.6 m)       BP Readings from Last 5 Encounters:   10/10/23 (!) 159/83   08/04/23 130/76   06/22/23 112/69   05/01/23 136/68   04/03/23 (!) 157/78        Physical Exam  Vitals and nursing note reviewed.   Constitutional:       General: She is not in acute distress.     Appearance: She is well-developed.   HENT:      Head: Normocephalic and atraumatic.   Neck:      Vascular: No JVD.   Cardiovascular:      Rate and Rhythm: Normal rate and regular rhythm.      Heart sounds: Normal heart sounds. No murmur heard.     No friction rub. No gallop.   Pulmonary:      Effort: Pulmonary effort is normal. No respiratory distress.      Breath sounds: Normal breath sounds. No wheezing or rales.   Abdominal:      General: Bowel sounds are normal.      Palpations: Abdomen is soft.      Tenderness: There is no abdominal tenderness. There is no guarding or rebound.   Musculoskeletal:         General: No tenderness.      Cervical back: Neck supple.   Skin:     General: Skin is warm and dry. "   Neurological:      Mental Status: She is alert and oriented to person, place, and time.   Psychiatric:         Behavior: Behavior normal.             Current Outpatient Medications   Medication Instructions    acetaminophen (TYLENOL) 650 mg, Oral, Every 8 hours, 4 a day<BR>    atorvastatin (LIPITOR) 40 mg, Oral, Daily    azelastine (ASTELIN) 137 mcg (0.1 %) nasal spray USE 1 SPRAY IN EACH NOSTRIL TWICE DAILY    BENEFIBER, WHEAT DEXTRIN, ORAL 1 Dose, Oral, Every morning    biotin 1,000 mcg, Oral, Nightly    calcium carbonate (OS-BLAKE) 600 mg, Oral, 2 times daily with meals    diclofenac sodium (VOLTAREN) 2 g, Topical (Top), 3 times daily, To painful area of neck    docusate sodium (COLACE) 100 mg, Oral, 2 times daily    dorzolamide-timolol 2-0.5% (COSOPT) 22.3-6.8 mg/mL ophthalmic solution 1 drop, Both Eyes, 2 times daily    EYLEA 2 mg/0.05 mL Syrg 1 mL, Subcutaneous, Every 30 days    fluticasone propionate (FLONASE) 50 mcg/actuation nasal spray USE 2 SPRAYS IN EACH NOSTRIL ONE TIME DAILY    gabapentin (NEURONTIN) 400 mg, Oral, 2 times daily    latanoprost 0.005 % ophthalmic solution 1 drop, Both Eyes, Nightly    levocetirizine (XYZAL) 5 mg, Oral, Nightly    metoprolol succinate (TOPROL-XL) 25 mg, Oral, 2 times daily    mirtazapine (REMERON) 15 MG tablet Take 1-2 tablets by mouth at bedtime    valsartan (DIOVAN) 80 mg, Oral, Nightly    VIT C/E/ZN/COPPR/LUTEIN/ZEAXAN (PRESERVISION AREDS 2 ORAL) 1 capsule, Oral, 2 times daily    XARELTO 20 mg Tab No dose, route, or frequency recorded.       Lipid Panel:   Lab Results   Component Value Date    CHOL 160 05/03/2023    HDL 76 (H) 05/03/2023    LDLCALC 60.0 (L) 05/03/2023    TRIG 120 05/03/2023    CHOLHDL 47.5 05/03/2023       The ASCVD Risk score (Bakari DK, et al., 2019) failed to calculate for the following reasons:    The 2019 ASCVD risk score is only valid for ages 40 to 79    The patient has a prior MI or stroke diagnosis    All pertinent labs, imaging, and EKGs  reviewed.  Patient's most recent EKG tracing was personally interpreted by this provider.    Most Recent EKG Results  Results for orders placed or performed in visit on 01/03/23   IN OFFICE EKG 12-LEAD (to [place])    Collection Time: 01/03/23  9:36 AM    Narrative    Test Reason : I63.30,I25.10,I10,E78.2,I63.311,I65.22,    Vent. Rate : 054 BPM     Atrial Rate : 054 BPM     P-R Int : 176 ms          QRS Dur : 104 ms      QT Int : 478 ms       P-R-T Axes : 073 092 059 degrees     QTc Int : 453 ms    Sinus bradycardia  Rightward axis  Borderline Abnormal ECG  When compared with ECG of 10-MAY-2016 14:47,  Previous ECG has undetermined rhythm, needs review  Confirmed by Tio COOLEY, Daron NAVA ([id number]) on 1/6/2023 11:33:37 AM    Referred By: CHELSEY GAMEZ           Confirmed By:Daron Kinsey MD       Most Recent Echocardiogram Results  Results for orders placed during the hospital encounter of 01/11/23    Transesophageal echo (SWETHA) with possible cardioversion    Interpretation Summary  · Normal systolic function.  · Normal right ventricular size with normal right ventricular systolic function.  · Mild tricuspid regurgitation.  · Normal appearing left atrial appendage. No thrombus is present in the appendage or left atrium.  · Saline (bubble) contrast was used during the study. No evidence of interatrial connection appreciated.      Most Recent Nuclear Stress Test Results  No results found for this or any previous visit.      Most Recent Cardiac PET Stress Test Results  No results found for this or any previous visit.      Most Recent Cardiovascular Angiogram results  No results found for this or any previous visit.      Other Most Recent Cardiology Results  Results for orders placed in visit on 10/22/23    Cardiac device check - Remote    Narrative  Battery and Leads (BL)  Normal battery parameters    Presenting Rhythm (KY)  Normal Sinus Rhythm    Arrhythmic events (AE)  Indeterminate rhythm due to noise limiting  interpretation  False event(s) recorded due to PAC(s)/SVT/PVC(s)/VT    Anticoagulation  (AC)  Patient on anticoagulant therapy  Patient prescribed Rivaroxaban (Xarelto)    Transmission Information (TI)  Implant indication: Cryptogenic Stroke  Device Summary Report    Follow Up (FU)  Continue remote monitoring with monthly reporting    Additional Comments  ILR Report  Monitoring period: 9/03/23-10/03/23    Battery Status: Good        Assessment:       1. Abdominal aortic atherosclerosis    2. Essential hypertension    3. Stenosis of left internal carotid artery    4. Mixed hyperlipidemia         Plan:     Symptoms OK today  BP elevated today  Most recent echocardiogram reviewed personally     Start aspirin 81mg PO Daily   Continue atorvastatin 40 mg PO Daily   Continue metoprolol succinate 25 mg PO BID  Continue valsartan 80 mg PO Daily   Continue Xarelto 20 mg PO Daily to be taken with the largest meal of the day      We discussed the rationale, risks, benefits, and alternatives for seeking left atrial appendage closure with the Watchman device.  Shared decision making using the ACC/HRS algorithm and shared decision-making tool was used to help guide the discussion.  The benefits of DELFINO closure include risk reduction for ischemic stroke similar to that of chronic OAC without need for chronic OAC.  The risks include a <1% risk of death, tamponade, need for emergency heart surgery, device embolization, stroke, bleeding, vascular injury, and a 2-3% longterm risk of device related thrombus.  The patient is a candidate for short-term OAC but certainly not long-term as detailed above. We addressed the alternatives of oral anticoagulation or no OAC with the high risk of stroke.  I believe it reasonable to proceed with DELFINO closure. They would like to proceed with left atrial appendage closure and will be referred to Dr. Gibson for his opinion.     Continue other cardiac medications  Mediterranean Diet/Cardiovascular Exercise  Program    Patient queried and all questions were answered.    F/u in 6-9 months to reassess      Signed:    Daron Kinsey MD  10/10/2023 1:12 PM

## 2023-10-10 ENCOUNTER — OFFICE VISIT (OUTPATIENT)
Dept: CARDIOLOGY | Facility: CLINIC | Age: 83
End: 2023-10-10
Payer: MEDICARE

## 2023-10-10 VITALS
BODY MASS INDEX: 25.27 KG/M2 | HEART RATE: 65 BPM | DIASTOLIC BLOOD PRESSURE: 83 MMHG | SYSTOLIC BLOOD PRESSURE: 159 MMHG | WEIGHT: 142.63 LBS | HEIGHT: 63 IN

## 2023-10-10 DIAGNOSIS — E78.2 MIXED HYPERLIPIDEMIA: Chronic | ICD-10-CM

## 2023-10-10 DIAGNOSIS — I65.22 STENOSIS OF LEFT INTERNAL CAROTID ARTERY: Chronic | ICD-10-CM

## 2023-10-10 DIAGNOSIS — I70.0 ABDOMINAL AORTIC ATHEROSCLEROSIS: Primary | ICD-10-CM

## 2023-10-10 DIAGNOSIS — I10 ESSENTIAL HYPERTENSION: Chronic | ICD-10-CM

## 2023-10-10 DIAGNOSIS — I48.0 PAROXYSMAL ATRIAL FIBRILLATION: ICD-10-CM

## 2023-10-10 PROCEDURE — 1160F RVW MEDS BY RX/DR IN RCRD: CPT | Mod: CPTII,S$GLB,, | Performed by: INTERNAL MEDICINE

## 2023-10-10 PROCEDURE — 1157F ADVNC CARE PLAN IN RCRD: CPT | Mod: CPTII,S$GLB,, | Performed by: INTERNAL MEDICINE

## 2023-10-10 PROCEDURE — 3288F PR FALLS RISK ASSESSMENT DOCUMENTED: ICD-10-PCS | Mod: CPTII,S$GLB,, | Performed by: INTERNAL MEDICINE

## 2023-10-10 PROCEDURE — 1159F PR MEDICATION LIST DOCUMENTED IN MEDICAL RECORD: ICD-10-PCS | Mod: CPTII,S$GLB,, | Performed by: INTERNAL MEDICINE

## 2023-10-10 PROCEDURE — 1126F PR PAIN SEVERITY QUANTIFIED, NO PAIN PRESENT: ICD-10-PCS | Mod: CPTII,S$GLB,, | Performed by: INTERNAL MEDICINE

## 2023-10-10 PROCEDURE — 1101F PR PT FALLS ASSESS DOC 0-1 FALLS W/OUT INJ PAST YR: ICD-10-PCS | Mod: CPTII,S$GLB,, | Performed by: INTERNAL MEDICINE

## 2023-10-10 PROCEDURE — 3079F PR MOST RECENT DIASTOLIC BLOOD PRESSURE 80-89 MM HG: ICD-10-PCS | Mod: CPTII,S$GLB,, | Performed by: INTERNAL MEDICINE

## 2023-10-10 PROCEDURE — 1101F PT FALLS ASSESS-DOCD LE1/YR: CPT | Mod: CPTII,S$GLB,, | Performed by: INTERNAL MEDICINE

## 2023-10-10 PROCEDURE — 99215 OFFICE O/P EST HI 40 MIN: CPT | Mod: S$GLB,,, | Performed by: INTERNAL MEDICINE

## 2023-10-10 PROCEDURE — 1159F MED LIST DOCD IN RCRD: CPT | Mod: CPTII,S$GLB,, | Performed by: INTERNAL MEDICINE

## 2023-10-10 PROCEDURE — 3077F SYST BP >= 140 MM HG: CPT | Mod: CPTII,S$GLB,, | Performed by: INTERNAL MEDICINE

## 2023-10-10 PROCEDURE — 99999 PR PBB SHADOW E&M-EST. PATIENT-LVL IV: CPT | Mod: PBBFAC,,, | Performed by: INTERNAL MEDICINE

## 2023-10-10 PROCEDURE — 3079F DIAST BP 80-89 MM HG: CPT | Mod: CPTII,S$GLB,, | Performed by: INTERNAL MEDICINE

## 2023-10-10 PROCEDURE — 1157F PR ADVANCE CARE PLAN OR EQUIV PRESENT IN MEDICAL RECORD: ICD-10-PCS | Mod: CPTII,S$GLB,, | Performed by: INTERNAL MEDICINE

## 2023-10-10 PROCEDURE — 3288F FALL RISK ASSESSMENT DOCD: CPT | Mod: CPTII,S$GLB,, | Performed by: INTERNAL MEDICINE

## 2023-10-10 PROCEDURE — 99215 PR OFFICE/OUTPT VISIT, EST, LEVL V, 40-54 MIN: ICD-10-PCS | Mod: S$GLB,,, | Performed by: INTERNAL MEDICINE

## 2023-10-10 PROCEDURE — 99999 PR PBB SHADOW E&M-EST. PATIENT-LVL IV: ICD-10-PCS | Mod: PBBFAC,,, | Performed by: INTERNAL MEDICINE

## 2023-10-10 PROCEDURE — 1126F AMNT PAIN NOTED NONE PRSNT: CPT | Mod: CPTII,S$GLB,, | Performed by: INTERNAL MEDICINE

## 2023-10-10 PROCEDURE — 3077F PR MOST RECENT SYSTOLIC BLOOD PRESSURE >= 140 MM HG: ICD-10-PCS | Mod: CPTII,S$GLB,, | Performed by: INTERNAL MEDICINE

## 2023-10-10 PROCEDURE — 1160F PR REVIEW ALL MEDS BY PRESCRIBER/CLIN PHARMACIST DOCUMENTED: ICD-10-PCS | Mod: CPTII,S$GLB,, | Performed by: INTERNAL MEDICINE

## 2023-10-10 RX ORDER — DEXTROMETHORPHAN HYDROBROMIDE, GUAIFENESIN 5; 100 MG/5ML; MG/5ML
650 LIQUID ORAL 2 TIMES DAILY
COMMUNITY

## 2023-10-10 RX ORDER — ASPIRIN 81 MG/1
81 TABLET ORAL DAILY
Refills: 0 | Status: ON HOLD | COMMUNITY
Start: 2023-10-10 | End: 2024-01-12 | Stop reason: HOSPADM

## 2023-10-22 ENCOUNTER — CLINICAL SUPPORT (OUTPATIENT)
Dept: CARDIOLOGY | Facility: HOSPITAL | Age: 83
End: 2023-10-22
Payer: MEDICARE

## 2023-10-22 DIAGNOSIS — Z95.818 PRESENCE OF OTHER CARDIAC IMPLANTS AND GRAFTS: ICD-10-CM

## 2023-10-22 PROCEDURE — G2066 INTER DEVC REMOTE 30D: HCPCS | Mod: PO | Performed by: INTERNAL MEDICINE

## 2023-10-22 PROCEDURE — 93298 REM INTERROG DEV EVAL SCRMS: CPT | Mod: ,,, | Performed by: INTERNAL MEDICINE

## 2023-10-22 PROCEDURE — 93298 CARDIAC DEVICE CHECK - REMOTE: ICD-10-PCS | Mod: ,,, | Performed by: INTERNAL MEDICINE

## 2023-11-07 ENCOUNTER — OFFICE VISIT (OUTPATIENT)
Dept: CARDIOLOGY | Facility: CLINIC | Age: 83
End: 2023-11-07
Payer: MEDICARE

## 2023-11-07 VITALS
HEIGHT: 63 IN | SYSTOLIC BLOOD PRESSURE: 156 MMHG | HEART RATE: 69 BPM | DIASTOLIC BLOOD PRESSURE: 75 MMHG | WEIGHT: 145 LBS | BODY MASS INDEX: 25.69 KG/M2

## 2023-11-07 DIAGNOSIS — I10 PRIMARY HYPERTENSION: ICD-10-CM

## 2023-11-07 DIAGNOSIS — I48.0 PAF (PAROXYSMAL ATRIAL FIBRILLATION): Primary | ICD-10-CM

## 2023-11-07 DIAGNOSIS — Z91.89 AT HIGH RISK FOR BLEEDING: ICD-10-CM

## 2023-11-07 DIAGNOSIS — I63.311 STROKE DUE TO THROMBOSIS OF RIGHT MIDDLE CEREBRAL ARTERY: ICD-10-CM

## 2023-11-07 PROCEDURE — 1126F PR PAIN SEVERITY QUANTIFIED, NO PAIN PRESENT: ICD-10-PCS | Mod: CPTII,S$GLB,, | Performed by: INTERNAL MEDICINE

## 2023-11-07 PROCEDURE — 1126F AMNT PAIN NOTED NONE PRSNT: CPT | Mod: CPTII,S$GLB,, | Performed by: INTERNAL MEDICINE

## 2023-11-07 PROCEDURE — 1159F MED LIST DOCD IN RCRD: CPT | Mod: CPTII,S$GLB,, | Performed by: INTERNAL MEDICINE

## 2023-11-07 PROCEDURE — 3288F PR FALLS RISK ASSESSMENT DOCUMENTED: ICD-10-PCS | Mod: CPTII,S$GLB,, | Performed by: INTERNAL MEDICINE

## 2023-11-07 PROCEDURE — 1157F ADVNC CARE PLAN IN RCRD: CPT | Mod: CPTII,S$GLB,, | Performed by: INTERNAL MEDICINE

## 2023-11-07 PROCEDURE — 1101F PR PT FALLS ASSESS DOC 0-1 FALLS W/OUT INJ PAST YR: ICD-10-PCS | Mod: CPTII,S$GLB,, | Performed by: INTERNAL MEDICINE

## 2023-11-07 PROCEDURE — 3288F FALL RISK ASSESSMENT DOCD: CPT | Mod: CPTII,S$GLB,, | Performed by: INTERNAL MEDICINE

## 2023-11-07 PROCEDURE — 99999 PR PBB SHADOW E&M-EST. PATIENT-LVL III: ICD-10-PCS | Mod: PBBFAC,,, | Performed by: INTERNAL MEDICINE

## 2023-11-07 PROCEDURE — 3077F PR MOST RECENT SYSTOLIC BLOOD PRESSURE >= 140 MM HG: ICD-10-PCS | Mod: CPTII,S$GLB,, | Performed by: INTERNAL MEDICINE

## 2023-11-07 PROCEDURE — 99205 OFFICE O/P NEW HI 60 MIN: CPT | Mod: S$GLB,,, | Performed by: INTERNAL MEDICINE

## 2023-11-07 PROCEDURE — 1101F PT FALLS ASSESS-DOCD LE1/YR: CPT | Mod: CPTII,S$GLB,, | Performed by: INTERNAL MEDICINE

## 2023-11-07 PROCEDURE — 3078F PR MOST RECENT DIASTOLIC BLOOD PRESSURE < 80 MM HG: ICD-10-PCS | Mod: CPTII,S$GLB,, | Performed by: INTERNAL MEDICINE

## 2023-11-07 PROCEDURE — 99205 PR OFFICE/OUTPT VISIT, NEW, LEVL V, 60-74 MIN: ICD-10-PCS | Mod: S$GLB,,, | Performed by: INTERNAL MEDICINE

## 2023-11-07 PROCEDURE — 1159F PR MEDICATION LIST DOCUMENTED IN MEDICAL RECORD: ICD-10-PCS | Mod: CPTII,S$GLB,, | Performed by: INTERNAL MEDICINE

## 2023-11-07 PROCEDURE — 1157F PR ADVANCE CARE PLAN OR EQUIV PRESENT IN MEDICAL RECORD: ICD-10-PCS | Mod: CPTII,S$GLB,, | Performed by: INTERNAL MEDICINE

## 2023-11-07 PROCEDURE — 3078F DIAST BP <80 MM HG: CPT | Mod: CPTII,S$GLB,, | Performed by: INTERNAL MEDICINE

## 2023-11-07 PROCEDURE — 3077F SYST BP >= 140 MM HG: CPT | Mod: CPTII,S$GLB,, | Performed by: INTERNAL MEDICINE

## 2023-11-07 PROCEDURE — 99999 PR PBB SHADOW E&M-EST. PATIENT-LVL III: CPT | Mod: PBBFAC,,, | Performed by: INTERNAL MEDICINE

## 2023-11-07 NOTE — PROGRESS NOTES
"Structural Cardiology Clinic Note      Patient: Serenity Epps, 1940, [id number]  Primary Care Provider: Shant Jc MD     Chief Complaint/Reason for Referral: LAAC     Subjective:       Serenity Epps is a 83 y.o. female who presents for consult. They are accompanied by her . Referred by my colleague Dr. Kinsey.    No falls. No blood in urine or stools. No CP. Some SOBOE but "nothing serious". Dependent edema. No orthopnea or PND.     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  ILR in place  SWETHA 1/2023 with no intracardiac thrombus. Reviewed DELFINO anatomy amenable to closure with Watchman FLX.   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 Quit 2002. 1-2 glasses of wine in the evening.   No history of MI or coronary revasc    Review of Systems  Constitutional: negative for fevers, night sweats, and weight loss  Eyes: negative for visual disturbance, diplopia  Respiratory: negative for cough, hemoptysis, sputum, and wheezing  Cardiovascular: see HPI  Gastrointestinal: negative for abdominal pain, bright red blood per rectum, change in bowel habits, dysphagia, melena, and reflux symptoms  Genitourinary:negative for dysuria, frequency, and hematuria  Hematologic/lymphatic: negative for bleeding, easy bruising, and lymphadenopathy  Musculoskeletal:negative for arthralgias, back pain, and myalgias  Neurological: negative for gait problems, paresthesia, speech problems, vertigo, and weakness  Behavioral/Psych: negative for excessive alcohol consumption, illegal drug usage, and sleep disturbance    ----------------------------  Anxiety  Arthritis  Cataract - Both Eyes      Comment:  OU done//  CKD (chronic kidney disease), stage II      Comment:  pt denies  Diarrhea  Diverticulosis  DVT (deep venous thrombosis)      " Comment:  left leg, after childbirth  Exudative age-related macular degeneration of left eye  Glaucoma  Hyperlipidemia  Hypertension  Irritable bowel syndrome  Left foot pain      Comment:  chronic  Lymphocytic colitis  Macular degeneration      Comment:  bimonthly intraoccular injections  Osteopenia  Paroxysmal atrial fibrillation  Recurrent major depressive disorder  Rhinitis, chronic  Strabismus      Comment:  as child  Stroke due to thrombosis of right middle cerebral artery  ----------------------------  Appendectomy      Comment:  age 38  Cataract extraction      Comment:  OU done//  Cataract extraction w/ intraocular lens  implant, bilateral  Colonoscopy      Comment:  Diverticulosis.   Internal small hemorrhoids were found.  Colonoscopy      Comment:  Dr. Lynn, repeat in 7-8 years for surveillance  Colonoscopy  Colonoscopy      Comment:  Procedure: COLONOSCOPY;  Surgeon: Toby Lynn Jr., MD;  Location: [facility];  Service: Endoscopy;                 Laterality: N/A;  Echocardiogram,transesophageal      Comment:  Procedure: ECHOCARDIOGRAM,TRANSESOPHAGEAL;  Surgeon:                Daron Kinsey MD;  Location: [facility];  Service:                Cardiology;  Laterality: N/A;  Eye surgery      Comment:  Phaco with IOL (cataract extraction), rigth:[id number] 22.0                d//  Foot hardware removal      Comment:  Dr. Hammonds  Foot surgery      Comment:  ERG and open reduction tallo tarsal dislocation                (hyprocure implant)  Fracture surgery      Comment:  HIP  Insertion of implantable loop recorder      Comment:  Procedure: INSERTION, IMPLANTABLE LOOP RECORDER;                 Surgeon: Daron Kinsey MD;  Location: [facility];                 Service: Cardiology;  Laterality: N/A;  Insertion of implantable loop recorder      Comment:  Procedure: INSERTION, IMPLANTABLE LOOP RECORDER;                 Surgeon: Daron Kinsey MD;  Location: [facility];                  Service: Cardiology;  Laterality: N/A;  Joint replacement  Partial hysterectomy      Comment:  age 38, ovaries conserved  Pip joint fusion      Comment:  2nd-4th PIP joints of right foot; Dr. Hammonds  Tonsillectomy      Comment:  as a child  Total knee arthroplasty  Upper gastrointestinal endoscopy      Comment:  Erythema in the lower third of the esophagus (NERD).                 Patulous lower esophageal sphincter.   Gastric mucosal                atrophy.   PARAG Test  Negative.   Vein ligation      Comment:  BLE     Family History   Problem Relation Age of Onset    Coronary artery disease Mother 78    Glaucoma Mother     Diverticulitis Father     Parkinsonism Brother     Glaucoma Brother     Ankylosing spondylitis Brother     Diabetes Brother     Macular degeneration Brother          twin brother wet mac//    Cancer Brother         prostate    Crohn's disease Brother     Parkinsonism Brother     No Known Problems Maternal Grandmother     No Known Problems Maternal Grandfather     No Known Problems Paternal Grandmother     No Known Problems Paternal Grandfather     Breast cancer Daughter     Cancer Daughter         breast    Amblyopia Neg Hx     Blindness Neg Hx     Cataracts Neg Hx     Hypertension Neg Hx     Strabismus Neg Hx     Stroke Neg Hx     Thyroid disease Neg Hx     Retinal detachment Neg Hx     Celiac disease Neg Hx      Social History     Tobacco Use    Smoking status: Former     Current packs/day: 0.00     Average packs/day: 2.0 packs/day for 35.0 years (70.0 ttl pk-yrs)     Types: Cigarettes     Start date: 1976     Quit date: 2011     Years since quittin.8    Smokeless tobacco: Never   Substance Use Topics    Alcohol use: Yes     Alcohol/week: 14.0 standard drinks of alcohol     Types: 14 Glasses of wine per week     Comment: 2-3 glasses per night; NONE SINCE 2022    Drug use: Yes     Types: Benzodiazepines       Current Outpatient Medications   Medication Sig Dispense Refill     acetaminophen (TYLENOL) 650 MG TbSR Take 650 mg by mouth every 8 (eight) hours. 4 a day      aspirin (ECOTRIN) 81 MG EC tablet Take 1 tablet (81 mg total) by mouth once daily.  0    atorvastatin (LIPITOR) 40 MG tablet Take 1 tablet (40 mg total) by mouth once daily. 60 tablet 9    azelastine (ASTELIN) 137 mcg (0.1 %) nasal spray USE 1 SPRAY IN EACH NOSTRIL TWICE DAILY (Patient taking differently: 1 spray by Nasal route 2 (two) times daily.) 60 mL 6    BENEFIBER, WHEAT DEXTRIN, ORAL Take 1 Dose by mouth every morning.      biotin 1 mg tablet Take 1,000 mcg by mouth every evening.       calcium carbonate (OS-BLAKE) 600 mg (1,500 mg) Tab Take 600 mg by mouth 2 (two) times daily with meals.      diclofenac sodium (VOLTAREN) 1 % Gel Apply 2 g topically 3 (three) times daily. To painful area of neck 450 g 0    docusate sodium (COLACE) 100 MG capsule Take 100 mg by mouth 2 (two) times daily.      dorzolamide-timolol 2-0.5% (COSOPT) 22.3-6.8 mg/mL ophthalmic solution Place 1 drop into both eyes 2 (two) times daily. 30 mL 4    EYLEA 2 mg/0.05 mL Syrg Inject 1 mL into the skin every 30 days.      fluticasone propionate (FLONASE) 50 mcg/actuation nasal spray USE 2 SPRAYS IN EACH NOSTRIL ONE TIME DAILY (Patient taking differently: 2 sprays by Each Nostril route once daily.) 48 g 3    gabapentin (NEURONTIN) 400 MG capsule Take 1 capsule (400 mg total) by mouth 2 (two) times daily. 180 capsule 1    latanoprost 0.005 % ophthalmic solution Place 1 drop into both eyes every evening. 7.5 mL 3    levocetirizine (XYZAL) 5 MG tablet Take 1 tablet (5 mg total) by mouth every evening. 30 tablet 0    metoprolol succinate (TOPROL-XL) 25 MG 24 hr tablet Take 1 tablet (25 mg total) by mouth 2 (two) times daily. 180 tablet 3    mirtazapine (REMERON) 15 MG tablet Take 1-2 tablets by mouth at bedtime 120 tablet 3    valsartan (DIOVAN) 80 MG tablet Take 1 tablet (80 mg total) by mouth every evening. 90 tablet 3    VIT C/E/ZN/COPPR/LUTEIN/ZEAXAN  "(PRESERVISION AREDS 2 ORAL) Take 1 capsule by mouth 2 (two) times daily.       XARELTO 20 mg Tab        Current Facility-Administered Medications   Medication Dose Route Frequency Provider Last Rate Last Admin    bevacizumab (AVASTIN) 2.5 mg/0.10 mL 1.25 mg  1.25 mg Intraocular 1 time in Clinic/HOD NATHANIEL Lopez MD            Objective:      Physical Exam  BP (!) 156/75 (BP Location: Left arm, Patient Position: Sitting, BP Method: Medium (Automatic))   Pulse 69   Ht 5' 3" (1.6 m)   Wt 65.8 kg (145 lb)   BMI 25.69 kg/m²   Body surface area is 1.71 meters squared.  Body mass index is 25.69 kg/m².    General appearance: alert, appears stated age, cooperative, and no distress  Head: Normocephalic, without obvious abnormality, atraumatic  Neck: no carotid bruit, no JVD, and supple, symmetrical, trachea midline  Lungs: clear to auscultation bilaterally  Heart: regular rate and rhythm; S1, S2 normal, no murmur, click, rub or gallop  Abdomen: soft, non-tender, no distended  Extremities: extremities atraumatic, no pitting edema  Skin: warm, no cyanosis, no pathologic ecchymosis in exposed portions  Neurologic: Grossly normal. A&O x3      Lab Review   Lab Results   Component Value Date    WBC 7.19 12/25/2022    HGB 13.2 12/25/2022    HCT 39.2 12/25/2022    MCV 87 12/25/2022     12/25/2022         BMP  Lab Results   Component Value Date     05/03/2023    K 4.7 05/03/2023     05/03/2023    CO2 31 (H) 05/03/2023    BUN 10 05/03/2023    CREATININE 0.7 05/03/2023    CALCIUM 9.5 05/03/2023    ANIONGAP 4 (L) 05/03/2023    ESTGFRAFRICA >60.0 01/19/2022    EGFRNONAA >60.0 01/19/2022       Lab Results   Component Value Date    ALBUMIN 4.0 05/03/2023       Lab Results   Component Value Date    ALT 26 05/03/2023    AST 26 05/03/2023    ALKPHOS 68 05/03/2023    BILITOT 0.6 05/03/2023       Lab Results   Component Value Date    TSH 1.920 12/24/2022       Lab Results   Component Value Date    CHOL 160 " 05/03/2023    CHOL 212 (H) 12/24/2022    CHOL 216 (H) 01/19/2022     Lab Results   Component Value Date    HDL 76 (H) 05/03/2023    HDL 83 (H) 12/24/2022    HDL 79 (H) 01/19/2022     Lab Results   Component Value Date    LDLCALC 60.0 (L) 05/03/2023    LDLCALC 108.8 12/24/2022    LDLCALC 112.2 01/19/2022     Lab Results   Component Value Date    TRIG 120 05/03/2023    TRIG 101 12/24/2022    TRIG 124 01/19/2022     Lab Results   Component Value Date    CHOLHDL 47.5 05/03/2023    CHOLHDL 39.2 12/24/2022    CHOLHDL 36.6 01/19/2022     [id number] score   Sex: 1  Female (1 point)   Male (0 points)    Age: 2   ?64 years old (0 points)   65 to 74 years old (1 point)   ?75 years old (2 points)    Comorbidities: 0+1+0+2+1   Heart failure (1 point)   Hypertension (1 point)   Diabetes mellitus (1 point)   History of stroke, TIA, or thromboembolism (2 points)   Vascular disease (history of MI, PAD, or aortic atherosclerosis) (1 point)    Total points: 7    Unadjusted stroke rate: [Nivia L, Annie M, [place] GY. Evaluation of risk stratification schemes for ischaemic stroke and bleeding in [id number] patients with atrial fibrillation: the [place] Atrial Fibrillation cohort study. Eur Heart J 2012; 33:1500.]  0 points: 0.2% per year  1 point:  0.6% per year  2 points: 2.2% per year  3 points: 3.2% per year  4 points: 4.8% per year  5 points: 7.2% per year  6 points: 9.7% per year  7 points: 11.1% per year  8 points: 11% per year  9 points: 12.2% per year    HAS-BLED score:  Hypertension (1 point) : 1  Abnormal liver function (cirrhosis, bilirubin >2xULN or ALT>3xULN) (1 point): 0  Abnormal renal function (dialysis, renal transplant or Cr>2.26) (1 point): 0  Stroke (1 point): 1  Bleeding tendency or predisposition (1 point): 1  Labile INRs in patients taking warfarin (<60%TTR or high/labile INR) (1 point): 0  Elderly: age greater than 65 years (1 point): 1  Drugs: concomitant antiplatelet agents (eg, aspirin, clopidogrel,  ticlopidine, nonsteroidal antiinflammatory agents) (1 point): 0  Drugs: concomitant excess alcohol use (1 point): 0    Total score: 4    0 points: 1.13 bleeds per 100 patient-years  1 point:  1.02 bleeds per 100 patient-years  2 points: 1.88 bleeds per 100 patient-years  3 points: 3.74 bleeds per 100 patient-years  4 points: 8.70 bleeds per 100 patient-years  5 to 9 points: Insufficient data    [Lip GY. Implications of the TALI(2)DS(2)-VASc and HAS-BLED Scores for thromboprophylaxis in atrial fibrillation. Am J Med 2011; 124:111.]    If you answer NO to any of the four criteria below, the patient does not meet the eligibility requirements for LAAC via Watchman implantation:    Patient has non-valvular atrial fibrillation: Yes  Patient has an increased risk for stroke and is recommended for oral anticoagulation (OAC): Yes  Patient is suitable for short-term anticoagulation therapy but deemed unable to take long-term OAC: Yes  Patient has an appropriate rationale to seek a non-pharmacological alternative to OACs. Specific factors include: History of bleeding or increased bleeding risk and History of risk of falls       Assessment & Plan:      This is a 83 y.o. pleasant frail WF with PAF and h/o stroke and HBR.     We discussed the rationale, risks, benefits, and alternatives for seeking left atrial appendage closure with the Watchman device.  Shared decision making using the ACC/HRS algorithm and shared decision-making tool was used to help guide the discussion.  The benefits of DELFINO closure include risk reduction for ischemic stroke similar to that of chronic OAC without need for chronic OAC.  The risks include a <1% risk of death, tamponade, need for emergency heart surgery, device embolization, stroke, bleeding, vascular injury, and a 2-3% longterm risk of device related thrombus.  The patient is a candidate for short-term OAC but certainly not long-term as detailed above . We addressed the alternatives of oral  anticoagulation or no OAC with the high risk of stroke.  I believe it reasonable to proceed with DELFINO closure. They would like to proceed with left atrial appendage closure and shared decision making discussed with Dr. Kinsey.         1. PAF (paroxysmal atrial fibrillation)        2. Primary hypertension        3. Stroke due to thrombosis of right middle cerebral artery        4. At high risk for bleeding             Proceed to LAAC with Watchman FLX. Plan for 6 weeks NOAC after implant then transition to DAPT  Continue atorva 40  BP checks at home. Continue nannette 80 and metop 25 BID  Emphasized the importance of modifying lifestyle related risk factors including smoking cessation, limiting alcohol intake, exercise, diet most resembling a Mediterranean diet.    I appreciate the opportunity to participate in Serenity Epps 's care today.  Please follow up with me in April-May 2024.      Nellie Gibson MD, [facility]  Interventional Cardiology/Structural Heart Disease  [facility]  Office: [phone]     Parts of this note were completed using voice recognition software. Please excuse any misspellings or syntax errors and reach out to me with questions.

## 2023-11-08 DIAGNOSIS — I48.0 PAF (PAROXYSMAL ATRIAL FIBRILLATION): Primary | ICD-10-CM

## 2023-11-08 DIAGNOSIS — Z91.89 AT RISK FOR BLEEDING: ICD-10-CM

## 2023-11-08 RX ORDER — MUPIROCIN 20 MG/G
OINTMENT TOPICAL 2 TIMES DAILY
Status: CANCELLED | OUTPATIENT
Start: 2023-11-08 | End: 2023-11-13

## 2023-11-08 RX ORDER — SODIUM CHLORIDE 9 MG/ML
INJECTION, SOLUTION INTRAVENOUS CONTINUOUS
Status: CANCELLED | OUTPATIENT
Start: 2023-11-08

## 2023-11-08 RX ORDER — NAPROXEN SODIUM 220 MG/1
324 TABLET, FILM COATED ORAL
Status: CANCELLED | OUTPATIENT
Start: 2023-11-08 | End: 2023-11-08

## 2023-11-08 RX ORDER — CHLORHEXIDINE GLUCONATE ORAL RINSE 1.2 MG/ML
15 SOLUTION DENTAL 2 TIMES DAILY
Status: CANCELLED | OUTPATIENT
Start: 2023-11-08 | End: 2023-11-13

## 2023-11-23 ENCOUNTER — CLINICAL SUPPORT (OUTPATIENT)
Dept: CARDIOLOGY | Facility: HOSPITAL | Age: 83
End: 2023-11-23
Payer: MEDICARE

## 2023-11-23 ENCOUNTER — CLINICAL SUPPORT (OUTPATIENT)
Dept: CARDIOLOGY | Facility: HOSPITAL | Age: 83
End: 2023-11-23
Attending: INTERNAL MEDICINE
Payer: MEDICARE

## 2023-11-23 PROCEDURE — 93298 REM INTERROG DEV EVAL SCRMS: CPT | Mod: ,,, | Performed by: INTERNAL MEDICINE

## 2023-11-23 PROCEDURE — 93298 CARDIAC DEVICE CHECK - REMOTE: ICD-10-PCS | Mod: ,,, | Performed by: INTERNAL MEDICINE

## 2023-11-23 PROCEDURE — G2066 INTER DEVC REMOTE 30D: HCPCS | Mod: PO | Performed by: INTERNAL MEDICINE

## 2023-11-27 LAB
OHS CV AF BURDEN PERCENT: < 1
OHS CV DC REMOTE DEVICE TYPE: NORMAL

## 2023-12-05 ENCOUNTER — LAB VISIT (OUTPATIENT)
Dept: LAB | Facility: HOSPITAL | Age: 83
End: 2023-12-05
Attending: FAMILY MEDICINE
Payer: MEDICARE

## 2023-12-05 DIAGNOSIS — I10 ESSENTIAL HYPERTENSION: Chronic | ICD-10-CM

## 2023-12-05 DIAGNOSIS — E78.2 MIXED HYPERLIPIDEMIA: Chronic | ICD-10-CM

## 2023-12-05 LAB
ALBUMIN SERPL BCP-MCNC: 3.7 G/DL (ref 3.5–5.2)
ALP SERPL-CCNC: 69 U/L (ref 55–135)
ALT SERPL W/O P-5'-P-CCNC: 16 U/L (ref 10–44)
ANION GAP SERPL CALC-SCNC: 7 MMOL/L (ref 8–16)
AST SERPL-CCNC: 20 U/L (ref 10–40)
BASOPHILS # BLD AUTO: 0.05 K/UL (ref 0–0.2)
BASOPHILS NFR BLD: 0.7 % (ref 0–1.9)
BILIRUB SERPL-MCNC: 0.6 MG/DL (ref 0.1–1)
BUN SERPL-MCNC: 9 MG/DL (ref 8–23)
CALCIUM SERPL-MCNC: 9.1 MG/DL (ref 8.7–10.5)
CHLORIDE SERPL-SCNC: 106 MMOL/L (ref 95–110)
CHOLEST SERPL-MCNC: 145 MG/DL (ref 120–199)
CHOLEST/HDLC SERPL: 2.1 {RATIO} (ref 2–5)
CO2 SERPL-SCNC: 25 MMOL/L (ref 23–29)
CREAT SERPL-MCNC: 0.6 MG/DL (ref 0.5–1.4)
DIFFERENTIAL METHOD: NORMAL
EOSINOPHIL # BLD AUTO: 0.3 K/UL (ref 0–0.5)
EOSINOPHIL NFR BLD: 3.9 % (ref 0–8)
ERYTHROCYTE [DISTWIDTH] IN BLOOD BY AUTOMATED COUNT: 12.6 % (ref 11.5–14.5)
EST. GFR  (NO RACE VARIABLE): >60 ML/MIN/1.73 M^2
GLUCOSE SERPL-MCNC: 90 MG/DL (ref 70–110)
HCT VFR BLD AUTO: 37.9 % (ref 37–48.5)
HDLC SERPL-MCNC: 69 MG/DL (ref 40–75)
HDLC SERPL: 47.6 % (ref 20–50)
HGB BLD-MCNC: 12.5 G/DL (ref 12–16)
IMM GRANULOCYTES # BLD AUTO: 0.02 K/UL (ref 0–0.04)
IMM GRANULOCYTES NFR BLD AUTO: 0.3 % (ref 0–0.5)
LDLC SERPL CALC-MCNC: 61.8 MG/DL (ref 63–159)
LYMPHOCYTES # BLD AUTO: 1.6 K/UL (ref 1–4.8)
LYMPHOCYTES NFR BLD: 22.5 % (ref 18–48)
MCH RBC QN AUTO: 28.9 PG (ref 27–31)
MCHC RBC AUTO-ENTMCNC: 33 G/DL (ref 32–36)
MCV RBC AUTO: 88 FL (ref 82–98)
MONOCYTES # BLD AUTO: 0.7 K/UL (ref 0.3–1)
MONOCYTES NFR BLD: 9.3 % (ref 4–15)
NEUTROPHILS # BLD AUTO: 4.6 K/UL (ref 1.8–7.7)
NEUTROPHILS NFR BLD: 63.3 % (ref 38–73)
NONHDLC SERPL-MCNC: 76 MG/DL
NRBC BLD-RTO: 0 /100 WBC
PLATELET # BLD AUTO: 290 K/UL (ref 150–450)
PMV BLD AUTO: 10.6 FL (ref 9.2–12.9)
POTASSIUM SERPL-SCNC: 4.3 MMOL/L (ref 3.5–5.1)
PROT SERPL-MCNC: 7 G/DL (ref 6–8.4)
RBC # BLD AUTO: 4.32 M/UL (ref 4–5.4)
SODIUM SERPL-SCNC: 138 MMOL/L (ref 136–145)
TRIGL SERPL-MCNC: 71 MG/DL (ref 30–150)
WBC # BLD AUTO: 7.19 K/UL (ref 3.9–12.7)

## 2023-12-05 PROCEDURE — 80061 LIPID PANEL: CPT | Performed by: FAMILY MEDICINE

## 2023-12-05 PROCEDURE — 80053 COMPREHEN METABOLIC PANEL: CPT | Performed by: FAMILY MEDICINE

## 2023-12-05 PROCEDURE — 85025 COMPLETE CBC W/AUTO DIFF WBC: CPT | Performed by: FAMILY MEDICINE

## 2023-12-05 PROCEDURE — 36415 COLL VENOUS BLD VENIPUNCTURE: CPT | Mod: PO | Performed by: FAMILY MEDICINE

## 2023-12-13 ENCOUNTER — OFFICE VISIT (OUTPATIENT)
Dept: PRIMARY CARE CLINIC | Facility: CLINIC | Age: 83
End: 2023-12-13
Payer: MEDICARE

## 2023-12-13 VITALS
HEIGHT: 63 IN | WEIGHT: 139.69 LBS | SYSTOLIC BLOOD PRESSURE: 136 MMHG | DIASTOLIC BLOOD PRESSURE: 78 MMHG | TEMPERATURE: 98 F | BODY MASS INDEX: 24.75 KG/M2 | RESPIRATION RATE: 18 BRPM

## 2023-12-13 DIAGNOSIS — F32.1 MAJOR DEPRESSIVE DISORDER, SINGLE EPISODE, MODERATE: ICD-10-CM

## 2023-12-13 DIAGNOSIS — G62.9 PERIPHERAL POLYNEUROPATHY: ICD-10-CM

## 2023-12-13 DIAGNOSIS — I10 PRIMARY HYPERTENSION: Primary | ICD-10-CM

## 2023-12-13 DIAGNOSIS — E78.2 MIXED HYPERLIPIDEMIA: Chronic | ICD-10-CM

## 2023-12-13 DIAGNOSIS — G57.82 NEUROPATHY OF LEFT SURAL NERVE: ICD-10-CM

## 2023-12-13 DIAGNOSIS — R32 URINARY INCONTINENCE, UNSPECIFIED TYPE: ICD-10-CM

## 2023-12-13 DIAGNOSIS — I48.0 PAF (PAROXYSMAL ATRIAL FIBRILLATION): Chronic | ICD-10-CM

## 2023-12-13 PROCEDURE — 1159F MED LIST DOCD IN RCRD: CPT | Mod: CPTII,S$GLB,, | Performed by: FAMILY MEDICINE

## 2023-12-13 PROCEDURE — 1157F ADVNC CARE PLAN IN RCRD: CPT | Mod: CPTII,S$GLB,, | Performed by: FAMILY MEDICINE

## 2023-12-13 PROCEDURE — 1101F PT FALLS ASSESS-DOCD LE1/YR: CPT | Mod: CPTII,S$GLB,, | Performed by: FAMILY MEDICINE

## 2023-12-13 PROCEDURE — 3288F FALL RISK ASSESSMENT DOCD: CPT | Mod: CPTII,S$GLB,, | Performed by: FAMILY MEDICINE

## 2023-12-13 PROCEDURE — 1160F RVW MEDS BY RX/DR IN RCRD: CPT | Mod: CPTII,S$GLB,, | Performed by: FAMILY MEDICINE

## 2023-12-13 PROCEDURE — 3288F PR FALLS RISK ASSESSMENT DOCUMENTED: ICD-10-PCS | Mod: CPTII,S$GLB,, | Performed by: FAMILY MEDICINE

## 2023-12-13 PROCEDURE — 1125F AMNT PAIN NOTED PAIN PRSNT: CPT | Mod: CPTII,S$GLB,, | Performed by: FAMILY MEDICINE

## 2023-12-13 PROCEDURE — 3075F PR MOST RECENT SYSTOLIC BLOOD PRESS GE 130-139MM HG: ICD-10-PCS | Mod: CPTII,S$GLB,, | Performed by: FAMILY MEDICINE

## 2023-12-13 PROCEDURE — 1157F PR ADVANCE CARE PLAN OR EQUIV PRESENT IN MEDICAL RECORD: ICD-10-PCS | Mod: CPTII,S$GLB,, | Performed by: FAMILY MEDICINE

## 2023-12-13 PROCEDURE — 3078F PR MOST RECENT DIASTOLIC BLOOD PRESSURE < 80 MM HG: ICD-10-PCS | Mod: CPTII,S$GLB,, | Performed by: FAMILY MEDICINE

## 2023-12-13 PROCEDURE — 3078F DIAST BP <80 MM HG: CPT | Mod: CPTII,S$GLB,, | Performed by: FAMILY MEDICINE

## 2023-12-13 PROCEDURE — 1125F PR PAIN SEVERITY QUANTIFIED, PAIN PRESENT: ICD-10-PCS | Mod: CPTII,S$GLB,, | Performed by: FAMILY MEDICINE

## 2023-12-13 PROCEDURE — 1160F PR REVIEW ALL MEDS BY PRESCRIBER/CLIN PHARMACIST DOCUMENTED: ICD-10-PCS | Mod: CPTII,S$GLB,, | Performed by: FAMILY MEDICINE

## 2023-12-13 PROCEDURE — 99999 PR PBB SHADOW E&M-EST. PATIENT-LVL V: ICD-10-PCS | Mod: PBBFAC,,, | Performed by: FAMILY MEDICINE

## 2023-12-13 PROCEDURE — 99215 OFFICE O/P EST HI 40 MIN: CPT | Mod: S$GLB,,, | Performed by: FAMILY MEDICINE

## 2023-12-13 PROCEDURE — 1159F PR MEDICATION LIST DOCUMENTED IN MEDICAL RECORD: ICD-10-PCS | Mod: CPTII,S$GLB,, | Performed by: FAMILY MEDICINE

## 2023-12-13 PROCEDURE — 1101F PR PT FALLS ASSESS DOC 0-1 FALLS W/OUT INJ PAST YR: ICD-10-PCS | Mod: CPTII,S$GLB,, | Performed by: FAMILY MEDICINE

## 2023-12-13 PROCEDURE — 3075F SYST BP GE 130 - 139MM HG: CPT | Mod: CPTII,S$GLB,, | Performed by: FAMILY MEDICINE

## 2023-12-13 PROCEDURE — 99999 PR PBB SHADOW E&M-EST. PATIENT-LVL V: CPT | Mod: PBBFAC,,, | Performed by: FAMILY MEDICINE

## 2023-12-13 PROCEDURE — 99215 PR OFFICE/OUTPT VISIT, EST, LEVL V, 40-54 MIN: ICD-10-PCS | Mod: S$GLB,,, | Performed by: FAMILY MEDICINE

## 2023-12-13 RX ORDER — MIRTAZAPINE 15 MG/1
TABLET, FILM COATED ORAL
Qty: 120 TABLET | Refills: 3 | Status: SHIPPED | OUTPATIENT
Start: 2023-12-13 | End: 2024-01-11

## 2023-12-13 RX ORDER — GABAPENTIN 400 MG/1
400 CAPSULE ORAL 3 TIMES DAILY
Qty: 270 CAPSULE | Refills: 1 | Status: SHIPPED | OUTPATIENT
Start: 2023-12-13

## 2023-12-13 NOTE — PROGRESS NOTES
Do not have a 1:00 a.m. I am not complain in I my patients sick especially Viviana escudero    [facility] Provider App[facility]ment   [facility]       Patient ID: Serenity Epps is a 83 y.o. female.    ASSESSMENT/PLAN by Problem List:    1. Primary hypertension  Assessment & Plan:  Stable and satisfactory continue current medications      2. Mixed hyperlipidemia  Assessment & Plan:  Stable continue atorvastatin 40 mg      3. PAF (paroxysmal atrial fibrillation)  Overview:  Seen on loop recorder    Assessment & Plan:  Reviewed cardiology notes.  She is scheduled for Watchman device.  Rate is controlled.      4. Neuropathy of left sural nerve  Overview:  She reports bilateral peripheral neuropathy of her feet and on her hands.  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  She does have some chronic upper and lower left extremity paresthesias after a CVA.    Orders:  -     gabapentin (NEURONTIN) 400 MG capsule; Take 1 capsule (400 mg total) by mouth 3 (three) times daily.  Dispense: 270 capsule; Refill: 1    5. Major depressive disorder, single episode, moderate  -     mirtazapine (REMERON) 15 MG tablet; Take 1-2 tablets by mouth at bedtime  Dispense: 120 tablet; Refill: 3    6. Urinary incontinence, unspecified type  -     Ambulatory referral/consult to Physical/Occupational Therapy; Future; Expected date: 12/20/2023    7. Peripheral polyneuropathy  Overview:  She reports bilateral peripheral neuropathy of her feet and on her hands.  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  She does have some chronic upper and lower left extremity paresthesias after a CVA.           Follow Up:  Three months    Forty-four minutes of total time spent on the encounter, time includes face to face time, and some or all of the following: review of chart, lab, imaging, consultant notes, ER, hospital, documentation, care coordination,  etc.    Health Maintenance         Date Due Completion Date    RSV Vaccine (Age 60+ and Pregnant patients) (1 - 1-dose 60+ series) Never done ---    COVID-19 Vaccine (4 - 2023-24 season) 09/01/2023 10/5/2021    DEXA Scan 01/17/2026 1/17/2023    TETANUS VACCINE 09/21/2026 9/21/2016    Lipid Panel 12/05/2028 12/5/2023          Discussed recommended vaccinations including RSV and COVID-19      Subjective:     Chief Complaint   Patient presents with    Follow-up    Peripheral Neuropathy    incontience    pain medication            I have reviewed the information entered by the ancillary staff regarding the chief complaint as well as the related history.    HPI    Patient is a/an 83 y.o.  female     She is here with her .  Several topics to discuss.  Arthralgias, discussed safety of tylenol.  She takes two extra-strength Tylenol a day.  She wants to know if this is safe.  I advised her the safe limit Tylenol she can safely take more, try to remain under 3000 mg a day most days    Neuropathy feet, asked about lyrica, for now will increase gabapentin to 400 mg 3 times a day as she does not have any apparent side effects.  But Lyrica is an option we can consider if not responding or developing side effects    She has chronic urinary incontinence, worsening.  She asked about treatment options.      She is scheduled for the Watchman procedure.  She does have some questions but is determined to proceed.    For complete problem list, past medical history, surgical history, social history, etc., see appropriate section in the electronic medical record    Review of Systems   Constitutional:  Negative for fatigue, fever and unexpected weight change.   HENT:  Negative for trouble swallowing.    Eyes:  Negative for visual disturbance.   Respiratory:  Negative for cough and shortness of breath.    Cardiovascular:  Negative for chest pain, palpitations and leg swelling.   Gastrointestinal:  Negative for abdominal pain, blood in  "stool, constipation, diarrhea, nausea and vomiting.   Genitourinary:  Negative for dysuria, frequency and hematuria.   Musculoskeletal:  Positive for arthralgias and myalgias. Negative for neck pain.   Skin:  Negative for rash and wound.   Neurological:  Positive for numbness. Negative for dizziness, tremors, syncope and headaches.   Psychiatric/Behavioral:  Negative for dysphoric mood and sleep disturbance. The patient is not nervous/anxious.        Objective     Physical Exam  Vitals reviewed.   Constitutional:       General: She is not in acute distress.     Appearance: She is well-developed. She is not ill-appearing.   HENT:      Head: Normocephalic and atraumatic.   Eyes:      General: No scleral icterus.     Conjunctiva/sclera: Conjunctivae normal.   Cardiovascular:      Rate and Rhythm: Normal rate and regular rhythm.      Heart sounds: Normal heart sounds. No murmur heard.  Pulmonary:      Effort: Pulmonary effort is normal. No respiratory distress.      Breath sounds: Normal breath sounds.   Skin:     General: Skin is dry.      Findings: No rash.   Neurological:      Mental Status: She is alert and oriented to person, place, and time.      Comments: Ambulatory with a walker.   Psychiatric:         Behavior: Behavior normal.       Vitals:    12/13/23 0732   BP: 136/78   BP Location: Left arm   Patient Position: Sitting   BP Method: Large (Manual)   Resp: 18   Temp: 97.7 °F (36.5 °C)   TempSrc: Oral   Weight: 63.3 kg (139 lb 10.6 oz)   Height: 5' 3" (1.6 m)           THIS DOCUMENT WAS MADE IN PART WITH VOICE RECOGNITION SOFTWARE.  OCCASIONALLY THIS SOFTWARE WILL MISINTERPRET WORDS OR PHRASES.    "

## 2023-12-14 DIAGNOSIS — H40.1233 LOW-TENSION GLAUCOMA OF BOTH EYES, SEVERE STAGE: ICD-10-CM

## 2023-12-14 RX ORDER — LATANOPROST 50 UG/ML
1 SOLUTION/ DROPS OPHTHALMIC NIGHTLY
Qty: 7.5 ML | Refills: 3 | Status: SHIPPED | OUTPATIENT
Start: 2023-12-14 | End: 2024-12-13

## 2023-12-14 RX ORDER — LATANOPROST 50 UG/ML
1 SOLUTION/ DROPS OPHTHALMIC NIGHTLY
Qty: 7.5 ML | Refills: 3 | Status: CANCELLED | OUTPATIENT
Start: 2023-12-14 | End: 2024-12-13

## 2023-12-20 DIAGNOSIS — M17.11 OSTEOARTHRITIS OF RIGHT KNEE, UNSPECIFIED OSTEOARTHRITIS TYPE: Primary | ICD-10-CM

## 2023-12-21 ENCOUNTER — HOSPITAL ENCOUNTER (OUTPATIENT)
Dept: RADIOLOGY | Facility: HOSPITAL | Age: 83
Discharge: HOME | End: 2023-12-21
Attending: ORTHOPAEDIC SURGERY
Payer: MEDICARE

## 2023-12-21 ENCOUNTER — OFFICE VISIT (OUTPATIENT)
Dept: ORTHOPEDICS | Facility: CLINIC | Age: 83
End: 2023-12-21
Payer: MEDICARE

## 2023-12-21 VITALS — WEIGHT: 139 LBS | BODY MASS INDEX: 24.63 KG/M2 | HEIGHT: 63 IN

## 2023-12-21 DIAGNOSIS — M17.11 OSTEOARTHRITIS OF RIGHT KNEE, UNSPECIFIED OSTEOARTHRITIS TYPE: Primary | ICD-10-CM

## 2023-12-21 DIAGNOSIS — M17.11 OSTEOARTHRITIS OF RIGHT KNEE, UNSPECIFIED OSTEOARTHRITIS TYPE: ICD-10-CM

## 2023-12-21 DIAGNOSIS — M25.511 RIGHT SHOULDER PAIN, UNSPECIFIED CHRONICITY: ICD-10-CM

## 2023-12-21 DIAGNOSIS — M25.519 SHOULDER PAIN, UNSPECIFIED CHRONICITY, UNSPECIFIED LATERALITY: Primary | ICD-10-CM

## 2023-12-21 DIAGNOSIS — M25.519 SHOULDER PAIN, UNSPECIFIED CHRONICITY, UNSPECIFIED LATERALITY: ICD-10-CM

## 2023-12-21 PROCEDURE — 1101F PR PT FALLS ASSESS DOC 0-1 FALLS W/OUT INJ PAST YR: ICD-10-PCS | Mod: CPTII,S$GLB,, | Performed by: ORTHOPAEDIC SURGERY

## 2023-12-21 PROCEDURE — 99214 OFFICE O/P EST MOD 30 MIN: CPT | Mod: 25,S$GLB,, | Performed by: ORTHOPAEDIC SURGERY

## 2023-12-21 PROCEDURE — 3288F PR FALLS RISK ASSESSMENT DOCUMENTED: ICD-10-PCS | Mod: CPTII,S$GLB,, | Performed by: ORTHOPAEDIC SURGERY

## 2023-12-21 PROCEDURE — 3288F FALL RISK ASSESSMENT DOCD: CPT | Mod: CPTII,S$GLB,, | Performed by: ORTHOPAEDIC SURGERY

## 2023-12-21 PROCEDURE — 1159F PR MEDICATION LIST DOCUMENTED IN MEDICAL RECORD: ICD-10-PCS | Mod: CPTII,S$GLB,, | Performed by: ORTHOPAEDIC SURGERY

## 2023-12-21 PROCEDURE — 20610 DRAIN/INJ JOINT/BURSA W/O US: CPT | Mod: RT,S$GLB,, | Performed by: ORTHOPAEDIC SURGERY

## 2023-12-21 PROCEDURE — 73030 X-RAY EXAM OF SHOULDER: CPT | Mod: TC,PO,RT

## 2023-12-21 PROCEDURE — 99214 PR OFFICE/OUTPT VISIT, EST, LEVL IV, 30-39 MIN: ICD-10-PCS | Mod: 25,S$GLB,, | Performed by: ORTHOPAEDIC SURGERY

## 2023-12-21 PROCEDURE — 99999 PR PBB SHADOW E&M-EST. PATIENT-LVL III: ICD-10-PCS | Mod: PBBFAC,,, | Performed by: ORTHOPAEDIC SURGERY

## 2023-12-21 PROCEDURE — 1101F PT FALLS ASSESS-DOCD LE1/YR: CPT | Mod: CPTII,S$GLB,, | Performed by: ORTHOPAEDIC SURGERY

## 2023-12-21 PROCEDURE — 1125F AMNT PAIN NOTED PAIN PRSNT: CPT | Mod: CPTII,S$GLB,, | Performed by: ORTHOPAEDIC SURGERY

## 2023-12-21 PROCEDURE — 20610 LARGE JOINT ASPIRATION/INJECTION: R KNEE: ICD-10-PCS | Mod: RT,S$GLB,, | Performed by: ORTHOPAEDIC SURGERY

## 2023-12-21 PROCEDURE — 99999 PR PBB SHADOW E&M-EST. PATIENT-LVL III: CPT | Mod: PBBFAC,,, | Performed by: ORTHOPAEDIC SURGERY

## 2023-12-21 PROCEDURE — 1157F ADVNC CARE PLAN IN RCRD: CPT | Mod: CPTII,S$GLB,, | Performed by: ORTHOPAEDIC SURGERY

## 2023-12-21 PROCEDURE — 73030 X-RAY EXAM OF SHOULDER: CPT | Mod: 26,RT,, | Performed by: RADIOLOGY

## 2023-12-21 PROCEDURE — 1125F PR PAIN SEVERITY QUANTIFIED, PAIN PRESENT: ICD-10-PCS | Mod: CPTII,S$GLB,, | Performed by: ORTHOPAEDIC SURGERY

## 2023-12-21 PROCEDURE — 73030 XR SHOULDER TRAUMA 3 VIEW RIGHT: ICD-10-PCS | Mod: 26,RT,, | Performed by: RADIOLOGY

## 2023-12-21 PROCEDURE — 1157F PR ADVANCE CARE PLAN OR EQUIV PRESENT IN MEDICAL RECORD: ICD-10-PCS | Mod: CPTII,S$GLB,, | Performed by: ORTHOPAEDIC SURGERY

## 2023-12-21 PROCEDURE — 1159F MED LIST DOCD IN RCRD: CPT | Mod: CPTII,S$GLB,, | Performed by: ORTHOPAEDIC SURGERY

## 2023-12-21 RX ORDER — TRIAMCINOLONE ACETONIDE 40 MG/ML
40 INJECTION, SUSPENSION INTRA-ARTICULAR; INTRAMUSCULAR
Status: DISCONTINUED | OUTPATIENT
Start: 2023-12-21 | End: 2023-12-21 | Stop reason: HOSPADM

## 2023-12-21 RX ADMIN — TRIAMCINOLONE ACETONIDE 40 MG: 40 INJECTION, SUSPENSION INTRA-ARTICULAR; INTRAMUSCULAR at 01:12

## 2023-12-21 NOTE — PROCEDURES
Large Joint Aspiration/Injection: R knee    Date/Time: 12/21/2023 1:15 PM    Performed by: Kamaljit Mane MD  Authorized by: Kamaljit Mane MD    Consent Done?:  Yes (Verbal)  Timeout: prior to procedure the correct patient, procedure, and site was verified    Prep: patient was prepped and draped in usual sterile fashion      Details:  Needle Size:  21 G  Approach:  Anterolateral  Location:  Knee  Site:  R knee  Medications:  40 mg triamcinolone acetonide 40 mg/mL  Patient tolerance:  Patient tolerated the procedure well with no immediate complications

## 2023-12-21 NOTE — PROGRESS NOTES
83 y.o. year old presents to the clinic today with recurring pain in the right knee.  Chronic problem.  Patient has had Kenlaog injections in the past and responded well.  Last injection was 5 months ago.  Symptoms not improving    Exam shows tenderness at the joint line without signs of infection or instability    X-rays show arthritic changes    Assessment:  right knee arthrosis    Plan:  Kenlaog into the right knee.  Encourage strengthening over time.  Followup as needed.

## 2023-12-25 ENCOUNTER — CLINICAL SUPPORT (OUTPATIENT)
Dept: CARDIOLOGY | Facility: HOSPITAL | Age: 83
End: 2023-12-25
Payer: MEDICARE

## 2023-12-25 ENCOUNTER — CLINICAL SUPPORT (OUTPATIENT)
Dept: CARDIOLOGY | Facility: HOSPITAL | Age: 83
End: 2023-12-25
Attending: INTERNAL MEDICINE
Payer: MEDICARE

## 2024-01-02 LAB
OHS CV AF BURDEN PERCENT: < 1
OHS CV DC REMOTE DEVICE TYPE: NORMAL

## 2024-01-11 DIAGNOSIS — F32.1 MAJOR DEPRESSIVE DISORDER, SINGLE EPISODE, MODERATE: ICD-10-CM

## 2024-01-11 RX ORDER — MIRTAZAPINE 15 MG/1
TABLET, FILM COATED ORAL
Qty: 120 TABLET | Refills: 3 | Status: SHIPPED | OUTPATIENT
Start: 2024-01-11

## 2024-01-12 PROBLEM — Z95.818 PRESENCE OF WATCHMAN LEFT ATRIAL APPENDAGE CLOSURE DEVICE: Status: ACTIVE | Noted: 2024-01-12

## 2024-01-26 ENCOUNTER — CLINICAL SUPPORT (OUTPATIENT)
Dept: CARDIOLOGY | Facility: HOSPITAL | Age: 84
End: 2024-01-26

## 2024-01-26 ENCOUNTER — HOSPITAL ENCOUNTER (OUTPATIENT)
Dept: CARDIOLOGY | Facility: HOSPITAL | Age: 84
Discharge: HOME | End: 2024-01-26
Attending: INTERNAL MEDICINE

## 2024-01-26 DIAGNOSIS — R00.2 PALPITATIONS: ICD-10-CM

## 2024-01-26 PROCEDURE — 93298 REM INTERROG DEV EVAL SCRMS: CPT | Mod: 26,,, | Performed by: INTERNAL MEDICINE

## 2024-01-26 PROCEDURE — 93298 REM INTERROG DEV EVAL SCRMS: CPT | Mod: PO | Performed by: INTERNAL MEDICINE

## 2024-02-06 RX ORDER — VALSARTAN 80 MG/1
80 TABLET ORAL NIGHTLY
Qty: 90 TABLET | Refills: 3 | Status: SHIPPED | OUTPATIENT
Start: 2024-02-06 | End: 2025-02-05

## 2024-02-12 PROBLEM — I63.311 STROKE DUE TO THROMBOSIS OF RIGHT MIDDLE CEREBRAL ARTERY: Status: RESOLVED | Noted: 2023-11-07 | Resolved: 2024-02-12

## 2024-02-21 ENCOUNTER — OFFICE VISIT (OUTPATIENT)
Dept: CARDIOLOGY | Facility: CLINIC | Age: 84
End: 2024-02-21
Payer: MEDICARE

## 2024-02-21 VITALS
HEIGHT: 63 IN | SYSTOLIC BLOOD PRESSURE: 113 MMHG | BODY MASS INDEX: 25.2 KG/M2 | DIASTOLIC BLOOD PRESSURE: 68 MMHG | WEIGHT: 142.19 LBS | HEART RATE: 55 BPM

## 2024-02-21 DIAGNOSIS — Z91.89 AT RISK FOR BLEEDING: ICD-10-CM

## 2024-02-21 DIAGNOSIS — Z86.73 HISTORY OF ISCHEMIC RIGHT MCA STROKE: ICD-10-CM

## 2024-02-21 DIAGNOSIS — I10 PRIMARY HYPERTENSION: ICD-10-CM

## 2024-02-21 DIAGNOSIS — I70.0 ABDOMINAL AORTIC ATHEROSCLEROSIS: ICD-10-CM

## 2024-02-21 DIAGNOSIS — I48.0 PAF (PAROXYSMAL ATRIAL FIBRILLATION): ICD-10-CM

## 2024-02-21 DIAGNOSIS — Z95.818 STATUS POST PLACEMENT OF IMPLANTABLE LOOP RECORDER: ICD-10-CM

## 2024-02-21 DIAGNOSIS — Z95.818 PRESENCE OF WATCHMAN LEFT ATRIAL APPENDAGE CLOSURE DEVICE: Primary | ICD-10-CM

## 2024-02-21 PROCEDURE — 3288F FALL RISK ASSESSMENT DOCD: CPT | Mod: CPTII,S$GLB,, | Performed by: INTERNAL MEDICINE

## 2024-02-21 PROCEDURE — 99214 OFFICE O/P EST MOD 30 MIN: CPT | Mod: S$GLB,,, | Performed by: INTERNAL MEDICINE

## 2024-02-21 PROCEDURE — 1126F AMNT PAIN NOTED NONE PRSNT: CPT | Mod: CPTII,S$GLB,, | Performed by: INTERNAL MEDICINE

## 2024-02-21 PROCEDURE — 3078F DIAST BP <80 MM HG: CPT | Mod: CPTII,S$GLB,, | Performed by: INTERNAL MEDICINE

## 2024-02-21 PROCEDURE — 1157F ADVNC CARE PLAN IN RCRD: CPT | Mod: CPTII,S$GLB,, | Performed by: INTERNAL MEDICINE

## 2024-02-21 PROCEDURE — 1101F PT FALLS ASSESS-DOCD LE1/YR: CPT | Mod: CPTII,S$GLB,, | Performed by: INTERNAL MEDICINE

## 2024-02-21 PROCEDURE — 3074F SYST BP LT 130 MM HG: CPT | Mod: CPTII,S$GLB,, | Performed by: INTERNAL MEDICINE

## 2024-02-21 PROCEDURE — 99999 PR PBB SHADOW E&M-EST. PATIENT-LVL III: CPT | Mod: PBBFAC,,, | Performed by: INTERNAL MEDICINE

## 2024-02-21 RX ORDER — ASPIRIN 81 MG/1
81 TABLET ORAL DAILY
Qty: 90 TABLET | Refills: 3 | Status: SHIPPED | OUTPATIENT
Start: 2024-02-21

## 2024-02-21 RX ORDER — CLOPIDOGREL BISULFATE 75 MG/1
75 TABLET ORAL DAILY
Qty: 141 TABLET | Refills: 0 | Status: ON HOLD | OUTPATIENT
Start: 2024-02-21 | End: 2024-04-04

## 2024-02-21 RX ORDER — PANTOPRAZOLE SODIUM 40 MG/1
40 TABLET, DELAYED RELEASE ORAL DAILY
Qty: 141 TABLET | Refills: 0 | Status: SHIPPED | OUTPATIENT
Start: 2024-02-21 | End: 2024-07-11

## 2024-02-21 NOTE — PATIENT INSTRUCTIONS
Ultrasound down the throat (SWETHA) in April  Start aspirin 81 mg once daily  Start clopidogrel (Plavix) 75 mg once daily  Start pantoprazole (Protonix) 40 mg once daily while on clopidogrel   Don't forget to take amoxicillin before any dental work in 2024  Return in July with Dr. Kinsey or myself  
ADMIT

## 2024-02-21 NOTE — PROGRESS NOTES
"Structural Cardiology Clinic Note      Patient: Serenity Epps, 1940, [id number]  Primary Care Provider: Shant Jc MD     Chief Complaint/Reason for Referral: LAAC     Subjective:       Serenity Epps is a 83 y.o. female who presents for consult. They are accompanied by her . Referred by my colleague Dr. Kinsey.    No falls. No blood in urine or stools. No CP. Some SOBOE but "nothing serious". Dependent edema. No orthopnea or PND. No chest discomfort. No fevers, chills. Still on rivaroxaban.     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 Quit 2002. 1-2 glasses of wine in the evening.   No history of MI or coronary revasc    Review of Systems  Constitutional: negative for fevers, night sweats, and weight loss  Eyes: negative for visual disturbance, diplopia  Respiratory: negative for cough, hemoptysis, sputum, and wheezing  Cardiovascular: see HPI  Gastrointestinal: negative for abdominal pain, bright red blood per rectum, change in bowel habits, dysphagia, melena, and reflux symptoms  Genitourinary:negative for dysuria, frequency, and hematuria  Hematologic/lymphatic: negative for bleeding, easy bruising, and lymphadenopathy  Musculoskeletal:negative for arthralgias, back pain, and myalgias  Neurological: negative for gait problems, paresthesia, speech problems, vertigo, and weakness  Behavioral/Psych: negative for excessive alcohol consumption, illegal drug usage, and sleep disturbance    ----------------------------  Anxiety  Arthritis  Cataract - Both Eyes      Comment:  OU done//  CKD (chronic kidney disease), stage II      Comment:  pt " denies  Diarrhea  Diverticulosis  DVT (deep venous thrombosis)      Comment:  left leg, after childbirth  Exudative age-related macular degeneration of left eye  Glaucoma  Hyperlipidemia  Hypertension  Irritable bowel syndrome  Left foot pain      Comment:  chronic  Lymphocytic colitis  Macular degeneration      Comment:  bimonthly intraoccular injections  Osteopenia  Paroxysmal atrial fibrillation  Recurrent major depressive disorder  Rhinitis, chronic  Strabismus      Comment:  as child  Stroke due to thrombosis of right middle cerebral artery  Urinary incontinence  ----------------------------  Appendectomy      Comment:  age 38  Cataract extraction      Comment:  OU done//  Cataract extraction w/ intraocular lens  implant, bilateral  Closure of left atrial appendage using device      Comment:  Procedure: Watchman;  Surgeon: Nellie Gibson MD;                 Location: [facility] CATH;  Service: Cardiology;;  Colonoscopy      Comment:  Diverticulosis.   Internal small hemorrhoids were found.  Colonoscopy      Comment:  Dr. Lynn, repeat in 7-8 years for surveillance  Colonoscopy  Colonoscopy      Comment:  Procedure: COLONOSCOPY;  Surgeon: Toby Lynn Jr., MD;  Location: [facility];  Service: Endoscopy;                 Laterality: N/A;  Echocardiogram,transesophageal      Comment:  Procedure: ECHOCARDIOGRAM,TRANSESOPHAGEAL;  Surgeon:                Daron Kinsey MD;  Location: [facility];  Service:                Cardiology;  Laterality: N/A;  Echocardiogram,transesophageal      Comment:  Procedure: (SWETHA) intra-procedure;  Surgeon: Daron Kinsey MD;  Location: [facility];  Service: Cardiology;;  Eye surgery      Comment:  Phaco with IOL (cataract extraction), rigth:[id number] 22.0                d//  Foot hardware removal      Comment:  Dr. Hammonds  Foot surgery      Comment:  ERG and open reduction tallo tarsal dislocation                (hyprocure implant)  Fracture surgery       Comment:  HIP  Insertion of implantable loop recorder      Comment:  Procedure: INSERTION, IMPLANTABLE LOOP RECORDER;                 Surgeon: Daron Kinsey MD;  Location: [facility];                 Service: Cardiology;  Laterality: N/A;  Insertion of implantable loop recorder      Comment:  Procedure: INSERTION, IMPLANTABLE LOOP RECORDER;                 Surgeon: Daron Kinsey MD;  Location: [facility];                 Service: Cardiology;  Laterality: N/A;  Joint replacement  Partial hysterectomy      Comment:  age 38, ovaries conserved  Pip joint fusion      Comment:  2nd-4th PIP joints of right foot; Dr. Hammonds  Tonsillectomy      Comment:  as a child  Total knee arthroplasty  Upper gastrointestinal endoscopy      Comment:  Erythema in the lower third of the esophagus (NERD).                 Patulous lower esophageal sphincter.   Gastric mucosal                atrophy.   PARAG Test  Negative.   Vein ligation      Comment:  BLE     Family History   Problem Relation Age of Onset    Coronary artery disease Mother 78    Glaucoma Mother     Diverticulitis Father     Parkinsonism Brother     Glaucoma Brother     Ankylosing spondylitis Brother     Diabetes Brother     Macular degeneration Brother          twin brother wet mac//    Cancer Brother         prostate    Crohn's disease Brother     Parkinsonism Brother     No Known Problems Maternal Grandmother     No Known Problems Maternal Grandfather     No Known Problems Paternal Grandmother     No Known Problems Paternal Grandfather     Breast cancer Daughter     Cancer Daughter         breast    Amblyopia Neg Hx     Blindness Neg Hx     Cataracts Neg Hx     Hypertension Neg Hx     Strabismus Neg Hx     Stroke Neg Hx     Thyroid disease Neg Hx     Retinal detachment Neg Hx     Celiac disease Neg Hx      Social History     Tobacco Use    Smoking status: Former     Current packs/day: 0.00     Average packs/day: 2.0 packs/day for 35.0 years (70.0 ttl pk-yrs)      Types: Cigarettes     Start date: 1976     Quit date: 2011     Years since quittin.1    Smokeless tobacco: Never   Substance Use Topics    Alcohol use: Yes     Alcohol/week: 14.0 standard drinks of alcohol     Types: 14 Glasses of wine per week     Comment: 2-3 glasses per night    Drug use: Yes     Types: Benzodiazepines       Current Outpatient Medications   Medication Sig Dispense Refill    acetaminophen (TYLENOL) 650 MG TbSR Take 650 mg by mouth 2 (two) times a day. 4 a day      amoxicillin (AMOXIL) 500 MG Tab Take 4 tabs (2000 mg) once 60 minutes prior to dental cleaning or other high risk procedures 20 tablet 0    atorvastatin (LIPITOR) 40 MG tablet Take 1 tablet (40 mg total) by mouth once daily. 60 tablet 9    azelastine (ASTELIN) 137 mcg (0.1 %) nasal spray USE 1 SPRAY IN EACH NOSTRIL TWICE DAILY (Patient taking differently: 1 spray by Nasal route 2 (two) times daily.) 60 mL 6    BENEFIBER, WHEAT DEXTRIN, ORAL Take 1 Dose by mouth every morning.      biotin 1 mg tablet Take 1,000 mcg by mouth Daily.      calcium carbonate (OS-BLAKE) 600 mg (1,500 mg) Tab Take 600 mg by mouth 2 (two) times daily with meals.      diclofenac sodium (VOLTAREN) 1 % Gel Apply 2 g topically 3 (three) times daily. To painful area of neck (Patient taking differently: Apply 2 g topically 3 (three) times daily as needed. To painful area of neck) 450 g 0    docusate sodium (COLACE) 100 MG capsule Take 100 mg by mouth 2 (two) times daily.      dorzolamide-timolol 2-0.5% (COSOPT) 22.3-6.8 mg/mL ophthalmic solution Place 1 drop into both eyes 2 (two) times daily. 30 mL 4    EYLEA 2 mg/0.05 mL Syrg Inject 1 mL into the skin every 30 days.      fluticasone propionate (FLONASE) 50 mcg/actuation nasal spray USE 2 SPRAYS IN EACH NOSTRIL ONE TIME DAILY (Patient taking differently: 2 sprays by Each Nostril route once daily.) 48 g 3    gabapentin (NEURONTIN) 400 MG capsule Take 1 capsule (400 mg total) by mouth 3 (three) times daily.  "270 capsule 1    latanoprost 0.005 % ophthalmic solution Place 1 drop into both eyes every evening. 7.5 mL 3    latanoprost 0.005 % ophthalmic solution Place 1 drop in both eyes every evening. 7.5 mL 0    metoprolol succinate (TOPROL-XL) 25 MG 24 hr tablet Take 1 tablet (25 mg total) by mouth 2 (two) times daily. 180 tablet 3    mirtazapine (REMERON) 15 MG tablet Take 1-2 tablets by mouth at bedtime 120 tablet 3    valsartan (DIOVAN) 80 MG tablet Take 1 tablet (80 mg total) by mouth every evening. 90 tablet 3    VIT C/E/ZN/COPPR/LUTEIN/ZEAXAN (PRESERVISION AREDS 2 ORAL) Take 1 capsule by mouth 2 (two) times daily.       XARELTO 20 mg Tab Take 1 tablet (20 mg total) by mouth Daily. 45 tablet 0    aspirin (ECOTRIN) 81 MG EC tablet Take 1 tablet (81 mg total) by mouth once daily. 90 tablet 3    clopidogreL (PLAVIX) 75 mg tablet Take 1 tablet (75 mg total) by mouth once daily. 141 tablet 0    pantoprazole (PROTONIX) 40 MG tablet Take 1 tablet (40 mg total) by mouth once daily. 141 tablet 0     No current facility-administered medications for this visit.        Objective:      Physical Exam  /68 (BP Location: Left arm, Patient Position: Sitting, BP Method: Medium (Automatic))   Pulse (!) 55   Ht 5' 3" (1.6 m)   Wt 64.5 kg (142 lb 3.2 oz)   BMI 25.19 kg/m²   Body surface area is 1.69 meters squared.  Body mass index is 25.19 kg/m².    General appearance: alert, appears stated age, cooperative, and no distress  Head: Normocephalic, without obvious abnormality, atraumatic  Neck: no carotid bruit, no JVD, and supple, symmetrical, trachea midline  Lungs: clear to auscultation bilaterally  Heart: regular rate and rhythm; S1, S2 normal, no murmur, click, rub or gallop  Abdomen: soft, non-tender, no distended  Extremities: extremities atraumatic, no pitting edema  Skin: warm, no cyanosis, no pathologic ecchymosis in exposed portions  Neurologic: Grossly normal. A&O x3      Lab Review   Lab Results   Component Value " Date    WBC 7.85 01/05/2024    HGB 12.4 01/05/2024    HCT 38.2 01/05/2024    MCV 89 01/05/2024     01/05/2024         BMP  Lab Results   Component Value Date     01/05/2024    K 4.3 01/05/2024     01/05/2024    CO2 26 01/05/2024    BUN 13 01/05/2024    CREATININE 0.74 01/05/2024    CALCIUM 9.6 01/05/2024    ANIONGAP 12 01/05/2024    ESTGFRAFRICA >60.0 01/19/2022    EGFRNONAA >60.0 01/19/2022       Lab Results   Component Value Date    ALBUMIN 3.7 12/05/2023       Lab Results   Component Value Date    ALT 16 12/05/2023    AST 20 12/05/2023    ALKPHOS 69 12/05/2023    BILITOT 0.6 12/05/2023       Lab Results   Component Value Date    TSH 1.920 12/24/2022       Lab Results   Component Value Date    CHOL 145 12/05/2023    CHOL 160 05/03/2023    CHOL 212 (H) 12/24/2022     Lab Results   Component Value Date    HDL 69 12/05/2023    HDL 76 (H) 05/03/2023    HDL 83 (H) 12/24/2022     Lab Results   Component Value Date    LDLCALC 61.8 (L) 12/05/2023    LDLCALC 60.0 (L) 05/03/2023    LDLCALC 108.8 12/24/2022     Lab Results   Component Value Date    TRIG 71 12/05/2023    TRIG 120 05/03/2023    TRIG 101 12/24/2022     Lab Results   Component Value Date    CHOLHDL 47.6 12/05/2023    CHOLHDL 47.5 05/03/2023    CHOLHDL 39.2 12/24/2022     [id number] score   Sex: 1  Female (1 point)   Male (0 points)    Age: 2   ?64 years old (0 points)   65 to 74 years old (1 point)   ?75 years old (2 points)    Comorbidities: 0+1+0+2+1   Heart failure (1 point)   Hypertension (1 point)   Diabetes mellitus (1 point)   History of stroke, TIA, or thromboembolism (2 points)   Vascular disease (history of MI, PAD, or aortic atherosclerosis) (1 point)    Total points: 7    Unadjusted stroke rate: [Nivia L, Annie M, [place] GY. Evaluation of risk stratification schemes for ischaemic stroke and bleeding in [id number] patients with atrial fibrillation: the Pashto Atrial Fibrillation cohort study. Eur Heart J 2012; 33:1500.]  0  points: 0.2% per year  1 point:  0.6% per year  2 points: 2.2% per year  3 points: 3.2% per year  4 points: 4.8% per year  5 points: 7.2% per year  6 points: 9.7% per year  7 points: 11.1% per year  8 points: 11% per year  9 points: 12.2% per year    HAS-BLED score:  Hypertension (1 point) : 1  Abnormal liver function (cirrhosis, bilirubin >2xULN or ALT>3xULN) (1 point): 0  Abnormal renal function (dialysis, renal transplant or Cr>2.26) (1 point): 0  Stroke (1 point): 1  Bleeding tendency or predisposition (1 point): 1  Labile INRs in patients taking warfarin (<60%TTR or high/labile INR) (1 point): 0  Elderly: age greater than 65 years (1 point): 1  Drugs: concomitant antiplatelet agents (eg, aspirin, clopidogrel, ticlopidine, nonsteroidal antiinflammatory agents) (1 point): 0  Drugs: concomitant excess alcohol use (1 point): 0    Total score: 4    0 points: 1.13 bleeds per 100 patient-years  1 point:  1.02 bleeds per 100 patient-years  2 points: 1.88 bleeds per 100 patient-years  3 points: 3.74 bleeds per 100 patient-years  4 points: 8.70 bleeds per 100 patient-years  5 to 9 points: Insufficient data    [Lip GY. Implications of the TALI(2)DS(2)-VASc and HAS-BLED Scores for thromboprophylaxis in atrial fibrillation. Am J Med 2011; 124:111.]    If you answer NO to any of the four criteria below, the patient does not meet the eligibility requirements for LAAC via Watchman implantation:    Patient has non-valvular atrial fibrillation: Yes  Patient has an increased risk for stroke and is recommended for oral anticoagulation (OAC): Yes  Patient is suitable for short-term anticoagulation therapy but deemed unable to take long-term OAC: Yes  Patient has an appropriate rationale to seek a non-pharmacological alternative to OACs. Specific factors include: History of bleeding or increased bleeding risk and History of risk of falls       Assessment & Plan:      This is a 83 y.o. pleasant frail WF with PAF and h/o stroke and  HBR.     She is recovering wonderfully after a successful left atrial appendage occlusion procedure. She has finished 6 weeks of rivaroxaban and we will transition to dual antiplatelet therapy.        1. PAF (paroxysmal atrial fibrillation)        2. Abdominal aortic atherosclerosis        3. Presence of Watchman left atrial appendage closure device        4. Primary hypertension        5. At risk for bleeding        6. History of stroke due to thrombosis of right middle cerebral artery        7. Status post placement of implantable loop recorder             Dual antiplatelet therapy through early July then single antiplatelet therapy at Dr. Kinsey's discretion - pantoprazole while on dual antiplatelet therapy  Valsartan 80 mg daily for hypertension and metoprolol 25 mg twice daily  Continue atorva 40 for atherosclerotic disease  Endocarditis prophylaxis through end of 2024  Emphasized the importance of modifying lifestyle related risk factors including exercise, diet most resembling a Mediterranean diet.    I appreciate the opportunity to participate in Serenity Epps 's care today.  Please follow up with Dr. Kinsey in July      Nellie Gibson MD, [facility]  Interventional Cardiology/Structural Heart Disease  [facility]  Office: [phone]     Parts of this note were completed using voice recognition software. Please excuse any misspellings or syntax errors and reach out to me with questions.

## 2024-02-27 ENCOUNTER — HOSPITAL ENCOUNTER (OUTPATIENT)
Dept: CARDIOLOGY | Facility: HOSPITAL | Age: 84
Discharge: HOME | End: 2024-02-27
Attending: INTERNAL MEDICINE

## 2024-02-27 ENCOUNTER — CLINICAL SUPPORT (OUTPATIENT)
Dept: CARDIOLOGY | Facility: HOSPITAL | Age: 84
End: 2024-02-27

## 2024-02-27 DIAGNOSIS — R00.2 PALPITATIONS: ICD-10-CM

## 2024-02-27 PROCEDURE — 93298 REM INTERROG DEV EVAL SCRMS: CPT | Mod: 26,,, | Performed by: INTERNAL MEDICINE

## 2024-02-27 PROCEDURE — 93298 REM INTERROG DEV EVAL SCRMS: CPT | Mod: PO | Performed by: INTERNAL MEDICINE

## 2024-03-07 ENCOUNTER — OFFICE VISIT (OUTPATIENT)
Dept: PRIMARY CARE CLINIC | Facility: CLINIC | Age: 84
End: 2024-03-07
Payer: MEDICARE

## 2024-03-07 VITALS
HEART RATE: 62 BPM | RESPIRATION RATE: 18 BRPM | DIASTOLIC BLOOD PRESSURE: 70 MMHG | TEMPERATURE: 98 F | BODY MASS INDEX: 24.86 KG/M2 | WEIGHT: 140.31 LBS | SYSTOLIC BLOOD PRESSURE: 134 MMHG | OXYGEN SATURATION: 98 %

## 2024-03-07 DIAGNOSIS — J30.9 ALLERGIC RHINITIS, UNSPECIFIED SEASONALITY, UNSPECIFIED TRIGGER: ICD-10-CM

## 2024-03-07 DIAGNOSIS — I10 PRIMARY HYPERTENSION: Primary | ICD-10-CM

## 2024-03-07 DIAGNOSIS — F33.41 RECURRENT MAJOR DEPRESSIVE DISORDER, IN PARTIAL REMISSION: ICD-10-CM

## 2024-03-07 DIAGNOSIS — H35.3211 EXUDATIVE AGE-RELATED MACULAR DEGENERATION OF RIGHT EYE WITH ACTIVE CHOROIDAL NEOVASCULARIZATION: ICD-10-CM

## 2024-03-07 DIAGNOSIS — E78.2 MIXED HYPERLIPIDEMIA: Chronic | ICD-10-CM

## 2024-03-07 DIAGNOSIS — F33.0 MILD EPISODE OF RECURRENT MAJOR DEPRESSIVE DISORDER: Chronic | ICD-10-CM

## 2024-03-07 DIAGNOSIS — J43.9 PULMONARY EMPHYSEMA, UNSPECIFIED EMPHYSEMA TYPE: ICD-10-CM

## 2024-03-07 DIAGNOSIS — J31.0 CHRONIC RHINITIS: ICD-10-CM

## 2024-03-07 PROCEDURE — 99999 PR PBB SHADOW E&M-EST. PATIENT-LVL IV: CPT | Mod: PBBFAC,,, | Performed by: FAMILY MEDICINE

## 2024-03-07 PROCEDURE — 99214 OFFICE O/P EST MOD 30 MIN: CPT | Mod: S$GLB,,, | Performed by: FAMILY MEDICINE

## 2024-03-07 PROCEDURE — 3078F DIAST BP <80 MM HG: CPT | Mod: CPTII,S$GLB,, | Performed by: FAMILY MEDICINE

## 2024-03-07 PROCEDURE — 1101F PT FALLS ASSESS-DOCD LE1/YR: CPT | Mod: CPTII,S$GLB,, | Performed by: FAMILY MEDICINE

## 2024-03-07 PROCEDURE — 1157F ADVNC CARE PLAN IN RCRD: CPT | Mod: CPTII,S$GLB,, | Performed by: FAMILY MEDICINE

## 2024-03-07 PROCEDURE — 1126F AMNT PAIN NOTED NONE PRSNT: CPT | Mod: CPTII,S$GLB,, | Performed by: FAMILY MEDICINE

## 2024-03-07 PROCEDURE — 1160F RVW MEDS BY RX/DR IN RCRD: CPT | Mod: CPTII,S$GLB,, | Performed by: FAMILY MEDICINE

## 2024-03-07 PROCEDURE — 3288F FALL RISK ASSESSMENT DOCD: CPT | Mod: CPTII,S$GLB,, | Performed by: FAMILY MEDICINE

## 2024-03-07 PROCEDURE — 3075F SYST BP GE 130 - 139MM HG: CPT | Mod: CPTII,S$GLB,, | Performed by: FAMILY MEDICINE

## 2024-03-07 PROCEDURE — 1159F MED LIST DOCD IN RCRD: CPT | Mod: CPTII,S$GLB,, | Performed by: FAMILY MEDICINE

## 2024-03-07 RX ORDER — FLUTICASONE PROPIONATE 50 MCG
2 SPRAY, SUSPENSION (ML) NASAL DAILY
Qty: 48 ML | Refills: 3 | Status: SHIPPED | OUTPATIENT
Start: 2024-03-07

## 2024-03-07 RX ORDER — AZELASTINE 1 MG/ML
1 SPRAY, METERED NASAL 2 TIMES DAILY
Qty: 90 ML | Refills: 3 | Status: SHIPPED | OUTPATIENT
Start: 2024-03-07

## 2024-03-07 NOTE — ASSESSMENT & PLAN NOTE
Patient reports ongoing symptoms even though she is on Astelin and Flonase.  Will add Claritin or similar antihistamine if not improving could consider Singulair but caution in the elderly.  May need to consult with Allergy or ENT if still not improving.

## 2024-03-07 NOTE — ASSESSMENT & PLAN NOTE
Patient is a former smoker.  Emphysematous changes have been documented on multiple images.  Clinically she is stable and does not complain of chronic cough or sputum production.  She does not complain of significant shortness of breath but remains very sedentary.  Will monitor clinically.

## 2024-03-07 NOTE — ASSESSMENT & PLAN NOTE
She does have a history of depression.  She is currently doing well.  She is on Remeron which has helped improve sleep but also mood.  Will continue.

## 2024-03-07 NOTE — PROGRESS NOTES
Primary Care Provider Appointment   [facility], [place]       Patient ID: Serenity Epps is a 83 y.o. female.    ASSESSMENT/PLAN by Problem List:    1. Primary hypertension  Assessment & Plan:  This chronic condition appears stable and satisfactory.  Continue current medications.    Orders:  -     Comprehensive Metabolic Panel; Future; Expected date: 03/07/2024  -     TSH; Future; Expected date: 03/07/2024    2. Pulmonary emphysema, unspecified emphysema type  Assessment & Plan:  Patient is a former smoker.  Emphysematous changes have been documented on multiple images.  Clinically she is stable and does not complain of chronic cough or sputum production.  She does not complain of significant shortness of breath but remains very sedentary.  Will monitor clinically.      3. Exudative age-related macular degeneration of right eye with active choroidal neovascularization    4. Allergic rhinitis, unspecified seasonality, unspecified trigger  Assessment & Plan:  Patient reports ongoing symptoms even though she is on Astelin and Flonase.  Will add Claritin or similar antihistamine if not improving could consider Singulair but caution in the elderly.  May need to consult with Allergy or ENT if still not improving.      5. Chronic rhinitis  -     azelastine (ASTELIN) 137 mcg (0.1 %) nasal spray; 1 spray (137 mcg total) by Nasal route 2 (two) times daily.  Dispense: 90 mL; Refill: 3    6. Mixed hyperlipidemia  -     Lipid Panel; Future; Expected date: 03/07/2024    7. Mild episode of recurrent major depressive disorder  Assessment & Plan:  She does have a history of depression.  She is currently doing well.  She is on Remeron which has helped improve sleep but also mood.  Will continue.      8. Recurrent major depressive disorder, in partial remission    Other orders  -     fluticasone propionate (FLONASE) 50 mcg/actuation nasal spray; 2 sprays (100 mcg total) by Each Nostril route once daily.   Dispense: 48 mL; Refill: 3             Follow Up:  Three months      Subjective:     Chief Complaint   Patient presents with    Follow-up     I have reviewed the information entered by the ancillary staff regarding the chief complaint as well as the related history.    HPI    Patient is a/an 83 y.o.  female     She is here with her .  Doing well except for some allergies.  Refer above for all details of chronic conditions addressed today  Postnasal drip, congestion, allergies  Not sure if nasal sprays helping    Avg BP <140/70s    For complete problem list, past medical history, surgical history, social history, etc., see appropriate section in the electronic medical record    Review of Systems   HENT:  Positive for congestion, postnasal drip and voice change.    Respiratory: Negative.     Cardiovascular: Negative.    Gastrointestinal: Negative.    Genitourinary: Negative.        Objective     Physical Exam  Vitals reviewed.   Constitutional:       General: She is not in acute distress.     Appearance: She is well-developed. She is not diaphoretic.   HENT:      Head: Normocephalic and atraumatic.   Eyes:      General: No scleral icterus.  Pulmonary:      Effort: Pulmonary effort is normal. No respiratory distress.   Neurological:      Mental Status: She is alert and oriented to person, place, and time.   Psychiatric:         Mood and Affect: Mood normal.         Behavior: Behavior normal.       Vitals:    03/07/24 0743 03/07/24 0852   BP: (!) 150/90 134/70   BP Location: Left arm    Patient Position: Sitting    BP Method: Medium (Manual)    Pulse: 62    Resp: 18    Temp: 98.1 °F (36.7 °C)    TempSrc: Oral    SpO2: 98%    Weight: 63.6 kg (140 lb 5.2 oz)            THIS DOCUMENT WAS MADE IN PART WITH VOICE RECOGNITION SOFTWARE.  OCCASIONALLY THIS SOFTWARE WILL MISINTERPRET WORDS OR PHRASES.

## 2024-03-12 ENCOUNTER — TELEPHONE (OUTPATIENT)
Dept: CARDIOLOGY | Facility: CLINIC | Age: 84
End: 2024-03-12
Payer: MEDICARE

## 2024-03-12 ENCOUNTER — PATIENT MESSAGE (OUTPATIENT)
Dept: CARDIOLOGY | Facility: CLINIC | Age: 84
End: 2024-03-12
Payer: MEDICARE

## 2024-03-12 DIAGNOSIS — I48.0 PAF (PAROXYSMAL ATRIAL FIBRILLATION): Primary | ICD-10-CM

## 2024-03-12 DIAGNOSIS — I48.0 PAROXYSMAL ATRIAL FIBRILLATION: ICD-10-CM

## 2024-03-12 DIAGNOSIS — Z95.818 PRESENCE OF WATCHMAN LEFT ATRIAL APPENDAGE CLOSURE DEVICE: ICD-10-CM

## 2024-03-12 NOTE — TELEPHONE ENCOUNTER
----- Message from Trisha Vidal sent at 3/12/2024  9:06 AM CDT -----  Contact: pt  Type:  Needs Medical Advice    Who Called: pt    Would the patient rather a call back or a response via [facility]? Call back    Best Call Back Number: [phone]  or [phone] (mobile)    Additional Information: was prescribed clopidogreL (PLAVIX) 75 mg tablet but a script for XARELTO 20 mg Tab    Wants to know if she is to take both or continue with the plavix or switch back to the xarelto?    Please call to advise  Thanks\     Please try both numbers    Thanks

## 2024-03-12 NOTE — TELEPHONE ENCOUNTER
Spoke with patient and advised she should be on DAPT...Plavix and Aspirin. Patient verbalized understanding. Informed patient of 3 month SWETHA scheduled on 4/4/24 at [facility]. Patient verbalized understanding.

## 2024-03-28 ENCOUNTER — TELEPHONE (OUTPATIENT)
Dept: ORTHOPEDICS | Facility: CLINIC | Age: 84
End: 2024-03-28
Payer: MEDICARE

## 2024-03-28 NOTE — TELEPHONE ENCOUNTER
Contacted patient. Appointment made. Date, time & location was given. Patient verbalized understanding.

## 2024-03-28 NOTE — TELEPHONE ENCOUNTER
----- Message from Soumya Solano sent at 3/28/2024  2:17 PM CDT -----  Contact: pt  Type: Needs Medical Advice         Who Called: pt  Best Call Back Number:[phone]  Additional Information: Requesting a call back regarding  pt is asking for the office to call her. Pt had injection on 12/21for her knee. Pt said it didn't last very long and she is asking if she can have the Gel injection   Please Advise- Thank you

## 2024-03-30 ENCOUNTER — CLINICAL SUPPORT (OUTPATIENT)
Dept: CARDIOLOGY | Facility: HOSPITAL | Age: 84
End: 2024-03-30

## 2024-03-30 ENCOUNTER — HOSPITAL ENCOUNTER (OUTPATIENT)
Dept: CARDIOLOGY | Facility: HOSPITAL | Age: 84
Discharge: HOME | End: 2024-03-30
Attending: INTERNAL MEDICINE

## 2024-03-30 DIAGNOSIS — R00.2 PALPITATIONS: ICD-10-CM

## 2024-03-30 PROCEDURE — 93298 REM INTERROG DEV EVAL SCRMS: CPT | Mod: PO | Performed by: INTERNAL MEDICINE

## 2024-03-30 PROCEDURE — 93298 REM INTERROG DEV EVAL SCRMS: CPT | Mod: 26,,, | Performed by: INTERNAL MEDICINE

## 2024-04-10 ENCOUNTER — OFFICE VISIT (OUTPATIENT)
Dept: ORTHOPEDICS | Facility: CLINIC | Age: 84
End: 2024-04-10
Payer: MEDICARE

## 2024-04-10 DIAGNOSIS — M17.11 OSTEOARTHRITIS OF RIGHT KNEE, UNSPECIFIED OSTEOARTHRITIS TYPE: Primary | ICD-10-CM

## 2024-04-10 DIAGNOSIS — M25.561 CHRONIC PAIN OF RIGHT KNEE: ICD-10-CM

## 2024-04-10 DIAGNOSIS — G89.29 CHRONIC PAIN OF RIGHT KNEE: ICD-10-CM

## 2024-04-10 PROCEDURE — 99214 OFFICE O/P EST MOD 30 MIN: CPT | Mod: S$GLB,,, | Performed by: ORTHOPAEDIC SURGERY

## 2024-04-10 PROCEDURE — 3288F FALL RISK ASSESSMENT DOCD: CPT | Mod: CPTII,S$GLB,, | Performed by: ORTHOPAEDIC SURGERY

## 2024-04-10 PROCEDURE — 1159F MED LIST DOCD IN RCRD: CPT | Mod: CPTII,S$GLB,, | Performed by: ORTHOPAEDIC SURGERY

## 2024-04-10 PROCEDURE — 1125F AMNT PAIN NOTED PAIN PRSNT: CPT | Mod: CPTII,S$GLB,, | Performed by: ORTHOPAEDIC SURGERY

## 2024-04-10 PROCEDURE — 1101F PT FALLS ASSESS-DOCD LE1/YR: CPT | Mod: CPTII,S$GLB,, | Performed by: ORTHOPAEDIC SURGERY

## 2024-04-10 PROCEDURE — 99999 PR PBB SHADOW E&M-EST. PATIENT-LVL III: CPT | Mod: PBBFAC,,, | Performed by: ORTHOPAEDIC SURGERY

## 2024-04-10 PROCEDURE — 1157F ADVNC CARE PLAN IN RCRD: CPT | Mod: CPTII,S$GLB,, | Performed by: ORTHOPAEDIC SURGERY

## 2024-04-10 NOTE — PROGRESS NOTES
83 years old recurrent pain of the right knee 5 on the pain scale last time we gave her a Kenalog injection about 5 months ago with limited relief     Exam shows valgus knee tender at the joint line no signs infection difficulty getting up from a chair difficulty walking    X-rays show arthritic changes    Assessment: Right knee arthrosis     Plan:  We will schedule her for viscosupplementation and get this done once it is approved by insurance

## 2024-04-12 ENCOUNTER — OFFICE VISIT (OUTPATIENT)
Dept: OPTOMETRY | Facility: CLINIC | Age: 84
End: 2024-04-12
Payer: MEDICARE

## 2024-04-12 ENCOUNTER — CLINICAL SUPPORT (OUTPATIENT)
Dept: OPHTHALMOLOGY | Facility: CLINIC | Age: 84
End: 2024-04-12
Payer: MEDICARE

## 2024-04-12 DIAGNOSIS — H40.1233 LOW-TENSION GLAUCOMA OF BOTH EYES, SEVERE STAGE: Primary | ICD-10-CM

## 2024-04-12 DIAGNOSIS — H40.1233 LOW-TENSION GLAUCOMA OF BOTH EYES, SEVERE STAGE: ICD-10-CM

## 2024-04-12 DIAGNOSIS — H35.3211 EXUDATIVE AGE-RELATED MACULAR DEGENERATION OF RIGHT EYE WITH ACTIVE CHOROIDAL NEOVASCULARIZATION: Chronic | ICD-10-CM

## 2024-04-12 DIAGNOSIS — H40.1234 BILATERAL LOW-TENSION GLAUCOMA, INDETERMINATE STAGE: ICD-10-CM

## 2024-04-12 DIAGNOSIS — H35.3221 EXUDATIVE AGE-RELATED MACULAR DEGENERATION OF LEFT EYE WITH ACTIVE CHOROIDAL NEOVASCULARIZATION: Chronic | ICD-10-CM

## 2024-04-12 DIAGNOSIS — R00.2 PALPITATIONS: ICD-10-CM

## 2024-04-12 PROCEDURE — 1157F ADVNC CARE PLAN IN RCRD: CPT | Mod: CPTII,S$GLB,, | Performed by: OPTOMETRIST

## 2024-04-12 PROCEDURE — 1160F RVW MEDS BY RX/DR IN RCRD: CPT | Mod: CPTII,S$GLB,, | Performed by: OPTOMETRIST

## 2024-04-12 PROCEDURE — 1101F PT FALLS ASSESS-DOCD LE1/YR: CPT | Mod: CPTII,S$GLB,, | Performed by: OPTOMETRIST

## 2024-04-12 PROCEDURE — 3288F FALL RISK ASSESSMENT DOCD: CPT | Mod: CPTII,S$GLB,, | Performed by: OPTOMETRIST

## 2024-04-12 PROCEDURE — 1159F MED LIST DOCD IN RCRD: CPT | Mod: CPTII,S$GLB,, | Performed by: OPTOMETRIST

## 2024-04-12 PROCEDURE — 99213 OFFICE O/P EST LOW 20 MIN: CPT | Mod: S$GLB,,, | Performed by: OPTOMETRIST

## 2024-04-12 PROCEDURE — 99999 PR PBB SHADOW E&M-EST. PATIENT-LVL III: CPT | Mod: PBBFAC,,, | Performed by: OPTOMETRIST

## 2024-04-12 PROCEDURE — 1126F AMNT PAIN NOTED NONE PRSNT: CPT | Mod: CPTII,S$GLB,, | Performed by: OPTOMETRIST

## 2024-04-12 NOTE — PROGRESS NOTES
Test unable to complete. Pt unable to see light in back, stated in was on the left.     Assessment /Plan     For exam results, see Encounter Report.    There are no diagnoses linked to this encounter.

## 2024-04-12 NOTE — Clinical Note
Pt has appt with Jessica in May, wants to get in sooner if any cancels or opening, feels vision has changed

## 2024-04-12 NOTE — PROGRESS NOTES
HPI     Glaucoma     Additional comments: IOP ck           Comments    DLS: 10/5/23    Pt states no changes since last visit. Not able to do HVF.  Has Lor[facility] appt in May    Gtts: Combigan BID, Latanoprost QHS OU          Last edited by Salo Bean, OD on 4/12/2024  8:38 AM.            Assessment /Plan     For exam results, see Encounter Report.    Low-tension glaucoma of both eyes, severe stage    Exudative age-related macular degeneration of right eye with active choroidal neovascularization    Exudative age-related macular degeneration of left eye with active choroidal neovascularization    Bilateral low-tension glaucoma, indeterminate stage  -     Posterior Segment OCT Optic Nerve- Both eyes      Unable to complete VF today, OCt rnfl done with no color maps, nerve eval stable, IOP low and stable, cont Combigan and Latanaprost does not need refills, [place] pt, IOP low normal and stable, pachy thin, RTC 3-4 mos, IOP ck  2,3. Keep appt in May with Jessica, get on list for cancel to come sooner if any openings                  Offered and patient accepted

## 2024-04-29 DIAGNOSIS — G57.82 NEUROPATHY OF LEFT SURAL NERVE: ICD-10-CM

## 2024-04-29 RX ORDER — GABAPENTIN 400 MG/1
400 CAPSULE ORAL 3 TIMES DAILY
Qty: 270 CAPSULE | Refills: 1 | Status: SHIPPED | OUTPATIENT
Start: 2024-04-29

## 2024-05-01 ENCOUNTER — OFFICE VISIT (OUTPATIENT)
Dept: ORTHOPEDICS | Facility: CLINIC | Age: 84
End: 2024-05-01
Payer: MEDICARE

## 2024-05-01 ENCOUNTER — HOSPITAL ENCOUNTER (OUTPATIENT)
Dept: CARDIOLOGY | Facility: HOSPITAL | Age: 84
Discharge: HOME | End: 2024-05-01
Attending: INTERNAL MEDICINE
Payer: MEDICARE

## 2024-05-01 VITALS — BODY MASS INDEX: 25.16 KG/M2 | HEIGHT: 63 IN | WEIGHT: 142 LBS

## 2024-05-01 DIAGNOSIS — R00.2 PALPITATIONS: ICD-10-CM

## 2024-05-01 DIAGNOSIS — M17.11 OSTEOARTHRITIS OF RIGHT KNEE, UNSPECIFIED OSTEOARTHRITIS TYPE: Primary | ICD-10-CM

## 2024-05-01 PROCEDURE — 99999 PR PBB SHADOW E&M-EST. PATIENT-LVL III: CPT | Mod: PBBFAC,,, | Performed by: ORTHOPAEDIC SURGERY

## 2024-05-01 PROCEDURE — 99499 UNLISTED E&M SERVICE: CPT | Mod: S$GLB,,, | Performed by: ORTHOPAEDIC SURGERY

## 2024-05-01 PROCEDURE — 93298 REM INTERROG DEV EVAL SCRMS: CPT | Mod: PO | Performed by: INTERNAL MEDICINE

## 2024-05-01 PROCEDURE — 20610 DRAIN/INJ JOINT/BURSA W/O US: CPT | Mod: RT,S$GLB,, | Performed by: ORTHOPAEDIC SURGERY

## 2024-05-01 PROCEDURE — 93298 REM INTERROG DEV EVAL SCRMS: CPT | Mod: 26,,, | Performed by: INTERNAL MEDICINE

## 2024-05-01 NOTE — PROCEDURES
Large Joint Aspiration/Injection: R knee    Date/Time: 5/1/2024 9:00 AM    Performed by: Kamaljit Mane MD  Authorized by: Kamaljit Mane MD    Consent Done?:  Yes (Verbal)  Timeout: prior to procedure the correct patient, procedure, and site was verified    Prep: patient was prepped and draped in usual sterile fashion      Details:  Needle Size:  21 G  Approach:  Anterolateral  Location:  Knee  Site:  R knee  Medications:  10 mg sodium hyaluronate (EUFLEXXA) 10 mg/mL(mw 2.4 -3.6 million)  Patient tolerance:  Patient tolerated the procedure well with no immediate complications

## 2024-05-02 LAB
OHS CV AF BURDEN PERCENT: < 1
OHS CV DC REMOTE DEVICE TYPE: NORMAL

## 2024-05-04 RX ORDER — ATORVASTATIN CALCIUM 40 MG/1
40 TABLET, FILM COATED ORAL
Qty: 60 TABLET | Refills: 9 | Status: SHIPPED | OUTPATIENT
Start: 2024-05-04

## 2024-05-04 NOTE — TELEPHONE ENCOUNTER
Refill Routing Note   Medication(s) are not appropriate for processing by [facility] for the following reason(s):        Non-participating provider    [facility] action(s):  Route               Appointments  past 12m or future 3m with PCP    Date Provider   Last Visit   3/7/2024 Shant Jc MD   Next Visit   6/10/2024 Shant Jc MD   ED visits in past 90 days: 0        Note composed:9:17 AM 05/04/2024

## 2024-05-08 ENCOUNTER — OFFICE VISIT (OUTPATIENT)
Dept: ORTHOPEDICS | Facility: CLINIC | Age: 84
End: 2024-05-08
Payer: MEDICARE

## 2024-05-08 VITALS — HEIGHT: 63 IN | BODY MASS INDEX: 25.16 KG/M2 | WEIGHT: 142 LBS

## 2024-05-08 DIAGNOSIS — M25.561 CHRONIC PAIN OF RIGHT KNEE: ICD-10-CM

## 2024-05-08 DIAGNOSIS — M17.11 OSTEOARTHRITIS OF RIGHT KNEE, UNSPECIFIED OSTEOARTHRITIS TYPE: Primary | ICD-10-CM

## 2024-05-08 DIAGNOSIS — G89.29 CHRONIC PAIN OF RIGHT KNEE: ICD-10-CM

## 2024-05-08 PROCEDURE — 99999 PR PBB SHADOW E&M-EST. PATIENT-LVL III: CPT | Mod: PBBFAC,,, | Performed by: ORTHOPAEDIC SURGERY

## 2024-05-08 PROCEDURE — 99499 UNLISTED E&M SERVICE: CPT | Mod: S$GLB,,, | Performed by: ORTHOPAEDIC SURGERY

## 2024-05-08 PROCEDURE — 20610 DRAIN/INJ JOINT/BURSA W/O US: CPT | Mod: RT,S$GLB,, | Performed by: ORTHOPAEDIC SURGERY

## 2024-05-08 NOTE — PROCEDURES
Large Joint Aspiration/Injection: R knee    Date/Time: 5/8/2024 9:00 AM    Performed by: Kamaljit Mane MD  Authorized by: Kamaljit Mane MD    Consent Done?:  Yes (Verbal)  Timeout: prior to procedure the correct patient, procedure, and site was verified    Prep: patient was prepped and draped in usual sterile fashion      Details:  Needle Size:  21 G  Approach:  Anterolateral  Location:  Knee  Site:  R knee  Medications:  10 mg sodium hyaluronate (EUFLEXXA) 10 mg/mL(mw 2.4 -3.6 million)  Patient tolerance:  Patient tolerated the procedure well with no immediate complications

## 2024-05-13 ENCOUNTER — OFFICE VISIT (OUTPATIENT)
Dept: OPHTHALMOLOGY | Facility: CLINIC | Age: 84
End: 2024-05-13
Payer: MEDICARE

## 2024-05-13 DIAGNOSIS — H35.3221 EXUDATIVE AGE-RELATED MACULAR DEGENERATION OF LEFT EYE WITH ACTIVE CHOROIDAL NEOVASCULARIZATION: Chronic | ICD-10-CM

## 2024-05-13 DIAGNOSIS — H35.3134 NONEXUDATIVE AGE-RELATED MACULAR DEGENERATION, BILATERAL, ADVANCED ATROPHIC WITH SUBFOVEAL INVOLVEMENT: Chronic | ICD-10-CM

## 2024-05-13 DIAGNOSIS — H35.3211 EXUDATIVE AGE-RELATED MACULAR DEGENERATION OF RIGHT EYE WITH ACTIVE CHOROIDAL NEOVASCULARIZATION: Primary | ICD-10-CM

## 2024-05-13 PROCEDURE — 67028 INJECTION EYE DRUG: CPT | Mod: 50,S$GLB,, | Performed by: OPHTHALMOLOGY

## 2024-05-13 PROCEDURE — 1159F MED LIST DOCD IN RCRD: CPT | Mod: CPTII,S$GLB,, | Performed by: OPHTHALMOLOGY

## 2024-05-13 PROCEDURE — 1157F ADVNC CARE PLAN IN RCRD: CPT | Mod: CPTII,S$GLB,, | Performed by: OPHTHALMOLOGY

## 2024-05-13 PROCEDURE — 1126F AMNT PAIN NOTED NONE PRSNT: CPT | Mod: CPTII,S$GLB,, | Performed by: OPHTHALMOLOGY

## 2024-05-13 PROCEDURE — 1160F RVW MEDS BY RX/DR IN RCRD: CPT | Mod: CPTII,S$GLB,, | Performed by: OPHTHALMOLOGY

## 2024-05-13 PROCEDURE — 3288F FALL RISK ASSESSMENT DOCD: CPT | Mod: CPTII,S$GLB,, | Performed by: OPHTHALMOLOGY

## 2024-05-13 PROCEDURE — 92014 COMPRE OPH EXAM EST PT 1/>: CPT | Mod: 25,S$GLB,, | Performed by: OPHTHALMOLOGY

## 2024-05-13 PROCEDURE — 1101F PT FALLS ASSESS-DOCD LE1/YR: CPT | Mod: CPTII,S$GLB,, | Performed by: OPHTHALMOLOGY

## 2024-05-13 PROCEDURE — 92134 CPTRZ OPH DX IMG PST SGM RTA: CPT | Mod: S$GLB,,, | Performed by: OPHTHALMOLOGY

## 2024-05-13 PROCEDURE — 99999 PR PBB SHADOW E&M-EST. PATIENT-LVL III: CPT | Mod: PBBFAC,,, | Performed by: OPHTHALMOLOGY

## 2024-05-13 RX ADMIN — Medication 1.25 MG: at 09:05

## 2024-05-13 NOTE — PROGRESS NOTES
HPI     Macular Degeneration     Additional comments: Overdue amd chk           Comments    Pt states she feels like her va has decreased in OU since the beginning of   the year. No flashes. No floaters.    Cosopt BID OU  Latanaprost QHS OU          Last edited by Vahe Hay on 5/13/2024  7:58 AM.              OCT - activity around fibrosis OU  Atrophy OU        A/P    1. Wet AMD OS  S/p Avastin OS x 15, Eylea OS  x 14    Persistent SRF OS - improving  With RPE tear OS  Central fibrosis with atrophy - poor central potential  10/17 - increased SRH - will keep at 8 weeks for now  9/18 - stable cicatrix - try observation  12/18 - no activity  6/21 - some heme over cicatrix OS  1/23 - given extensive atrophy, try extension      2. New disease OD  Sx started 9/21  S/p Avastin OD x 5, Eylea OD x 8  3/23 - sig atrophy without activity  9/23 heme OS, but ASx  5/24 increase in activity around fibrosis - pt does notice some changes    Avastin OU today      3. PCIOL OU  CTR OS - but saw 20/30 with correction, in spite of sub RPE fibrosis      4. POAG OU  Good IOP control on Cosopt, brimonidine, latanoprost OU  Small drance heme OD    5. Floaters OU        8 weeks no Dilate (LDFE 5/24)    Injection Procedure Note:  Diagnosis: Wet AMD OU    Patient Identified and Time Out complete  Pt marked  Topical Proparacaine and Betadine.  Inject Avastin OU at 6:00 @ 3.5-4mm posterior to limbus  Post Operative Dx: Same  Complications: None  Follow up as above.

## 2024-05-15 ENCOUNTER — OFFICE VISIT (OUTPATIENT)
Dept: ORTHOPEDICS | Facility: CLINIC | Age: 84
End: 2024-05-15
Payer: MEDICARE

## 2024-05-15 VITALS — BODY MASS INDEX: 25.16 KG/M2 | WEIGHT: 142 LBS | HEIGHT: 63 IN

## 2024-05-15 DIAGNOSIS — M17.11 OSTEOARTHRITIS OF RIGHT KNEE, UNSPECIFIED OSTEOARTHRITIS TYPE: ICD-10-CM

## 2024-05-15 DIAGNOSIS — G89.29 CHRONIC PAIN OF RIGHT KNEE: Primary | ICD-10-CM

## 2024-05-15 DIAGNOSIS — M25.561 CHRONIC PAIN OF RIGHT KNEE: Primary | ICD-10-CM

## 2024-05-15 PROCEDURE — 99999 PR PBB SHADOW E&M-EST. PATIENT-LVL III: CPT | Mod: PBBFAC,,, | Performed by: ORTHOPAEDIC SURGERY

## 2024-05-15 PROCEDURE — 99499 UNLISTED E&M SERVICE: CPT | Mod: S$GLB,,, | Performed by: ORTHOPAEDIC SURGERY

## 2024-05-15 PROCEDURE — 20610 DRAIN/INJ JOINT/BURSA W/O US: CPT | Mod: RT,S$GLB,, | Performed by: ORTHOPAEDIC SURGERY

## 2024-05-15 NOTE — PROCEDURES
Large Joint Aspiration/Injection: R knee    Date/Time: 5/15/2024 9:00 AM    Performed by: Kamaljit Mane MD  Authorized by: Kamaljit Mane MD    Consent Done?:  Yes (Verbal)  Timeout: prior to procedure the correct patient, procedure, and site was verified    Prep: patient was prepped and draped in usual sterile fashion      Details:  Needle Size:  21 G  Approach:  Anterolateral  Location:  Knee  Site:  R knee  Medications:  10 mg sodium hyaluronate (EUFLEXXA) 10 mg/mL(mw 2.4 -3.6 million)  Patient tolerance:  Patient tolerated the procedure well with no immediate complications

## 2024-05-21 DIAGNOSIS — H40.1233 LOW-TENSION GLAUCOMA OF BOTH EYES, SEVERE STAGE: ICD-10-CM

## 2024-05-21 RX ORDER — DORZOLAMIDE HYDROCHLORIDE AND TIMOLOL MALEATE 20; 5 MG/ML; MG/ML
1 SOLUTION/ DROPS OPHTHALMIC 2 TIMES DAILY
Qty: 30 ML | Refills: 4 | Status: SHIPPED | OUTPATIENT
Start: 2024-05-21 | End: 2025-05-21

## 2024-05-21 RX ORDER — DORZOLAMIDE HYDROCHLORIDE AND TIMOLOL MALEATE 20; 5 MG/ML; MG/ML
1 SOLUTION/ DROPS OPHTHALMIC 2 TIMES DAILY
Qty: 30 ML | Refills: 4 | Status: CANCELLED | OUTPATIENT
Start: 2024-05-21 | End: 2025-05-21

## 2024-06-01 ENCOUNTER — CLINICAL SUPPORT (OUTPATIENT)
Dept: CARDIOLOGY | Facility: HOSPITAL | Age: 84
End: 2024-06-01
Payer: MEDICARE

## 2024-06-01 ENCOUNTER — HOSPITAL ENCOUNTER (OUTPATIENT)
Dept: CARDIOLOGY | Facility: HOSPITAL | Age: 84
Discharge: HOME | End: 2024-06-01
Attending: INTERNAL MEDICINE

## 2024-06-01 DIAGNOSIS — R00.2 PALPITATIONS: ICD-10-CM

## 2024-06-01 PROCEDURE — 93298 REM INTERROG DEV EVAL SCRMS: CPT | Mod: PO | Performed by: INTERNAL MEDICINE

## 2024-06-01 PROCEDURE — 93298 REM INTERROG DEV EVAL SCRMS: CPT | Mod: 26,,, | Performed by: INTERNAL MEDICINE

## 2024-06-03 ENCOUNTER — LAB VISIT (OUTPATIENT)
Dept: LAB | Facility: HOSPITAL | Age: 84
End: 2024-06-03
Attending: FAMILY MEDICINE
Payer: MEDICARE

## 2024-06-03 DIAGNOSIS — I10 PRIMARY HYPERTENSION: ICD-10-CM

## 2024-06-03 DIAGNOSIS — E78.2 MIXED HYPERLIPIDEMIA: Chronic | ICD-10-CM

## 2024-06-03 LAB
ALBUMIN SERPL BCP-MCNC: 3.8 G/DL (ref 3.5–5.2)
ALP SERPL-CCNC: 72 U/L (ref 55–135)
ALT SERPL W/O P-5'-P-CCNC: 17 U/L (ref 10–44)
ANION GAP SERPL CALC-SCNC: 10 MMOL/L (ref 8–16)
AST SERPL-CCNC: 20 U/L (ref 10–40)
BILIRUB SERPL-MCNC: 0.5 MG/DL (ref 0.1–1)
BUN SERPL-MCNC: 16 MG/DL (ref 8–23)
CALCIUM SERPL-MCNC: 9.5 MG/DL (ref 8.7–10.5)
CHLORIDE SERPL-SCNC: 105 MMOL/L (ref 95–110)
CHOLEST SERPL-MCNC: 144 MG/DL (ref 120–199)
CHOLEST/HDLC SERPL: 2.1 {RATIO} (ref 2–5)
CO2 SERPL-SCNC: 23 MMOL/L (ref 23–29)
CREAT SERPL-MCNC: 0.7 MG/DL (ref 0.5–1.4)
EST. GFR  (NO RACE VARIABLE): >60 ML/MIN/1.73 M^2
GLUCOSE SERPL-MCNC: 89 MG/DL (ref 70–110)
HDLC SERPL-MCNC: 70 MG/DL (ref 40–75)
HDLC SERPL: 48.6 % (ref 20–50)
LDLC SERPL CALC-MCNC: 65 MG/DL (ref 63–159)
NONHDLC SERPL-MCNC: 74 MG/DL
OHS CV AF BURDEN PERCENT: < 1
OHS CV DC REMOTE DEVICE TYPE: NORMAL
POTASSIUM SERPL-SCNC: 4.3 MMOL/L (ref 3.5–5.1)
PROT SERPL-MCNC: 7 G/DL (ref 6–8.4)
SODIUM SERPL-SCNC: 138 MMOL/L (ref 136–145)
TRIGL SERPL-MCNC: 45 MG/DL (ref 30–150)
TSH SERPL DL<=0.005 MIU/L-ACNC: 1.57 UIU/ML (ref 0.4–4)

## 2024-06-03 PROCEDURE — 84443 ASSAY THYROID STIM HORMONE: CPT | Performed by: FAMILY MEDICINE

## 2024-06-03 PROCEDURE — 80053 COMPREHEN METABOLIC PANEL: CPT | Performed by: FAMILY MEDICINE

## 2024-06-03 PROCEDURE — 80061 LIPID PANEL: CPT | Performed by: FAMILY MEDICINE

## 2024-06-03 PROCEDURE — 36415 COLL VENOUS BLD VENIPUNCTURE: CPT | Mod: PO | Performed by: FAMILY MEDICINE

## 2024-06-10 ENCOUNTER — OFFICE VISIT (OUTPATIENT)
Dept: PRIMARY CARE CLINIC | Facility: CLINIC | Age: 84
End: 2024-06-10
Payer: MEDICARE

## 2024-06-10 VITALS
OXYGEN SATURATION: 96 % | BODY MASS INDEX: 24.62 KG/M2 | SYSTOLIC BLOOD PRESSURE: 114 MMHG | TEMPERATURE: 98 F | WEIGHT: 139 LBS | DIASTOLIC BLOOD PRESSURE: 76 MMHG | RESPIRATION RATE: 18 BRPM

## 2024-06-10 DIAGNOSIS — I10 PRIMARY HYPERTENSION: ICD-10-CM

## 2024-06-10 DIAGNOSIS — E78.2 MIXED HYPERLIPIDEMIA: Chronic | ICD-10-CM

## 2024-06-10 DIAGNOSIS — I48.0 PAF (PAROXYSMAL ATRIAL FIBRILLATION): Primary | Chronic | ICD-10-CM

## 2024-06-10 DIAGNOSIS — H54.7 VISION IMPAIRMENT: ICD-10-CM

## 2024-06-10 PROCEDURE — 3078F DIAST BP <80 MM HG: CPT | Mod: CPTII,S$GLB,, | Performed by: FAMILY MEDICINE

## 2024-06-10 PROCEDURE — 99214 OFFICE O/P EST MOD 30 MIN: CPT | Mod: S$GLB,,, | Performed by: FAMILY MEDICINE

## 2024-06-10 PROCEDURE — 1160F RVW MEDS BY RX/DR IN RCRD: CPT | Mod: CPTII,S$GLB,, | Performed by: FAMILY MEDICINE

## 2024-06-10 PROCEDURE — 3288F FALL RISK ASSESSMENT DOCD: CPT | Mod: CPTII,S$GLB,, | Performed by: FAMILY MEDICINE

## 2024-06-10 PROCEDURE — 1159F MED LIST DOCD IN RCRD: CPT | Mod: CPTII,S$GLB,, | Performed by: FAMILY MEDICINE

## 2024-06-10 PROCEDURE — 1123F ACP DISCUSS/DSCN MKR DOCD: CPT | Mod: CPTII,S$GLB,, | Performed by: FAMILY MEDICINE

## 2024-06-10 PROCEDURE — 99999 PR PBB SHADOW E&M-EST. PATIENT-LVL V: CPT | Mod: PBBFAC,,, | Performed by: FAMILY MEDICINE

## 2024-06-10 PROCEDURE — 1101F PT FALLS ASSESS-DOCD LE1/YR: CPT | Mod: CPTII,S$GLB,, | Performed by: FAMILY MEDICINE

## 2024-06-10 PROCEDURE — 1125F AMNT PAIN NOTED PAIN PRSNT: CPT | Mod: CPTII,S$GLB,, | Performed by: FAMILY MEDICINE

## 2024-06-10 PROCEDURE — 3074F SYST BP LT 130 MM HG: CPT | Mod: CPTII,S$GLB,, | Performed by: FAMILY MEDICINE

## 2024-06-10 NOTE — ASSESSMENT & PLAN NOTE
Rhythm is regular, rate controlled.  History of intermittent episodes.  Now has Watchman device.    She is now off Xarelto.  Currently on Plavix and aspirin but anticipating stopping Plavix.  Encouraged her to discuss this with her cardiologist.

## 2024-06-10 NOTE — PROGRESS NOTES
Primary Care Provider Appointment   [facility], [place]       Patient ID: Serenity Epps is a 83 y.o. female.    ASSESSMENT/PLAN by Problem List:    1. PAF (paroxysmal atrial fibrillation)  Overview:  Seen on loop recorder    Assessment & Plan:   Rhythm is regular, rate controlled.  History of intermittent episodes.  Now has Watchman device.    She is now off Xarelto.  Currently on Plavix and aspirin but anticipating stopping Plavix.  Encouraged her to discuss this with her cardiologist.      2. Primary hypertension  Assessment & Plan:    Stable and satisfactory.  Continue current medications      3. Mixed hyperlipidemia  Assessment & Plan:   Lipids are fairly stable continue atorvastatin 40 mg.      4. Vision impairment  Assessment & Plan:   Secondary to macular degeneration.  Discussed fall risk and prevention , home safety modifications.  She will continue to use her walker.           Follow Up:   three months    Advance Care Planning     Date: 06/10/2024    Living Will  Power of   I initiated the process of voluntary advance care planning today and explained the importance of this process to the patient.  I introduced the concept of advance directives to the patient, as well. Then the patient received detailed information about the importance of designating a Health Care Power of  (HCPOA). She was also instructed to communicate with this person about their wishes for future healthcare, should she become sick and lose decision-making capacity. The patient has previously appointed a HCPOA. After our discussion, the patient has decided to complete a HCPOA and has appointed her significant other, health care agent:  Alejandro Epps       Documents and wishes unchanged.  Documents scanned in chart               Subjective:     Chief Complaint   Patient presents with    Follow-up     3 month follow up visit     I have reviewed the information entered by the ancillary staff  regarding the chief complaint as well as the related history.    HPI    Patient is a/an 83 y.o.  female        Recent right knee injection, some improvement.  She does exercise at the gym on a bicycle.      Reports vision is worsening, following closely with Ophthalmology.  No falls.  She does take fall precautions and uses her walker.    See above for all other topics addressed        For complete problem list, past medical history, surgical history, social history, etc., see appropriate section in the electronic medical record    Review of Systems   HENT: Negative.     Eyes:  Positive for visual disturbance.   Cardiovascular: Negative.    Gastrointestinal: Negative.    Genitourinary: Negative.    Musculoskeletal:  Positive for arthralgias and gait problem.   Psychiatric/Behavioral: Negative.  Negative for dysphoric mood.        Objective     Physical Exam  Vitals reviewed.   Constitutional:       General: She is not in acute distress.     Appearance: She is well-developed. She is not diaphoretic.   HENT:      Head: Normocephalic and atraumatic.   Eyes:      General: No scleral icterus.  Cardiovascular:      Rate and Rhythm: Normal rate and regular rhythm.   Pulmonary:      Effort: Pulmonary effort is normal. No respiratory distress.   Neurological:      Mental Status: She is alert and oriented to person, place, and time.      Comments:  Antalgic gait.  Using a walker   Psychiatric:         Mood and Affect: Mood normal.         Behavior: Behavior normal.       Vitals:    06/10/24 0731   BP: 114/76   BP Location: Left arm   Patient Position: Sitting   BP Method: Medium (Manual)   Resp: 18   Temp: 98.3 °F (36.8 °C)   TempSrc: Oral   SpO2: 96%   Weight: 63 kg (139 lb)           THIS DOCUMENT WAS MADE IN PART WITH VOICE RECOGNITION SOFTWARE.  OCCASIONALLY THIS SOFTWARE WILL MISINTERPRET WORDS OR PHRASES.

## 2024-06-10 NOTE — ASSESSMENT & PLAN NOTE
Secondary to macular degeneration.  Discussed fall risk and prevention , home safety modifications.  She will continue to use her walker.

## 2024-06-19 RX ORDER — METOPROLOL SUCCINATE 25 MG/1
25 TABLET, EXTENDED RELEASE ORAL 2 TIMES DAILY
Qty: 180 TABLET | Refills: 3 | Status: SHIPPED | OUTPATIENT
Start: 2024-06-19

## 2024-07-02 ENCOUNTER — OFFICE VISIT (OUTPATIENT)
Dept: CARDIOLOGY | Facility: CLINIC | Age: 84
End: 2024-07-02
Payer: MEDICARE

## 2024-07-02 VITALS
WEIGHT: 140.44 LBS | HEART RATE: 61 BPM | SYSTOLIC BLOOD PRESSURE: 131 MMHG | BODY MASS INDEX: 24.88 KG/M2 | HEIGHT: 63 IN | DIASTOLIC BLOOD PRESSURE: 69 MMHG

## 2024-07-02 DIAGNOSIS — I10 PRIMARY HYPERTENSION: ICD-10-CM

## 2024-07-02 DIAGNOSIS — I48.0 PAF (PAROXYSMAL ATRIAL FIBRILLATION): ICD-10-CM

## 2024-07-02 DIAGNOSIS — Z95.818 STATUS POST PLACEMENT OF IMPLANTABLE LOOP RECORDER: ICD-10-CM

## 2024-07-02 DIAGNOSIS — Z86.73 HISTORY OF ISCHEMIC RIGHT MCA STROKE: ICD-10-CM

## 2024-07-02 DIAGNOSIS — Z95.818 PRESENCE OF WATCHMAN LEFT ATRIAL APPENDAGE CLOSURE DEVICE: Primary | ICD-10-CM

## 2024-07-02 DIAGNOSIS — Z91.89 AT HIGH RISK FOR BLEEDING: ICD-10-CM

## 2024-07-02 DIAGNOSIS — I70.0 ABDOMINAL AORTIC ATHEROSCLEROSIS: ICD-10-CM

## 2024-07-02 PROCEDURE — 99999 PR PBB SHADOW E&M-EST. PATIENT-LVL III: CPT | Mod: PBBFAC,,, | Performed by: INTERNAL MEDICINE

## 2024-07-02 RX ORDER — CELECOXIB 200 MG/1
200 CAPSULE ORAL 2 TIMES DAILY PRN
Qty: 20 CAPSULE | Refills: 0 | Status: SHIPPED | OUTPATIENT
Start: 2024-07-02

## 2024-07-02 NOTE — PROGRESS NOTES
"Structural Cardiology Clinic Note      Patient: Serenity Epps, 1940, [id number]  Primary Care Provider: Shant Jc MD     Chief Complaint/Reason for Referral: LAAC     Subjective:       Serenity Epps is a 83 y.o. female who presents for consult. They are accompanied by her . Referred by my colleague Dr. Kinsey.    No falls. No blood in urine or stools. Some SOBOE but "nothing serious". No edema. No orthopnea or PND. No chest discomfort. No fevers, chills.     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  COPD  HTN  Aortic athero  PAF seen on ILR checks  Last 6/1 no AF. 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 Quit 2002. 1-2 glasses of wine in the evening.   No history of MI or coronary revasc    Review of Systems  Constitutional: negative for fevers, night sweats, and weight loss  Eyes: negative for visual disturbance, diplopia  Respiratory: negative for cough, hemoptysis, sputum, and wheezing  Cardiovascular: see HPI  Gastrointestinal: negative for abdominal pain, bright red blood per rectum, change in bowel habits, dysphagia, melena, and reflux symptoms  Genitourinary:negative for dysuria, frequency, and hematuria  Hematologic/lymphatic: negative for bleeding, easy bruising, and lymphadenopathy  Musculoskeletal:negative for arthralgias, back pain, and myalgias  Neurological: negative for gait problems, paresthesia, speech problems, vertigo, and weakness  Behavioral/Psych: negative for excessive alcohol consumption, illegal drug usage, and sleep disturbance    -------------------------------------    Anxiety    Arthritis    Cataract - Both Eyes    OU done//    CKD (chronic " kidney disease), stage II    pt denies    Diarrhea    Diverticulosis    DVT (deep venous thrombosis)    left leg, after childbirth    Exudative age-related macular degeneration of left eye    Glaucoma    Hyperlipidemia    Hypertension    Irritable bowel syndrome    Left foot pain    chronic    Lymphocytic colitis    Macular degeneration    bimonthly intraoccular injections    Osteopenia    Paroxysmal atrial fibrillation    Presence of Watchman left atrial appendage closure device    Recurrent major depressive disorder    Rhinitis, chronic    Strabismus    as child    Stroke due to thrombosis of right middle cerebral artery    Urinary incontinence     ----------------------------    Appendectomy    age 38    Cataract extraction    OU done//    Cataract extraction w/ intraocular lens  implant, bilateral    Closure of left atrial appendage using device    Procedure: Watchman;  Surgeon: Nellie Gibson MD;  Location: [facility];  Service: Cardiology;;    Colonoscopy    Diverticulosis.   Internal small hemorrhoids were found.     Colonoscopy    Dr. Lynn, repeat in 7-8 years for surveillance    Colonoscopy    Colonoscopy    Procedure: COLONOSCOPY;  Surgeon: Toby Lynn Jr., MD;  Location: [facility];  Service: Endoscopy;  Laterality: N/A;    Echocardiogram,transesophageal    Procedure: ECHOCARDIOGRAM,TRANSESOPHAGEAL;  Surgeon: Daron Kinsey MD;  Location: [facility];  Service: Cardiology;  Laterality: N/A;    Echocardiogram,transesophageal    Procedure: (SWETHA) intra-procedure;  Surgeon: Daron Kinsey MD;  Location: [facility];  Service: Cardiology;;    Eye surgery    Phaco with IOL (cataract extraction), rigth:[id number] 22.0 d//    Foot hardware removal    Dr. Hammonds    Foot surgery    ERG and open reduction tallo tarsal dislocation (hyprocure implant)    Fracture surgery    HIP    Insertion of implantable loop recorder    Procedure: INSERTION, IMPLANTABLE LOOP RECORDER;  Surgeon: Daron Kinsey MD;   Location: [facility];  Service: Cardiology;  Laterality: N/A;    Insertion of implantable loop recorder    Procedure: INSERTION, IMPLANTABLE LOOP RECORDER;  Surgeon: Daron Kinsey MD;  Location: [facility];  Service: Cardiology;  Laterality: N/A;    Joint replacement    Partial hysterectomy    age 38, ovaries conserved    Pip joint fusion    2nd-4th PIP joints of right foot; Dr. 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  Patulous lower esophageal sphincter.   Gastric mucosal atrophy.   PARAG Test  Negative.     Vein ligation    BLE        Family History   Problem Relation Name Age of Onset    Coronary artery disease Mother  78    Glaucoma Mother      Diverticulitis Father      Parkinsonism Brother      Glaucoma Brother      Ankylosing spondylitis Brother      Diabetes Brother      Macular degeneration Brother           twin brother wet mac//    Cancer Brother          prostate    Crohn's disease Brother      Parkinsonism Brother      No Known Problems Maternal Grandmother      No Known Problems Maternal Grandfather      No Known Problems Paternal Grandmother      No Known Problems Paternal Grandfather      Breast cancer Daughter      Cancer Daughter          breast    Amblyopia Neg Hx      Blindness Neg Hx      Cataracts Neg Hx      Hypertension Neg Hx      Strabismus Neg Hx      Stroke Neg Hx      Thyroid disease Neg Hx      Retinal detachment Neg Hx      Celiac disease Neg Hx       Social History     Tobacco Use    Smoking status: Former     Current packs/day: 0.00     Average packs/day: 2.0 packs/day for 28.0 years (56.0 ttl pk-yrs)     Types: Cigarettes     Start date: 1/1/1976     Quit date: 1/1/2004     Years since  quittin.5    Smokeless tobacco: Never   Substance Use Topics    Alcohol use: Not Currently     Comment: occ    Drug use: Yes     Types: Benzodiazepines       Current Outpatient Medications   Medication Sig Dispense Refill    acetaminophen (TYLENOL) 650 MG TbSR Take 650 mg by mouth 2 (two) times a day. 4 a day      aspirin (ECOTRIN) 81 MG EC tablet Take 1 tablet (81 mg total) by mouth once daily. 90 tablet 3    atorvastatin (LIPITOR) 40 MG tablet TAKE 1 TABLET(40 MG) BY MOUTH EVERY DAY 60 tablet 9    azelastine (ASTELIN) 137 mcg (0.1 %) nasal spray 1 spray (137 mcg total) by Nasal route 2 (two) times daily. (Patient taking differently: 1 spray by Nasal route 2 (two) times daily as needed.) 90 mL 3    BENEFIBER, WHEAT DEXTRIN, ORAL Take 1 Dose by mouth every morning.      biotin 1 mg tablet Take 1,000 mcg by mouth Daily.      calcium carbonate (OS-BLAKE) 600 mg (1,500 mg) Tab Take 600 mg by mouth 2 (two) times daily with meals.      diclofenac sodium (VOLTAREN) 1 % Gel Apply 2 g topically 3 (three) times daily. To painful area of neck (Patient taking differently: Apply 2 g topically 3 (three) times daily as needed. To painful area of neck) 450 g 0    docusate sodium (COLACE) 100 MG capsule Take 100 mg by mouth 2 (two) times daily.      dorzolamide-timolol 2-0.5% (COSOPT) 22.3-6.8 mg/mL ophthalmic solution Place 1 drop into both eyes 2 (two) times daily. 30 mL 4    fluticasone propionate (FLONASE) 50 mcg/actuation nasal spray 2 sprays (100 mcg total) by Each Nostril route once daily. 48 mL 3    gabapentin (NEURONTIN) 400 MG capsule Take 1 capsule (400 mg total) by mouth 3 (three) times daily. 270 capsule 1    latanoprost 0.005 % ophthalmic solution Place 1 drop into both eyes every evening. 7.5 mL 3    metoprolol succinate (TOPROL-XL) 25 MG 24 hr tablet Take 1 tablet (25 mg total) by mouth 2 (two) times daily. 180 tablet 3    mirtazapine (REMERON) 15 MG tablet Take 1-2 tablets by mouth at bedtime 120 tablet 3     "pantoprazole (PROTONIX) 40 MG tablet Take 1 tablet (40 mg total) by mouth once daily. 141 tablet 0    valsartan (DIOVAN) 80 MG tablet Take 1 tablet (80 mg total) by mouth every evening. 90 tablet 3    VIT C/E/ZN/COPPR/LUTEIN/ZEAXAN (PRESERVISION AREDS 2 ORAL) Take 1 capsule by mouth 2 (two) times daily.       celecoxib (CELEBREX) 200 MG capsule Take 1 capsule (200 mg total) by mouth 2 (two) times daily as needed for Pain (with meals). 20 capsule 0     No current facility-administered medications for this visit.        Objective:      Physical Exam  /69 (BP Location: Left arm, Patient Position: Sitting, BP Method: Medium (Automatic))   Pulse 61   Ht 5' 3" (1.6 m)   Wt 63.7 kg (140 lb 6.9 oz)   BMI 24.88 kg/m²   Body surface area is 1.68 meters squared.  Body mass index is 24.88 kg/m².    General appearance: alert, appears stated age, cooperative, and no distress  Head: Normocephalic, without obvious abnormality, atraumatic  Neck: no carotid bruit, no JVD, and supple, symmetrical, trachea midline  Lungs: clear to auscultation bilaterally  Heart: regular rate and rhythm; S1, S2 normal, no murmur, click, rub or gallop  Abdomen: soft, non-tender, no distended  Extremities: extremities atraumatic, no pitting edema  Skin: warm, no cyanosis, no pathologic ecchymosis in exposed portions  Neurologic: Grossly normal. A&O x3      Lab Review   Lab Results   Component Value Date    WBC 7.85 01/05/2024    HGB 12.4 01/05/2024    HCT 38.2 01/05/2024    MCV 89 01/05/2024     01/05/2024         BMP  Lab Results   Component Value Date     06/03/2024    K 4.3 06/03/2024     06/03/2024    CO2 23 06/03/2024    BUN 16 06/03/2024    CREATININE 0.7 06/03/2024    CALCIUM 9.5 06/03/2024    ANIONGAP 10 06/03/2024    ESTGFRAFRICA >60.0 01/19/2022    EGFRNONAA >60.0 01/19/2022       Lab Results   Component Value Date    ALBUMIN 3.8 06/03/2024       Lab Results   Component Value Date    ALT 17 06/03/2024    AST 20 " 06/03/2024    ALKPHOS 72 06/03/2024    BILITOT 0.5 06/03/2024       Lab Results   Component Value Date    TSH 1.573 06/03/2024       Lab Results   Component Value Date    CHOL 144 06/03/2024    CHOL 145 12/05/2023    CHOL 160 05/03/2023     Lab Results   Component Value Date    HDL 70 06/03/2024    HDL 69 12/05/2023    HDL 76 (H) 05/03/2023     Lab Results   Component Value Date    LDLCALC 65.0 06/03/2024    LDLCALC 61.8 (L) 12/05/2023    LDLCALC 60.0 (L) 05/03/2023     Lab Results   Component Value Date    TRIG 45 06/03/2024    TRIG 71 12/05/2023    TRIG 120 05/03/2023     Lab Results   Component Value Date    CHOLHDL 48.6 06/03/2024    CHOLHDL 47.6 12/05/2023    CHOLHDL 47.5 05/03/2023     [id number]-TCGt score   Sex: 1  Female (1 point)   Male (0 points)    Age: 2   ?64 years old (0 points)   65 to 74 years old (1 point)   ?75 years old (2 points)    Comorbidities: 0+1+0+2+1   Heart failure (1 point)   Hypertension (1 point)   Diabetes mellitus (1 point)   History of stroke, TIA, or thromboembolism (2 points)   Vascular disease (history of MI, PAD, or aortic atherosclerosis) (1 point)    Total points: 7    Unadjusted stroke rate: [Nivia L, Annie M, [place] GY. Evaluation of risk stratification schemes for ischaemic stroke and bleeding in [id number] patients with atrial fibrillation: the [place] Atrial Fibrillation cohort study. Eur Heart J 2012; 33:1500.]  0 points: 0.2% per year  1 point:  0.6% per year  2 points: 2.2% per year  3 points: 3.2% per year  4 points: 4.8% per year  5 points: 7.2% per year  6 points: 9.7% per year  7 points: 11.1% per year  8 points: 11% per year  9 points: 12.2% per year    HAS-BLED score:  Hypertension (1 point) : 1  Abnormal liver function (cirrhosis, bilirubin >2xULN or ALT>3xULN) (1 point): 0  Abnormal renal function (dialysis, renal transplant or Cr>2.26) (1 point): 0  Stroke (1 point): 1  Bleeding tendency or predisposition (1 point): 1  Labile INRs in patients taking warfarin  (<60%TTR or high/labile INR) (1 point): 0  Elderly: age greater than 65 years (1 point): 1  Drugs: concomitant antiplatelet agents (eg, aspirin, clopidogrel, ticlopidine, nonsteroidal antiinflammatory agents) (1 point): 0  Drugs: concomitant excess alcohol use (1 point): 0    Total score: 4    0 points: 1.13 bleeds per 100 patient-years  1 point:  1.02 bleeds per 100 patient-years  2 points: 1.88 bleeds per 100 patient-years  3 points: 3.74 bleeds per 100 patient-years  4 points: 8.70 bleeds per 100 patient-years  5 to 9 points: Insufficient data    [Lip GY. Implications of the TALI(2)DS(2)-VASc and HAS-BLED Scores for thromboprophylaxis in atrial fibrillation. Am J Med 2011; 124:111.]    If you answer NO to any of the four criteria below, the patient does not meet the eligibility requirements for LAAC via Watchman implantation:    Patient has non-valvular atrial fibrillation: Yes  Patient has an increased risk for stroke and is recommended for oral anticoagulation (OAC): Yes  Patient is suitable for short-term anticoagulation therapy but deemed unable to take long-term OAC: Yes  Patient has an appropriate rationale to seek a non-pharmacological alternative to OACs. Specific factors include: History of bleeding or increased bleeding risk and History of risk of falls       Assessment & Plan:      This is a 83 y.o. pleasant frail WF with PAF and h/o stroke and HBR.     She recovered wonderfully after a successful left atrial appendage occlusion procedure. She finished 6 weeks of rivaroxaban 6 months of Dual antiplatelet therapy.         1. Presence of Watchman left atrial appendage closure device        2. PAF (paroxysmal atrial fibrillation)        3. Abdominal aortic atherosclerosis        4. Status post placement of implantable loop recorder        5. Primary hypertension        6. History of stroke due to thrombosis of right middle cerebral artery        7. At high risk for bleeding               Stop clopidogrel.  Continue aspirin 81 mg daily  Valsartan 80 mg daily for hypertension and metoprolol 25 mg twice daily  Continue atorva 40 for atherosclerotic disease  Plan for carotid duplex ultrasound in follow-up   Endocarditis prophylaxis through end of 2024  Emphasized the importance of modifying lifestyle related risk factors including exercise, diet most resembling a Mediterranean diet.    I appreciate the opportunity to participate in Serenity Epps 's care today.  Please follow up with me virtually in 9 months. They are moving to [place].       Nellie Gibson MD, FACC  Interventional Cardiology/Structural Heart Disease  [facility]  Office: [phone]     Parts of this note were completed using voice recognition software. Please excuse any misspellings or syntax errors and reach out to me with questions.

## 2024-07-02 NOTE — PATIENT INSTRUCTIONS
Stop clopidogrel (Plavix)  Continue aspirin 81 mg every day  Take Celebrex 200 mg up to twice daily on a full stomach   Return in 9 months

## 2024-07-03 ENCOUNTER — CLINICAL SUPPORT (OUTPATIENT)
Dept: CARDIOLOGY | Facility: HOSPITAL | Age: 84
End: 2024-07-03
Payer: MEDICARE

## 2024-07-03 ENCOUNTER — HOSPITAL ENCOUNTER (OUTPATIENT)
Dept: CARDIOLOGY | Facility: HOSPITAL | Age: 84
Discharge: HOME | End: 2024-07-03
Attending: INTERNAL MEDICINE

## 2024-07-03 DIAGNOSIS — R00.2 PALPITATIONS: ICD-10-CM

## 2024-07-03 PROCEDURE — 93298 REM INTERROG DEV EVAL SCRMS: CPT | Mod: PO | Performed by: INTERNAL MEDICINE

## 2024-07-03 PROCEDURE — 93298 REM INTERROG DEV EVAL SCRMS: CPT | Mod: 26,,, | Performed by: INTERNAL MEDICINE

## 2024-07-08 ENCOUNTER — PROCEDURE VISIT (OUTPATIENT)
Dept: OPHTHALMOLOGY | Facility: CLINIC | Age: 84
End: 2024-07-08
Payer: MEDICARE

## 2024-07-08 DIAGNOSIS — H35.3211 EXUDATIVE AGE-RELATED MACULAR DEGENERATION OF RIGHT EYE WITH ACTIVE CHOROIDAL NEOVASCULARIZATION: Primary | ICD-10-CM

## 2024-07-08 LAB
OHS CV AF BURDEN PERCENT: < 1
OHS CV DC REMOTE DEVICE TYPE: NORMAL

## 2024-07-08 PROCEDURE — 67028 INJECTION EYE DRUG: CPT | Mod: 50,S$GLB,, | Performed by: OPHTHALMOLOGY

## 2024-07-08 PROCEDURE — 92012 INTRM OPH EXAM EST PATIENT: CPT | Mod: 25,S$GLB,, | Performed by: OPHTHALMOLOGY

## 2024-07-08 PROCEDURE — 92134 CPTRZ OPH DX IMG PST SGM RTA: CPT | Mod: S$GLB,,, | Performed by: OPHTHALMOLOGY

## 2024-07-08 RX ADMIN — Medication 1.25 MG: at 08:07

## 2024-07-08 RX ADMIN — Medication 1.5 MG: at 08:07

## 2024-07-08 NOTE — PROGRESS NOTES
HPI     AVASTIN   Injection   OU      Additional comments: Avastin injection today ou  Oct     Blurred va      AMD            Comments    Pt states she feels like her va has decreased in OU since the beginning of   the year. No flashes. No floaters.  DLS     05/13/24  Cosopt BID OU  Latanaprost QHS OU          Last edited by Linn Mackey on 7/8/2024  7:46 AM.                OCT - activity around fibrosis OU - Improved  Atrophy OU        A/P    1. Wet AMD OS  S/p Avastin OS x 16, Eylea OS  x 14    Persistent SRF OS - improving  With RPE tear OS  Central fibrosis with atrophy - poor central potential  10/17 - increased SRH - will keep at 8 weeks for now  9/18 - stable cicatrix - try observation  12/18 - no activity  6/21 - some heme over cicatrix OS  1/23 - given extensive atrophy, try extension      2. New disease OD  Sx started 9/21  S/p Avastin OD x 6, Eylea OD x 8  3/23 - sig atrophy without activity  9/23 heme OS, but ASx  5/24 increase in activity around fibrosis - pt does notice some changes    Avastin OU today      3. PCIOL OU  CTR OS - but saw 20/30 with correction, in spite of sub RPE fibrosis      4. POAG OU  Good IOP control on Cosopt, brimonidine, latanoprost OU  Small drance heme OD    5. Floaters OU        12 weeks and Dilate (LDFE 5/24)    Injection Procedure Note:  Diagnosis: Wet AMD OU    Patient Identified and Time Out complete  Pt marked  Topical Proparacaine and Betadine.  Inject Avastin OU at 6:00 @ 3.5-4mm posterior to limbus  Post Operative Dx: Same  Complications: None  Follow up as above.

## 2024-07-18 ENCOUNTER — TELEPHONE (OUTPATIENT)
Dept: ORTHOPEDICS | Facility: CLINIC | Age: 84
End: 2024-07-18
Payer: MEDICARE

## 2024-07-18 NOTE — TELEPHONE ENCOUNTER
----- Message from Ernesto You sent at 7/18/2024 10:55 AM CDT -----  Patient would like a callback regarding her right knee pain post injection.    [phone]

## 2024-07-19 DIAGNOSIS — M17.11 OSTEOARTHRITIS OF RIGHT KNEE, UNSPECIFIED OSTEOARTHRITIS TYPE: Primary | ICD-10-CM

## 2024-07-22 ENCOUNTER — HOSPITAL ENCOUNTER (OUTPATIENT)
Dept: RADIOLOGY | Facility: HOSPITAL | Age: 84
Discharge: HOME | End: 2024-07-22
Attending: ORTHOPAEDIC SURGERY
Payer: MEDICARE

## 2024-07-22 ENCOUNTER — OFFICE VISIT (OUTPATIENT)
Dept: ORTHOPEDICS | Facility: CLINIC | Age: 84
End: 2024-07-22
Payer: MEDICARE

## 2024-07-22 VITALS — BODY MASS INDEX: 24.88 KG/M2 | HEIGHT: 63 IN | WEIGHT: 140.44 LBS

## 2024-07-22 DIAGNOSIS — M17.11 OSTEOARTHRITIS OF RIGHT KNEE, UNSPECIFIED OSTEOARTHRITIS TYPE: Primary | ICD-10-CM

## 2024-07-22 DIAGNOSIS — G89.29 CHRONIC PAIN OF RIGHT KNEE: ICD-10-CM

## 2024-07-22 DIAGNOSIS — M25.561 CHRONIC PAIN OF RIGHT KNEE: ICD-10-CM

## 2024-07-22 DIAGNOSIS — M17.11 OSTEOARTHRITIS OF RIGHT KNEE, UNSPECIFIED OSTEOARTHRITIS TYPE: ICD-10-CM

## 2024-07-22 DIAGNOSIS — Z01.812 ENCOUNTER FOR PRE-OPERATIVE LABORATORY TESTING: ICD-10-CM

## 2024-07-22 DIAGNOSIS — Z96.651 S/P TOTAL KNEE ARTHROPLASTY, RIGHT: ICD-10-CM

## 2024-07-22 DIAGNOSIS — M17.11 DEGENERATIVE ARTHRITIS OF RIGHT KNEE: ICD-10-CM

## 2024-07-22 PROBLEM — Z79.01 LONG TERM (CURRENT) USE OF ANTICOAGULANTS: Status: ACTIVE | Noted: 2024-07-22

## 2024-07-22 PROCEDURE — 3288F FALL RISK ASSESSMENT DOCD: CPT | Mod: CPTII,S$GLB,, | Performed by: ORTHOPAEDIC SURGERY

## 2024-07-22 PROCEDURE — 73560 X-RAY EXAM OF KNEE 1 OR 2: CPT | Mod: 26,59,LT, | Performed by: RADIOLOGY

## 2024-07-22 PROCEDURE — 99999 PR PBB SHADOW E&M-EST. PATIENT-LVL III: CPT | Mod: PBBFAC,,, | Performed by: ORTHOPAEDIC SURGERY

## 2024-07-22 PROCEDURE — 73562 X-RAY EXAM OF KNEE 3: CPT | Mod: TC,PO,RT

## 2024-07-22 PROCEDURE — 1125F AMNT PAIN NOTED PAIN PRSNT: CPT | Mod: CPTII,S$GLB,, | Performed by: ORTHOPAEDIC SURGERY

## 2024-07-22 PROCEDURE — 73562 X-RAY EXAM OF KNEE 3: CPT | Mod: 26,RT,, | Performed by: RADIOLOGY

## 2024-07-22 PROCEDURE — 1159F MED LIST DOCD IN RCRD: CPT | Mod: CPTII,S$GLB,, | Performed by: ORTHOPAEDIC SURGERY

## 2024-07-22 PROCEDURE — 73560 X-RAY EXAM OF KNEE 1 OR 2: CPT | Mod: TC,PO,LT

## 2024-07-22 PROCEDURE — 1157F ADVNC CARE PLAN IN RCRD: CPT | Mod: CPTII,S$GLB,, | Performed by: ORTHOPAEDIC SURGERY

## 2024-07-22 PROCEDURE — 1101F PT FALLS ASSESS-DOCD LE1/YR: CPT | Mod: CPTII,S$GLB,, | Performed by: ORTHOPAEDIC SURGERY

## 2024-07-22 PROCEDURE — 99215 OFFICE O/P EST HI 40 MIN: CPT | Mod: 57,S$GLB,, | Performed by: ORTHOPAEDIC SURGERY

## 2024-07-22 RX ORDER — CEFAZOLIN SODIUM 2 G/50ML
2 SOLUTION INTRAVENOUS
OUTPATIENT
Start: 2024-07-22

## 2024-07-22 RX ORDER — MUPIROCIN 20 MG/G
OINTMENT TOPICAL
OUTPATIENT
Start: 2024-07-22

## 2024-07-22 NOTE — PROGRESS NOTES
83 years old debilitating pain in the right knee failed a longstanding nonoperative course.  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

## 2024-07-31 ENCOUNTER — TELEPHONE (OUTPATIENT)
Dept: ORTHOPEDICS | Facility: CLINIC | Age: 84
End: 2024-07-31
Payer: MEDICARE

## 2024-07-31 ENCOUNTER — TELEPHONE (OUTPATIENT)
Dept: PRIMARY CARE CLINIC | Facility: CLINIC | Age: 84
End: 2024-07-31
Payer: MEDICARE

## 2024-07-31 NOTE — TELEPHONE ENCOUNTER
Contacted patient. All questions and concerns has been answered about surgery. Attempted to contact Humza about her date/time for joint camp.

## 2024-07-31 NOTE — TELEPHONE ENCOUNTER
Yes any orthopedic procedure other than an injection does require her to come in person.  She will also need clearance from her cardiologist.  Sometimes they will do this over the phone but that is up to her cardiologist so they should be reaching out to them as well.  I think the patient was already told this by Ladi last week but maybe not

## 2024-07-31 NOTE — TELEPHONE ENCOUNTER
----- Message from Marie Ann sent at 7/31/2024  3:32 PM CDT -----  Regarding: pt advice - pre op clearance  [phone] - call back; needing to see  for pre op clearance surgery with . She also needs EKG and blood work.    Marie

## 2024-07-31 NOTE — TELEPHONE ENCOUNTER
----- Message from Gloria Oh MA sent at 7/31/2024 11:24 AM CDT -----  Contact: pt  Questions on upcoming surgery   Call back [phone]

## 2024-08-01 ENCOUNTER — TELEPHONE (OUTPATIENT)
Dept: ORTHOPEDICS | Facility: CLINIC | Age: 84
End: 2024-08-01
Payer: MEDICARE

## 2024-08-01 ENCOUNTER — TELEPHONE (OUTPATIENT)
Dept: CARDIOLOGY | Facility: CLINIC | Age: 84
End: 2024-08-01
Payer: MEDICARE

## 2024-08-01 NOTE — TELEPHONE ENCOUNTER
----- Message from Evy Darby sent at 8/1/2024  3:59 PM CDT -----  Regarding: sx clearance  Contact: pt's   Type:  Needs Medical Advice    Who Called: pt's     Symptoms (please be specific): sx clearance     Best Call Back Number: [phone]    Additional Information: pt requesting sx clearance.  Sx is sept 10 with Dr Mane

## 2024-08-01 NOTE — TELEPHONE ENCOUNTER
Spoke with pt and pts wife. I informed them that we have not received a surgical clearance request from Dr. Mane's office. Pt JORY and stated that they will give their office a call. I JORY. I gave pt our fax number. [phone].

## 2024-08-01 NOTE — TELEPHONE ENCOUNTER
Contacted patient. Informed her all pcp and cardiologist paperwork has been faxed. Patient verbalized understanding.

## 2024-08-01 NOTE — TELEPHONE ENCOUNTER
----- Message from Gloria Oh MA sent at 8/1/2024  4:23 PM CDT -----  Contact: jerald Fischer  [facility] does not have request for clearance   Call back

## 2024-08-01 NOTE — TELEPHONE ENCOUNTER
----- Message from Dariela Ortiz sent at 8/1/2024 11:42 AM CDT -----  Contact: Pt [phone]  Patient is returning a phone call.    Who left a message for the patient: Stephen Marino, RN      Does patient know what this is regarding:  Appt     Would you like a call back, or a response through your [facility] portal?:   Call Back     Comments:

## 2024-08-01 NOTE — TELEPHONE ENCOUNTER
Patient was informed of Dr. Jc's recs. She verbalized understanding. Appt scheduled for her preop visit on 8/26/24.

## 2024-08-01 NOTE — TELEPHONE ENCOUNTER
----- Message from Gloria Oh MA sent at 8/1/2024  1:46 PM CDT -----  Contact:   mario  Questions on up coming procedure   Labwork,  PCP does not know what labs to order   And cardiology clearance    Call back

## 2024-08-02 ENCOUNTER — TELEPHONE (OUTPATIENT)
Dept: PRIMARY CARE CLINIC | Facility: CLINIC | Age: 84
End: 2024-08-02
Payer: MEDICARE

## 2024-08-02 ENCOUNTER — TELEPHONE (OUTPATIENT)
Dept: CARDIOLOGY | Facility: CLINIC | Age: 84
End: 2024-08-02
Payer: MEDICARE

## 2024-08-02 DIAGNOSIS — Z01.818 PREOP TESTING: Primary | ICD-10-CM

## 2024-08-02 NOTE — TELEPHONE ENCOUNTER
I spoke with patient to inform her that MRI has yet to be approved.  I told her that we will have to cancel the MRI that is scheduled on Monday, 08/05/2024 at 8am.  I explained that Dr. Mane and staff are not in clinic at this time but will return on Monday and someone will reach out to her then.  She verbally expressed understanding.

## 2024-08-02 NOTE — TELEPHONE ENCOUNTER
Facility: [facility]   Doctor: Dr. Mane  Surgery:Arthroplasty, knee total (right)  Surgery: 9/10/24  Implant: Loop recorder  Medication: Asa

## 2024-08-03 ENCOUNTER — HOSPITAL ENCOUNTER (OUTPATIENT)
Dept: CARDIOLOGY | Facility: HOSPITAL | Age: 84
Discharge: HOME | End: 2024-08-03
Attending: INTERNAL MEDICINE

## 2024-08-03 ENCOUNTER — CLINICAL SUPPORT (OUTPATIENT)
Dept: CARDIOLOGY | Facility: HOSPITAL | Age: 84
End: 2024-08-03
Payer: MEDICARE

## 2024-08-03 DIAGNOSIS — R00.2 PALPITATIONS: ICD-10-CM

## 2024-08-03 PROCEDURE — 93298 REM INTERROG DEV EVAL SCRMS: CPT | Mod: PO | Performed by: INTERNAL MEDICINE

## 2024-08-03 PROCEDURE — 93298 REM INTERROG DEV EVAL SCRMS: CPT | Mod: 26,,, | Performed by: INTERNAL MEDICINE

## 2024-08-05 ENCOUNTER — TELEPHONE (OUTPATIENT)
Dept: CARDIOLOGY | Facility: CLINIC | Age: 84
End: 2024-08-05
Payer: MEDICARE

## 2024-08-05 DIAGNOSIS — I10 PRIMARY HYPERTENSION: ICD-10-CM

## 2024-08-05 DIAGNOSIS — Z01.818 PRE-OP EVALUATION: Primary | ICD-10-CM

## 2024-08-05 DIAGNOSIS — E78.2 MIXED HYPERLIPIDEMIA: ICD-10-CM

## 2024-08-05 DIAGNOSIS — I10 ESSENTIAL HYPERTENSION: ICD-10-CM

## 2024-08-05 DIAGNOSIS — I48.0 PAF (PAROXYSMAL ATRIAL FIBRILLATION): ICD-10-CM

## 2024-08-05 DIAGNOSIS — R00.2 PALPITATIONS: ICD-10-CM

## 2024-08-07 LAB
OHS CV AF BURDEN PERCENT: < 1
OHS CV DC REMOTE DEVICE TYPE: NORMAL

## 2024-08-08 ENCOUNTER — OFFICE VISIT (OUTPATIENT)
Dept: FAMILY MEDICINE | Facility: CLINIC | Age: 84
End: 2024-08-08
Payer: MEDICARE

## 2024-08-08 VITALS
DIASTOLIC BLOOD PRESSURE: 76 MMHG | HEART RATE: 58 BPM | HEIGHT: 63 IN | SYSTOLIC BLOOD PRESSURE: 134 MMHG | OXYGEN SATURATION: 99 % | WEIGHT: 141.13 LBS | BODY MASS INDEX: 25.01 KG/M2

## 2024-08-08 DIAGNOSIS — G62.9 PERIPHERAL POLYNEUROPATHY: ICD-10-CM

## 2024-08-08 DIAGNOSIS — Z00.00 ENCOUNTER FOR PREVENTIVE HEALTH EXAMINATION: Primary | ICD-10-CM

## 2024-08-08 DIAGNOSIS — J43.9 PULMONARY EMPHYSEMA, UNSPECIFIED EMPHYSEMA TYPE: Chronic | ICD-10-CM

## 2024-08-08 DIAGNOSIS — F32.1 MAJOR DEPRESSIVE DISORDER, SINGLE EPISODE, MODERATE: ICD-10-CM

## 2024-08-08 DIAGNOSIS — I10 PRIMARY HYPERTENSION: ICD-10-CM

## 2024-08-08 DIAGNOSIS — Z95.818 PRESENCE OF WATCHMAN LEFT ATRIAL APPENDAGE CLOSURE DEVICE: ICD-10-CM

## 2024-08-08 DIAGNOSIS — I73.9 PERIPHERAL VASCULAR DISEASE, UNSPECIFIED: ICD-10-CM

## 2024-08-08 DIAGNOSIS — I70.0 ABDOMINAL AORTIC ATHEROSCLEROSIS: ICD-10-CM

## 2024-08-08 DIAGNOSIS — I48.0 PAF (PAROXYSMAL ATRIAL FIBRILLATION): Chronic | ICD-10-CM

## 2024-08-08 DIAGNOSIS — E78.2 MIXED HYPERLIPIDEMIA: Chronic | ICD-10-CM

## 2024-08-08 PROBLEM — L97.422 NON-PRESSURE CHRONIC ULCER OF LEFT HEEL AND MIDFOOT WITH FAT LAYER EXPOSED: Status: ACTIVE | Noted: 2024-08-08

## 2024-08-08 PROCEDURE — 99999 PR PBB SHADOW E&M-EST. PATIENT-LVL V: CPT | Mod: PBBFAC,,, | Performed by: NURSE PRACTITIONER

## 2024-08-09 PROBLEM — L97.422 NON-PRESSURE CHRONIC ULCER OF LEFT HEEL AND MIDFOOT WITH FAT LAYER EXPOSED: Status: RESOLVED | Noted: 2024-08-08 | Resolved: 2024-08-09

## 2024-08-13 DIAGNOSIS — M17.11 OSTEOARTHRITIS OF RIGHT KNEE, UNSPECIFIED OSTEOARTHRITIS TYPE: Primary | ICD-10-CM

## 2024-08-14 ENCOUNTER — HOSPITAL ENCOUNTER (OUTPATIENT)
Dept: RADIOLOGY | Facility: HOSPITAL | Age: 84
Discharge: HOME | End: 2024-08-14
Attending: ORTHOPAEDIC SURGERY
Payer: MEDICARE

## 2024-08-14 DIAGNOSIS — M17.11 OSTEOARTHRITIS OF RIGHT KNEE, UNSPECIFIED OSTEOARTHRITIS TYPE: ICD-10-CM

## 2024-08-14 DIAGNOSIS — G89.29 CHRONIC PAIN OF RIGHT KNEE: ICD-10-CM

## 2024-08-14 DIAGNOSIS — M25.561 CHRONIC PAIN OF RIGHT KNEE: ICD-10-CM

## 2024-08-14 PROCEDURE — 77073 BONE LENGTH STUDIES: CPT | Mod: 26,,, | Performed by: RADIOLOGY

## 2024-08-14 PROCEDURE — 73721 MRI JNT OF LWR EXTRE W/O DYE: CPT | Mod: TC,PO,RT

## 2024-08-14 PROCEDURE — 73721 MRI JNT OF LWR EXTRE W/O DYE: CPT | Mod: 26,RT,, | Performed by: RADIOLOGY

## 2024-08-14 PROCEDURE — 77073 BONE LENGTH STUDIES: CPT | Mod: TC,FY,PO

## 2024-08-21 ENCOUNTER — HOSPITAL ENCOUNTER (OUTPATIENT)
Dept: RADIOLOGY | Facility: HOSPITAL | Age: 84
Discharge: HOME | End: 2024-08-21
Attending: FAMILY MEDICINE
Payer: MEDICARE

## 2024-08-21 ENCOUNTER — PATIENT MESSAGE (OUTPATIENT)
Dept: CARDIOLOGY | Facility: HOSPITAL | Age: 84
End: 2024-08-21
Payer: MEDICARE

## 2024-08-21 DIAGNOSIS — Z01.818 PREOP TESTING: ICD-10-CM

## 2024-08-21 PROCEDURE — 71046 X-RAY EXAM CHEST 2 VIEWS: CPT | Mod: TC,FY,PO

## 2024-08-21 PROCEDURE — 71046 X-RAY EXAM CHEST 2 VIEWS: CPT | Mod: 26,,, | Performed by: STUDENT IN AN ORGANIZED HEALTH CARE EDUCATION/TRAINING PROGRAM

## 2024-08-22 ENCOUNTER — TELEPHONE (OUTPATIENT)
Dept: PRIMARY CARE CLINIC | Facility: CLINIC | Age: 84
End: 2024-08-22
Payer: MEDICARE

## 2024-08-22 DIAGNOSIS — Z01.818 PREOP TESTING: Primary | ICD-10-CM

## 2024-08-22 NOTE — TELEPHONE ENCOUNTER
Pre-op paperwork from Dr. Mane requires pt to get an EKG.  EKG order placed, called pt and scheduled for EKG tomorrow.  Pre-op appt with Dr. Dupree scheduled on 8/26.

## 2024-08-23 ENCOUNTER — HOSPITAL ENCOUNTER (OUTPATIENT)
Dept: CARDIOLOGY | Facility: HOSPITAL | Age: 84
Discharge: HOME | End: 2024-08-23
Attending: INTERNAL MEDICINE
Payer: MEDICARE

## 2024-08-23 ENCOUNTER — HOSPITAL ENCOUNTER (OUTPATIENT)
Dept: RADIOLOGY | Facility: HOSPITAL | Age: 84
Discharge: HOME | End: 2024-08-23
Attending: INTERNAL MEDICINE
Payer: MEDICARE

## 2024-08-23 ENCOUNTER — TELEPHONE (OUTPATIENT)
Dept: CARDIOLOGY | Facility: CLINIC | Age: 84
End: 2024-08-23

## 2024-08-23 ENCOUNTER — HOSPITAL ENCOUNTER (OUTPATIENT)
Dept: CARDIOLOGY | Facility: HOSPITAL | Age: 84
Discharge: HOME | End: 2024-08-23
Attending: FAMILY MEDICINE
Payer: MEDICARE

## 2024-08-23 VITALS — WEIGHT: 141 LBS | BODY MASS INDEX: 24.98 KG/M2 | HEIGHT: 63 IN

## 2024-08-23 DIAGNOSIS — I48.0 PAF (PAROXYSMAL ATRIAL FIBRILLATION): ICD-10-CM

## 2024-08-23 DIAGNOSIS — R00.2 PALPITATIONS: ICD-10-CM

## 2024-08-23 DIAGNOSIS — I10 PRIMARY HYPERTENSION: ICD-10-CM

## 2024-08-23 DIAGNOSIS — Z01.818 PREOP TESTING: ICD-10-CM

## 2024-08-23 DIAGNOSIS — I10 ESSENTIAL HYPERTENSION: ICD-10-CM

## 2024-08-23 DIAGNOSIS — E78.2 MIXED HYPERLIPIDEMIA: ICD-10-CM

## 2024-08-23 DIAGNOSIS — Z01.818 PRE-OP EVALUATION: ICD-10-CM

## 2024-08-23 LAB
CV PHARM DOSE: 0.4 MG
CV STRESS BASE HR: 61 BPM
DIASTOLIC BLOOD PRESSURE: 105 MMHG
NUC REST EJECTION FRACTION: 63
OHS CV CPX 1 MINUTE RECOVERY HEART RATE: 71 BPM
OHS CV CPX 85 PERCENT MAX PREDICTED HEART RATE MALE: 115
OHS CV CPX MAX PREDICTED HEART RATE: 135
OHS CV CPX PATIENT IS FEMALE: 1
OHS CV CPX PATIENT IS MALE: 0
OHS CV CPX PEAK DIASTOLIC BLOOD PRESSURE: 105 MMHG
OHS CV CPX PEAK HEAR RATE: 99 BPM
OHS CV CPX PEAK RATE PRESSURE PRODUCT: NORMAL
OHS CV CPX PEAK SYSTOLIC BLOOD PRESSURE: 186 MMHG
OHS CV CPX PERCENT MAX PREDICTED HEART RATE ACHIEVED: 75
OHS CV CPX RATE PRESSURE PRODUCT PRESENTING: NORMAL
OHS CV PHARM TIME: 852 MIN
OHS QRS DURATION: 90 MS
OHS QTC CALCULATION: 458 MS
SYSTOLIC BLOOD PRESSURE: 186 MMHG

## 2024-08-23 PROCEDURE — 93016 CV STRESS TEST SUPVJ ONLY: CPT | Mod: ,,, | Performed by: INTERNAL MEDICINE

## 2024-08-23 PROCEDURE — 78452 HT MUSCLE IMAGE SPECT MULT: CPT | Mod: PO

## 2024-08-23 PROCEDURE — 63600175 PHARM REV CODE 636 W HCPCS: Mod: PO | Performed by: INTERNAL MEDICINE

## 2024-08-23 PROCEDURE — 93017 CV STRESS TEST TRACING ONLY: CPT | Mod: PO

## 2024-08-23 PROCEDURE — A9502 TC99M TETROFOSMIN: HCPCS | Mod: PO | Performed by: INTERNAL MEDICINE

## 2024-08-23 PROCEDURE — 93018 CV STRESS TEST I&R ONLY: CPT | Mod: ,,, | Performed by: INTERNAL MEDICINE

## 2024-08-23 PROCEDURE — 93005 ELECTROCARDIOGRAM TRACING: CPT | Mod: PO

## 2024-08-23 PROCEDURE — 78452 HT MUSCLE IMAGE SPECT MULT: CPT | Mod: 26,,, | Performed by: INTERNAL MEDICINE

## 2024-08-23 PROCEDURE — 93010 ELECTROCARDIOGRAM REPORT: CPT | Mod: ,,, | Performed by: INTERNAL MEDICINE

## 2024-08-23 RX ORDER — REGADENOSON 0.08 MG/ML
0.4 INJECTION, SOLUTION INTRAVENOUS
Status: COMPLETED | OUTPATIENT
Start: 2024-08-23 | End: 2024-08-23

## 2024-08-23 RX ADMIN — TETROFOSMIN 33 MILLICURIE: 1.38 INJECTION, POWDER, LYOPHILIZED, FOR SOLUTION INTRAVENOUS at 08:08

## 2024-08-23 RX ADMIN — REGADENOSON 0.4 MG: 0.08 INJECTION, SOLUTION INTRAVENOUS at 08:08

## 2024-08-23 RX ADMIN — TETROFOSMIN 11 MILLICURIE: 1.38 INJECTION, POWDER, LYOPHILIZED, FOR SOLUTION INTRAVENOUS at 07:08

## 2024-08-23 NOTE — TELEPHONE ENCOUNTER
----- Message from Daron Kinsey MD sent at 8/23/2024  3:50 PM CDT -----  No evidence of blockages.

## 2024-08-26 ENCOUNTER — OFFICE VISIT (OUTPATIENT)
Dept: PRIMARY CARE CLINIC | Facility: CLINIC | Age: 84
End: 2024-08-26
Payer: MEDICARE

## 2024-08-26 ENCOUNTER — TELEPHONE (OUTPATIENT)
Dept: PRIMARY CARE CLINIC | Facility: CLINIC | Age: 84
End: 2024-08-26
Payer: MEDICARE

## 2024-08-26 VITALS
OXYGEN SATURATION: 98 % | DIASTOLIC BLOOD PRESSURE: 72 MMHG | BODY MASS INDEX: 24.63 KG/M2 | SYSTOLIC BLOOD PRESSURE: 132 MMHG | WEIGHT: 139 LBS | HEART RATE: 67 BPM | HEIGHT: 63 IN

## 2024-08-26 DIAGNOSIS — I10 PRIMARY HYPERTENSION: ICD-10-CM

## 2024-08-26 DIAGNOSIS — M85.80 OSTEOPENIA AFTER MENOPAUSE: ICD-10-CM

## 2024-08-26 DIAGNOSIS — Z78.0 OSTEOPENIA AFTER MENOPAUSE: ICD-10-CM

## 2024-08-26 DIAGNOSIS — I48.0 PAF (PAROXYSMAL ATRIAL FIBRILLATION): Chronic | ICD-10-CM

## 2024-08-26 DIAGNOSIS — Z01.818 PREOP EXAMINATION: Primary | ICD-10-CM

## 2024-08-26 PROBLEM — I73.9 PERIPHERAL VASCULAR DISEASE, UNSPECIFIED: Chronic | Status: ACTIVE | Noted: 2024-08-08

## 2024-08-26 PROBLEM — F32.1 MAJOR DEPRESSIVE DISORDER, SINGLE EPISODE, MODERATE: Chronic | Status: ACTIVE | Noted: 2024-08-08

## 2024-08-26 PROCEDURE — 99214 OFFICE O/P EST MOD 30 MIN: CPT | Mod: S$GLB,,, | Performed by: FAMILY MEDICINE

## 2024-08-26 PROCEDURE — 99999 PR PBB SHADOW E&M-EST. PATIENT-LVL IV: CPT | Mod: PBBFAC,,, | Performed by: FAMILY MEDICINE

## 2024-08-26 PROCEDURE — 1160F RVW MEDS BY RX/DR IN RCRD: CPT | Mod: CPTII,S$GLB,, | Performed by: FAMILY MEDICINE

## 2024-08-26 PROCEDURE — 1125F AMNT PAIN NOTED PAIN PRSNT: CPT | Mod: CPTII,S$GLB,, | Performed by: FAMILY MEDICINE

## 2024-08-26 PROCEDURE — 1159F MED LIST DOCD IN RCRD: CPT | Mod: CPTII,S$GLB,, | Performed by: FAMILY MEDICINE

## 2024-08-26 PROCEDURE — 1101F PT FALLS ASSESS-DOCD LE1/YR: CPT | Mod: CPTII,S$GLB,, | Performed by: FAMILY MEDICINE

## 2024-08-26 PROCEDURE — 3078F DIAST BP <80 MM HG: CPT | Mod: CPTII,S$GLB,, | Performed by: FAMILY MEDICINE

## 2024-08-26 PROCEDURE — 3075F SYST BP GE 130 - 139MM HG: CPT | Mod: CPTII,S$GLB,, | Performed by: FAMILY MEDICINE

## 2024-08-26 PROCEDURE — 3288F FALL RISK ASSESSMENT DOCD: CPT | Mod: CPTII,S$GLB,, | Performed by: FAMILY MEDICINE

## 2024-08-26 PROCEDURE — 1157F ADVNC CARE PLAN IN RCRD: CPT | Mod: CPTII,S$GLB,, | Performed by: FAMILY MEDICINE

## 2024-08-26 NOTE — ASSESSMENT & PLAN NOTE
Patient was on therapy at one point but currently not.  I did review her last bone density scan.  I will recommend beginning a bisphosphonate but since see has upcoming orthopedic surgery in a few weeks will hold off, I will see her back a few weeks after surgery and will likely recommend starting at that time.  I do recommend beginning vitamin-D 2000 IU daily.

## 2024-08-26 NOTE — TELEPHONE ENCOUNTER
Pt seen in clinic today and Dr. Dupree advised that he would discuss his message with her at today's appt.

## 2024-08-26 NOTE — PROGRESS NOTES
Primary Care Provider Appointment   [facility]       Patient ID: Serenity Epps is a 83 y.o. female.    ASSESSMENT/PLAN by Problem List:    1. Preop examination  Assessment & Plan:  Patient is optimized for surgery.  She may proceed as planned.  Patient does have multiple medical problems has some chronic risk.  Again these are optimize.      Most recent EKG did show some nonspecific ST changes.  However she had a nuclear stress test that same day without any ischemia.    She was advised to discontinue aspirin seven days prior to the procedure.  She also takes Advil and Celebrex p.r.n..  She was advised to never take these together and avoid both within 5-7 days before her upcoming surgery.      2. PAF (paroxysmal atrial fibrillation)  Overview:  Seen on loop recorder    Assessment & Plan:  Rhythm is currently regular.  Last EKG normal sinus rhythm.  Patient has Watchman device.      3. Osteopenia after menopause  Assessment & Plan:  Patient was on therapy at one point but currently not.  I did review her last bone density scan.  I will recommend beginning a bisphosphonate but since see has upcoming orthopedic surgery in a few weeks will hold off, I will see her back a few weeks after surgery and will likely recommend starting at that time.  I do recommend beginning vitamin-D 2000 IU daily.      4. Primary hypertension  Assessment & Plan:  Blood pressure is stable and satisfactory.           Follow Up:  1-2 weeks after surgery    Subjective:     No chief complaint on file.    I have reviewed the information entered by the ancillary staff regarding the chief complaint as well as the related history.    HPI    Patient is a/an 83 y.o.  female     Patient is here today for preop exam.  Overall she is feeling well.  She did have a recent stress test.  She reports no fevers or illness.  No accidents.  No chest pain or shortness of breath.    For complete problem list, past medical  "history, surgical history, social history, etc., see appropriate section in the electronic medical record    Review of Systems   Constitutional:  Negative for chills and fever.   HENT: Negative.     Eyes:  Positive for visual disturbance (Chronic).   Cardiovascular: Negative.    Gastrointestinal: Negative.    Genitourinary: Negative.    Musculoskeletal:  Positive for arthralgias and gait problem.   Psychiatric/Behavioral: Negative.  Negative for dysphoric mood.        Objective     Physical Exam  Vitals reviewed.   Constitutional:       General: She is not in acute distress.     Appearance: She is well-developed. She is not diaphoretic.   HENT:      Head: Normocephalic and atraumatic.   Eyes:      General: No scleral icterus.     Conjunctiva/sclera: Conjunctivae normal.   Cardiovascular:      Rate and Rhythm: Normal rate and regular rhythm.      Comments: No peripheral edema  Pulmonary:      Effort: Pulmonary effort is normal. No respiratory distress.      Breath sounds: No wheezing or rales.   Skin:     Coloration: Skin is not jaundiced.   Neurological:      Mental Status: She is alert and oriented to person, place, and time.      Comments:  Antalgic gait.  Using a walker   Psychiatric:         Mood and Affect: Mood normal.         Behavior: Behavior normal.       Vitals:    08/26/24 1403   BP: 132/72   BP Location: Right arm   Patient Position: Sitting   BP Method: Small (Manual)   Pulse: 67   SpO2: 98%   Weight: 63.1 kg (139 lb 0.4 oz)   Height: 5' 3" (1.6 m)           THIS DOCUMENT WAS MADE IN PART WITH VOICE RECOGNITION SOFTWARE.  OCCASIONALLY THIS SOFTWARE WILL MISINTERPRET WORDS OR PHRASES.    "

## 2024-08-26 NOTE — ASSESSMENT & PLAN NOTE
Patient is optimized for surgery.  She may proceed as planned.  Patient does have multiple medical problems has some chronic risk.  Again these are optimize.      Most recent EKG did show some nonspecific ST changes.  However she had a nuclear stress test that same day without any ischemia.    She was advised to discontinue aspirin seven days prior to the procedure.  She also takes Advil and Celebrex p.r.n..  She was advised to never take these together and avoid both within 5-7 days before her upcoming surgery.

## 2024-08-26 NOTE — TELEPHONE ENCOUNTER
Please call regarding EKG.  EKG was ordered in advance of her appointment with me, for preop evaluation as it appears she is scheduled for surgery with orthopedics within a few weeks.  There are changes on the EKG from her baseline.  So she should make certain she follows with her cardiologist as well to review these changes and see if there are any concerns that would change her risk for surgery or potentially require additional testing.

## 2024-08-28 ENCOUNTER — LAB VISIT (OUTPATIENT)
Dept: LAB | Facility: HOSPITAL | Age: 84
End: 2024-08-28
Attending: FAMILY MEDICINE
Payer: MEDICARE

## 2024-08-28 DIAGNOSIS — Z01.818 PREOP TESTING: Primary | ICD-10-CM

## 2024-08-28 DIAGNOSIS — Z01.818 PREOP TESTING: ICD-10-CM

## 2024-08-28 PROCEDURE — 87081 CULTURE SCREEN ONLY: CPT | Performed by: ORTHOPAEDIC SURGERY

## 2024-08-28 NOTE — PRE ADMISSION SCREENING
JOINT CAMP ASSESSMENT    Name Serenity Epps   MRN [id number]    Age/Sex 83 y.o. female    Surgeon Antonietta   Joint Camp Date 8/28/2024   Surgery Date 09-10-24   Procedure Total Right Knee Arthroplasty   Insurance Payor: [facility] MEDICARE ADVANTAGE / Plan[facility] / Product Type: Medicare Advantage /    Care Team Patient Care Team:  Shant Jc MD as PCP - General (Family Medicine)  Sabrina Cordon MD as Consulting Physician (Dermatology)  Estelle Mello MD (Inactive) as Consulting Physician (Ophthalmology)  NATHANIEL Lopez MD as Consulting Physician (Ophthalmology)  Kamaljit Mane MD as Consulting Physician (Sports Medicine)  [place] - (Home Health Services)  [place] - (Home Health Services)  Rox Fraser LPN as Care Coordinator (Family Medicine)    Pharmacy   [id number] - [place] [id number]  [address]  [facility] [address]  Phone: [phone] Fax: [phone]    [facility]  [address] [facility] [address] [place] [address]  Phone: [phone] Fax: [phone]     AM-PAC Score      Risk Assessment Score      Past Medical History:   Diagnosis Date    Anxiety     Arthritis     Cataract - Both Eyes     OU done//    CKD (chronic kidney disease), stage II 09/21/2016    pt denies    Diarrhea     Diverticulosis     DVT (deep venous thrombosis)     left leg, after childbirth    Exudative age-related macular degeneration of left eye 02/20/2014    Glaucoma     Hyperlipidemia 12/25/2022    Hypertension     Irritable bowel syndrome     Left foot pain     chronic    Lymphocytic colitis     Macular degeneration     bimonthly intraoccular injections    Osteopenia     Paroxysmal atrial fibrillation 10/10/2023    Presence of Watchman left atrial appendage closure device     Recurrent major depressive disorder 04/27/2018    Rhinitis, chronic      Strabismus     as child    Stroke due to thrombosis of right middle cerebral artery 12/24/2022    Urinary incontinence        Past Surgical History:   Procedure Laterality Date    APPENDECTOMY      age 38    CATARACT EXTRACTION      OU done//    CATARACT EXTRACTION W/ INTRAOCULAR LENS  IMPLANT, BILATERAL Bilateral 2016    CLOSURE OF LEFT ATRIAL APPENDAGE USING DEVICE  1/12/2024    Procedure: Watchman;  Surgeon: Nellie Gibson MD;  Location: [facility];  Service: Cardiology;;    COLONOSCOPY  9/26/2006  Shane    Diverticulosis.   Internal small hemorrhoids were found.     COLONOSCOPY  10/03/2012    Dr. Lynn, repeat in 7-8 years for surveillance    COLONOSCOPY N/A 05/02/2018    COLONOSCOPY  05/02/2018    Procedure: COLONOSCOPY;  Surgeon: Toby Lynn Jr., MD;  Location: [facility];  Service: Endoscopy;  Laterality: N/A;    ECHOCARDIOGRAM,TRANSESOPHAGEAL N/A 01/11/2023    Procedure: ECHOCARDIOGRAM,TRANSESOPHAGEAL;  Surgeon: Daron Kinsey MD;  Location: [facility];  Service: Cardiology;  Laterality: N/A;    ECHOCARDIOGRAM,TRANSESOPHAGEAL  1/12/2024    Procedure: (SWETHA) intra-procedure;  Surgeon: Daron Kinsey MD;  Location: [facility];  Service: Cardiology;;    EYE SURGERY Bilateral     Phaco with IOL (cataract extraction), rigth:[id number] 22.0 d//    FOOT HARDWARE REMOVAL Left 06/01/2016    Dr. Hammonds    FOOT SURGERY Left 2014    ERG and open reduction tallo tarsal dislocation (hyprocure implant)    FRACTURE SURGERY Left     HIP    INSERTION OF IMPLANTABLE LOOP RECORDER N/A 01/11/2023    Procedure: INSERTION, IMPLANTABLE LOOP RECORDER;  Surgeon: Daron Kinsey MD;  Location: [facility];  Service: Cardiology;  Laterality: N/A;    INSERTION OF IMPLANTABLE LOOP RECORDER N/A 01/11/2023    Procedure: INSERTION, IMPLANTABLE LOOP RECORDER;  Surgeon: Daron Kinsey MD;  Location: [facility];  Service: Cardiology;  Laterality: N/A;    JOINT REPLACEMENT      PARTIAL HYSTERECTOMY      age 38, ovaries conserved     PIP JOINT FUSION Right 06/01/2016    2nd-4th PIP joints of right foot; Dr. Hammonds    TONSILLECTOMY      as a child    TOTAL KNEE ARTHROPLASTY Left 09/2015    TRANSESOPHAGEAL ECHOCARDIOGRAPHY N/A 4/4/2024    Procedure: ECHOCARDIOGRAM, TRANSESOPHAGEAL;  Surgeon: Daron Kinsey MD;  Location: [facility];  Service: Cardiology;  Laterality: N/A;    UPPER GASTROINTESTINAL ENDOSCOPY  9/26/2006  [facility]ane    Erythema in the lower third of the esophagus (NERD).  Patulous lower esophageal sphincter.   Gastric mucosal atrophy.   PARAG Test  Negative.     VEIN LIGATION  1964    BLE         Home Enviroment     Living Arrangement: Lives with spouse  Home Environment: 1-story house/ trailer  Home Safety Concerns: Discussed with Anesthesia/Dr. Bo ok to have procedure at [facility]. Pt assures she has a neighbor and friend who will assist pt and  at home 24/7 for first few days.   DISCHARGE CAREGIVER/SUPPORT SYSTEM     Identified post-op caregiver: Patient has spouse / significant other, friend, and neighbor.  Patient's caregiver(s) will be able to provide physical assistance. Patient will have someone to assist overnight.      Caregiver present at pre-op interview:  Yes      PRE-OPERATIVE FUNCTIONAL STATUS     Employment: Unemployed    Pre-op Functional Status: Mobility/Ambulation: Independent with mobility prior to admission in the home on all surfaces with no assistive devices for unlimited distances.     Use of assistive device for ambulation: walker  ADL: self care  ADL Limitations: difficulty with walking  Medical Restrictions: Unstable ambulation and Decreased range of motions in extremities    POTENTIAL BARRIERS TO DISCHARGE/POTENTIAL POST-OP COMPLICATIONS           DISCHARGE PLAN     Expected LOS of 1 days or less for joint replacement discussed with patient. We also discussed a discharge path of HH for approximately two weeks with a transition to outpatient PT on the third week given no post-op complications.       Patient in agreement with discharge plan: No    Discharge to: Discharge home with home health (PT/OT) x2 weeks with transition to outpatient PT     HH:       OP PT:      Home DME: rolling walker and bedside commode    Needed DME at D/C: pt will acquire shower/transfer chair     Rx: Per Dr. Mane at discharge     Meds to Beds: Yes  Patient expected to discharge on Coumadin (Warfarin) for DVT prophylaxis. Coumadin Clinic Needed: Yes  Location: per Dr Office

## 2024-08-28 NOTE — PRE ADMISSION SCREENING
Patient Name: Serenity Epps  YOB: 1940   MRN: [id number]     [facility]   Basic Mobility Inpatient Short Form 6 Clicks         How much difficulty does the patient currently have  Unable  A Lot  A Little  None      1. Turning over in bed (including adjusting bedclothes, sheets and blankets)?     1 []    2 []    3 [x]    4 []        2. Sitting down on and standing up from a chair with arms (e.g., wheelchair, bedside commode, etc.)     1 []  2 []  3 [x]     4 []      3. Moving from lying on back to sitting on the side of the bed?     1 []  2 []  3 [x]    4 []    How much help from another person does the patient currently need  Total  A Lot  A Little  None      4. Moving to and from a bed to a chair (including a wheelchair)?    1 []  2 []  3 [x]    4 []      5. Need to walk in hospital room?    1 []  2 []  3 [x]    4 []      6. Climbing 3-5 steps with a railing?    1 [x]  2 []  3 []    4 []       Raw Score:       16           CMS 0-100% Score:     54.16       %   Standardized Score:       2.95        CMS Modifier:          CK                                [facility]   Basic Mobility Inpatient Short Form 6 Clicks Score Conversion Table*         *Use this form to convert -PAC Basic Mobility Inpatient Raw Scores.   [facility] Inpatient Basic Mobility Short Form Scoring Example   1. Add the number values associated with the response to each item. For example, items totals yield a Raw Score of 21.   2. Match the raw score to the t-Scale scores (t-Scale score = 50.25, SE = 4.69).   3. Find the associated CMS % (CMS % = 28.97%).   4. Locate the correct CMS Functional Modifier Code, or G Code (G code = CJ)     NOTE: Each -PAC Short Form has a separate conversion table. Make sure that you use the correct conversion table.       Instruction Manual - page 45 contains conversion table

## 2024-08-28 NOTE — PRE ADMISSION SCREENING
"               [facility] Risk Assessment Scale    Patient Name: Serenity Epps  YOB: 1940  MRN: [id number]            RIsk Factor Measure Recommendation Patient Data Scale/Score   BMI >40 Reconsider surgery, weight loss   Estimated body mass index is 24.63 kg/m² as calculated from the following:    Height as of 8/26/24: 5' 3" (1.6 m).    Weight as of 8/26/24: 63.1 kg (139 lb 0.4 oz).   [x] 0 = 1 - 24.9  [] 1 = 25-29.9  [] 2 = 30-34.9  [] 3 = 35-39.9  [] 4 = 40-44.9  [] 5 = 45-99.9   Hemoglobin AIC (if applicable) >9 Delay surgery until DM under control  Refer for:  Nutrition Therapy  Exercise   Medication    Lab Results   Component Value Date    HGBA1C 5.3 12/24/2022       Lab Results   Component Value Date    GLU 94 08/21/2024      [x] 0 = 4.0-5.6  [] 1 = 5.7-6.4  [] 2 = 6.5-6.9  [] 3 = 7.0-7.9  [] 4 = 8.0-8.9  [] 5 = 9.0-12   Hemoglobin (Anemia) <9 Delay surgery   Correct anemia Lab Results   Component Value Date    HGB 12.1 08/21/2024    [] 20 - <9.0                    Albumin <3 Delay surgery &Workup Lab Results   Component Value Date    ALBUMIN 3.8 06/03/2024    [] 20 - <3.0   Smoking Cessation >4 Weeks Delay Surgery  Refer to OP Cessation Class    N/A [] 20 - current smoker                                _____ PPD                    Hx of MI, PE, Arrhythmia, CVA, DVT <30 Days Delay Surgery    N/A [] 20      Infection Variable Delay surgery and re-evaluate   N/A [] 20 - recent/current infection     Depression (PHQ) >10 out of 27 Delay Surgery and re-evaluate  Medication  Counseling              [] 0     [x]1     []2     []3      []4      [] 5                    (1-4)      (5-9)  (10-14)  (15-19)   (20-27)     Memory Impairment & Memory loss (Mini-Cog Screening Tool) Advanced dementia and/or Parkinson's Reconsider surgery    N/A [] 0     []1     []2     []3     []4     [] 5     Physical Conditioning (Modified AM-PAC Per Physical Therapy at [facility]) Unable to ambulate on day of surgery Delay surgery and " re-evaluate  Pre-Rehabilitation   (PT evaluation)       []  0   []4       []8     []12        [x]16     []20       (<20%)   (<40%)   (<60%)   (<80% )    (>80%)     Home Environment/Caregiver support  (Per /Navigator Interview)    Availability of basic services and/or approprate assistance during post-operative period Delay surgery and re-evaluate  Safe home environment  Health   1 week post-surgery  Transportation  availability  Ability to obtain DME/Medications post-op    [x] 0     []1     []2     []3     []4     [] 5  [] 0     [x]1     []2     []3     []4     [] 5  [x] 0     []1     []2     []3     []4     [] 5  [x] 0     []1     []2     []3     []4     [] 5         MD Contact:  Comments:  Total Score:  18

## 2024-08-31 LAB — MRSA SPEC QL CULT: NORMAL

## 2024-09-04 ENCOUNTER — CLINICAL SUPPORT (OUTPATIENT)
Dept: CARDIOLOGY | Facility: HOSPITAL | Age: 84
End: 2024-09-04
Payer: MEDICARE

## 2024-09-04 ENCOUNTER — HOSPITAL ENCOUNTER (OUTPATIENT)
Dept: CARDIOLOGY | Facility: HOSPITAL | Age: 84
Discharge: HOME | End: 2024-09-04
Attending: INTERNAL MEDICINE
Payer: MEDICARE

## 2024-09-04 DIAGNOSIS — R00.2 PALPITATIONS: ICD-10-CM

## 2024-09-04 PROCEDURE — 93298 REM INTERROG DEV EVAL SCRMS: CPT | Mod: PO | Performed by: INTERNAL MEDICINE

## 2024-09-04 PROCEDURE — 93298 REM INTERROG DEV EVAL SCRMS: CPT | Mod: 26,,, | Performed by: INTERNAL MEDICINE

## 2024-09-05 ENCOUNTER — ANESTHESIA EVENT (OUTPATIENT)
Dept: SURGERY | Facility: HOSPITAL | Age: 84
End: 2024-09-05
Payer: MEDICARE

## 2024-09-09 DIAGNOSIS — G89.29 CHRONIC PAIN OF RIGHT KNEE: ICD-10-CM

## 2024-09-09 DIAGNOSIS — M17.11 DEGENERATIVE ARTHRITIS OF RIGHT KNEE: ICD-10-CM

## 2024-09-09 DIAGNOSIS — M25.561 CHRONIC PAIN OF RIGHT KNEE: ICD-10-CM

## 2024-09-09 DIAGNOSIS — Z96.651 S/P TOTAL KNEE ARTHROPLASTY, RIGHT: Primary | ICD-10-CM

## 2024-09-09 LAB
OHS CV AF BURDEN PERCENT: < 1
OHS CV DC REMOTE DEVICE TYPE: NORMAL

## 2024-09-09 RX ORDER — WARFARIN SODIUM 5 MG/1
5 TABLET ORAL DAILY
Qty: 30 TABLET | Refills: 1 | Status: SHIPPED | OUTPATIENT
Start: 2024-09-09 | End: 2025-09-09

## 2024-09-09 RX ORDER — OXYCODONE AND ACETAMINOPHEN 5; 325 MG/1; MG/1
1 TABLET ORAL
Qty: 42 TABLET | Refills: 0 | Status: SHIPPED | OUTPATIENT
Start: 2024-09-09

## 2024-09-09 RX ORDER — CEPHALEXIN 500 MG/1
500 CAPSULE ORAL 4 TIMES DAILY
Qty: 20 CAPSULE | Refills: 0 | Status: SHIPPED | OUTPATIENT
Start: 2024-09-09

## 2024-09-10 ENCOUNTER — CLINICAL SUPPORT (OUTPATIENT)
Dept: REHABILITATION | Facility: HOSPITAL | Age: 84
End: 2024-09-10
Attending: ORTHOPAEDIC SURGERY
Payer: MEDICARE

## 2024-09-10 ENCOUNTER — ANESTHESIA (OUTPATIENT)
Dept: SURGERY | Facility: HOSPITAL | Age: 84
End: 2024-09-10
Payer: MEDICARE

## 2024-09-10 ENCOUNTER — HOSPITAL ENCOUNTER (OUTPATIENT)
Dept: RADIOLOGY | Facility: HOSPITAL | Age: 84
Discharge: HOME | End: 2024-09-10
Attending: ORTHOPAEDIC SURGERY | Admitting: ORTHOPAEDIC SURGERY
Payer: MEDICARE

## 2024-09-10 ENCOUNTER — HOSPITAL ENCOUNTER (OUTPATIENT)
Facility: HOSPITAL | Age: 84
Discharge: HOME | End: 2024-09-10
Attending: ORTHOPAEDIC SURGERY | Admitting: ORTHOPAEDIC SURGERY
Payer: MEDICARE

## 2024-09-10 VITALS
BODY MASS INDEX: 24.63 KG/M2 | RESPIRATION RATE: 16 BRPM | HEART RATE: 69 BPM | HEIGHT: 63 IN | OXYGEN SATURATION: 95 % | DIASTOLIC BLOOD PRESSURE: 78 MMHG | TEMPERATURE: 98 F | SYSTOLIC BLOOD PRESSURE: 135 MMHG | WEIGHT: 139 LBS

## 2024-09-10 DIAGNOSIS — G89.29 CHRONIC PAIN OF RIGHT KNEE: ICD-10-CM

## 2024-09-10 DIAGNOSIS — M25.561 CHRONIC PAIN OF RIGHT KNEE: ICD-10-CM

## 2024-09-10 DIAGNOSIS — M17.11 OSTEOARTHRITIS OF RIGHT KNEE, UNSPECIFIED OSTEOARTHRITIS TYPE: ICD-10-CM

## 2024-09-10 DIAGNOSIS — M17.11 DEGENERATIVE ARTHRITIS OF RIGHT KNEE: ICD-10-CM

## 2024-09-10 DIAGNOSIS — Z96.651 S/P TOTAL KNEE ARTHROPLASTY, RIGHT: ICD-10-CM

## 2024-09-10 DIAGNOSIS — R26.81 GAIT INSTABILITY: Primary | ICD-10-CM

## 2024-09-10 PROCEDURE — 63600175 PHARM REV CODE 636 W HCPCS: Mod: JG,PO | Performed by: ORTHOPAEDIC SURGERY

## 2024-09-10 PROCEDURE — C1776 JOINT DEVICE (IMPLANTABLE): HCPCS | Mod: PO | Performed by: ORTHOPAEDIC SURGERY

## 2024-09-10 PROCEDURE — 71000033 HC RECOVERY, INTIAL HOUR: Mod: PO | Performed by: ORTHOPAEDIC SURGERY

## 2024-09-10 PROCEDURE — 71000015 HC POSTOP RECOV 1ST HR: Mod: PO | Performed by: ORTHOPAEDIC SURGERY

## 2024-09-10 PROCEDURE — 71000039 HC RECOVERY, EACH ADD'L HOUR: Mod: PO | Performed by: ORTHOPAEDIC SURGERY

## 2024-09-10 PROCEDURE — 63600175 PHARM REV CODE 636 W HCPCS: Mod: PO | Performed by: ANESTHESIOLOGY

## 2024-09-10 PROCEDURE — 63600175 PHARM REV CODE 636 W HCPCS: Mod: JZ,JG,PO | Performed by: ANESTHESIOLOGY

## 2024-09-10 PROCEDURE — 0055T BONE SRGRY CMPTR CT/MRI IMAG: CPT | Mod: ,,, | Performed by: ORTHOPAEDIC SURGERY

## 2024-09-10 PROCEDURE — 27447 TOTAL KNEE ARTHROPLASTY: CPT | Mod: RT,,, | Performed by: ORTHOPAEDIC SURGERY

## 2024-09-10 PROCEDURE — 27201423 OPTIME MED/SURG SUP & DEVICES STERILE SUPPLY: Mod: PO | Performed by: ORTHOPAEDIC SURGERY

## 2024-09-10 PROCEDURE — 25000003 PHARM REV CODE 250: Mod: PO | Performed by: ORTHOPAEDIC SURGERY

## 2024-09-10 PROCEDURE — 25000003 PHARM REV CODE 250: Mod: PO | Performed by: ANESTHESIOLOGY

## 2024-09-10 PROCEDURE — 36000710: Mod: PO | Performed by: ORTHOPAEDIC SURGERY

## 2024-09-10 PROCEDURE — 73560 X-RAY EXAM OF KNEE 1 OR 2: CPT | Mod: TC,FY,PO,RT

## 2024-09-10 PROCEDURE — 97530 THERAPEUTIC ACTIVITIES: CPT | Mod: PO

## 2024-09-10 PROCEDURE — 25000003 PHARM REV CODE 250: Mod: PO | Performed by: NURSE ANESTHETIST, CERTIFIED REGISTERED

## 2024-09-10 PROCEDURE — 36000711: Mod: PO | Performed by: ORTHOPAEDIC SURGERY

## 2024-09-10 PROCEDURE — 63600175 PHARM REV CODE 636 W HCPCS: Mod: PO | Performed by: NURSE ANESTHETIST, CERTIFIED REGISTERED

## 2024-09-10 PROCEDURE — 37000008 HC ANESTHESIA 1ST 15 MINUTES: Mod: PO | Performed by: ORTHOPAEDIC SURGERY

## 2024-09-10 PROCEDURE — 73560 X-RAY EXAM OF KNEE 1 OR 2: CPT | Mod: 26,RT,, | Performed by: INTERNAL MEDICINE

## 2024-09-10 PROCEDURE — C1713 ANCHOR/SCREW BN/BN,TIS/BN: HCPCS | Mod: PO | Performed by: ORTHOPAEDIC SURGERY

## 2024-09-10 PROCEDURE — 64447 NJX AA&/STRD FEMORAL NRV IMG: CPT | Mod: PO | Performed by: ANESTHESIOLOGY

## 2024-09-10 PROCEDURE — 37000009 HC ANESTHESIA EA ADD 15 MINS: Mod: PO | Performed by: ORTHOPAEDIC SURGERY

## 2024-09-10 PROCEDURE — C9290 INJ, BUPIVACAINE LIPOSOME: HCPCS | Mod: PO | Performed by: ANESTHESIOLOGY

## 2024-09-10 PROCEDURE — 97161 PT EVAL LOW COMPLEX 20 MIN: CPT | Mod: PO

## 2024-09-10 DEVICE — IMPLANTABLE DEVICE: Type: IMPLANTABLE DEVICE | Site: KNEE | Status: FUNCTIONAL

## 2024-09-10 DEVICE — CEMENT BONE SMPLX HV GENTMYCN: Type: IMPLANTABLE DEVICE | Site: KNEE | Status: FUNCTIONAL

## 2024-09-10 DEVICE — COMPONENT PATELLA 9X32MM: Type: IMPLANTABLE DEVICE | Site: KNEE | Status: FUNCTIONAL

## 2024-09-10 DEVICE — BASEPLATE TIBIAL GENESIS SZ4: Type: IMPLANTABLE DEVICE | Site: KNEE | Status: FUNCTIONAL

## 2024-09-10 DEVICE — INSERT TIBIAL SZ 3-4 11MM XLPE: Type: IMPLANTABLE DEVICE | Site: KNEE | Status: FUNCTIONAL

## 2024-09-10 DEVICE — LUGS FEMORAL GII PRI: Type: IMPLANTABLE DEVICE | Site: KNEE | Status: FUNCTIONAL

## 2024-09-10 RX ORDER — LIDOCAINE HYDROCHLORIDE 10 MG/ML
1 INJECTION, SOLUTION EPIDURAL; INFILTRATION; INTRACAUDAL; PERINEURAL ONCE
Status: DISCONTINUED | OUTPATIENT
Start: 2024-09-10 | End: 2024-09-10 | Stop reason: HOSPADM

## 2024-09-10 RX ORDER — SODIUM CHLORIDE 0.9 % (FLUSH) 0.9 %
3 SYRINGE (ML) INJECTION
Status: DISCONTINUED | OUTPATIENT
Start: 2024-09-10 | End: 2024-09-10 | Stop reason: HOSPADM

## 2024-09-10 RX ORDER — CEFAZOLIN SODIUM 1 G/3ML
INJECTION, POWDER, FOR SOLUTION INTRAMUSCULAR; INTRAVENOUS
Status: DISCONTINUED | OUTPATIENT
Start: 2024-09-10 | End: 2024-09-10

## 2024-09-10 RX ORDER — FENTANYL CITRATE 50 UG/ML
100 INJECTION, SOLUTION INTRAMUSCULAR; INTRAVENOUS SEE ADMIN INSTRUCTIONS
Status: DISCONTINUED | OUTPATIENT
Start: 2024-09-10 | End: 2024-09-10 | Stop reason: HOSPADM

## 2024-09-10 RX ORDER — MUPIROCIN 20 MG/G
OINTMENT TOPICAL
Status: DISCONTINUED | OUTPATIENT
Start: 2024-09-10 | End: 2024-09-10 | Stop reason: HOSPADM

## 2024-09-10 RX ORDER — CEFAZOLIN SODIUM 2 G/50ML
2 SOLUTION INTRAVENOUS
Status: DISCONTINUED | OUTPATIENT
Start: 2024-09-10 | End: 2024-09-10 | Stop reason: HOSPADM

## 2024-09-10 RX ORDER — SODIUM CHLORIDE, SODIUM LACTATE, POTASSIUM CHLORIDE, CALCIUM CHLORIDE 600; 310; 30; 20 MG/100ML; MG/100ML; MG/100ML; MG/100ML
INJECTION, SOLUTION INTRAVENOUS CONTINUOUS
Status: DISCONTINUED | OUTPATIENT
Start: 2024-09-10 | End: 2024-09-10 | Stop reason: HOSPADM

## 2024-09-10 RX ORDER — FENTANYL CITRATE 50 UG/ML
25 INJECTION, SOLUTION INTRAMUSCULAR; INTRAVENOUS EVERY 5 MIN PRN
Status: DISCONTINUED | OUTPATIENT
Start: 2024-09-10 | End: 2024-09-10 | Stop reason: HOSPADM

## 2024-09-10 RX ORDER — PHENYLEPHRINE HYDROCHLORIDE 10 MG/ML
INJECTION INTRAVENOUS
Status: DISCONTINUED | OUTPATIENT
Start: 2024-09-10 | End: 2024-09-10

## 2024-09-10 RX ORDER — BUPIVACAINE HYDROCHLORIDE 5 MG/ML
INJECTION, SOLUTION EPIDURAL; INTRACAUDAL
Status: COMPLETED | OUTPATIENT
Start: 2024-09-10 | End: 2024-09-10

## 2024-09-10 RX ORDER — METOCLOPRAMIDE HYDROCHLORIDE 5 MG/ML
10 INJECTION INTRAMUSCULAR; INTRAVENOUS EVERY 10 MIN PRN
Status: DISCONTINUED | OUTPATIENT
Start: 2024-09-10 | End: 2024-09-10 | Stop reason: HOSPADM

## 2024-09-10 RX ORDER — ACETAMINOPHEN 500 MG
1000 TABLET ORAL
Status: COMPLETED | OUTPATIENT
Start: 2024-09-10 | End: 2024-09-10

## 2024-09-10 RX ORDER — SODIUM CHLORIDE 0.9 G/100ML
IRRIGANT IRRIGATION
Status: DISCONTINUED | OUTPATIENT
Start: 2024-09-10 | End: 2024-09-10 | Stop reason: HOSPADM

## 2024-09-10 RX ORDER — TRANEXAMIC ACID 100 MG/ML
INJECTION, SOLUTION INTRAVENOUS
Status: DISCONTINUED | OUTPATIENT
Start: 2024-09-10 | End: 2024-09-10

## 2024-09-10 RX ORDER — BUPIVACAINE HYDROCHLORIDE 7.5 MG/ML
INJECTION, SOLUTION EPIDURAL; RETROBULBAR
Status: COMPLETED | OUTPATIENT
Start: 2024-09-10 | End: 2024-09-10

## 2024-09-10 RX ORDER — PROPOFOL 10 MG/ML
VIAL (ML) INTRAVENOUS
Status: DISCONTINUED | OUTPATIENT
Start: 2024-09-10 | End: 2024-09-10

## 2024-09-10 RX ORDER — LIDOCAINE HYDROCHLORIDE 20 MG/ML
INJECTION INTRAVENOUS
Status: DISCONTINUED | OUTPATIENT
Start: 2024-09-10 | End: 2024-09-10

## 2024-09-10 RX ORDER — PROPOFOL 10 MG/ML
VIAL (ML) INTRAVENOUS CONTINUOUS PRN
Status: DISCONTINUED | OUTPATIENT
Start: 2024-09-10 | End: 2024-09-10

## 2024-09-10 RX ORDER — MIDAZOLAM HYDROCHLORIDE 1 MG/ML
2 INJECTION, SOLUTION INTRAMUSCULAR; INTRAVENOUS
Status: DISCONTINUED | OUTPATIENT
Start: 2024-09-10 | End: 2024-09-10 | Stop reason: HOSPADM

## 2024-09-10 RX ORDER — OXYCODONE HYDROCHLORIDE 5 MG/1
5 TABLET ORAL ONCE AS NEEDED
Status: DISCONTINUED | OUTPATIENT
Start: 2024-09-10 | End: 2024-09-10 | Stop reason: HOSPADM

## 2024-09-10 RX ORDER — CELECOXIB 200 MG/1
400 CAPSULE ORAL
Status: COMPLETED | OUTPATIENT
Start: 2024-09-10 | End: 2024-09-10

## 2024-09-10 RX ORDER — FENTANYL CITRATE 50 UG/ML
INJECTION, SOLUTION INTRAMUSCULAR; INTRAVENOUS
Status: DISCONTINUED | OUTPATIENT
Start: 2024-09-10 | End: 2024-09-10

## 2024-09-10 RX ORDER — METHYLENE BLUE 5 MG/ML
INJECTION INTRAVENOUS
Status: DISCONTINUED | OUTPATIENT
Start: 2024-09-10 | End: 2024-09-10 | Stop reason: HOSPADM

## 2024-09-10 RX ADMIN — PHENYLEPHRINE HYDROCHLORIDE 100 MCG: 10 INJECTION INTRAVENOUS at 10:09

## 2024-09-10 RX ADMIN — ACETAMINOPHEN 1000 MG: 500 TABLET ORAL at 08:09

## 2024-09-10 RX ADMIN — CELECOXIB 400 MG: 200 CAPSULE ORAL at 08:09

## 2024-09-10 RX ADMIN — PROPOFOL 40 MCG/KG/MIN: 10 INJECTION, EMULSION INTRAVENOUS at 09:09

## 2024-09-10 RX ADMIN — FENTANYL CITRATE 25 MCG: 50 INJECTION, SOLUTION INTRAMUSCULAR; INTRAVENOUS at 09:09

## 2024-09-10 RX ADMIN — DEXTROSE MONOHYDRATE 1 G: 2.5 INJECTION INTRAVENOUS at 11:09

## 2024-09-10 RX ADMIN — CEFAZOLIN 2 G: 330 INJECTION, POWDER, FOR SOLUTION INTRAMUSCULAR; INTRAVENOUS at 09:09

## 2024-09-10 RX ADMIN — SODIUM CHLORIDE, POTASSIUM CHLORIDE, SODIUM LACTATE AND CALCIUM CHLORIDE: 600; 310; 30; 20 INJECTION, SOLUTION INTRAVENOUS at 09:09

## 2024-09-10 RX ADMIN — PHENYLEPHRINE HYDROCHLORIDE 50 MCG: 10 INJECTION INTRAVENOUS at 10:09

## 2024-09-10 RX ADMIN — BUPIVACAINE HYDROCHLORIDE 10 ML: 5 INJECTION, SOLUTION EPIDURAL; INTRACAUDAL; PERINEURAL at 09:09

## 2024-09-10 RX ADMIN — SODIUM CHLORIDE, POTASSIUM CHLORIDE, SODIUM LACTATE AND CALCIUM CHLORIDE: 600; 310; 30; 20 INJECTION, SOLUTION INTRAVENOUS at 08:09

## 2024-09-10 RX ADMIN — PHENYLEPHRINE HYDROCHLORIDE 100 MCG: 10 INJECTION INTRAVENOUS at 09:09

## 2024-09-10 RX ADMIN — TRANEXAMIC ACID 700 MG: 100 INJECTION, SOLUTION INTRAVENOUS at 09:09

## 2024-09-10 RX ADMIN — PROPOFOL 40 MG: 10 INJECTION, EMULSION INTRAVENOUS at 09:09

## 2024-09-10 RX ADMIN — PROPOFOL 20 MG: 10 INJECTION, EMULSION INTRAVENOUS at 09:09

## 2024-09-10 RX ADMIN — MUPIROCIN: 20 OINTMENT TOPICAL at 08:09

## 2024-09-10 RX ADMIN — BUPIVACAINE HYDROCHLORIDE 1.4 ML: 7.5 INJECTION, SOLUTION EPIDURAL; RETROBULBAR at 09:09

## 2024-09-10 RX ADMIN — BUPIVACAINE 20 ML: 13.3 INJECTION, SUSPENSION, LIPOSOMAL INFILTRATION at 09:09

## 2024-09-10 RX ADMIN — LIDOCAINE HYDROCHLORIDE 100 MG: 20 INJECTION INTRAVENOUS at 09:09

## 2024-09-10 NOTE — PROGRESS NOTES
Right adductor saphenous single shot block complete per Dr. Bo with Anesthesia. Exparel band placed to pt's wrist. Pt tolerated well. See Anesthesia record for procedural notes. See flowsheet and MAR for vitals and medications. Monitors in place, alarms audible. VSS. etCO2 monitoring in place. Resp even, unlabored. Skin warm, dry. Pt in NAD. Pt awaiting transport to OR. Family at bedside. Bed in lowest, locked position. Side rails up x2. Call light within reach.

## 2024-09-10 NOTE — ANESTHESIA POSTPROCEDURE EVALUATION
Anesthesia Post Evaluation    Patient: Serenity Epps    Procedure(s) Performed: Procedure(s) (LRB):  ARTHROPLASTY, KNEE TOTAL (Right)    Final Anesthesia Type: spinal      Patient location during evaluation: PACU  Patient participation: Yes- Able to Participate  Level of consciousness: awake and alert  Post-procedure vital signs: reviewed and stable  Pain management: adequate  Airway patency: patent    PONV status at discharge: No PONV  Anesthetic complications: no      Cardiovascular status: blood pressure returned to baseline  Respiratory status: unassisted and room air  Hydration status: euvolemic  Follow-up not needed.              Vitals Value Taken Time   /68 09/10/24 1300   Temp 36.7 °C (98 °F) 09/10/24 1103   Pulse 55 09/10/24 1300   Resp 16 09/10/24 1300   SpO2 93 % 09/10/24 1300         No case tracking events are documented in the log.      Pain/Kassy Score: Pain Rating Prior to Med Admin: 8 (9/10/2024  9:06 AM)  Pain Rating Post Med Admin: 0 (9/10/2024  9:09 AM)  Kassy Score: 10 (9/10/2024 12:39 PM)

## 2024-09-10 NOTE — PLAN OF CARE
"PT at bedside. Pt states left leg "feels weak like it's going to buckle". Patient able to stand and shift weight to left leg but unable to take steps on left leg.. pt placed back in bed.  "

## 2024-09-10 NOTE — DISCHARGE INSTRUCTIONS
Joint Replacement     Hip    Knee   After surgery until first follow-up visit:    DO:  Keep leg elevated (toes above your nose) for several hours per day while recovering from surgery.  Use walker for comfort. You may put weight on affected leg as tolerated.  Minimal activity and advance diet as tolerated.  Keep operative site clean and dry for 48 hours.  Keep ice pack on the knee for 48-72 hours. Ice on for 30 minutes of each hour while awake to reduce pain and swelling.  Resume home medications.   Home health will call the day following surgery to provide follow up.   Physical therapy will be done by home health for two weeks after surgery.      DO NOT:  Do not soak in tub.  Do not drive for 24 hours or while taking narcotic pain medication.  Do not take additional tylenol/acetaminophen while taking narcotic pain medication that contains tylenol/acetaminophen.    CALL PHYSICIAN FOR ANY QUESTIONS OR CONCERNS.    You will have a follow-up appointment in 2 weeks.       Exparel(bupivacaine) has been injected to provide approximately 72 hours of reduced pain after your surgery.  Do not remove the bracelet for five days.  Report to your doctor as soon as possible if you experience any of the following:   Restlessness   Anxiety   Speech problems    Lightheadedness   Numbness and tingling of the mouth and lips   Seizures    Metallic taste   Blurred vision   Tremors    Twitching   Depression   Extreme drowsiness  Avoid additional use of local anesthetics (such as dental procedures) for five days (96 hours).     Discharge Instructions: After Your Surgery/Procedure  Youve just had surgery. During surgery you were given medicine called anesthesia to keep you relaxed and free of pain. After surgery you may have some pain or nausea. This is common. Here are some tips for feeling better and getting well after surgery.     Stay on schedule with your medication.   Going home  Your doctor or nurse will show you how to take care  "of yourself when you go home. He or she will also answer your questions. Have an adult family member or friend drive you home.      For your safety we recommend these precaution for the first 24 hours after your procedure:  Do not drive or use heavy equipment.  Do not make important decisions or sign legal papers.  Do not drink alcohol.  Have someone stay with you, if needed. He or she can watch for problems and help keep you safe.  Your concentration, balance, coordination, and judgement may be impaired for many hours after anesthesia.  Use caution when ambulating or standing up.     You may feel weak and "washed out" after anesthesia and surgery.      Subtle residual effects of general anesthesia or sedation with regional / local anesthesia can last more than 24 hours.  Rest for the remainder of the day or longer if your Doctor/Surgeon has advised you to do so.  Although you may feel normal within the first 24 hours, your reflexes and mental ability may be impaired without you realizing it.  You may feel dizzy, lightheaded or sleepy for 24 hours or longer.      Be sure to go to all follow-up visits with your doctor. And rest after your surgery for as long as your doctor tells you to.  Coping with pain  If you have pain after surgery, pain medicine will help you feel better. Take it as told, before pain becomes severe. Also, ask your doctor or pharmacist about other ways to control pain. This might be with heat, ice, or relaxation. And follow any other instructions your surgeon or nurse gives you.  Tips for taking pain medicine  To get the best relief possible, remember these points:  Pain medicines can upset your stomach. Taking them with a little food may help.  Most pain relievers taken by mouth need at least 20 to 30 minutes to start to work.  Taking medicine on a schedule can help you remember to take it. Try to time your medicine so that you can take it before starting an activity. This might be before you " get dressed, go for a walk, or sit down for dinner.  Constipation is a common side effect of pain medicines. Call your doctor before taking any medicines such as laxatives or stool softeners to help ease constipation. Also ask if you should skip any foods. Drinking lots of fluids and eating foods such as fruits and vegetables that are high in fiber can also help. Remember, do not take laxatives unless your surgeon has prescribed them.  Drinking alcohol and taking pain medicine can cause dizziness and slow your breathing. It can even be deadly. Do not drink alcohol while taking pain medicine.  Pain medicine can make you react more slowly to things. Do not drive or run machinery while taking pain medicine.  Your health care provider may tell you to take acetaminophen to help ease your pain. Ask him or her how much you are supposed to take each day. Acetaminophen or other pain relievers may interact with your prescription medicines or other over-the-counter (OTC) drugs. Some prescription medicines have acetaminophen and other ingredients. Using both prescription and OTC acetaminophen for pain can cause you to overdose. Read the labels on your OTC medicines with care. This will help you to clearly know the list of ingredients, how much to take, and any warnings. It may also help you not take too much acetaminophen. If you have questions or do not understand the information, ask your pharmacist or health care provider to explain it to you before you take the OTC medicine.  Managing nausea  Some people have an upset stomach after surgery. This is often because of anesthesia, pain, or pain medicine, or the stress of surgery. These tips will help you handle nausea and eat healthy foods as you get better. If you were on a special food plan before surgery, ask your doctor if you should follow it while you get better. These tips may help:  Do not push yourself to eat. Your body will tell you when to eat and how much.  Start  off with clear liquids and soup. They are easier to digest.  Next try semi-solid foods, such as mashed potatoes, applesauce, and gelatin, as you feel ready.  Slowly move to solid foods. Dont eat fatty, rich, or spicy foods at first.  Do not force yourself to have 3 large meals a day. Instead eat smaller amounts more often.  Take pain medicines with a small amount of solid food, such as crackers or toast, to avoid nausea.     Call your surgeon if  You still have pain an hour after taking medicine. The medicine may not be strong enough.  You feel too sleepy, dizzy, or groggy. The medicine may be too strong.  You have side effects like nausea, vomiting, or skin changes, such as rash, itching, or hives.       If you have obstructive sleep apnea  You were given anesthesia medicine during surgery to keep you comfortable and free of pain. After surgery, you may have more apnea spells because of this medicine and other medicines you were given. The spells may last longer than usual.   At home:  Keep using the continuous positive airway pressure (CPAP) device when you sleep. Unless your health care provider tells you not to, use it when you sleep, day or night. CPAP is a common device used to treat obstructive sleep apnea.  Talk with your provider before taking any pain medicine, muscle relaxants, or sedatives. Your provider will tell you about the possible dangers of taking these medicines.  © 1798-2058 [employer]. [address] [place] [address], [place] [address]. All rights reserved. This information is not intended as a substitute for professional medical care. Always follow your healthcare professional's instructions.

## 2024-09-10 NOTE — OP NOTE
Department of Orthopedic Surgery    Operative Note    DATE: 9/10/2024      PREOPERATIVE DIAGNOSIS: Osteoarthritis of right knee, unspecified osteoarthritis type [M17.11]  Chronic pain of right knee [M25.561, G89.29]    POSTOPERATIVE DIAGNOSIS:  Osteoarthritis of right knee, unspecified osteoarthritis type [M17.11]  Chronic pain of right knee [M25.561, G89.29]    PROCEDURE: right Total Knee Arthroplasty    SURGEON: Kamaljit Mane M.D.    ASSISTANT: Luis Cruz RN CFA     ANESTHESIA: Spinal    BLOOD LOSS: Less than 20 mL    FLUIDS:  Crystalloid    DRAINS: None    INDICATIONS FOR PROCEDURE:   [unfilled] is a 83 y.o.-year-old with debilitating right-sided knee pain despite nonoperative measures and interested in knee arthroplasty.    PROCEDURE IN DETAIL:  After obtaining informed consent and starting the patient   on preoperative IV antibiotics, the patient was taken back to the Operating   Room. Spinal anesthesia was performed by Anesthesia Team. The right lower  extremity was prepped and draped in normal sterile fashion.  An Esmarch bandage was used to exsanguinate the right lower extremity and the pneumatic tourniquet  was inflated to 300 mmHg.  A standard longitudinal midline incision was made beginning 3 fingerbreadths above the superior pole of the patella extending down to the tibial tubercle.  Full-thickness skin flaps were raised.  A medial parapatellar arthrotomy was made. We everted the  patella, took down the soft tissues off the proximal medial tibia and the distal anterior femur.  We then used a   preoperative navigation patient specific cutting block to make our distal   femoral resection perpendicular to the mechanical axis.  An additional 2mm of bone was resected due to the flexion contracture.  We then used our size   5, 4-in-1 cutting block to make our anterior, posterior, and anterior and posterior chamfer cuts.  We then made a box cut with the femoral component.  We then   addressed the tibia,  resected the proximal tibia perpendicular to the medial axis using our preoperative navigation patient specific cutting block.  Flexion and extension gaps were symmetric and balanced at 11.  We then went ahead and punched for a tibia size 4 using preoperative navigation rotational holes.  We then went with the trial components, 5 femur, 4 tibia with a 11 polyethylene spacer.  Had good range of motion and stability.  We then went ahead and resected about 10 mm of bone off the patella to accommodate our 32 patellar button.  Patella tracked nicely.  No lateral release was required.  We then Pulsavac irrigated cancellous bone, vacuum mixed,   antibiotic-impregnated cement, cemented the tibial component, size 4, removed our excess cement, cemented our femoral component, size 5, and removed excess cement, locked our 11 polyethylene spacer and then cemented our patellar button size 32 and removed excess cement.  Once the cement hardened, we closed the extensor retinaculum with #1 Vicryl figure of eight sutures with the knee in slight flexion, the subcutaneous tissue with 2-0 Vicryl, skin reapproximated with staples.  A sterile dressing was applied.  Tourniquet deflated.

## 2024-09-10 NOTE — ANESTHESIA PROCEDURE NOTES
Spinal    Diagnosis: OA  Patient location during procedure: OR  Start time: 9/10/2024 9:26 AM  Timeout: 9/10/2024 9:25 AM  End time: 9/10/2024 9:30 AM    Staffing  Authorizing Provider: Dalia Bo MD  Performing Provider: Dalia Bo MD    Staffing  Performed by: Dalia Bo MD  Authorized by: Dalia Bo MD    Preanesthetic Checklist  Completed: patient identified, IV checked, site marked, risks and benefits discussed, surgical consent, monitors and equipment checked, pre-op evaluation and timeout performed  Spinal Block  Patient position: sitting  Prep: ChloraPrep  Patient monitoring: heart rate, cardiac monitor, continuous pulse ox, continuous capnometry and frequent blood pressure checks  Approach: midline  Location: L3-4  Injection technique: single shot  CSF Fluid: clear free-flowing CSF  Needle  Needle type: Arely   Needle gauge: 25 G  Needle length: 3.5 in  Additional Documentation: incremental injection, negative aspiration for heme and no paresthesia on injection  Needle localization: anatomical landmarks  Assessment  Sensory level: T10   Dermatomal levels determined by alcohol wipe  Ease of block: easy  Patient's tolerance of the procedure: comfortable throughout block  Medications:    Medications: bupivacaine (pf) (MARCAINE) injection 0.75% - Intraspinal   1.4 mL - 9/10/2024 9:30:00 AM

## 2024-09-10 NOTE — PLAN OF CARE
[facility]  Physical Therapy Initial Evaluation      Date: 9/10/2024   Name: Serenity Epps  Clinic Number: [id number]    Therapy Diagnosis:   Encounter Diagnoses   Name Primary?    Osteoarthritis of right knee, unspecified osteoarthritis type     Chronic pain of right knee     S/P total knee arthroplasty, right     Gait instability Yes     Physician: Kamaljit Mane MD    Physician Orders: PT Eval and Treat   Medical Diagnosis from Referral: Osteoarthritis of right knee, unspecified osteoarthritis type [M17.11], Chronic pain of right knee [M25.561, G89.29], S/P total knee arthroplasty, right   Evaluation Date: 9/10/2024  Authorization Period Expiration: 7/22/2025  Plan of Care Expiration: 09/10/2024  Visit # / Visits authorized: 1/ 1     Precautions: Standard     Time In: 1340  Time Out: 1400  Total Appointment Time (timed & untimed codes): 20 minutes      SUBJECTIVE   Date of onset: 09/10/2024    History of current condition - Serenity reports: she underwent a right TKA this morning. Pt states she received RW training prior to surgery.  Patient is agreeble to participate with PT Evaluation.     Social History:  lives with their spouse  Occupation: retired   Prior Level of Function: ambulating with RW  Current Level of Function: ambulating with RW    Pain:  Current 3/10,  (Faces scale)   Location: right knee    Patients goals: To go home     Medical History:   Past Medical History:   Diagnosis Date    Anxiety     Arthritis     Cataract - Both Eyes     OU done//    CKD (chronic kidney disease), stage II 09/21/2016    pt denies    Diarrhea     Diverticulosis     DVT (deep venous thrombosis)     left leg, after childbirth    Exudative age-related macular degeneration of left eye 02/20/2014    Glaucoma     Hyperlipidemia 12/25/2022    Hypertension     Irritable bowel syndrome     Left foot pain     chronic    Lymphocytic colitis     Macular degeneration     bimonthly intraoccular injections     Osteopenia     Paroxysmal atrial fibrillation 10/10/2023    Presence of Watchman left atrial appendage closure device     Recurrent major depressive disorder 04/27/2018    Rhinitis, chronic     Strabismus     as child    Stroke due to thrombosis of right middle cerebral artery 12/24/2022    Urinary incontinence        Surgical History:   Serenity Epps  has a past surgical history that includes Vein ligation (1964); Appendectomy; Foot surgery (Left, 2014); Tonsillectomy; Eye surgery (Bilateral); Total knee arthroplasty (Left, 09/2015); Partial hysterectomy; Cataract extraction w/ intraocular lens  implant, bilateral (Bilateral, 2016); PIP joint fusion (Right, 06/01/2016); Foot hardware removal (Left, 06/01/2016); Cataract extraction; Upper gastrointestinal endoscopy (9/26/2006  [place]); Joint replacement; Colonoscopy (9/26/2006  [place]); Colonoscopy (10/03/2012); Colonoscopy (N/A, 05/02/2018); Colonoscopy (05/02/2018); Fracture surgery (Left); echocardiogram,transesophageal (N/A, 01/11/2023); Insertion of implantable loop recorder (N/A, 01/11/2023); Insertion of implantable loop recorder (N/A, 01/11/2023); Closure of left atrial appendage using device (1/12/2024); echocardiogram,transesophageal (1/12/2024); and Transesophageal echocardiography (N/A, 4/4/2024).    Medications:   Serenity has a current medication list which includes the following prescription(s): acetaminophen, aspirin, atorvastatin, azelastine, wheat dextrin, biotin, calcium carbonate, celecoxib, cephalexin, diclofenac sodium, docusate sodium, dorzolamide-timolol 2-0.5%, fluticasone propionate, gabapentin, latanoprost, metoprolol succinate, mirtazapine, oxycodone-acetaminophen, pantoprazole, valsartan, vit c/e/zn/coppr/lutein/zeaxan, and warfarin, and the following Facility-Administered Medications: cefazolin 2 g/50ml dextrose ivpb, electrolyte-s (ph 7.4), fentanyl, fentanyl, lactated ringers, lidocaine (pf) 10 mg/ml (1%), methylene blue, metoclopramide,  metoclopramide, midazolam, mupirocin, oxycodone, sodium chloride 0.9%, sodium chloride 0.9%, and tranexamic acid (CYKLOKAPRON) 1,000 mg in 0.9% NaCl 100 mL IVPB (MB+).    Allergies:   Review of patient's allergies indicates:   Allergen Reactions    Brimonidine Other (See Comments)     Burning and redness        Objective     Patient found with HOB elevated, with PIV, BP, pulse ox. RN reports patient is medically appropriate for PT.    Cognitive Exam:  Oriented to: Person, Place, Time, and Situation    Physical Exam:    Lower Extremity Range of Motion:  Right Lower Extremity: WFL except knee  Left Lower Extremity: WFL    Lower Extremity Strength:  Right Lower Extremity: WFL  Left Lower Extremity: WFL    Functional Mobility:  Bed Mobility:  Supine to sit: min A cueing for sequencing     Transfers:  Sit <> stand: min A cueing for sequencing and hand placement  Toilet transfer: min A with RW with stand to sit. Nursing helped patient back into bed.     Gait:   Ambulated 40 ft with RW and CGA assist. Pt demo Pt demo appropriate weight shift and quad control during stance.     Therapeutic Activity to improve function for 10 minutes:   Education on exercise to lower risk of DVT, functional mobility, weight shifting, and education to safely negotiate home environment     Home Exercises and Patient Education Provided    Education provided:   - Safety with RW  - Safety with mobility     Assessment   Serenity is a 83 y.o. female referred to outpatient Physical Therapy with a medical diagnosis of Osteoarthritis of right knee, unspecified osteoarthritis type [M17.11], Chronic pain of right knee [M25.561, G89.29], S/P total knee arthroplasty, right . Pt presents with decreased ROM, decreased LE strength, gait instability, impaired balance, pain, and impaired functional mobility. She required min A to CGA for functional mobility today with mod cueing for safety with transfers and ambulation.    Pt prognosis is Good.   Pt will benefit  from skilled outpatient Physical Therapy to address the deficits stated above and in the chart below, provide pt/family education, and to maximize pt's level of independence.     Plan of care discussed with patient: Yes  Pt's spiritual, cultural and educational needs considered and patient is agreeable to the plan of care and goals as stated below:     Medical Necessity is demonstrated by the following   History  Co-morbidities and personal factors that may impact the plan of care [x] LOW: no personal factors / co-morbidities  [] MODERATE: 1-2 personal factors / co-morbidities  [] HIGH: 3+ personal factors / co-morbidities    Moderate / High Support Documentation:   Co-morbidities affecting plan of care: see above     Personal Factors:   age     Examination  Body Structures and Functions, activity limitations and participation restrictions that may impact the plan of care [x] LOW: addressing 1-2 elements  [] MODERATE: 3+ elements  [] HIGH: 4+ elements (please support below)    Moderate / High Support Documentation: see above      Clinical Presentation [x] LOW: stable  [] MODERATE: Evolving  [] HIGH: Unstable     Decision Making/ Complexity Score: low               Plan     Discharge Home with family with Home Health Physical Therapy when medically appropriate     Jayden Albert, PT, DPT

## 2024-09-10 NOTE — H&P
83 years old debilitating pain in the right knee failed a longstanding nonoperative course.  She would undergone left-sided knee replacement by myself several years ago satisfied with the result interested in undergoing right-sided knee arthroplasty     Exam shows valgus deformity skin is intact compartments soft no signs infection instability, tenderness the joint line valgus deformity      X-rays show arthritic changes valgus deformity      Assessment:  Right knee arthrosis failed a nonoperative course      Plan: We will schedule the patient for knee replacement surgery, she has aware of the risks and limitations of surgery and still wants to proceed    Imaging studies ordered and reviewed by me    Further History  Aching pain  Worse with activity  Relieved with rest  No other associated symptoms  No other radiation    Further Exam  Alert and oriented  Pleasant  Contralateral limb has appropriate range of motion for age and condition  Contralateral limb has appropriate strength for age and condition  Contralateral limb has appropriate stability  for age and condition  No adenopathy  Pulses are appropriate for current condition  Skin is intact        Chief Complaint    No chief complaint on file.      HPI  Serenity Epps is a 83 y.o.  female who presents with       Past Medical History  Past Medical History:   Diagnosis Date    Anxiety     Arthritis     Cataract - Both Eyes     OU done//    CKD (chronic kidney disease), stage II 09/21/2016    pt denies    Diarrhea     Diverticulosis     DVT (deep venous thrombosis)     left leg, after childbirth    Exudative age-related macular degeneration of left eye 02/20/2014    Glaucoma     Hyperlipidemia 12/25/2022    Hypertension     Irritable bowel syndrome     Left foot pain     chronic    Lymphocytic colitis     Macular degeneration     bimonthly intraoccular injections    Osteopenia     Paroxysmal atrial fibrillation 10/10/2023    Presence of Watchman left atrial appendage  closure device     Recurrent major depressive disorder 04/27/2018    Rhinitis, chronic     Strabismus     as child    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   Internal small hemorrhoids were found.     COLONOSCOPY  10/03/2012    Dr. Lynn, repeat in 7-8 years for surveillance    COLONOSCOPY N/A 05/02/2018    COLONOSCOPY  05/02/2018    Procedure: COLONOSCOPY;  Surgeon: Toby Lynn Jr., MD;  Location: [facility];  Service: Endoscopy;  Laterality: N/A;    ECHOCARDIOGRAM,TRANSESOPHAGEAL N/A 01/11/2023    Procedure: ECHOCARDIOGRAM,TRANSESOPHAGEAL;  Surgeon: Daron Kinsey MD;  Location: [facility];  Service: Cardiology;  Laterality: N/A;    ECHOCARDIOGRAM,TRANSESOPHAGEAL  1/12/2024    Procedure: (SWETHA) intra-procedure;  Surgeon: Daron Kinsey MD;  Location: [facility];  Service: Cardiology;;    EYE SURGERY Bilateral     Phaco with IOL (cataract extraction), rigth:[id number] 22.0 d//    FOOT HARDWARE REMOVAL Left 06/01/2016    Dr. Hammonds    FOOT SURGERY Left 2014    ERG and open reduction tallo tarsal dislocation (hyprocure implant)    FRACTURE SURGERY Left     HIP    INSERTION OF IMPLANTABLE LOOP RECORDER N/A 01/11/2023    Procedure: INSERTION, IMPLANTABLE LOOP RECORDER;  Surgeon: Daron Kinsey MD;  Location: [facility];  Service: Cardiology;  Laterality: N/A;    INSERTION OF IMPLANTABLE LOOP RECORDER N/A 01/11/2023    Procedure: INSERTION, IMPLANTABLE LOOP RECORDER;  Surgeon: Daron Kinsey MD;  Location: [facility];   Service: Cardiology;  Laterality: N/A;    JOINT REPLACEMENT      PARTIAL HYSTERECTOMY      age 38, ovaries conserved    PIP JOINT FUSION Right 06/01/2016    2nd-4th PIP joints of right foot; Dr. Hammonds    TONSILLECTOMY      as a child    TOTAL KNEE ARTHROPLASTY Left 09/2015    TRANSESOPHAGEAL ECHOCARDIOGRAPHY N/A 4/4/2024    Procedure: ECHOCARDIOGRAM, TRANSESOPHAGEAL;  Surgeon: Daron Kinsey MD;  Location: [facility];  Service: Cardiology;  Laterality: N/A;    UPPER GASTROINTESTINAL ENDOSCOPY  9/26/2006  [facility]    Erythema in the lower third of the esophagus (NERD).  Patulous lower esophageal sphincter.   Gastric mucosal atrophy.   PARAG Test  Negative.     VEIN LIGATION  1964    BLE       Medications  No current facility-administered medications for this encounter.       Allergies  Review of patient's allergies indicates:   Allergen Reactions    Brimonidine Other (See Comments)     Burning and redness       Family History  Family History   Problem Relation Name Age of Onset    Coronary artery disease Mother  78    Glaucoma Mother      Diverticulitis Father      Parkinsonism Brother      Glaucoma Brother      Ankylosing spondylitis Brother      Diabetes Brother      Macular degeneration Brother           twin brother wet mac//    Cancer Brother          prostate    Crohn's disease Brother      Parkinsonism Brother      No Known Problems Maternal Grandmother      No Known Problems Maternal Grandfather      No Known Problems Paternal Grandmother      No Known Problems Paternal Grandfather      Breast cancer Daughter      Cancer Daughter          breast    Amblyopia Neg Hx      Blindness Neg Hx      Cataracts Neg Hx      Hypertension Neg Hx      Strabismus Neg Hx      Stroke Neg Hx      Thyroid disease Neg Hx      Retinal detachment Neg Hx      Celiac disease Neg Hx         Social History  Social History     Socioeconomic History    Marital status:    Tobacco Use    Smoking status: Former     Current  packs/day: 0.00     Average packs/day: 2.0 packs/day for 28.0 years (56.0 ttl pk-yrs)     Types: Cigarettes     Start date: 1976     Quit date: 2004     Years since quittin.7    Smokeless tobacco: Never   Substance and Sexual Activity    Alcohol use: Yes     Comment: occ    Drug use: Yes     Types: Benzodiazepines    Sexual activity: Yes     Partners: Male     Social Determinants of Health     Financial Resource Strain: Low Risk  (2024)    Overall Financial Resource Strain (CARDIA)     Difficulty of Paying Living Expenses: Not hard at all   Food Insecurity: No Food Insecurity (2024)    Hunger Vital Sign     Worried About Running Out of Food in the Last Year: Never true     Ran Out of Food in the Last Year: Never true   Transportation Needs: No Transportation Needs (2024)    PRAPARE - Transportation     Lack of Transportation (Medical): No     Lack of Transportation (Non-Medical): No   Physical Activity: Sufficiently Active (2024)    Exercise Vital Sign     Days of Exercise per Week: 3 days     Minutes of Exercise per Session: 60 min   Stress: Stress Concern Present (2024)    [employer] [place] [employer] - Occupational Stress Questionnaire     Feeling of Stress : To some extent   Housing Stability: Unknown (2022)    Housing Stability Vital Sign     Unable to Pay for Housing in the Last Year: No     Number of Places Lived in the Last Year: 1               Review of Systems     Constitutional: Negative    HENT: Negative  Eyes: Negative  Respiratory: Negative  Cardiovascular: Negative  Musculoskeletal: HPI  Skin: Negative  Neurological: Negative  Hematological: Negative  Endocrine: Negative                 Physical Exam    There were no vitals filed for this visit.  Body mass index is 24.62 kg/m².  Physical Examination:     General appearance -  well appearing, and in no distress  Mental status - awake  Neck - supple  Chest -  symmetric air entry  Heart - normal rate    Abdomen - soft      Assessment   No diagnosis found.      Plan

## 2024-09-10 NOTE — TRANSFER OF CARE
"Anesthesia Transfer of Care Note    Patient: Serenity Epps    Procedure(s) Performed: Procedure(s) (LRB):  ARTHROPLASTY, KNEE TOTAL (Right)    Patient location: PACU    Anesthesia Type: spinal    Transport from OR: Transported from OR on 2-3 L/min O2 by NC with adequate spontaneous ventilation    Post pain: adequate analgesia    Post assessment: no apparent anesthetic complications and tolerated procedure well    Post vital signs: stable    Level of consciousness: awake, alert and oriented    Nausea/Vomiting: no nausea/vomiting    Complications: none    Transfer of care protocol was followed      Last vitals: Visit Vitals  BP (!) 167/95 (BP Location: Right arm, Patient Position: Lying)   Pulse 80   Temp 36 °C (96.8 °F) (Skin)   Resp 16   Ht 5' 3" (1.6 m)   Wt 63 kg (139 lb)   SpO2 97%   Breastfeeding No   BMI 24.62 kg/m²     "

## 2024-09-10 NOTE — ANESTHESIA PROCEDURE NOTES
Peripheral Block    Patient location during procedure: pre-op   Block not for primary anesthetic.  Reason for block: at surgeon's request and post-op pain management   Post-op Pain Location: left knee   Start time: 9/10/2024 9:06 AM  Timeout: 9/10/2024 9:06 AM   End time: 9/10/2024 9:09 AM    Staffing  Authorizing Provider: Dalia Bo MD  Performing Provider: Dalia Bo MD    Staffing  Performed by: Dalia Bo MD  Authorized by: Dalia Bo MD    Preanesthetic Checklist  Completed: patient identified, IV checked, site marked, risks and benefits discussed, surgical consent, monitors and equipment checked, pre-op evaluation and timeout performed  Peripheral Block  Patient position: supine  Prep: ChloraPrep  Patient monitoring: heart rate, cardiac monitor, continuous pulse ox, continuous capnometry and frequent blood pressure checks  Block type: adductor canal  Laterality: right  Injection technique: single shot  Needle  Needle type: Stimuplex   Needle gauge: 21 G  Needle length: 4 in  Needle localization: anatomical landmarks and ultrasound guidance   -ultrasound image captured on disc.  Assessment  Injection assessment: negative aspiration, negative parasthesia and local visualized surrounding nerve  Paresthesia pain: none  Heart rate change: no  Slow fractionated injection: yes  Pain Tolerance: comfortable throughout block and no complaints  Medications:    Medications: bupivacaine (pf) (MARCAINE) injection 0.5% - Perineural   10 mL - 9/10/2024 9:09:00 AM    Additional Notes  VSS.  [facility] RN monitoring vitals throughout procedure.  Patient tolerated procedure well.     EXPAREL 20mL

## 2024-09-10 NOTE — ANESTHESIA PREPROCEDURE EVALUATION
09/10/2024  Serenity Epps is a 83 y.o., female.      Pre-op Assessment    I have reviewed the Patient Summary Reports.     I have reviewed the Nursing Notes. I have reviewed the NPO Status.   I have reviewed the Medications.     Review of Systems  Anesthesia Hx:  No problems with previous Anesthesia             Denies Family Hx of Anesthesia complications.    Denies Personal Hx of Anesthesia complications.                    Social:  Former Smoker       Hematology/Oncology:                   Hematology Comments: Long Term AC w/ Plavix                    Cardiovascular:  Exercise tolerance: good   Hypertension    Dysrhythmias atrial fibrillation      hyperlipidemia   ECG has been reviewed. S/p Watchman    Echo  Summary   The left ventricle is normal in size with eccentric hypertrophy and normal systolic function.   The estimated ejection fraction is 60%.   Indeterminate left ventricular diastolic function.   Normal right ventricular size with normal right ventricular systolic function.   Mild left atrial enlargement.   Mild tricuspid regurgitation.   Normal central venous pressure (3 mmHg).   The estimated PA systolic pressure is 29 mmHg.   No bubble study images were available for review.  ·  Normal myocardial perfusion scan. There is no evidence of myocardial ischemia or infarction.  ·  The gated perfusion images showed an ejection fraction of 63% at rest.  ·  There is normal wall motion at rest.  ·  The ECG portion of the study is negative for ischemia.  ·  The patient reported no chest pain during the stress test.  ·  There are no prior studies for comparison.                             Pulmonary:   COPD, mild                     Renal/:  Chronic Renal Disease (CKD2), CKD                Hepatic/GI:      Denies GERD.   IBS  diverticulosis          Musculoskeletal:  Arthritis   Right knee             Neurological:   CVA (R MCA stroke 12/2022 due to thrombosis), residual symptoms        Pt with residual slurred speech, R hand numbness, balance issues (uses walker)    Sural nerve neuropathy                            Psych:   anxiety depression                Physical Exam  General: Well nourished, Cooperative, Alert and Oriented    Airway:  Mallampati: II   Mouth Opening: Normal  TM Distance: Normal  Tongue: Normal  Neck ROM: Normal ROM    Dental:  Intact    Chest/Lungs:  Clear to auscultation    Heart:  Rate: Normal  Rhythm: Regular Rhythm  Sounds: Normal      Anesthesia Plan  Type of Anesthesia, risks & benefits discussed:    Anesthesia Type: Spinal  Intra-op Monitoring Plan: Standard ASA Monitors  Post Op Pain Control Plan: multimodal analgesia, IV/PO Opioids PRN and peripheral nerve block  Induction:  IV  Informed Consent: Informed consent signed with the Patient and all parties understand the risks and agree with anesthesia plan.  All questions answered.   ASA Score: 3  Day of Surgery Review of History & Physical: H&P Update referred to the surgeon/provider.    Ready For Surgery From Anesthesia Perspective.     .

## 2024-09-10 NOTE — DISCHARGE SUMMARY
Discharge Note  Short Stay      SUMMARY     Admit Date: 9/10/2024    Attending Physician: Kamaljit Mane MD     Discharge Physician: Kamaljit Mane MD    Discharge Date: 9/10/2024 10:40 AM    Final Diagnosis: Osteoarthritis of right knee, unspecified osteoarthritis type [M17.11]  Chronic pain of right knee [M25.561, G89.29]    Hospital Course: Patient tolerated procedure well.     Disposition: Home or Self Care    Patient Instructions:   Current Discharge Medication List        CONTINUE these medications which have NOT CHANGED    Details   acetaminophen (TYLENOL) 650 MG TbSR Take 650 mg by mouth 2 (two) times a day. 4 a day      atorvastatin (LIPITOR) 40 MG tablet TAKE 1 TABLET(40 MG) BY MOUTH EVERY DAY  Qty: 60 tablet, Refills: 9      azelastine (ASTELIN) 137 mcg (0.1 %) nasal spray 1 spray (137 mcg total) by Nasal route 2 (two) times daily.  Qty: 90 mL, Refills: 3    Associated Diagnoses: Chronic rhinitis      BENEFIBER, WHEAT DEXTRIN, ORAL Take 1 Dose by mouth every morning.      biotin 1 mg tablet Take 1,000 mcg by mouth Daily.      calcium carbonate (OS-BLAKE) 600 mg (1,500 mg) Tab Take 600 mg by mouth 2 (two) times daily with meals.      celecoxib (CELEBREX) 200 MG capsule Take 1 capsule (200 mg total) by mouth 2 (two) times daily as needed for Pain (with meals).  Qty: 20 capsule, Refills: 0      docusate sodium (COLACE) 100 MG capsule Take 100 mg by mouth 2 (two) times daily.      dorzolamide-timolol 2-0.5% (COSOPT) 22.3-6.8 mg/mL ophthalmic solution Place 1 drop into both eyes 2 (two) times daily.  Qty: 30 mL, Refills: 4    Comments: Ok to dispense 90 day supply  Associated Diagnoses: Low-tension glaucoma of both eyes, severe stage      fluticasone propionate (FLONASE) 50 mcg/actuation nasal spray 2 sprays (100 mcg total) by Each Nostril route once daily.  Qty: 48 mL, Refills: 3      gabapentin (NEURONTIN) 400 MG capsule Take 1 capsule (400 mg total) by mouth 3 (three) times daily.  Qty: 270 capsule,  Refills: 1    Associated Diagnoses: Neuropathy of left sural nerve      latanoprost 0.005 % ophthalmic solution Place 1 drop into both eyes every evening.  Qty: 7.5 mL, Refills: 3    Comments: Ok to dispense 90 day supply  Associated Diagnoses: Low-tension glaucoma of both eyes, severe stage      metoprolol succinate (TOPROL-XL) 25 MG 24 hr tablet Take 1 tablet (25 mg total) by mouth 2 (two) times daily.  Qty: 180 tablet, Refills: 3    Comments: Please delete all prior scripts with same name and strength including on holds.      mirtazapine (REMERON) 15 MG tablet Take 1-2 tablets by mouth at bedtime  Qty: 120 tablet, Refills: 3    Associated Diagnoses: Major depressive disorder, single episode, moderate      pantoprazole (PROTONIX) 40 MG tablet Take 1 tablet (40 mg total) by mouth once daily.  Qty: 141 tablet, Refills: 0      valsartan (DIOVAN) 80 MG tablet Take 1 tablet (80 mg total) by mouth every evening.  Qty: 90 tablet, Refills: 3    Comments: .      VIT C/E/ZN/COPPR/LUTEIN/ZEAXAN (PRESERVISION AREDS 2 ORAL) Take 1 capsule by mouth 2 (two) times daily.       aspirin (ECOTRIN) 81 MG EC tablet Take 1 tablet (81 mg total) by mouth once daily.  Qty: 90 tablet, Refills: 3      cephALEXin (KEFLEX) 500 MG capsule Take 1 capsule (500 mg total) by mouth 4 (four) times daily.  Qty: 20 capsule, Refills: 0    Associated Diagnoses: S/P total knee arthroplasty, right; Degenerative arthritis of right knee; Chronic pain of right knee      diclofenac sodium (VOLTAREN) 1 % Gel Apply 2 g topically 3 (three) times daily. To painful area of neck  Qty: 450 g, Refills: 0    Associated Diagnoses: Neck pain      oxyCODONE-acetaminophen (PERCOCET) 5-325 mg per tablet Take 1 tablet by mouth every 4 to 6 hours as needed for Pain.  Qty: 42 tablet, Refills: 0    Comments: Quantity prescribed more than 7 day supply? Yes, quantity medically necessary  Associated Diagnoses: S/P total knee arthroplasty, right; Degenerative arthritis of right  "knee; Chronic pain of right knee      warfarin (COUMADIN) 5 MG tablet Take 1 tablet (5 mg total) by mouth Daily.  Qty: 30 tablet, Refills: 1    Comments: Take first dose the day before surgery between 5-6pm.  Associated Diagnoses: S/P total knee arthroplasty, right; Degenerative arthritis of right knee; Chronic pain of right knee             Discharge Procedure Orders (must include Diet, Follow-up, Activity):   Discharge Procedure Orders (must include Diet, Follow-up, Activity)   WALKER FOR HOME USE     Order Specific Question Answer Comments   Type of Walker: Adult (5'4"-6'6")    With wheels? No unless needed   Height: 5' 3" (1.6 m)    Weight: 63 kg (139 lb)    Length of need (1-99 months): 99    Platform attachment: Bilateral none needed   Please check all that apply: Patient is unable to safely ambulate without equipment.       Remove dressing in 72 hours     Activity as tolerated   Order Comments: Use walker     Weight bearing restrictions (specify):   Order Comments: Use walker        Follow Up:  Follow up as scheduled.  Resume routine diet.  Activity as tolerated.    No driving day of procedure.        "

## 2024-09-12 PROCEDURE — G0180 MD CERTIFICATION HHA PATIENT: HCPCS | Mod: ,,, | Performed by: ORTHOPAEDIC SURGERY

## 2024-09-19 DIAGNOSIS — Z96.651 S/P TOTAL KNEE ARTHROPLASTY, RIGHT: ICD-10-CM

## 2024-09-19 DIAGNOSIS — M17.11 DEGENERATIVE ARTHRITIS OF RIGHT KNEE: ICD-10-CM

## 2024-09-19 DIAGNOSIS — G89.29 CHRONIC PAIN OF RIGHT KNEE: ICD-10-CM

## 2024-09-19 DIAGNOSIS — M25.561 CHRONIC PAIN OF RIGHT KNEE: ICD-10-CM

## 2024-09-19 NOTE — TELEPHONE ENCOUNTER
----- Message from Sendyjo Jesus sent at 9/19/2024 10:45 AM CDT -----  Regarding: Refill Request    Who Called: Patient         New Prescription or Refill : Refill    RX Name and Strength:   oxyCODONE-acetaminophen (PERCOCET) 5-325 mg per tablet      RX Name and Strength:         RX Name and Strength:      30 day or 90 day RX:     Preferred Pharmacy:[id number] [place] [address] [address] AT [facility] [address]    Would the patient rather a call back or a response via [facility]?    Best Call Back Number:   [phone]    Additional Information: Please call patient she has questions about this Rx

## 2024-09-20 DIAGNOSIS — Z96.651 S/P TOTAL KNEE ARTHROPLASTY, RIGHT: Primary | ICD-10-CM

## 2024-09-20 DIAGNOSIS — M25.561 RIGHT KNEE PAIN: ICD-10-CM

## 2024-09-20 RX ORDER — OXYCODONE AND ACETAMINOPHEN 5; 325 MG/1; MG/1
1 TABLET ORAL
Qty: 42 TABLET | Refills: 0 | OUTPATIENT
Start: 2024-09-20

## 2024-09-20 NOTE — TELEPHONE ENCOUNTER
----- Message from Gloria hO MA sent at 9/20/2024  4:25 PM CDT -----  Contact:  nurse jd [facility]   Pharmacy [facility]   Call back  [phone]

## 2024-09-23 ENCOUNTER — TELEPHONE (OUTPATIENT)
Dept: ORTHOPEDICS | Facility: CLINIC | Age: 84
End: 2024-09-23
Payer: MEDICARE

## 2024-09-23 RX ORDER — OXYCODONE AND ACETAMINOPHEN 5; 325 MG/1; MG/1
1 TABLET ORAL
Qty: 42 TABLET | Refills: 0 | Status: SHIPPED | OUTPATIENT
Start: 2024-09-23

## 2024-09-23 NOTE — TELEPHONE ENCOUNTER
Called and informed pt that prescription was dropped off at pharmacy and would be available to  today or whenever it was needed. Pt verbalized understanding.

## 2024-09-23 NOTE — TELEPHONE ENCOUNTER
----- Message from Jazlyn Luis sent at 9/23/2024 11:16 AM CDT -----  Contact: VIK LAMBERT  Type:  Needs Medical Advice    Who Called: VIK LAMBERT   Symptoms (please be specific): PHARMACY DID RECEIVE THE RX FOR PAIN    How long has patient had these symptoms:  N/A  Pharmacy name and phone #:    [facility] [address] [facility] [address] [place] [address]  Phone: [phone] Fax: [phone]  Would the patient rather a call back or a response via [facility]? CALL   Best Call Back Number: [phone]  Additional Information: THANK YOU

## 2024-09-25 ENCOUNTER — OFFICE VISIT (OUTPATIENT)
Dept: ORTHOPEDICS | Facility: CLINIC | Age: 84
End: 2024-09-25
Payer: MEDICARE

## 2024-09-25 ENCOUNTER — TELEPHONE (OUTPATIENT)
Dept: ORTHOPEDICS | Facility: CLINIC | Age: 84
End: 2024-09-25
Payer: MEDICARE

## 2024-09-25 VITALS — BODY MASS INDEX: 24.61 KG/M2 | WEIGHT: 138.88 LBS | HEIGHT: 63 IN

## 2024-09-25 DIAGNOSIS — Z96.651 S/P TOTAL KNEE ARTHROPLASTY, RIGHT: Primary | ICD-10-CM

## 2024-09-25 DIAGNOSIS — M25.561 RIGHT KNEE PAIN: ICD-10-CM

## 2024-09-25 DIAGNOSIS — M17.11 OSTEOARTHRITIS OF RIGHT KNEE, UNSPECIFIED OSTEOARTHRITIS TYPE: ICD-10-CM

## 2024-09-25 DIAGNOSIS — Z96.651 S/P TOTAL KNEE ARTHROPLASTY, RIGHT: ICD-10-CM

## 2024-09-25 DIAGNOSIS — M25.561 RIGHT KNEE PAIN: Primary | ICD-10-CM

## 2024-09-25 PROCEDURE — 1159F MED LIST DOCD IN RCRD: CPT | Mod: CPTII,S$GLB,, | Performed by: ORTHOPAEDIC SURGERY

## 2024-09-25 PROCEDURE — 1125F AMNT PAIN NOTED PAIN PRSNT: CPT | Mod: CPTII,S$GLB,, | Performed by: ORTHOPAEDIC SURGERY

## 2024-09-25 PROCEDURE — 1157F ADVNC CARE PLAN IN RCRD: CPT | Mod: CPTII,S$GLB,, | Performed by: ORTHOPAEDIC SURGERY

## 2024-09-25 PROCEDURE — 3288F FALL RISK ASSESSMENT DOCD: CPT | Mod: CPTII,S$GLB,, | Performed by: ORTHOPAEDIC SURGERY

## 2024-09-25 PROCEDURE — 1101F PT FALLS ASSESS-DOCD LE1/YR: CPT | Mod: CPTII,S$GLB,, | Performed by: ORTHOPAEDIC SURGERY

## 2024-09-25 PROCEDURE — 99024 POSTOP FOLLOW-UP VISIT: CPT | Mod: S$GLB,,, | Performed by: ORTHOPAEDIC SURGERY

## 2024-09-25 PROCEDURE — 99999 PR PBB SHADOW E&M-EST. PATIENT-LVL III: CPT | Mod: PBBFAC,,, | Performed by: ORTHOPAEDIC SURGERY

## 2024-09-25 NOTE — TELEPHONE ENCOUNTER
----- Message from Cliff Hernandez MA sent at 9/25/2024 11:40 AM CDT -----  Contact: /Alejandro MOLINA-Wants patients PT orders for [facility] at [address] [facility] [address]

## 2024-09-27 ENCOUNTER — CLINICAL SUPPORT (OUTPATIENT)
Dept: REHABILITATION | Facility: HOSPITAL | Age: 84
End: 2024-09-27
Attending: ORTHOPAEDIC SURGERY
Payer: MEDICARE

## 2024-09-27 DIAGNOSIS — M25.661 DECREASED ROM OF RIGHT KNEE: ICD-10-CM

## 2024-09-27 DIAGNOSIS — Z96.651 S/P TOTAL KNEE ARTHROPLASTY, RIGHT: ICD-10-CM

## 2024-09-27 DIAGNOSIS — R29.898 WEAKNESS OF RIGHT LOWER EXTREMITY: ICD-10-CM

## 2024-09-27 DIAGNOSIS — R26.9 GAIT ABNORMALITY: Primary | ICD-10-CM

## 2024-09-27 DIAGNOSIS — M17.11 OSTEOARTHRITIS OF RIGHT KNEE, UNSPECIFIED OSTEOARTHRITIS TYPE: ICD-10-CM

## 2024-09-27 DIAGNOSIS — M25.561 RIGHT KNEE PAIN: ICD-10-CM

## 2024-09-27 PROCEDURE — 97112 NEUROMUSCULAR REEDUCATION: CPT | Mod: PO | Performed by: PHYSICAL THERAPIST

## 2024-09-27 PROCEDURE — 97161 PT EVAL LOW COMPLEX 20 MIN: CPT | Mod: PO | Performed by: PHYSICAL THERAPIST

## 2024-09-27 NOTE — PATIENT INSTRUCTIONS
HOME EXERCISE PROGRAM  Created by Bandar Gonzalez  Sep 27th, 2024  View videos at [email or web address]        Quad Sets    -Starting with legs fully extended out.  -Place a towel under the involved knee.  -Contract the quadricep and push down onto the towel.    -Condition and Functionality: Helps with standing and walking.  -Muscles: Quadriceps. Repeat 10 Times   Hold 3 Seconds   Complete 2 Sets   Perform 3 Times a Day          KNEE EXTENSION    SIT ON EDGE OF BE AND STRAIGHTEN YOUR LEG AND SQUEEZE THE MUSCLE AT THE TOP, HOLD AND SLOWLY LOWER YOUR LEG. Repeat 10 Times   Hold 2 Seconds   Complete 3 Sets   Perform 3 Times a Day          heel slides    heel slides while supine Repeat 10 Times   Complete 2 Sets   Perform 3 Times a Day          GLUTE SET - SUPINE    While lying on your back, squeeze your buttocks and hold. Repeat. Repeat 10 Times   Hold 2 Seconds   Complete 2 Sets   Perform 3 Times a Day          SIT STAND - BOTH HANDS ASSIST    While seated in a chair, scoot forward towards the edge of the chair. Next, hold on to the arm rest with both hands for support and then raise up to standing.    Video # [id number] Repeat 10 Times   Hold 1 Second   Complete 1 Set   Perform 2 Times a Day

## 2024-09-27 NOTE — PLAN OF CARE
[facility]   Physical Therapy Initial Evaluation      Name: Serenity Epps  Clinic Number: [id number]    Therapy Diagnosis:   Encounter Diagnoses   Name Primary?    Right knee pain     S/P total knee arthroplasty, right     Osteoarthritis of right knee, unspecified osteoarthritis type     Gait abnormality Yes    Weakness of right lower extremity     Decreased ROM of right knee         Physician: Kamaljit Mane MD    Physician Orders: PT Eval and Treat   Medical Diagnosis from Referral:   Right knee pain   S/P total knee arthroplasty, right   Osteoarthritis of right knee, unspecified osteoarthritis type  Evaluation Date: 9/27/2024  Authorization Period Expiration: 09/25/2024  Plan of Care Expiration: 11/22/2024  Progress Note Due: 10/25/2024  Visit # / Visits authorized: 1   FOTO: 1/3    Precautions: Standard and Fall, Visual Impairment, A-Fib    Time In: 0700  Time Out: 0800  Total Appointment Time (timed & untimed codes): 60 minutes    Subjective     Date of onset: 09/10/2024    History of current condition - Serenity reports: having right knee weakness, decreased mobility noted since 09/10/2024 (R-TKA). Patient reported having the left knee done about 15 years ago but this time come around is taking longer. Patient motivated on getting better and walking again.    Falls: none    Imaging: x-ray: 09/10/2024  New right total knee arthroplasty. Normal alignment. No perihardware fractures. Joint fluid, soft tissue gas, and skin staples are compatible with the perioperative state.  Prior Therapy:  for 2 weeks  Social History:  lives with their family  Occupation: Retired  Prior Level of Function: independent  Current Level of Function: modified independent, increase time noted with home management    Pain:  Current 2/10, worst 4/10, best 0/10   Location: right knee   Description: Aching, Dull, Tight, and Variable  Aggravating Factors: Standing  Easing Factors: rest and exercise    Patients goals:  To improve right knee motion and leg strength. Walk with and without walker modified independently due to visual impairment.     Medical History:   Past Medical History:   Diagnosis Date    Anxiety     Arthritis     Cataract - Both Eyes     OU done//    CKD (chronic kidney disease), stage II 09/21/2016    pt denies    Diarrhea     Diverticulosis     DVT (deep venous thrombosis)     left leg, after childbirth    Exudative age-related macular degeneration of left eye 02/20/2014    Glaucoma     Hyperlipidemia 12/25/2022    Hypertension     Irritable bowel syndrome     Left foot pain     chronic    Lymphocytic colitis     Macular degeneration     bimonthly intraoccular injections    Osteopenia     Paroxysmal atrial fibrillation 10/10/2023    Presence of Watchman left atrial appendage closure device     Recurrent major depressive disorder 04/27/2018    Rhinitis, chronic     Strabismus     as child    Stroke due to thrombosis of right middle cerebral artery 12/24/2022    Urinary incontinence        Surgical History:   Serenity Epps  has a past surgical history that includes Vein ligation (1964); Appendectomy; Foot surgery (Left, 2014); Tonsillectomy; Eye surgery (Bilateral); Total knee arthroplasty (Left, 09/2015); Partial hysterectomy; Cataract extraction w/ intraocular lens  implant, bilateral (Bilateral, 2016); PIP joint fusion (Right, 06/01/2016); Foot hardware removal (Left, 06/01/2016); Cataract extraction; Upper gastrointestinal endoscopy (9/26/2006  [place]); Joint replacement; Colonoscopy (9/26/2006  [place]); Colonoscopy (10/03/2012); Colonoscopy (N/A, 05/02/2018); Colonoscopy (05/02/2018); Fracture surgery (Left); echocardiogram,transesophageal (N/A, 01/11/2023); Insertion of implantable loop recorder (N/A, 01/11/2023); Insertion of implantable loop recorder (N/A, 01/11/2023); Closure of left atrial appendage using device (1/12/2024); echocardiogram,transesophageal (1/12/2024); Transesophageal echocardiography (N/A,  "2024); and Knee Arthroplasty (Right, 9/10/2024).    Medications:   Serenity has a current medication list which includes the following prescription(s): acetaminophen, aspirin, atorvastatin, azelastine, wheat dextrin, biotin, calcium carbonate, celecoxib, cephalexin, diclofenac sodium, docusate sodium, dorzolamide-timolol 2-0.5%, fluticasone propionate, gabapentin, latanoprost, metoprolol succinate, mirtazapine, oxycodone-acetaminophen, oxycodone-acetaminophen, pantoprazole, valsartan, vit c/e/zn/coppr/lutein/zeaxan, and warfarin.    Allergies:   Review of patient's allergies indicates:   Allergen Reactions    Brimonidine Other (See Comments)     Burning and redness        Objective      Observation: in chair    Posture: slump position motion in seated position    Range of Motion:   Knee Left active Left Passive Right Active R passive   Flexion 110  95 100   Extension 0  -10 -5     Lower Extremity Strength  Right LE  Left LE    Knee extension: 4-/5 Knee extension: 5/5   Knee flexion: 4-/5 Knee flexion: 5/5   Hip flexion: 4/5 Hip flexion: 4+/5   Hip extension:   Hip extension:    Hip abduction: 4-/5 Hip abduction: 4/5   Hip adduction: 4/5 Hip adduction 4+/5   Ankle dorsiflexion: 4+/5 Ankle dorsiflexion: 5/5   Ankle plantarflexion: 4+/5 Ankle plantarflexion: 5/5     Special Tests:  Homans -    Function:    - SLS R: limited, juddering  - SLS L: -  - Squat: limited   - Sit <--> Stand:modified independent, increase time noted    - Bed Mobility: modified independent, increase time noted    TU " with RW  5 sit to stands: 40" with UE support    Joint Mobility: right knee hypo-patellar glide    Palpation: point tenderness, generalized to the right knee, with swelling noted    Sensation: intact to light touch    Flexibility: Right HS     Edema: Yes right knee    Gait: ambulate 50 ft with RW, SBA.  Gait deviations: decreased taylor, decreased step length to RLE, extension lag.    Primary Measure Score Range Intake Score " Score Interpretation  KOOS, JR. 0 - 100 68.3 Lower Score = Greater Disability  Treatment     Total Treatment time (time-based codes) separate from Evaluation: 8 minutes     Serenity received the treatments listed below:      neuromuscular re-education activities to improve: Balance, Coordination, Kinesthetic, Sense, Proprioception, and Posture for 8 minutes. The following activities were included:  5 sit to stands  TUG  Quad sets  Left knee extension with ankle prop, strap assist  Seated: LAQ  Heel slides    Patient Education and Home Exercises     Education provided:   - Yes    Written Home Exercises Provided: yes. Exercises were reviewed and Serenity was able to demonstrate them prior to the end of the session.  Serenity demonstrated good  understanding of the education provided. See EMR under Patient Instructions for exercises provided during therapy sessions.    Assessment     Serenity is a 84 y.o. female referred to outpatient Physical Therapy with a medical diagnosis of Right knee pain,  S/P total knee arthroplasty, right    Osteoarthritis of right knee, unspecified osteoarthritis type. Patient presents with gait abnormality, decreased right knee mobility, leg weakness, impaired transfer skills, imbalance.    Problem List: pain, decreased ROM, decreased flexibility, decreased strength, decreased balance and stability, decreased motor control, antalgic gait, inability to participate fully in vocation pursuits, and decreased ability to fully participate in recreational/sports related activities.    Patient prognosis is Good.   Patient will benefit from skilled outpatient Physical Therapy to address the deficits stated above and in the chart below, provide patient /family education, and to maximize patientt's level of independence.     Plan of care discussed with patient: Yes  Patient's spiritual, cultural and educational needs considered and patient is agreeable to the plan of care and goals as stated below:     Anticipated  "Barriers for therapy: transportation, visual impairment    Medical Necessity is demonstrated by the following  History  Co-morbidities and personal factors that may impact the plan of care Co-morbidities:   advanced age, depression, history of CVA, HTN, and prior knee sx    Personal Factors:   no deficits     high   Examination  Body Structures and Functions, activity limitations and participation restrictions that may impact the plan of care Body Regions:   back  lower extremities  trunk    Body Systems:    ROM  strength  balance  gait  transfers  transitions  motor control    Participation Restrictions:   Home management  Community ambulation    Activity limitations:   Learning and applying knowledge  no deficits    General Tasks and Commands  no deficits    Communication  no deficits    Mobility  lifting and carrying objects  walking    Self care  no deficits    Domestic Life  cooking  doing house work (cleaning house, washing dishes, laundry)  assisting others    Interactions/Relationships  no deficits    Life Areas  no deficits    Community and Social Life  no deficits         high   Clinical Presentation stable and uncomplicated low   Decision Making/ Complexity Score: low     Goals:  Short Term Goals: 4 weeks   Independent with HEP with 3/10 pain.  Improve right knee extension to 0 degrees actively for ambulatory tasks.  Improve right knee flexion to 110 degrees for mobility purposes.  Demonstrate TUG < 45" with AD.  Demonstrate 5 sit to stands < 40" for mobility purposes.    Long Term Goals: 8 weeks   Independent with HEP with no limitations.  Demonstrate right knee motion 0-110 degrees for home management.  Demonstrate TUG without AD < 30" for mobility purposes.  Demonstrate 5 sit to stands independently < 30".  Ambulate 150ft or > without AD modified independently for home management.    Plan     Plan of care Certification: 9/27/2024 to 11/22/2024.    Outpatient Physical Therapy 2 times weekly for 8 weeks " to include the following interventions: Gait Training, Manual Therapy, Moist Heat/ Ice, Neuromuscular Re-ed, Patient Education, Therapeutic Activities, and Therapeutic Exercise.

## 2024-09-30 ENCOUNTER — PROCEDURE VISIT (OUTPATIENT)
Dept: OPHTHALMOLOGY | Facility: CLINIC | Age: 84
End: 2024-09-30
Payer: MEDICARE

## 2024-09-30 DIAGNOSIS — H35.3211 EXUDATIVE AGE-RELATED MACULAR DEGENERATION OF RIGHT EYE WITH ACTIVE CHOROIDAL NEOVASCULARIZATION: Primary | ICD-10-CM

## 2024-09-30 DIAGNOSIS — H35.3134 NONEXUDATIVE AGE-RELATED MACULAR DEGENERATION, BILATERAL, ADVANCED ATROPHIC WITH SUBFOVEAL INVOLVEMENT: Chronic | ICD-10-CM

## 2024-09-30 DIAGNOSIS — H35.3221 EXUDATIVE AGE-RELATED MACULAR DEGENERATION OF LEFT EYE WITH ACTIVE CHOROIDAL NEOVASCULARIZATION: Chronic | ICD-10-CM

## 2024-09-30 PROCEDURE — 92014 COMPRE OPH EXAM EST PT 1/>: CPT | Mod: 25,S$GLB,, | Performed by: OPHTHALMOLOGY

## 2024-09-30 PROCEDURE — 67028 INJECTION EYE DRUG: CPT | Mod: 50,S$GLB,, | Performed by: OPHTHALMOLOGY

## 2024-09-30 PROCEDURE — 92134 CPTRZ OPH DX IMG PST SGM RTA: CPT | Mod: S$GLB,,, | Performed by: OPHTHALMOLOGY

## 2024-09-30 RX ADMIN — Medication 1.25 MG: at 09:09

## 2024-09-30 NOTE — PROGRESS NOTES
HPI    Pt states she feels like her va has decreased in OU. Pt had knee sx 2wks   ago   DLS     07/8/24  Cosopt BID OU  Latanaprost QHS OU  Last edited by Nena Lee on 9/30/2024  7:57 AM.           OCT - activity around fibrosis OU - Improved  Atrophy OU        A/P    1. Wet AMD OS  S/p Avastin OS x 17, Eylea OS  x 14    Persistent SRF OS - improving  With RPE tear OS  Central fibrosis with atrophy - poor central potential  10/17 - increased SRH - will keep at 8 weeks for now  9/18 - stable cicatrix - try observation  12/18 - no activity  6/21 - some heme over cicatrix OS  1/23 - given extensive atrophy, try extension      2. New disease OD  Sx started 9/21  S/p Avastin OD x 7, Eylea OD x 8  3/23 - sig atrophy without activity  9/23 heme OS, but ASx  5/24 increase in activity around fibrosis - pt does notice some changes    Avastin OU today      3. PCIOL OU  CTR OS - but saw 20/30 with correction, in spite of sub RPE fibrosis      4. POAG OU  Good IOP control on Cosopt, brimonidine, latanoprost OU  Small drance heme OD    5. Floaters OU        12 weeks no  Dilate (LDFE 9/24)    Injection Procedure Note:  Diagnosis: Wet AMD OU    Patient Identified and Time Out complete  Pt marked  Topical Proparacaine and Betadine.  Inject Avastin OU at 6:00 @ 3.5-4mm posterior to limbus  Post Operative Dx: Same  Complications: None  Follow up as above.

## 2024-10-02 ENCOUNTER — OFFICE VISIT (OUTPATIENT)
Dept: PRIMARY CARE CLINIC | Facility: CLINIC | Age: 84
End: 2024-10-02
Payer: MEDICARE

## 2024-10-02 VITALS
DIASTOLIC BLOOD PRESSURE: 72 MMHG | HEART RATE: 66 BPM | OXYGEN SATURATION: 98 % | SYSTOLIC BLOOD PRESSURE: 130 MMHG | BODY MASS INDEX: 24.6 KG/M2 | HEIGHT: 63 IN

## 2024-10-02 DIAGNOSIS — I10 PRIMARY HYPERTENSION: Primary | ICD-10-CM

## 2024-10-02 DIAGNOSIS — I48.0 PAF (PAROXYSMAL ATRIAL FIBRILLATION): Chronic | ICD-10-CM

## 2024-10-02 DIAGNOSIS — E78.2 MIXED HYPERLIPIDEMIA: Chronic | ICD-10-CM

## 2024-10-02 DIAGNOSIS — G57.82 NEUROPATHY OF LEFT SURAL NERVE: ICD-10-CM

## 2024-10-02 PROCEDURE — 1160F RVW MEDS BY RX/DR IN RCRD: CPT | Mod: CPTII,S$GLB,, | Performed by: FAMILY MEDICINE

## 2024-10-02 PROCEDURE — 1157F ADVNC CARE PLAN IN RCRD: CPT | Mod: CPTII,S$GLB,, | Performed by: FAMILY MEDICINE

## 2024-10-02 PROCEDURE — 99999 PR PBB SHADOW E&M-EST. PATIENT-LVL IV: CPT | Mod: PBBFAC,,, | Performed by: FAMILY MEDICINE

## 2024-10-02 PROCEDURE — 1125F AMNT PAIN NOTED PAIN PRSNT: CPT | Mod: CPTII,S$GLB,, | Performed by: FAMILY MEDICINE

## 2024-10-02 PROCEDURE — 1101F PT FALLS ASSESS-DOCD LE1/YR: CPT | Mod: CPTII,S$GLB,, | Performed by: FAMILY MEDICINE

## 2024-10-02 PROCEDURE — 1159F MED LIST DOCD IN RCRD: CPT | Mod: CPTII,S$GLB,, | Performed by: FAMILY MEDICINE

## 2024-10-02 PROCEDURE — 99214 OFFICE O/P EST MOD 30 MIN: CPT | Mod: S$GLB,,, | Performed by: FAMILY MEDICINE

## 2024-10-02 PROCEDURE — 3288F FALL RISK ASSESSMENT DOCD: CPT | Mod: CPTII,S$GLB,, | Performed by: FAMILY MEDICINE

## 2024-10-02 RX ORDER — GABAPENTIN 400 MG/1
400 CAPSULE ORAL 3 TIMES DAILY
Qty: 270 CAPSULE | Refills: 1 | Status: SHIPPED | OUTPATIENT
Start: 2024-10-02

## 2024-10-02 NOTE — ASSESSMENT & PLAN NOTE
Rhythm is currently regular.  Last EKG normal sinus rhythm.  Patient has Watchman device.  No concerns or recurrence after surgery

## 2024-10-02 NOTE — PROGRESS NOTES
Primary Care Provider Appointment   [facility] [place]       Patient ID: Serenity Epps is a 84 y.o. female.    ASSESSMENT/PLAN by Problem List:    1. Primary hypertension  Assessment & Plan:  This is a chronic condition.  This condition has been reviewed and evaluated and it is currently stable.      2. PAF (paroxysmal atrial fibrillation)  Overview:  Seen on loop recorder    Assessment & Plan:  Rhythm is currently regular.  Last EKG normal sinus rhythm.  Patient has Watchman device.  No concerns or recurrence after surgery      3. Mixed hyperlipidemia  Assessment & Plan:  Continue atorvastatin           Assessment & Plan    Assessed post-knee arthroplasty recovery at 3 weeks, noting excellent progress  Evaluated surgical site, finding it well-healed with no signs of infection or complications  Considered patient's report of occasional lightheadedness, attributing it to possible dehydration post-surgery  Assessed cardiovascular status, noting regular rate and rhythm without murmurs  Evaluated respiratory system, finding clear lungs bilaterally  Noted trace leg swelling, deemed expected post-surgery  Considered sleep issues, recommending conservative approach with melatonin before considering stronger interventions      POST-OPERATIVE CARE:  - Discussed importance of protein intake for post-surgical recovery.  - Provided information on sources of protein in diet.  - Serenity to continue physical therapy as scheduled.  - Recommend maintaining adequate hydration.  - Serenity to limit salt intake to manage post-surgical swelling.  - Recommend incorporating more protein-rich foods into diet.    SLEEP ISSUES:  - Explained melatonin's role in sleep regulation and proper usage.  - Started OTC melatonin 1 mg, to be taken 2 hours before bedtime for sleep issues.    - Serenity to reduce consumption of unhealthy foods like fried chicken.    MEDICATIONS/SUPPLEMENTS:  - Discontinued Coumadin as scheduled.  - Started  aspirin 81 mg daily, to begin the day after stopping Coumadin.    FOLLOW UP:  - Follow up in approximately 2 months.           Subjective:     Chief Complaint   Patient presents with    Post-op Problem     3 weeks knee      I have reviewed the information entered by the ancillary staff regarding the chief complaint as well as the related history.    HPI    Patient is a/an 84 y.o.  female       History of Present Illness    CHIEF COMPLAINT:  Serenity presents today for follow up of multiple chronic conditions and recent knee arthroplasty.    KNEE ARTHROPLASTY RECOVERY:  She is three weeks post-knee arthroplasty and reports doing very well. She started physical therapy last Friday, attending twice weekly. The physical therapist is satisfied with her range of motion and progress. Her wound is healing well with staples removed and a scab forming. She no longer requires a bandage as the wound is closed without open spots or drainage. She is advised to gently clean the area daily with a damp cloth. She denies any concerns related to her recovery.    CARDIOVASCULAR:  She reports stable atrial fibrillation without new symptoms or concerns. Her ventricular rate remains normal. She also reports stable hypertension. She experienced low blood pressure post-surgery due to dehydration, which has resolved with increased fluid intake. She occasionally felt weak or lightheaded but currently feels well. She notes discrepancies in blood pressure readings between her physical therapist and nurse, suspecting possible equipment inaccuracy.    MEDICATIONS:  She reports discontinuing Coumadin today as prescribed and inquires about resuming aspirin 81 mg the following day.    NUTRITION AND SLEEP:  She reports decreased appetite but is eating protein sources like chicken and peanut butter. She acknowledges the need to focus on healthier options and eats vegetables regularly. She plans to freeze homegrown produce for future consumption. She  "reports difficulty falling asleep, noting she was still awake at 10 PM despite taking two mirtazapine. She denies consuming caffeine beyond morning coffee.    ACTIVITIES:  She describes resting in the afternoon rather than truly napping. She acknowledges having limited activity levels, which may be contributing to her sleep issues.    FAMILY HEALTH:  She expresses concern about her 's low platelet count, which is just below the normal range. She denies observing any excessive bleeding or bruising in him. She understands the need to monitor the situation and report any changes in bleeding or bruising patterns.      ROS:  General: -fever, -chills, -fatigue, -weight gain, -weight loss, +loss of appetite  Eyes: -vision changes, -redness, -discharge  ENT: -ear pain, -nasal congestion, -sore throat  Cardiovascular: -chest pain, -palpitations, -lower extremity edema  Respiratory: -cough, -shortness of breath  Gastrointestinal: -abdominal pain, -nausea, -vomiting, -diarrhea, -constipation, -blood in stool  Genitourinary: -dysuria, -hematuria, -frequency  Musculoskeletal: -joint pain, -muscle pain  Skin: -rash, -lesion  Neurological: -headache, -dizziness, -numbness, -tingling, +weakness  Psychiatric: -anxiety, -depression, +sleep difficulty         For complete problem list, past medical history, surgical history, social history, etc., see appropriate section in the electronic medical record    Review of Systems    Objective     Physical Exam  Vitals:    10/02/24 0734   BP: 130/72   BP Location: Right arm   Patient Position: Sitting   Pulse: 66   SpO2: 98%   Height: 5' 3" (1.6 m)     Physical Exam    General: No acute distress. Well-developed. Well-nourished.  Eyes: EOMI. Sclerae anicteric.  HENT: Normocephalic. Atraumatic. Nares patent. Moist oral mucosa.  Ears: Bilateral TMs clear. Bilateral EACs clear.  Cardiovascular: Regular rate. Regular rhythm. No murmurs. No rubs. No gallops. Normal S1, S2. Regular rate and " rhythm.  Respiratory: Normal respiratory effort. Clear to auscultation bilaterally. No rales. No rhonchi. No wheezing. Clear lungs bilaterally.  Abdomen: Soft. Non-tender. Non-distended. Normoactive bowel sounds.  Musculoskeletal: No  obvious deformity.  Extremities: Trace edema in leg.  Neurological: Alert & oriented x3. No slurred speech. Normal gait.  Psychiatric: Normal mood. Normal affect. Good insight. Good judgment.  Skin: Warm. Dry. No rash. Normal skin color. Normal nail appearance.  MSK: Knee - Right: Right knee range of motion greater than 90 degrees.           This note was generated with the assistance of ambient listening technology. Verbal consent was obtained by the patient and accompanying visitor(s) for the recording of patient appointment to facilitate this note. I attest to having reviewed and edited the generated note for accuracy, though some syntax or spelling errors may persist. Please contact the author of this note for any clarification.  Parts of the note were also generated with voice recognition software.  Occasionally this software will misinterpreted words or phrases

## 2024-10-02 NOTE — ASSESSMENT & PLAN NOTE
This is a chronic condition.  This condition has been reviewed and evaluated and it is currently stable.

## 2024-10-03 ENCOUNTER — EXTERNAL HOME HEALTH (OUTPATIENT)
Dept: HOME HEALTH SERVICES | Facility: HOSPITAL | Age: 84
End: 2024-10-03
Payer: MEDICARE

## 2024-10-04 ENCOUNTER — CLINICAL SUPPORT (OUTPATIENT)
Dept: REHABILITATION | Facility: HOSPITAL | Age: 84
End: 2024-10-04
Payer: MEDICARE

## 2024-10-04 DIAGNOSIS — R29.898 WEAKNESS OF RIGHT LOWER EXTREMITY: ICD-10-CM

## 2024-10-04 DIAGNOSIS — R26.9 GAIT ABNORMALITY: Primary | ICD-10-CM

## 2024-10-04 PROBLEM — R26.81 GAIT INSTABILITY: Status: RESOLVED | Noted: 2024-09-10 | Resolved: 2024-10-04

## 2024-10-04 PROBLEM — M25.661 DECREASED ROM OF RIGHT KNEE: Status: RESOLVED | Noted: 2024-09-27 | Resolved: 2024-10-04

## 2024-10-04 PROCEDURE — 97112 NEUROMUSCULAR REEDUCATION: CPT | Mod: PO | Performed by: PHYSICAL THERAPIST

## 2024-10-04 PROCEDURE — 97110 THERAPEUTIC EXERCISES: CPT | Mod: PO | Performed by: PHYSICAL THERAPIST

## 2024-10-04 NOTE — PROGRESS NOTES
[facility]   Physical Therapy Treatment Note     Name: Serenity Epps  Clinic Number: [id number]  Therapy Diagnosis:        Encounter Diagnoses   Name Primary?    Right knee pain      S/P total knee arthroplasty, right      Osteoarthritis of right knee, unspecified osteoarthritis type      Gait abnormality Yes    Weakness of right lower extremity      Decreased ROM of right knee          Physician: Kamaljit Mane MD     Physician Orders: PT Eval and Treat   Medical Diagnosis from Referral:   Right knee pain   S/P total knee arthroplasty, right   Osteoarthritis of right knee, unspecified osteoarthritis type    Physician: Kamaljit Mane MD    Visit Date: 10/4/2024     Time In: 945  Time Out: 1030  Total Billable Time: 38 minutes    Precautions: Standard and Fall, Visual Impairment, A-Fib   SUBJECTIVE     Pt reports: using RW at home with no falls, spouse assistance due to report on being legally blind.  She was compliant with home exercise program.  Response to previous treatment: muscle soreness  Functional change: walking with RW    Pain: 3/10  Location: right knee     OBJECTIVE     Objective Measures updated at progress report unless specified.   Range of Motion:   Knee Left active Left Passive Right Active R passive   Flexion 110   95 100   Extension 0   -10 -5      Lower Extremity Strength  Right LE   Left LE     Knee extension: 4-/5 Knee extension: 5/5   Knee flexion: 4-/5 Knee flexion: 5/5   Hip flexion: 4/5 Hip flexion: 4+/5   Hip extension:    Hip extension:     Hip abduction: 4-/5 Hip abduction: 4/5   Hip adduction: 4/5 Hip adduction 4+/5   Ankle dorsiflexion: 4+/5 Ankle dorsiflexion: 5/5   Ankle plantarflexion: 4+/5 Ankle plantarflexion: 5/5      Special Tests:  Homans -     Function:     - SLS R: limited, juddering  - SLS L: -  - Squat: limited   - Sit <--> Stand:modified independent, increase time noted    - Bed Mobility: modified independent, increase time noted     TU  "" with RW  5 sit to stands: 40" with UE support     Joint Mobility: right knee hypo-patellar glide  Treatment     Serenity received the treatments listed below:      therapeutic exercises to develop strength, endurance, ROM, flexibility, posture, and core stabilization for 15 minutes including:  Recumbent bike x 8 minute for mobility purposes  Supine: quad sets 3/10, 3" hold    manual therapy techniques: Joint mobilizations, Manual traction, Myofacial release, and Soft tissue Mobilization were applied to the: right knee for 3 minutes, including:  Knee patella glides    neuromuscular re-education activities to improve: Balance, Coordination, Kinesthetic, Sense, Proprioception, and Posture for 30 minutes. The following activities were included:  Supine: knee extension stretch  PROM: flexion/extension  Upright knee extension, ankle prop with strap: x 5'  SAQ 3/10  LAQ 2# 2/10 each  Seated: heel slides with slider  Sit to stands 2/10    Ambulate with RW > 100 ft SBA    therapeutic activities to improve functional performance for  minutes, including:      gait training to improve functional mobility and safety for   minutes, including      Patient Education and Home Exercises     Home Exercises Provided and Patient Education Provided     Education provided:   - Yes    Written Home Exercises Provided: Patient instructed to cont prior HEP. Exercises were reviewed and Serenity was able to demonstrate them prior to the end of the session.  Serenity demonstrated good  understanding of the education provided. See EMR under Patient Instructions for exercises provided during therapy sessions    ASSESSMENT     Patient was given cueing and tactile input on right knee motion and standing posture. CGA with sit to stands with emphasis on loading legs equally and weight shifting. Patient ambulated with RW SBA to CGA with turning. No adverse effects.    Serenity Is progressing well towards her goals.   Pt prognosis is Good.     Pt will continue to benefit " "from skilled outpatient physical therapy to address the deficits listed in the problem list box on initial evaluation, provide pt/family education and to maximize pt's level of independence in the home and community environment.     Pt's spiritual, cultural and educational needs considered and pt agreeable to plan of care and goals.     Anticipated barriers to physical therapy: transportation    Goals:   Short Term Goals: 4 weeks   Independent with HEP with 3/10 pain.  Improve right knee extension to 0 degrees actively for ambulatory tasks.  Improve right knee flexion to 110 degrees for mobility purposes.  Demonstrate TUG < 45" with AD.  Demonstrate 5 sit to stands < 40" for mobility purposes.    PLAN     Continue with POC    Bandar Gonzalez, PT     "

## 2024-10-06 ENCOUNTER — CLINICAL SUPPORT (OUTPATIENT)
Dept: CARDIOLOGY | Facility: HOSPITAL | Age: 84
End: 2024-10-06
Payer: MEDICARE

## 2024-10-06 ENCOUNTER — HOSPITAL ENCOUNTER (OUTPATIENT)
Dept: CARDIOLOGY | Facility: HOSPITAL | Age: 84
Discharge: HOME | End: 2024-10-06
Attending: INTERNAL MEDICINE
Payer: MEDICARE

## 2024-10-06 DIAGNOSIS — R00.2 PALPITATIONS: ICD-10-CM

## 2024-10-06 PROCEDURE — 93298 REM INTERROG DEV EVAL SCRMS: CPT | Mod: 26,,, | Performed by: INTERNAL MEDICINE

## 2024-10-06 PROCEDURE — 93298 REM INTERROG DEV EVAL SCRMS: CPT | Mod: PO | Performed by: INTERNAL MEDICINE

## 2024-10-08 ENCOUNTER — CLINICAL SUPPORT (OUTPATIENT)
Dept: REHABILITATION | Facility: HOSPITAL | Age: 84
End: 2024-10-08
Payer: MEDICARE

## 2024-10-08 DIAGNOSIS — R26.9 GAIT ABNORMALITY: Primary | ICD-10-CM

## 2024-10-08 DIAGNOSIS — R29.898 WEAKNESS OF RIGHT LOWER EXTREMITY: ICD-10-CM

## 2024-10-08 LAB
OHS CV AF BURDEN PERCENT: < 1
OHS CV DC REMOTE DEVICE TYPE: NORMAL

## 2024-10-08 PROCEDURE — 97110 THERAPEUTIC EXERCISES: CPT | Mod: PO,CQ

## 2024-10-08 PROCEDURE — 97112 NEUROMUSCULAR REEDUCATION: CPT | Mod: PO,CQ

## 2024-10-08 NOTE — PROGRESS NOTES
[facility]   Physical Therapy Treatment Note     Name: Serenity Epps  Clinic Number: [id number]  Therapy Diagnosis:        Encounter Diagnoses   Name Primary?    Right knee pain      S/P total knee arthroplasty, right      Osteoarthritis of right knee, unspecified osteoarthritis type      Gait abnormality Yes    Weakness of right lower extremity      Decreased ROM of right knee          Physician: Kamaljit Mane MD     Physician Orders: PT Eval and Treat   Medical Diagnosis from Referral:   Right knee pain   S/P total knee arthroplasty, right   Osteoarthritis of right knee, unspecified osteoarthritis type    Physician: Kamaljit Mane MD    Visit Date: 10/8/2024     Time In: 8:00 AM  Time Out: 9:00 AM  Total Billable Time: 60 minutes    Precautions: Standard and Fall, Visual Impairment, A-Fib   SUBJECTIVE     Pt reports: using RW at home with no falls, states that her knee pn is ~ a 2/10 this morning.  She was compliant with home exercise program.  Response to previous treatment: muscle soreness  Functional change: walking with RW    Pain: 2/10  Location: right knee     OBJECTIVE     Objective Measures updated at progress report unless specified.   Range of Motion:   Knee Left active Left Passive Right Active R passive   Flexion 110   95 100   Extension 0   -10 -5      Lower Extremity Strength  Right LE   Left LE     Knee extension: 4-/5 Knee extension: 5/5   Knee flexion: 4-/5 Knee flexion: 5/5   Hip flexion: 4/5 Hip flexion: 4+/5   Hip extension:    Hip extension:     Hip abduction: 4-/5 Hip abduction: 4/5   Hip adduction: 4/5 Hip adduction 4+/5   Ankle dorsiflexion: 4+/5 Ankle dorsiflexion: 5/5   Ankle plantarflexion: 4+/5 Ankle plantarflexion: 5/5      Special Tests:  Homans -     Function:     - SLS R: limited, juddering  - SLS L: -  - Squat: limited   - Sit <--> Stand:modified independent, increase time noted    - Bed Mobility: modified independent, increase time noted     TU  "" with RW  5 sit to stands: 40" with UE support     Joint Mobility: right knee hypo-patellar glide  Treatment     Serenity received the treatments listed below:      therapeutic exercises to develop strength, endurance, ROM, flexibility, posture, and core stabilization for 20 minutes including:  Recumbent bike x 10 minute for mobility purposes  Supine: quad sets 3/10, 3" hold  GSS x 2 min on incline  Seated HSS x 2 min    manual therapy techniques: Joint mobilizations, Manual traction, Myofacial release, and Soft tissue Mobilization were applied to the: right knee for 0 minutes, including:  Knee patella glides  PROM: flexion/extension    neuromuscular re-education activities to improve: Balance, Coordination, Kinesthetic, Sense, Proprioception, and Posture for 40 minutes. The following activities were included:  Supine: knee extension stretch  Upright knee extension, ankle prop with strap: x 5'  SAQ 2/10  2#  LAQ 2# 2/10 each  Seated: heel slides with slider  Sit to stands 2/10    Ambulate with RW > 100 ft S for vision    therapeutic activities to improve functional performance for  minutes, including:      gait training to improve functional mobility and safety for   minutes, including      Patient Education and Home Exercises     Home Exercises Provided and Patient Education Provided     Education provided:   - Yes    Written Home Exercises Provided: Patient instructed to cont prior HEP. Exercises were reviewed and Serenity was able to demonstrate them prior to the end of the session.  Serenity demonstrated good  understanding of the education provided. See EMR under Patient Instructions for exercises provided during therapy sessions    ASSESSMENT     Serenity jose alfredo today's tx with progression of ther ex and neuromuscular re-ed well. She was able to progress with stretching and resistance with strengthening w/o difficulty or complaint. She demonstrates good effort with all activities and requires only min VCs for proper form, posture " "and ROM with exs post tx. S with sit to stands with emphasis on loading legs equally and weight shifting. Patient ambulated with RW with SBA for vision.    Serenity Is progressing well towards her goals.   Pt prognosis is Good.     Pt will continue to benefit from skilled outpatient physical therapy to address the deficits listed in the problem list box on initial evaluation, provide pt/family education and to maximize pt's level of independence in the home and community environment.     Pt's spiritual, cultural and educational needs considered and pt agreeable to plan of care and goals.     Anticipated barriers to physical therapy: transportation    Goals:   Short Term Goals: 4 weeks   Independent with HEP with 3/10 pain.  Improve right knee extension to 0 degrees actively for ambulatory tasks.  Improve right knee flexion to 110 degrees for mobility purposes.  Demonstrate TUG < 45" with AD.  Demonstrate 5 sit to stands < 40" for mobility purposes.    PLAN     Continue with LOY Johnston, PTA       "

## 2024-10-08 NOTE — PROGRESS NOTES
"[facility]   Physical Therapy Treatment Note     Name: Serenity Epps  Clinic Number: [id number]  Therapy Diagnosis:        Encounter Diagnoses   Name Primary?    Right knee pain      S/P total knee arthroplasty, right      Osteoarthritis of right knee, unspecified osteoarthritis type      Gait abnormality Yes    Weakness of right lower extremity      Decreased ROM of right knee       Physician: Kamaljit Mane MD       Physician Orders: PT Eval and Treat   Medical Diagnosis from Referral:   Right knee pain   S/P total knee arthroplasty, right   Osteoarthritis of right knee, unspecified osteoarthritis type    Visit Date: 10/8/2024     Time In: 945  Time Out: 1030  Total Billable Time: 38 minutes    Precautions: Standard and Fall, Visual Impairment, A-Fib   SUBJECTIVE     Pt reports: using RW at home with no falls, spouse assistance due to report on being legally blind.  She was compliant with home exercise program.  Response to previous treatment: muscle soreness  Functional change: walking with RW    Pain: 3/10  Location: right knee     OBJECTIVE     Objective Measures updated at progress report unless specified.   Range of Motion:   Knee Left active Left Passive Right Active R passive   Flexion 110   95 100   Extension 0   -10 -5      Lower Extremity Strength  Right LE   Left LE     Knee extension: 4-/5 Knee extension: 5/5   Knee flexion: 4-/5 Knee flexion: 5/5   Hip flexion: 4/5 Hip flexion: 4+/5   Hip extension:    Hip extension:     Hip abduction: 4-/5 Hip abduction: 4/5   Hip adduction: 4/5 Hip adduction 4+/5   Ankle dorsiflexion: 4+/5 Ankle dorsiflexion: 5/5   Ankle plantarflexion: 4+/5 Ankle plantarflexion: 5/5      Special Tests:  Homans -     Function:     - SLS R: limited, juddering  - SLS L: -  - Squat: limited   - Sit <--> Stand:modified independent, increase time noted    - Bed Mobility: modified independent, increase time noted     TU " with RW  5 sit to stands: 40" with " "UE support     Joint Mobility: right knee hypo-patellar glide  Treatment     Serenity received the treatments listed below:      therapeutic exercises to develop strength, endurance, ROM, flexibility, posture, and core stabilization for 15 minutes including:  Recumbent bike x 8 minute for mobility purposes  Supine: quad sets 3/10, 3" hold    manual therapy techniques: Joint mobilizations, Manual traction, Myofacial release, and Soft tissue Mobilization were applied to the: right knee for 3 minutes, including:  Knee patella glides    neuromuscular re-education activities to improve: Balance, Coordination, Kinesthetic, Sense, Proprioception, and Posture for 30 minutes. The following activities were included:  Supine: knee extension stretch  PROM: flexion/extension  Upright knee extension, ankle prop with strap: x 5'  SAQ 3/10  LAQ 2# 2/10 each  Seated: heel slides with slider  Sit to stands 2/10    Ambulate with RW > 100 ft SBA    therapeutic activities to improve functional performance for  minutes, including:      gait training to improve functional mobility and safety for   minutes, including      Patient Education and Home Exercises     Home Exercises Provided and Patient Education Provided     Education provided:   - Yes    Written Home Exercises Provided: Patient instructed to cont prior HEP. Exercises were reviewed and Serenity was able to demonstrate them prior to the end of the session.  Serenity demonstrated good  understanding of the education provided. See EMR under Patient Instructions for exercises provided during therapy sessions    ASSESSMENT     Patient was given cueing and tactile input on right knee motion and standing posture. CGA with sit to stands with emphasis on loading legs equally and weight shifting. Patient ambulated with RW SBA to CGA with turning. No adverse effects.    Serenity Is progressing well towards her goals.   Pt prognosis is Good.     Pt will continue to benefit from skilled outpatient physical " "therapy to address the deficits listed in the problem list box on initial evaluation, provide pt/family education and to maximize pt's level of independence in the home and community environment.     Pt's spiritual, cultural and educational needs considered and pt agreeable to plan of care and goals.     Anticipated barriers to physical therapy: transportation    Goals:   Short Term Goals: 4 weeks   Independent with HEP with 3/10 pain.  Improve right knee extension to 0 degrees actively for ambulatory tasks.  Improve right knee flexion to 110 degrees for mobility purposes.  Demonstrate TUG < 45" with AD.  Demonstrate 5 sit to stands < 40" for mobility purposes.    PLAN     Continue with LOY Gonzalez, PT       "

## 2024-10-10 ENCOUNTER — CLINICAL SUPPORT (OUTPATIENT)
Dept: REHABILITATION | Facility: HOSPITAL | Age: 84
End: 2024-10-10
Payer: MEDICARE

## 2024-10-10 DIAGNOSIS — R26.9 GAIT ABNORMALITY: Primary | ICD-10-CM

## 2024-10-10 DIAGNOSIS — R29.898 WEAKNESS OF RIGHT LOWER EXTREMITY: ICD-10-CM

## 2024-10-10 PROCEDURE — 97110 THERAPEUTIC EXERCISES: CPT | Mod: PO | Performed by: PHYSICAL THERAPIST

## 2024-10-10 PROCEDURE — 97112 NEUROMUSCULAR REEDUCATION: CPT | Mod: PO | Performed by: PHYSICAL THERAPIST

## 2024-10-10 NOTE — PROGRESS NOTES
"[facility]   Physical Therapy Treatment Note     Name: Serenity Epps  Clinic Number: [id number]  Therapy Diagnosis:        Encounter Diagnoses   Name Primary?    Right knee pain      S/P total knee arthroplasty, right      Osteoarthritis of right knee, unspecified osteoarthritis type      Gait abnormality Yes    Weakness of right lower extremity      Decreased ROM of right knee       Physician Orders: PT Eval and Treat   Medical Diagnosis from Referral:   Right knee pain   S/P total knee arthroplasty, right   Osteoarthritis of right knee, unspecified osteoarthritis type    Physician: Kamaljit Mane MD    Visit Date: 10/10/2024    3/10     Time In: 0650  Time Out: 0750  Total Billable Time: 55 minutes    Precautions: Standard and Fall, Visual Impairment, A-Fib     SUBJECTIVE     Pt reports: muscle soreness and has been walking more around the house. No falls noted.  She was compliant with home exercise program.  Response to previous treatment: muscle soreness  Functional change: walking with RW    Pain: 2/10, muscle soreness  Location: right knee     OBJECTIVE     Objective Measures updated at progress report unless specified.   Range of Motion:   Knee Left active Left Passive Right Active R passive   Flexion 110   105 110   Extension 0   -10 -5      Lower Extremity Strength  Right LE   Left LE     Knee extension: 4-/5 Knee extension: 5/5   Knee flexion: 4-/5 Knee flexion: 5/5   Hip flexion: 4/5 Hip flexion: 4+/5   Hip extension:    Hip extension:     Hip abduction: 4-/5 Hip abduction: 4/5   Hip adduction: 4/5 Hip adduction 4+/5   Ankle dorsiflexion: 4+/5 Ankle dorsiflexion: 5/5   Ankle plantarflexion: 4+/5 Ankle plantarflexion: 5/5      Special Tests:  Homans -     Function:     - SLS R: limited, juddering  - SLS L: -  - Squat: limited   - Sit <--> Stand:modified independent, increase time noted    - Bed Mobility: modified independent, increase time noted     TU " with RW  5 sit to " "stands: 40" with UE support     Joint Mobility: right knee hypo-patellar glide  Treatment     Serenity received the treatments listed below:      therapeutic exercises to develop strength, endurance, ROM, flexibility, posture, and core stabilization for 15 minutes including:  Recumbent bike x 10 minute for mobility purposes  Supine: quad sets 3/10, 3" hold  GSS x 2 min on incline  Seated HSS x 2 min    manual therapy techniques: Joint mobilizations, Manual traction, Myofacial release, and Soft tissue Mobilization were applied to the: right knee for 0 minutes, including:  Knee patella glides  PROM: flexion/extension    neuromuscular re-education activities to improve: Balance, Coordination, Kinesthetic, Sense, Proprioception, and Posture for 40 minutes. The following activities were included:  Supine: knee extension stretch  Upright knee extension, ankle prop with strap: x 5'  SAQ 2/10  2#  LAQ 3# 2/10 each  Seated: heel slides with slider  Sit to stands 2/10  Supine: ball squeezes 2/10    Ambulate with RW > 100 ft S for vision    therapeutic activities to improve functional performance for  minutes, including:    gait training to improve functional mobility and safety for   minutes, including    Patient Education and Home Exercises     Home Exercises Provided and Patient Education Provided     Education provided:   - Yes    Written Home Exercises Provided: Patient instructed to cont prior HEP. Exercises were reviewed and Serenity was able to demonstrate them prior to the end of the session.  Serenity demonstrated good  understanding of the education provided. See EMR under Patient Instructions for exercises provided during therapy sessions    ASSESSMENT     Serenity reported muscle soreness noted and demonstrated good tolerance with NMR progression. Improvement with right knee flexion noted. Patient ambulated with RW, SBA due to vision impairments. No adverse effects.    Serenity Is progressing well towards her goals.   Pt prognosis is " "Good.     Pt will continue to benefit from skilled outpatient physical therapy to address the deficits listed in the problem list box on initial evaluation, provide pt/family education and to maximize pt's level of independence in the home and community environment.     Pt's spiritual, cultural and educational needs considered and pt agreeable to plan of care and goals.     Anticipated barriers to physical therapy: transportation    Goals:   Short Term Goals: 4 weeks   Independent with HEP with 3/10 pain.  Improve right knee extension to 0 degrees actively for ambulatory tasks.  Improve right knee flexion to 110 degrees for mobility purposes.  Demonstrate TUG < 45" with AD.  Demonstrate 5 sit to stands < 40" for mobility purposes.    PLAN     Continue with LOY Gonzalez, PT         "

## 2024-10-15 ENCOUNTER — CLINICAL SUPPORT (OUTPATIENT)
Dept: REHABILITATION | Facility: HOSPITAL | Age: 84
End: 2024-10-15
Payer: MEDICARE

## 2024-10-15 DIAGNOSIS — R29.898 WEAKNESS OF RIGHT LOWER EXTREMITY: ICD-10-CM

## 2024-10-15 DIAGNOSIS — R26.9 GAIT ABNORMALITY: Primary | ICD-10-CM

## 2024-10-15 PROCEDURE — 97140 MANUAL THERAPY 1/> REGIONS: CPT | Mod: KX,PO,CQ

## 2024-10-15 PROCEDURE — 97110 THERAPEUTIC EXERCISES: CPT | Mod: KX,PO,CQ

## 2024-10-15 PROCEDURE — 97112 NEUROMUSCULAR REEDUCATION: CPT | Mod: KX,PO,CQ

## 2024-10-15 NOTE — PROGRESS NOTES
"[facility]   Physical Therapy Treatment Note     Name: Serenity Epps  Clinic Number: [id number]  Therapy Diagnosis:        Encounter Diagnoses   Name Primary?    Right knee pain      S/P total knee arthroplasty, right      Osteoarthritis of right knee, unspecified osteoarthritis type      Gait abnormality Yes    Weakness of right lower extremity      Decreased ROM of right knee       Physician Orders: PT Eval and Treat   Medical Diagnosis from Referral:   Right knee pain   S/P total knee arthroplasty, right   Osteoarthritis of right knee, unspecified osteoarthritis type    Physician: Kamaljit Mane MD    Visit Date: 10/15/2024    410     Time In: 8:00  Time Out: 9:12  Total Billable Time: 72 minutes    Precautions: Standard and Fall, Visual Impairment, A-Fib     SUBJECTIVE     Pt reports: that she did not sleep well last night. Very little pn upon arrival today.  No falls noted.  She was compliant with home exercise program.  Response to previous treatment: muscle soreness  Functional change: walking with RW    Pain: 1/10, muscle soreness  Location: right knee     OBJECTIVE     Objective Measures updated at progress report unless specified.   Range of Motion:   Knee Left active Left Passive Right Active R passive   Flexion 110   105 110   Extension 0   -10 -5      Lower Extremity Strength  Right LE   Left LE     Knee extension: 4-/5 Knee extension: 5/5   Knee flexion: 4-/5 Knee flexion: 5/5   Hip flexion: 4/5 Hip flexion: 4+/5   Hip extension:    Hip extension:     Hip abduction: 4-/5 Hip abduction: 4/5   Hip adduction: 4/5 Hip adduction 4+/5   Ankle dorsiflexion: 4+/5 Ankle dorsiflexion: 5/5   Ankle plantarflexion: 4+/5 Ankle plantarflexion: 5/5      Special Tests:  Homans -     Function:     - SLS R: limited, juddering  - SLS L: -  - Squat: limited   - Sit <--> Stand:modified independent, increase time noted    - Bed Mobility: modified independent, increase time noted     TU " with " "RW  5 sit to stands: 40" with UE support     Joint Mobility: right knee hypo-patellar glide  Treatment     Serenity received the treatments listed below:      therapeutic exercises to develop strength, endurance, ROM, flexibility, posture, and core stabilization for 20 minutes including:  Recumbent bike x 10 minute for mobility purposes  Supine: quad sets 3/10, 3" hold  GSS x 2 min on incline  Seated HSS x 2 min    manual therapy techniques: Joint mobilizations, Manual traction, Myofacial release, and Soft tissue Mobilization were applied to the: right knee for 10 minutes, including:  Knee patella glides  Passive stretch for ROM: flexion/extension    neuromuscular re-education activities to improve: Balance, Coordination, Kinesthetic, Sense, Proprioception, and Posture for 40 minutes. The following activities were included:  Supine: knee extension stretch  Upright knee extension, ankle prop with strap: x 5'  SAQ 2/10  3#  LAQ 3# 2/10 each  Seated: heel slides with slider with strap 3 sec hold x 3 min  Sit to stands 2/10  PPT with hip add with ball 3 sec hold x 3 min    Ambulate with RW > 100 ft S for vision    therapeutic activities to improve functional performance for  minutes, including:    gait training to improve functional mobility and safety for   minutes, including    Patient Education and Home Exercises     Home Exercises Provided and Patient Education Provided     Education provided:   - Yes    Written Home Exercises Provided: Patient instructed to cont prior HEP. Exercises were reviewed and Serenity was able to demonstrate them prior to the end of the session.  Serenity demonstrated good  understanding of the education provided. See EMR under Patient Instructions for exercises provided during therapy sessions    ASSESSMENT     Serenity jose alfredo today's tx with progression of ther ex, neuromuscular re-ed and manual intervention well. She was able to progress with resistance with strengthening exs and add core stab exs today w/o " "difficulty or complaint. Improvement with right knee flexion noted. Patient ambulated with RW, SBA due to vision impairments with VCs for posture walker management and step length. No adverse effects.    Serenity Is progressing well towards her goals.   Pt prognosis is Good.     Pt will continue to benefit from skilled outpatient physical therapy to address the deficits listed in the problem list box on initial evaluation, provide pt/family education and to maximize pt's level of independence in the home and community environment.     Pt's spiritual, cultural and educational needs considered and pt agreeable to plan of care and goals.     Anticipated barriers to physical therapy: transportation    Goals:   Short Term Goals: 4 weeks   Independent with HEP with 3/10 pain.  Improve right knee extension to 0 degrees actively for ambulatory tasks.  Improve right knee flexion to 110 degrees for mobility purposes.  Demonstrate TUG < 45" with AD.  Demonstrate 5 sit to stands < 40" for mobility purposes.    PLAN     Continue with LOY Johnston PTA           "

## 2024-10-17 ENCOUNTER — CLINICAL SUPPORT (OUTPATIENT)
Dept: REHABILITATION | Facility: HOSPITAL | Age: 84
End: 2024-10-17
Payer: MEDICARE

## 2024-10-17 DIAGNOSIS — R29.898 WEAKNESS OF RIGHT LOWER EXTREMITY: ICD-10-CM

## 2024-10-17 DIAGNOSIS — R26.9 GAIT ABNORMALITY: Primary | ICD-10-CM

## 2024-10-17 PROCEDURE — 97112 NEUROMUSCULAR REEDUCATION: CPT | Mod: KX,PO,CQ

## 2024-10-17 PROCEDURE — 97110 THERAPEUTIC EXERCISES: CPT | Mod: KX,PO,CQ

## 2024-10-17 PROCEDURE — 97140 MANUAL THERAPY 1/> REGIONS: CPT | Mod: KX,PO,CQ

## 2024-10-17 PROCEDURE — 97530 THERAPEUTIC ACTIVITIES: CPT | Mod: KX,PO,CQ

## 2024-10-17 NOTE — PROGRESS NOTES
"[facility]   Physical Therapy Treatment Note     Name: Serenity Epps  Clinic Number: [id number]  Therapy Diagnosis:        Encounter Diagnoses   Name Primary?    Right knee pain      S/P total knee arthroplasty, right      Osteoarthritis of right knee, unspecified osteoarthritis type      Gait abnormality Yes    Weakness of right lower extremity      Decreased ROM of right knee       Physician Orders: PT Eval and Treat   Medical Diagnosis from Referral:   Right knee pain   S/P total knee arthroplasty, right   Osteoarthritis of right knee, unspecified osteoarthritis type    Physician: Kamaljit Mane MD    Visit Date: 10/17/2024    4/10     Time In: 7:05  Time Out: 8:20  Total Billable Time: 75 minutes    Precautions: Standard and Fall, Visual Impairment, A-Fib     SUBJECTIVE     Pt reports: that she is having very little pn today.  She was compliant with home exercise program.  Response to previous treatment: muscle soreness  Functional change: walking with RW    Pain: 10, muscle soreness  Location: right knee     OBJECTIVE     Objective Measures updated at progress report unless specified.   Range of Motion:   Knee Left active Left Passive Right Active R passive   Flexion 110   105 110   Extension 0   -10 -5      Lower Extremity Strength  Right LE   Left LE     Knee extension: 4-/5 Knee extension: 5/5   Knee flexion: 4-/5 Knee flexion: 5/5   Hip flexion: 4/5 Hip flexion: 4+/5   Hip extension:    Hip extension:     Hip abduction: 4-/5 Hip abduction: 4/5   Hip adduction: 4/5 Hip adduction 4+/5   Ankle dorsiflexion: 4+/5 Ankle dorsiflexion: 5/5   Ankle plantarflexion: 4+/5 Ankle plantarflexion: 5/5      Special Tests:  Homans -     Function:     - SLS R: limited, juddering  - SLS L: -  - Squat: limited   - Sit <--> Stand:modified independent, increase time noted    - Bed Mobility: modified independent, increase time noted     TU " with RW  5 sit to stands: 40" with UE support     Joint " "Mobility: right knee hypo-patellar glide  Treatment     Serenity received the treatments listed below:      therapeutic exercises to develop strength, endurance, ROM, flexibility, posture, and core stabilization for 20 minutes including:  Recumbent bike x 10 minute for mobility purposes  Seated: quad sets 3/10, 3" hold  GSS x 2 min on incline  Seated HSS x 2 min  Standing hip ABD 20x  Standing hip ext 20x  Standing HS curl 20x    manual therapy techniques: Joint mobilizations, Manual traction, Myofacial release, and Soft tissue Mobilization were applied to the: right knee for 10 minutes, including:  Knee patella glides  Passive stretch for ROM: flexion/extension    neuromuscular re-education activities to improve: Balance, Coordination, Kinesthetic, Sense, Proprioception, and Posture for 35 minutes. The following activities were included:  Supine: knee extension stretch  Upright knee extension, ankle prop with strap: x 5'  SAQ 2/10  3#  LAQ 3# 2/10 each  Seated: heel slides with slider with strap 3 sec hold x 3 min  Sit to stands 2/10  Bridging 20x  PPT with hip add with ball 3 sec hold x 3 min    therapeutic activities to improve functional performance for  minutes, including:    gait training to improve functional mobility and safety for 10  minutes, including  Ambulate with RW > 100 ft S for vision, VCs for posture and L LE step length    Patient Education and Home Exercises     Home Exercises Provided and Patient Education Provided     Education provided:   - Yes    Written Home Exercises Provided: Patient instructed to cont prior HEP. Exercises were reviewed and Serenity was able to demonstrate them prior to the end of the session.  Serenity demonstrated good  understanding of the education provided. See EMR under Patient Instructions for exercises provided during therapy sessions    ASSESSMENT     Serenity jose alfredo today's tx with progression of ther ex, neuromuscular re-ed and manual intervention well. She was able to progress with " "strengthening exs adding standing LE exs today w/o difficulty or complaint. Improvement with right knee flexion noted as above. Patient ambulated with RW, SBA due to vision impairments with VCs for posture walker management and step length. No adverse effects. Continue to focus on LE strengthening, ext, and posture, step length with amb to facilitate increased stance time and quad activation on R LE.      Serenity Is progressing well towards her goals.   Pt prognosis is Good.     Pt will continue to benefit from skilled outpatient physical therapy to address the deficits listed in the problem list box on initial evaluation, provide pt/family education and to maximize pt's level of independence in the home and community environment.     Pt's spiritual, cultural and educational needs considered and pt agreeable to plan of care and goals.     Anticipated barriers to physical therapy: transportation    Goals:   Short Term Goals: 4 weeks   Independent with HEP with 3/10 pain.  Improve right knee extension to 0 degrees actively for ambulatory tasks.  Improve right knee flexion to 110 degrees for mobility purposes.  Demonstrate TUG < 45" with AD.  Demonstrate 5 sit to stands < 40" for mobility purposes.    PLAN     Continue with LOY Johnston, PTA             "

## 2024-10-22 ENCOUNTER — CLINICAL SUPPORT (OUTPATIENT)
Dept: REHABILITATION | Facility: HOSPITAL | Age: 84
End: 2024-10-22
Payer: MEDICARE

## 2024-10-22 DIAGNOSIS — R26.9 GAIT ABNORMALITY: Primary | ICD-10-CM

## 2024-10-22 DIAGNOSIS — R29.898 WEAKNESS OF RIGHT LOWER EXTREMITY: ICD-10-CM

## 2024-10-22 PROCEDURE — 97112 NEUROMUSCULAR REEDUCATION: CPT | Mod: KX,PO | Performed by: PHYSICAL THERAPIST

## 2024-10-22 PROCEDURE — 97110 THERAPEUTIC EXERCISES: CPT | Mod: KX,PO | Performed by: PHYSICAL THERAPIST

## 2024-10-22 PROCEDURE — 97116 GAIT TRAINING THERAPY: CPT | Mod: KX,PO | Performed by: PHYSICAL THERAPIST

## 2024-10-22 NOTE — PROGRESS NOTES
"[facility]   Physical Therapy Treatment Note     Name: Serenity Epps  Clinic Number: [id number]  Therapy Diagnosis:        Encounter Diagnoses   Name Primary?    Right knee pain      S/P total knee arthroplasty, right      Osteoarthritis of right knee, unspecified osteoarthritis type      Gait abnormality Yes    Weakness of right lower extremity      Decreased ROM of right knee       Physician Orders: PT Eval and Treat   Medical Diagnosis from Referral:   Right knee pain   S/P total knee arthroplasty, right   Osteoarthritis of right knee, unspecified osteoarthritis type    Physician: Kamaljit Mane MD    Visit Date: 10/22/2024    6/10     Time In: 0755  Time Out: 0900  Total Billable Time: 55 minutes    Precautions: Standard and Fall, Visual Impairment, A-Fib     SUBJECTIVE     Pt reports: doing well and no falls noted. Muscle soreness noted.  She was compliant with home exercise program.  Response to previous treatment: muscle soreness  Functional change: walking with RW    Pain: 1/10, muscle soreness  Location: right knee     OBJECTIVE     Objective Measures updated at progress report unless specified.   Range of Motion:   Knee Left active Left Passive Right Active R passive   Flexion 110   115 121   Extension 0   -5 -3     Special Tests:  Homans -     Function:     - SLS R: limited, juddering  - SLS L: -  - Squat: limited   - Sit <--> Stand:modified independent, increase time noted    - Bed Mobility: modified independent, increase time noted     TU " with RW  5 sit to stands: 40" with UE support     Joint Mobility: right knee hypo-patellar glide  Treatment     Serenity received the treatments listed below:      therapeutic exercises to develop strength, endurance, ROM, flexibility, posture, and core stabilization for 15 minutes including:  Recumbent bike x 10 minute for mobility purposes  Seated: quad sets 3/10, 3" hold  GSS x 2 min on incline  Seated HSS x 2 min  Standing hip ABD " "20x  Standing hip ext 20x  Standing HS curl 20x    manual therapy techniques: Joint mobilizations, Manual traction, Myofacial release, and Soft tissue Mobilization were applied to the: right knee for 5 minutes, including:  Knee patella glides  Passive stretch for ROM: flexion/extension    neuromuscular re-education activities to improve: Balance, Coordination, Kinesthetic, Sense, Proprioception, and Posture for 30 minutes. The following activities were included:  Supine: knee extension stretch  Upright knee extension, ankle prop with strap: x 5'  SAQ 2/10  3#  LAQ 3# 2/10 each  Seated: heel slides with slider with strap 3 sec hold x 3 min  Sit to stands /10  Bridging DL 2/10, progressed to single leg using bolster 2/10  PPT with hip add with ball 3 sec hold x 3 min  Supine: DKTC with green ball 2'    5 chair sit to stands: 40" with clinician assistance    therapeutic activities to improve functional performance for  minutes, including:    gait training to improve functional mobility and safety for 10 minutes, including  Ambulate with NBQC 50 ft VCs for posture and L LE step length    TU" with RW modified independent, SBA    Patient Education and Home Exercises     Home Exercises Provided and Patient Education Provided     Education provided:   - Yes    Written Home Exercises Provided: Patient instructed to cont prior HEP. Exercises were reviewed and Serenity was able to demonstrate them prior to the end of the session.  Serenity demonstrated good  understanding of the education provided. See EMR under Patient Instructions for exercises provided during therapy sessions    ASSESSMENT   Patient demonstrated improvement in knee flexion up to 115 degrees actively, 121 degrees passively. Extension slowly improving as well near 0 degrees with over-pressure. Improvement in TUG noted. Good tolerance with NMR progression with cueing and tactile input on upright posture. No adverse effects.    Serenity Is progressing well towards her " "goals.   Pt prognosis is Good.     Pt will continue to benefit from skilled outpatient physical therapy to address the deficits listed in the problem list box on initial evaluation, provide pt/family education and to maximize pt's level of independence in the home and community environment.     Pt's spiritual, cultural and educational needs considered and pt agreeable to plan of care and goals.     Anticipated barriers to physical therapy: transportation    Goals:   Short Term Goals: 4 weeks   Independent with HEP with 3/10 pain. Met  Improve right knee extension to 0 degrees actively for ambulatory tasks. Ongoing  Improve right knee flexion to 110 degrees for mobility purposes. Met  Demonstrate TUG < 45" with AD. Met  Demonstrate 5 sit to stands < 40" for mobility purposes. Met    PLAN     Continue with LOY Gonzalez, PT               "

## 2024-10-24 ENCOUNTER — CLINICAL SUPPORT (OUTPATIENT)
Dept: REHABILITATION | Facility: HOSPITAL | Age: 84
End: 2024-10-24
Payer: MEDICARE

## 2024-10-24 DIAGNOSIS — Z96.651 S/P TOTAL KNEE ARTHROPLASTY, RIGHT: Primary | ICD-10-CM

## 2024-10-24 DIAGNOSIS — R26.9 GAIT ABNORMALITY: Primary | ICD-10-CM

## 2024-10-24 DIAGNOSIS — R29.898 WEAKNESS OF RIGHT LOWER EXTREMITY: ICD-10-CM

## 2024-10-24 PROCEDURE — 97110 THERAPEUTIC EXERCISES: CPT | Mod: PO,CQ

## 2024-10-24 PROCEDURE — 97530 THERAPEUTIC ACTIVITIES: CPT | Mod: PO,CQ

## 2024-10-24 PROCEDURE — 97112 NEUROMUSCULAR REEDUCATION: CPT | Mod: PO,CQ

## 2024-10-24 NOTE — PROGRESS NOTES
"  [facility]   Physical Therapy Treatment Note     Name: Serenity Epps  Clinic Number: [id number]  Therapy Diagnosis:        Encounter Diagnoses   Name Primary?    Right knee pain      S/P total knee arthroplasty, right      Osteoarthritis of right knee, unspecified osteoarthritis type      Gait abnormality Yes    Weakness of right lower extremity      Decreased ROM of right knee       Physician Orders: PT Eval and Treat   Medical Diagnosis from Referral:   Right knee pain   S/P total knee arthroplasty, right   Osteoarthritis of right knee, unspecified osteoarthritis type    Physician: Kamaljit Mane MD    Visit Date: 10/24/2024    7/10     Time In: 0700 (Pt early)  Time Out: 08  Total Billable Time: 60 minutes    Precautions: Standard and Fall, Visual Impairment, A-Fib     SUBJECTIVE     Pt reports: doing well and minimal soreness with R knee. No increased pain.  She was compliant with home exercise program.  Response to previous treatment: muscle soreness  Functional change: walking with RW    Pain: 10, muscle soreness  Location: right knee     OBJECTIVE     Objective Measures updated at progress report unless specified.   Range of Motion:   Knee Left active Left Passive Right Active R passive   Flexion 110   115 121   Extension 0   -5 -3     Special Tests:  Barbarans -     Function:     - SLS R: limited, juddering  - SLS L: -  - Squat: limited   - Sit <--> Stand:modified independent, increase time noted    - Bed Mobility: modified independent, increase time noted     TU " with RW  5 sit to stands: 40" with UE support     Joint Mobility: right knee hypo-patellar glide  Treatment     Serenity received the treatments listed below:      therapeutic exercises to develop strength, endurance, ROM, flexibility, posture, and core stabilization for 20 minutes including:  Recumbent bike x 10 minute for mobility purposes  Seated: quad sets 3/10, 3" hold  GSS x 2 min on incline  Seated HSS x 2 " "min  Standing hip ABD 20x  Standing hip ext 20x  Standing HS curl 20x-NP    manual therapy techniques: Joint mobilizations, Manual traction, Myofacial release, and Soft tissue Mobilization were applied to the: right knee for 5 minutes, including:  Knee patella glides  Passive stretch for ROM: flexion/extension    neuromuscular re-education activities to improve: Balance, Coordination, Kinesthetic, Sense, Proprioception, and Posture for 30 minutes. The following activities were included:  Supine: knee extension stretch  Upright knee extension, ankle prop with strap: x 5'  SAQ 2/10  3#-NP  LAQ 3# 2/10 each  Seated: heel slides with slider with strap 3 sec hold x 3 min  Sit to stands 2/10  Bridging DL 2/10, progressed to single leg using bolster 2/10  PPT with hip add with ball 3 sec hold x 3 min  Supine: DKTC with green ball 2'  SLR w/ QS 2 x 10   Standing TKE 10x R knee      therapeutic activities to improve functional performance for  minutes, including:    gait training to improve functional mobility and safety for 10 minutes, including  Ambulate with NBQC 50 ft VCs for posture and L LE step length and heel to toe tech    TU" with RW modified independent, SBA-NP    Patient Education and Home Exercises     Home Exercises Provided and Patient Education Provided     Education provided:   - Yes    Written Home Exercises Provided: Patient instructed to cont prior HEP. Exercises were reviewed and Serenity was able to demonstrate them prior to the end of the session.  Serenity demonstrated good  understanding of the education provided. See EMR under Patient Instructions for exercises provided during therapy sessions    ASSESSMENT   Patient demo progression with extension and flexion ROM.  Good tolerance with NMR progression with good quad engagement with vc. Educated pt on equal WB with B LE during STS and importance of TKE when standing. Cont to progress pt with amb with cane. Pt expressed being fearful of amb with cane due " "to sight issue.  No adverse effects.    Serenity Is progressing well towards her goals.   Pt prognosis is Good.     Pt will continue to benefit from skilled outpatient physical therapy to address the deficits listed in the problem list box on initial evaluation, provide pt/family education and to maximize pt's level of independence in the home and community environment.     Pt's spiritual, cultural and educational needs considered and pt agreeable to plan of care and goals.     Anticipated barriers to physical therapy: transportation    Goals:   Short Term Goals: 4 weeks   Independent with HEP with 3/10 pain. Met  Improve right knee extension to 0 degrees actively for ambulatory tasks. Ongoing  Improve right knee flexion to 110 degrees for mobility purposes. Met  Demonstrate TUG < 45" with AD. Met  Demonstrate 5 sit to stands < 40" for mobility purposes. Met    PLAN     Continue with LOY Hayward PTA               "

## 2024-10-28 ENCOUNTER — OFFICE VISIT (OUTPATIENT)
Dept: ORTHOPEDICS | Facility: CLINIC | Age: 84
End: 2024-10-28
Payer: MEDICARE

## 2024-10-28 ENCOUNTER — HOSPITAL ENCOUNTER (OUTPATIENT)
Dept: RADIOLOGY | Facility: HOSPITAL | Age: 84
Discharge: HOME | End: 2024-10-28
Attending: ORTHOPAEDIC SURGERY
Payer: MEDICARE

## 2024-10-28 VITALS — WEIGHT: 138.88 LBS | BODY MASS INDEX: 24.61 KG/M2 | HEIGHT: 63 IN

## 2024-10-28 DIAGNOSIS — Z96.651 S/P TOTAL KNEE ARTHROPLASTY, RIGHT: Primary | ICD-10-CM

## 2024-10-28 DIAGNOSIS — M17.11 OSTEOARTHRITIS OF RIGHT KNEE, UNSPECIFIED OSTEOARTHRITIS TYPE: ICD-10-CM

## 2024-10-28 DIAGNOSIS — Z96.651 S/P TOTAL KNEE ARTHROPLASTY, RIGHT: ICD-10-CM

## 2024-10-28 DIAGNOSIS — M25.561 RIGHT KNEE PAIN: ICD-10-CM

## 2024-10-28 PROCEDURE — 73560 X-RAY EXAM OF KNEE 1 OR 2: CPT | Mod: 26,59,LT, | Performed by: RADIOLOGY

## 2024-10-28 PROCEDURE — 1126F AMNT PAIN NOTED NONE PRSNT: CPT | Mod: CPTII,S$GLB,, | Performed by: ORTHOPAEDIC SURGERY

## 2024-10-28 PROCEDURE — 1101F PT FALLS ASSESS-DOCD LE1/YR: CPT | Mod: CPTII,S$GLB,, | Performed by: ORTHOPAEDIC SURGERY

## 2024-10-28 PROCEDURE — 73562 X-RAY EXAM OF KNEE 3: CPT | Mod: TC,PO,RT

## 2024-10-28 PROCEDURE — 99999 PR PBB SHADOW E&M-EST. PATIENT-LVL III: CPT | Mod: PBBFAC,,, | Performed by: ORTHOPAEDIC SURGERY

## 2024-10-28 PROCEDURE — 99024 POSTOP FOLLOW-UP VISIT: CPT | Mod: S$GLB,,, | Performed by: ORTHOPAEDIC SURGERY

## 2024-10-28 PROCEDURE — 1159F MED LIST DOCD IN RCRD: CPT | Mod: CPTII,S$GLB,, | Performed by: ORTHOPAEDIC SURGERY

## 2024-10-28 PROCEDURE — 1157F ADVNC CARE PLAN IN RCRD: CPT | Mod: CPTII,S$GLB,, | Performed by: ORTHOPAEDIC SURGERY

## 2024-10-28 PROCEDURE — 73562 X-RAY EXAM OF KNEE 3: CPT | Mod: 26,RT,, | Performed by: RADIOLOGY

## 2024-10-28 PROCEDURE — 3288F FALL RISK ASSESSMENT DOCD: CPT | Mod: CPTII,S$GLB,, | Performed by: ORTHOPAEDIC SURGERY

## 2024-10-29 ENCOUNTER — CLINICAL SUPPORT (OUTPATIENT)
Dept: REHABILITATION | Facility: HOSPITAL | Age: 84
End: 2024-10-29
Payer: MEDICARE

## 2024-10-29 DIAGNOSIS — F32.1 MAJOR DEPRESSIVE DISORDER, SINGLE EPISODE, MODERATE: ICD-10-CM

## 2024-10-29 DIAGNOSIS — R29.898 WEAKNESS OF RIGHT LOWER EXTREMITY: ICD-10-CM

## 2024-10-29 DIAGNOSIS — R26.9 GAIT ABNORMALITY: Primary | ICD-10-CM

## 2024-10-29 PROCEDURE — 97116 GAIT TRAINING THERAPY: CPT | Mod: KX,PO,CQ

## 2024-10-29 PROCEDURE — 97140 MANUAL THERAPY 1/> REGIONS: CPT | Mod: KX,PO,CQ

## 2024-10-29 PROCEDURE — 97112 NEUROMUSCULAR REEDUCATION: CPT | Mod: KX,PO,CQ

## 2024-10-29 PROCEDURE — 97110 THERAPEUTIC EXERCISES: CPT | Mod: KX,PO,CQ

## 2024-10-29 RX ORDER — MIRTAZAPINE 15 MG/1
TABLET, FILM COATED ORAL
Qty: 120 TABLET | Refills: 3 | Status: SHIPPED | OUTPATIENT
Start: 2024-10-29

## 2024-10-31 ENCOUNTER — CLINICAL SUPPORT (OUTPATIENT)
Dept: REHABILITATION | Facility: HOSPITAL | Age: 84
End: 2024-10-31
Payer: MEDICARE

## 2024-10-31 DIAGNOSIS — R29.898 WEAKNESS OF RIGHT LOWER EXTREMITY: ICD-10-CM

## 2024-10-31 DIAGNOSIS — R26.9 GAIT ABNORMALITY: Primary | ICD-10-CM

## 2024-10-31 PROCEDURE — 97110 THERAPEUTIC EXERCISES: CPT | Mod: PO,CQ

## 2024-10-31 PROCEDURE — 97112 NEUROMUSCULAR REEDUCATION: CPT | Mod: PO,CQ

## 2024-11-04 RX ORDER — ATORVASTATIN CALCIUM 40 MG/1
40 TABLET, FILM COATED ORAL NIGHTLY
Qty: 90 TABLET | Refills: 1 | Status: SHIPPED | OUTPATIENT
Start: 2024-11-04

## 2024-11-05 ENCOUNTER — CLINICAL SUPPORT (OUTPATIENT)
Dept: REHABILITATION | Facility: HOSPITAL | Age: 84
End: 2024-11-05
Payer: MEDICARE

## 2024-11-05 DIAGNOSIS — R26.9 GAIT ABNORMALITY: Primary | ICD-10-CM

## 2024-11-05 DIAGNOSIS — R29.898 WEAKNESS OF RIGHT LOWER EXTREMITY: ICD-10-CM

## 2024-11-05 PROCEDURE — 97112 NEUROMUSCULAR REEDUCATION: CPT | Mod: PO,CQ

## 2024-11-05 PROCEDURE — 97110 THERAPEUTIC EXERCISES: CPT | Mod: PO,CQ

## 2024-11-05 NOTE — PROGRESS NOTES
"    [facility]   Physical Therapy Treatment Note     Name: Serenity Epps  Clinic Number: [id number]  Therapy Diagnosis:        Encounter Diagnoses   Name Primary?    Right knee pain      S/P total knee arthroplasty, right      Osteoarthritis of right knee, unspecified osteoarthritis type      Gait abnormality Yes    Weakness of right lower extremity      Decreased ROM of right knee       Physician Orders: PT Eval and Treat   Medical Diagnosis from Referral:   Right knee pain   S/P total knee arthroplasty, right   Osteoarthritis of right knee, unspecified osteoarthritis type    Physician: Kamaljit Mane MD    Visit Date: 2024    8/10    Time In: 7:10 (Pt early)  Time Out: 8:00  Total Billable Time: 50 minutes    Precautions: Standard and Fall, Visual Impairment, A-Fib     SUBJECTIVE     Pt reports: doing well and minimal soreness with R knee. No increased pain.  She was compliant with home exercise program.  Response to previous treatment: muscle soreness  Functional change: walking with RW    Pain: 1/10, muscle soreness  Location: right knee     OBJECTIVE     Objective Measures updated at progress report unless specified.   Range of Motion:   Knee Left active Left Passive Right Active R passive   Flexion 110   115 121   Extension 0   -5 -3     Special Tests:  Homans -     Function:     - SLS R: limited, juddering  - SLS L: -  - Squat: limited   - Sit <--> Stand:modified independent, increase time noted    - Bed Mobility: modified independent, increase time noted     TU " with RW  5 sit to stands: 40" with UE support     Joint Mobility: right knee hypo-patellar glide  Treatment     Serenity received the treatments listed below:      therapeutic exercises to develop strength, endurance, ROM, flexibility, posture, and core stabilization for 15 minutes including:  Recumbent bike x 10 minute for mobility purposes  GSS x 2 min on incline  Seated HSS x 2 min  Supine: knee extension " "stretch  Upright knee extension, ankle prop with strap: x 5' 5#    manual therapy techniques: Joint mobilizations, Manual traction, Myofacial release, and Soft tissue Mobilization were applied to the: right knee for 00 minutes, including:  Knee patella glides  Passive stretch for ROM: flexion/extension    neuromuscular re-education activities to improve: Balance, Coordination, Kinesthetic, Sense, Proprioception, and Posture for 35 minutes. The following activities were included:  Seated: quad sets 3/10, 3" hold  SAQ 2/10  3#-NP  LAQ 3# 2/10 each  Seated: heel slides with slider with strap 3 sec hold x 3 min  Sit to stands 2/10-NP  Bridging DL 2/10, progressed to single leg using bolster 2/10  PPT with hip add with ball 3 sec hold x 3 min-NP  Supine: DKTC with green ball 2'-NP  SLR w/ QS 2 x 10  1#  Standing TKE 20x R knee (cueing for TKE)  Leg press 3x10 20# (cueing for TKE)  Ambulate with NBQC 100 ft VCs for posture and tactile and vc for quad contraction/knee extension(CGA for safety)  therapeutic activities to improve functional performance for  minutes, including:    Standing hip ABD 20x  Standing hip ext 20x  Standing HS curl 20x    gait training to improve functional mobility and safety for  minutes, including    TU" with RW modified independent, SBA-NP    Patient Education and Home Exercises     Home Exercises Provided and Patient Education Provided     Education provided:   - Yes    Written Home Exercises Provided: Patient instructed to cont prior HEP. Exercises were reviewed and Serenity was able to demonstrate them prior to the end of the session.  Serenity demonstrated good  understanding of the education provided. See EMR under Patient Instructions for exercises provided during therapy sessions    ASSESSMENT   Serenity jose alfredo today's tx with progression of ther ex, neuromuscular re-ed gt training. She conts to need vc and tactile cueing for effective quad contraction, especially with TKE with standing and w/ gait. " "Extension is better, but lacks w/ gait due to weakness.     Serenity Is progressing well towards her goals.   Pt prognosis is Good.     Pt will continue to benefit from skilled outpatient physical therapy to address the deficits listed in the problem list box on initial evaluation, provide pt/family education and to maximize pt's level of independence in the home and community environment.     Pt's spiritual, cultural and educational needs considered and pt agreeable to plan of care and goals.     Anticipated barriers to physical therapy: transportation    Goals:   Short Term Goals: 4 weeks   Independent with HEP with 3/10 pain. Met  Improve right knee extension to 0 degrees actively for ambulatory tasks. Ongoing  Improve right knee flexion to 110 degrees for mobility purposes. Met  Demonstrate TUG < 45" with AD. Met  Demonstrate 5 sit to stands < 40" for mobility purposes. Met    PLAN     Continue with LOY Hayward PTA                   "

## 2024-11-07 ENCOUNTER — HOSPITAL ENCOUNTER (OUTPATIENT)
Dept: CARDIOLOGY | Facility: HOSPITAL | Age: 84
Discharge: HOME | End: 2024-11-07
Attending: INTERNAL MEDICINE
Payer: MEDICARE

## 2024-11-07 ENCOUNTER — CLINICAL SUPPORT (OUTPATIENT)
Dept: CARDIOLOGY | Facility: HOSPITAL | Age: 84
End: 2024-11-07
Payer: MEDICARE

## 2024-11-07 DIAGNOSIS — R00.2 PALPITATIONS: ICD-10-CM

## 2024-11-07 PROCEDURE — 93298 REM INTERROG DEV EVAL SCRMS: CPT | Mod: 26,,, | Performed by: INTERNAL MEDICINE

## 2024-11-07 PROCEDURE — 93298 REM INTERROG DEV EVAL SCRMS: CPT | Mod: PO | Performed by: INTERNAL MEDICINE

## 2024-11-14 LAB
OHS CV AF BURDEN PERCENT: < 1
OHS CV DC REMOTE DEVICE TYPE: NORMAL

## 2024-11-20 ENCOUNTER — OFFICE VISIT (OUTPATIENT)
Dept: URGENT CARE | Facility: CLINIC | Age: 84
End: 2024-11-20
Payer: MEDICARE

## 2024-11-20 VITALS
DIASTOLIC BLOOD PRESSURE: 84 MMHG | SYSTOLIC BLOOD PRESSURE: 143 MMHG | HEART RATE: 61 BPM | RESPIRATION RATE: 18 BRPM | HEIGHT: 63 IN | TEMPERATURE: 98 F | BODY MASS INDEX: 24.45 KG/M2 | OXYGEN SATURATION: 96 % | WEIGHT: 138 LBS

## 2024-11-20 DIAGNOSIS — R09.82 POSTNASAL DRIP: ICD-10-CM

## 2024-11-20 DIAGNOSIS — J43.9: ICD-10-CM

## 2024-11-20 DIAGNOSIS — R05.9 COUGH, UNSPECIFIED TYPE: Primary | ICD-10-CM

## 2024-11-20 PROCEDURE — 99213 OFFICE O/P EST LOW 20 MIN: CPT | Mod: S$GLB,,, | Performed by: PHYSICIAN ASSISTANT

## 2024-11-20 PROCEDURE — 71046 X-RAY EXAM CHEST 2 VIEWS: CPT | Mod: S$GLB,,, | Performed by: RADIOLOGY

## 2024-11-20 RX ORDER — AZELASTINE 1 MG/ML
1 SPRAY, METERED NASAL 2 TIMES DAILY PRN
Qty: 30 ML | Refills: 3 | Status: SHIPPED | OUTPATIENT
Start: 2024-11-20

## 2024-11-20 RX ORDER — BENZONATATE 200 MG/1
200 CAPSULE ORAL 3 TIMES DAILY PRN
Qty: 60 CAPSULE | Refills: 0 | Status: SHIPPED | OUTPATIENT
Start: 2024-11-20

## 2024-11-20 RX ORDER — DOXYCYCLINE HYCLATE 100 MG
100 TABLET ORAL 2 TIMES DAILY
Qty: 20 TABLET | Refills: 0 | Status: SHIPPED | OUTPATIENT
Start: 2024-11-20 | End: 2024-11-30

## 2024-11-20 RX ORDER — FLUTICASONE PROPIONATE 50 MCG
1 SPRAY, SUSPENSION (ML) NASAL 2 TIMES DAILY
Qty: 16 ML | Refills: 3 | Status: SHIPPED | OUTPATIENT
Start: 2024-11-20

## 2024-11-20 RX ORDER — LEVALBUTEROL TARTRATE 45 UG/1
1-2 AEROSOL, METERED ORAL EVERY 8 HOURS PRN
Qty: 15 G | Refills: 0 | Status: SHIPPED | OUTPATIENT
Start: 2024-11-20

## 2024-11-20 NOTE — PROGRESS NOTES
"Subjective:      Patient ID: Serenity Epps is a 84 y.o. female.    Vitals:  height is 5' 3" (1.6 m) and weight is 62.6 kg (138 lb). Her oral temperature is 98.1 °F (36.7 °C). Her blood pressure is 143/84 (abnormal) and her pulse is 61. Her respiration is 18 and oxygen saturation is 96%.     Chief Complaint: Cough and Sinus Problem    Patient c/o nasal congestion and cough onset last week no other symptoms pt stated she doesn't feel sick just wants to get this cough checked out. Otc meds was taken ( mucus relief ) no relief. No pain     Cough  This is a new problem. The current episode started in the past 7 days. The problem has been unchanged. The cough is Non-productive. Associated symptoms include nasal congestion and postnasal drip. Pertinent negatives include no chest pain, chills, ear congestion, ear pain, eye redness, fever, headaches, heartburn, hemoptysis, myalgias, rash, rhinorrhea, sore throat, shortness of breath, sweats, weight loss or wheezing. Nothing aggravates the symptoms. She has tried nothing for the symptoms.       Constitution: Negative for chills, sweating, fatigue and fever.   HENT:  Positive for congestion, postnasal drip, sinus pain and sinus pressure. Negative for ear pain, drooling, nosebleeds, foreign body in nose, sore throat, trouble swallowing and voice change.    Neck: Negative for neck pain, neck stiffness, painful lymph nodes and neck swelling.   Cardiovascular:  Negative for chest pain, leg swelling, palpitations, sob on exertion and passing out.   Eyes:  Negative for eye pain, eye redness, photophobia, double vision, blurred vision and eyelid swelling.   Respiratory:  Positive for cough. Negative for chest tightness, sputum production, bloody sputum, shortness of breath, stridor and wheezing.    Gastrointestinal:  Negative for abdominal pain, abdominal bloating, nausea, vomiting, constipation, diarrhea and heartburn.   Musculoskeletal:  Negative for joint pain, joint swelling, " abnormal ROM of joint, back pain, muscle cramps and muscle ache.   Skin:  Negative for rash and hives.   Allergic/Immunologic: Positive for sneezing. Negative for seasonal allergies, food allergies, hives and itching.   Neurological:  Negative for dizziness, light-headedness, passing out, facial drooping, speech difficulty, loss of balance, headaches, altered mental status, loss of consciousness and seizures.   Hematologic/Lymphatic: Negative for swollen lymph nodes.   Psychiatric/Behavioral:  Negative for altered mental status and nervous/anxious. The patient is not nervous/anxious.       Objective:     Physical Exam   Constitutional: She is oriented to person, place, and time. She appears well-developed. She is cooperative.  Non-toxic appearance. She does not appear ill. No distress.   HENT:   Head: Normocephalic and atraumatic.   Ears:   Right Ear: Hearing, tympanic membrane, external ear and ear canal normal.   Left Ear: Hearing, tympanic membrane, external ear and ear canal normal.   Nose: Mucosal edema and rhinorrhea present. No nasal deformity. No epistaxis. Right sinus exhibits no maxillary sinus tenderness and no frontal sinus tenderness. Left sinus exhibits no maxillary sinus tenderness and no frontal sinus tenderness.   Mouth/Throat: Uvula is midline and mucous membranes are normal. No trismus in the jaw. Normal dentition. No uvula swelling. Posterior oropharyngeal erythema and cobblestoning present. No oropharyngeal exudate, posterior oropharyngeal edema or tonsillar abscesses. No tonsillar exudate.   Eyes: Conjunctivae and lids are normal. No scleral icterus.   Neck: Trachea normal and phonation normal. Neck supple. No edema present. No erythema present. No neck rigidity present.   Cardiovascular: Normal rate, regular rhythm, normal heart sounds and normal pulses.   Pulmonary/Chest: Effort normal and breath sounds normal. No accessory muscle usage or stridor. No respiratory distress. She has no  decreased breath sounds. She has no wheezes. She has no rhonchi. She has no rales.   Abdominal: Normal appearance.   Musculoskeletal: Normal range of motion.         General: No deformity or edema. Normal range of motion.   Lymphadenopathy:     She has no cervical adenopathy.   Neurological: She is alert and oriented to person, place, and time. She exhibits normal muscle tone. Coordination normal.   Skin: Skin is warm, dry, intact, not diaphoretic, not pale and no rash. Capillary refill takes less than 2 seconds.   Psychiatric: Her speech is normal and behavior is normal. Judgment and thought content normal.   Nursing note and vitals reviewed.    X-Ray Chest PA And Lateral  Result Date: 11/20/2024  EXAMINATION: XR CHEST PA AND LATERAL CLINICAL HISTORY: Cough, unspecified TECHNIQUE: PA and lateral views of the chest were performed. COMPARISON: Chest x-ray of August 21, 2024 FINDINGS: A loop recorder is noted in the left chest wall.  The cardiac size and contours within normal limits.  No intrapulmonary mass or infiltrate is seen.  There is no pneumothorax or pleural effusion.  Osteoarthritic changes are noted at the right shoulder.     Loop recorder in the left chest.  Osteoarthritis at the right shoulder.  Otherwise negative chest x-ray. Electronically signed by: Gonzalo Bo MD Date:    11/20/2024 Time:    11:31    Assessment:     1. Cough, unspecified type    2. Postnasal drip    3. History of pulmonary emphysema        Plan:   Discussed the limitations in the urgent care setting with patient. Discussed viral versus bacterial versus allergy with patient. Patient reports symptoms for > 10 days, will go ahead with antibiotics RX at this time. Advised close follow-up with PCP and/or Specialist for further evaluation as needed. ER precautions given to patient as well. Patient aware, verbalized understanding and agreed with plan of care.    Cough, unspecified type  -     X-Ray Chest PA And Lateral; Future; Expected  date: 11/20/2024  -     levalbuterol (XOPENEX HFA) 45 mcg/actuation inhaler; Inhale 1-2 puffs into the lungs every 8 (eight) hours as needed for Wheezing or Shortness of Breath (OR COUGH). Rescue  Dispense: 15 g; Refill: 0  -     benzonatate (TESSALON) 200 MG capsule; Take 1 capsule (200 mg total) by mouth 3 (three) times daily as needed for Cough.  Dispense: 60 capsule; Refill: 0    Postnasal drip  -     azelastine (ASTELIN) 137 mcg (0.1 %) nasal spray; 1 spray (137 mcg total) by Nasal route 2 (two) times daily as needed for Rhinitis.  Dispense: 30 mL; Refill: 3  -     fluticasone propionate (FLONASE ALLERGY RELIEF) 50 mcg/actuation nasal spray; 1 spray (50 mcg total) by Each Nostril route 2 (two) times daily.  Dispense: 16 mL; Refill: 3    History of pulmonary emphysema  -     X-Ray Chest PA And Lateral; Future; Expected date: 11/20/2024    Other orders  -     doxycycline (VIBRA-TABS) 100 MG tablet; Take 1 tablet (100 mg total) by mouth 2 (two) times daily. DO NOT TAKE ON EMPTY STOMACH. PLEASE TAKE WITH FOOD. for 10 days  Dispense: 20 tablet; Refill: 0

## 2024-11-25 ENCOUNTER — OFFICE VISIT (OUTPATIENT)
Dept: PRIMARY CARE CLINIC | Facility: CLINIC | Age: 84
End: 2024-11-25
Payer: MEDICARE

## 2024-11-25 VITALS
DIASTOLIC BLOOD PRESSURE: 78 MMHG | WEIGHT: 131.63 LBS | BODY MASS INDEX: 23.31 KG/M2 | OXYGEN SATURATION: 97 % | SYSTOLIC BLOOD PRESSURE: 130 MMHG | HEART RATE: 60 BPM

## 2024-11-25 DIAGNOSIS — Z23 FLU VACCINE NEED: ICD-10-CM

## 2024-11-25 DIAGNOSIS — E78.2 MIXED HYPERLIPIDEMIA: Chronic | ICD-10-CM

## 2024-11-25 DIAGNOSIS — I48.0 PAF (PAROXYSMAL ATRIAL FIBRILLATION): Chronic | ICD-10-CM

## 2024-11-25 DIAGNOSIS — I10 PRIMARY HYPERTENSION: Primary | ICD-10-CM

## 2024-11-25 PROCEDURE — 1126F AMNT PAIN NOTED NONE PRSNT: CPT | Mod: CPTII,S$GLB,, | Performed by: FAMILY MEDICINE

## 2024-11-25 PROCEDURE — G0008 ADMIN INFLUENZA VIRUS VAC: HCPCS | Mod: S$GLB,,, | Performed by: FAMILY MEDICINE

## 2024-11-25 PROCEDURE — 1159F MED LIST DOCD IN RCRD: CPT | Mod: CPTII,S$GLB,, | Performed by: FAMILY MEDICINE

## 2024-11-25 PROCEDURE — 99999 PR PBB SHADOW E&M-EST. PATIENT-LVL IV: CPT | Mod: PBBFAC,,, | Performed by: FAMILY MEDICINE

## 2024-11-25 PROCEDURE — 1157F ADVNC CARE PLAN IN RCRD: CPT | Mod: CPTII,S$GLB,, | Performed by: FAMILY MEDICINE

## 2024-11-25 PROCEDURE — 1101F PT FALLS ASSESS-DOCD LE1/YR: CPT | Mod: CPTII,S$GLB,, | Performed by: FAMILY MEDICINE

## 2024-11-25 PROCEDURE — 99214 OFFICE O/P EST MOD 30 MIN: CPT | Mod: S$GLB,,, | Performed by: FAMILY MEDICINE

## 2024-11-25 PROCEDURE — 3288F FALL RISK ASSESSMENT DOCD: CPT | Mod: CPTII,S$GLB,, | Performed by: FAMILY MEDICINE

## 2024-11-25 PROCEDURE — 1160F RVW MEDS BY RX/DR IN RCRD: CPT | Mod: CPTII,S$GLB,, | Performed by: FAMILY MEDICINE

## 2024-11-25 PROCEDURE — 90653 IIV ADJUVANT VACCINE IM: CPT | Mod: S$GLB,,, | Performed by: FAMILY MEDICINE

## 2024-11-25 NOTE — PROGRESS NOTES
Primary Care Provider Appointment   [facility], [place]       Patient ID: Serenity Epps is a 84 y.o. female.    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  - Discussed potential for cough to linger for several weeks despite overall improvement.  - Advised on signs that would warrant immediate follow-up (e.g., sudden fever, worsening symptoms).  - Take steam or hot bath 1 or 2 times daily to help loosen phlegm.  - Continued doxycycline.  - Take with food.  - Remain upright for 30 minutes to 1 hour after taking.  - Avoid sun exposure while on medication.  - Recommend OTC guaifenesin (Mucinex) to help loosen phlegm.  - Advised to limit use of prescribed cough medicine (Tessalon Pearls) to only when having uncontrollable coughing spells.    OSTEOARTHRITIS OF LEFT KNEE:  She may resume physical therapy when she feels well, she is not contagious at this point    HYPERLIPIDEMIA:  - Continued atorvastatin 40 mg.    FOLLOW-UP AND GENERAL INSTRUCTIONS:  - Blood work ordered for next visit in 3 months.  - Referred to general surgery.  - Follow up in 3 months.  - Contact office if symptoms worsen, particularly if developing fever or breathing difficulties.         ORDERS, CODING, ADDITIONAL DOCUMENTATION RELATED TO THIS ENCOUNTER:  1. Primary hypertension  -     Comprehensive Metabolic Panel; Future; Expected date: 11/25/2024    2. Mixed hyperlipidemia  -      Lipid Panel; Future; Expected date: 11/25/2024  -     CBC Auto Differential; Future; Expected date: 11/25/2024    3. PAF (paroxysmal atrial fibrillation)  Overview:  Seen on loop recorder  Stable, rate controlled rhythm appears regular      Follow Up:  Three months    Subjective:       HPI    Patient is a/an 84 y.o.  female       For complete problem list, past medical history, surgical history, social history, etc., see appropriate section in the electronic medical record    History of Present Illness    CHIEF COMPLAINT:  Serenity presents today for a three-month follow-up.    RECENT RESPIRATORY INFECTION:  She recently visited urgent care for respiratory symptoms, primarily cough and nasal congestion, which started two weeks ago. A chest XR showed no acute process in the lungs. She was prescribed Asteline, Flonase, doxycycline, and Tessalon Pearls. Currently, she reports feeling 100% better but still has a productive cough, swallowing the sputum. She denies breathing difficulties, wheezing, or fever. She tolerated the doxycycline well, without stomach upset or diarrhea.  She states she never received the prescription for Xopenex.  But denies any wheezing or shortness of breath    CARDIOVASCULAR:  Her hypertension is stable with satisfactory control. Her atrial fibrillation is stable, currently in normal sinus rhythm. She has a loop recorder in place and is being monitored by cardiology. She also has a Watchman device. Her hyperlipidemia is well-controlled on atorvastatin 40 mg.    ORTHOPEDIC:  She reports doing very well with her right knee arthroplasty recovery at her three-month follow-up, denying any specific complaints or concerns.    FAMILY HISTORY:  She reports a son with Charcot-Ienz-Tooth disease who committed suicide. She denies knowledge of the disease's origin in the family.    ENT:  She reports occasional discomfort due to earwax accumulation in the right ear.      ROS:  General: -fever  ENT: +nasal  congestion  Respiratory: +cough, -wheezing  Gastrointestinal: -diarrhea  Musculoskeletal: -joint pain       Review of Systems   HENT:  Positive for congestion.    Respiratory:  Positive for cough. Negative for shortness of breath and wheezing.    Cardiovascular: Negative.    Gastrointestinal: Negative.    Genitourinary: Negative.    Musculoskeletal:  Positive for arthralgias and gait problem.       Objective     Physical Exam  Vitals reviewed.   Constitutional:       General: She is not in acute distress.     Appearance: She is well-developed. She is not diaphoretic.   HENT:      Head: Normocephalic and atraumatic.      Ears:      Comments: Cerumen impaction right ear, removed TM normal afterwards   Left canal clear normal TM     Nose: Congestion present.   Eyes:      General: No scleral icterus.     Conjunctiva/sclera: Conjunctivae normal.   Cardiovascular:      Rate and Rhythm: Normal rate and regular rhythm.      Comments: No peripheral edema  Pulmonary:      Effort: Pulmonary effort is normal. No respiratory distress.      Comments: Few scattered rhonchi, mildly diminished breath sounds, no wheezes or distress  Skin:     Coloration: Skin is not jaundiced.   Neurological:      Mental Status: She is alert and oriented to person, place, and time.      Comments:  Antalgic gait.  Using a walker   Psychiatric:         Mood and Affect: Mood normal.         Behavior: Behavior normal.       Vitals:    11/25/24 0721   BP: 130/78   Patient Position: Sitting   Pulse: 60   SpO2: 97%   Weight: 59.7 kg (131 lb 9.8 oz)     Physical Exam                This note was generated with the assistance of ambient listening technology. Verbal consent was obtained by the patient and accompanying visitor(s) for the recording of patient appointment to facilitate this note. I attest to having reviewed and edited the generated note for accuracy, though some syntax or spelling errors may persist. Please contact the author of this note for any  clarification.  Parts of the note were also generated with voice recognition software.  Occasionally this software will misinterpreted words or phrases

## 2024-12-09 ENCOUNTER — CLINICAL SUPPORT (OUTPATIENT)
Dept: CARDIOLOGY | Facility: HOSPITAL | Age: 84
End: 2024-12-09
Payer: MEDICARE

## 2024-12-09 ENCOUNTER — HOSPITAL ENCOUNTER (OUTPATIENT)
Dept: CARDIOLOGY | Facility: HOSPITAL | Age: 84
Discharge: HOME | End: 2024-12-09
Attending: INTERNAL MEDICINE
Payer: MEDICARE

## 2024-12-09 DIAGNOSIS — R00.2 PALPITATIONS: ICD-10-CM

## 2024-12-09 PROCEDURE — 93298 REM INTERROG DEV EVAL SCRMS: CPT | Mod: PO | Performed by: INTERNAL MEDICINE

## 2024-12-09 PROCEDURE — 93298 REM INTERROG DEV EVAL SCRMS: CPT | Mod: 26,,, | Performed by: INTERNAL MEDICINE

## 2024-12-18 LAB
OHS CV AF BURDEN PERCENT: < 1
OHS CV DC REMOTE DEVICE TYPE: NORMAL

## 2024-12-27 DIAGNOSIS — H40.1233 LOW-TENSION GLAUCOMA OF BOTH EYES, SEVERE STAGE: ICD-10-CM

## 2024-12-27 RX ORDER — LATANOPROST 50 UG/ML
1 SOLUTION/ DROPS OPHTHALMIC NIGHTLY
Qty: 7.5 ML | Refills: 3 | Status: SHIPPED | OUTPATIENT
Start: 2024-12-27 | End: 2025-12-27

## 2024-12-30 ENCOUNTER — PROCEDURE VISIT (OUTPATIENT)
Dept: OPHTHALMOLOGY | Facility: CLINIC | Age: 84
End: 2024-12-30
Payer: MEDICARE

## 2024-12-30 DIAGNOSIS — H35.3211 EXUDATIVE AGE-RELATED MACULAR DEGENERATION OF RIGHT EYE WITH ACTIVE CHOROIDAL NEOVASCULARIZATION: Primary | ICD-10-CM

## 2024-12-30 PROCEDURE — 67028 INJECTION EYE DRUG: CPT | Mod: 50,S$GLB,, | Performed by: OPHTHALMOLOGY

## 2024-12-30 PROCEDURE — 92012 INTRM OPH EXAM EST PATIENT: CPT | Mod: 25,S$GLB,, | Performed by: OPHTHALMOLOGY

## 2024-12-30 PROCEDURE — 92134 CPTRZ OPH DX IMG PST SGM RTA: CPT | Mod: S$GLB,,, | Performed by: OPHTHALMOLOGY

## 2024-12-30 RX ADMIN — Medication 1.25 MG: at 10:12

## 2024-12-30 NOTE — PROGRESS NOTES
HPI     avastin injection ou         oct      Additional comments: Avastin  injection    ou  Armd   Pt  would like a letter  from Dr Lopez that says  she is legally blind   for her records   and ins  reasons   If  a letter can be dictated  or sent in mail to patients address for them   to keep     Oct     Pciol ou   RPE   Fuzzy va ou       no flashes   Dls 09/30/24          Last edited by Linn Mackey on 12/30/2024  9:54 AM.         OCT - activity around fibrosis OU - Improved  Atrophy OU        A/P    1. Wet AMD OS  S/p Avastin OS x 18, Eylea OS  x 14    Persistent SRF OS - improving  With RPE tear OS  Central fibrosis with atrophy - poor central potential  10/17 - increased SRH - will keep at 8 weeks for now  9/18 - stable cicatrix - try observation  12/18 - no activity  6/21 - some heme over cicatrix OS  1/23 - given extensive atrophy, try extension      2. New disease OD  Sx started 9/21  S/p Avastin OD x 8, Eylea OD x 8  3/23 - sig atrophy without activity  9/23 heme OS, but ASx  5/24 increase in activity around fibrosis - pt does notice some changes    Avastin OU today      3. PCIOL OU  CTR OS - but saw 20/30 with correction, in spite of sub RPE fibrosis      4. POAG OU  Good IOP control on Cosopt, brimonidine, latanoprost OU  Small drance heme OD    5. Floaters OU        12 weeks and Dilate (LDFE 9/24)    Injection Procedure Note:  Diagnosis: Wet AMD OU    Patient Identified and Time Out complete  Pt marked  Topical Proparacaine and Betadine.  Inject Avastin OU at 6:00 @ 3.5-4mm posterior to limbus  Post Operative Dx: Same  Complications: None  Follow up as above.

## 2025-01-10 ENCOUNTER — HOSPITAL ENCOUNTER (OUTPATIENT)
Dept: CARDIOLOGY | Facility: HOSPITAL | Age: 85
Discharge: HOME | End: 2025-01-10
Attending: INTERNAL MEDICINE
Payer: MEDICARE

## 2025-01-10 ENCOUNTER — CLINICAL SUPPORT (OUTPATIENT)
Dept: CARDIOLOGY | Facility: HOSPITAL | Age: 85
End: 2025-01-10
Payer: MEDICARE

## 2025-01-10 DIAGNOSIS — R00.2 PALPITATIONS: ICD-10-CM

## 2025-01-10 PROCEDURE — 93298 REM INTERROG DEV EVAL SCRMS: CPT | Mod: PO | Performed by: INTERNAL MEDICINE

## 2025-01-10 PROCEDURE — 93298 REM INTERROG DEV EVAL SCRMS: CPT | Mod: 26,,, | Performed by: INTERNAL MEDICINE

## 2025-01-13 LAB
OHS CV AF BURDEN PERCENT: < 1
OHS CV DC REMOTE DEVICE TYPE: NORMAL

## 2025-01-29 PROBLEM — L03.116 CELLULITIS OF LEFT LOWER EXTREMITY: Status: ACTIVE | Noted: 2025-01-29

## 2025-01-30 DIAGNOSIS — G57.82 NEUROPATHY OF LEFT SURAL NERVE: ICD-10-CM

## 2025-01-30 DIAGNOSIS — Z00.00 ENCOUNTER FOR MEDICARE ANNUAL WELLNESS EXAM: ICD-10-CM

## 2025-01-30 RX ORDER — GABAPENTIN 400 MG/1
400 CAPSULE ORAL 3 TIMES DAILY
Qty: 270 CAPSULE | Refills: 1 | Status: SHIPPED | OUTPATIENT
Start: 2025-01-30

## 2025-01-31 PROBLEM — N30.01 ACUTE CYSTITIS WITH HEMATURIA: Status: ACTIVE | Noted: 2025-01-31

## 2025-01-31 PROBLEM — E87.1 HYPONATREMIA: Status: ACTIVE | Noted: 2025-01-31

## 2025-02-02 PROCEDURE — G0180 MD CERTIFICATION HHA PATIENT: HCPCS | Mod: ,,, | Performed by: FAMILY MEDICINE

## 2025-02-03 RX ORDER — CELECOXIB 200 MG/1
200 CAPSULE ORAL DAILY
Qty: 7 CAPSULE | Refills: 0 | Status: SHIPPED | OUTPATIENT
Start: 2025-02-03

## 2025-02-03 NOTE — TELEPHONE ENCOUNTER
----- Message from Koko sent at 2/3/2025  8:06 AM CST -----  Contact: [phone]  Patient needs a Hosp follow up appt with their PCP only.       When is the next available appointment:  March    Symptoms:  infection in ankle    Discharge date: 2/1/25    Needs to be seen by:7 days    Would you prefer an answer via MyChart?: call     Comments:

## 2025-02-03 NOTE — TELEPHONE ENCOUNTER
Spoke with pt, scheduled hospital f/u with RG Weldon.    Pt has pain in her left foot, pt is now on 2 PO abx. Pt also has UTI.  Pain is taking advil for pain, it works a little. Pain with advil is about 7. Pt says celebrex works and pt is out of celebrex. Pended med.   Last time refilled was 7/2/2024    Pharmacy, [facility] [address].    Pt has diarrhea, she is taking a probiotic and eating yogurt.   Pt is having diarrhea 3-4 times in a day. Pt was on IVAB in hospital.   Suggested drinking smart water to replace electrolytes lost from diarrhea.   Also, discussed making sure she is still urinating. If she cannot urinate, she could be dehydrated and should call office. Pt verbalized understanding.

## 2025-02-03 NOTE — TELEPHONE ENCOUNTER
I sent in a limited supply of Celebrex to help with her pain.  This is not something we like to use long-term in elderly patients.  I did review her last creatinine which was stable, estimated GFR above 60.  But she should absolutely not mixed this with any OTC NSAIDs.  She can supplement with Tylenol.  Maximum dose of the Celebrex that she can take his 200 once a day.  Not twice a day.

## 2025-02-04 ENCOUNTER — TELEPHONE (OUTPATIENT)
Dept: PRIMARY CARE CLINIC | Facility: CLINIC | Age: 85
End: 2025-02-04
Payer: MEDICARE

## 2025-02-04 RX ORDER — VALSARTAN 80 MG/1
80 TABLET ORAL NIGHTLY
Qty: 90 TABLET | Refills: 3 | Status: SHIPPED | OUTPATIENT
Start: 2025-02-04 | End: 2026-02-04

## 2025-02-04 NOTE — TELEPHONE ENCOUNTER
----- Message from Marie sent at 2/4/2025  2:03 PM CST -----  Regarding: requesting orders ; referral for podiatry  [phone] or [phone]  - call back ; requesting to have referral for podiatry that accepts the [facility] insurance plan . They tried scheduling w/non [facility] Dr.Robert Lagman Annabelle

## 2025-02-05 ENCOUNTER — OFFICE VISIT (OUTPATIENT)
Dept: PRIMARY CARE CLINIC | Facility: CLINIC | Age: 85
End: 2025-02-05
Payer: MEDICARE

## 2025-02-05 VITALS
TEMPERATURE: 98 F | HEART RATE: 99 BPM | OXYGEN SATURATION: 95 % | HEIGHT: 66 IN | WEIGHT: 133.38 LBS | SYSTOLIC BLOOD PRESSURE: 122 MMHG | DIASTOLIC BLOOD PRESSURE: 72 MMHG | BODY MASS INDEX: 21.44 KG/M2

## 2025-02-05 DIAGNOSIS — L03.116 CELLULITIS OF LEFT LOWER EXTREMITY: Primary | ICD-10-CM

## 2025-02-05 PROCEDURE — 1159F MED LIST DOCD IN RCRD: CPT | Mod: CPTII,S$GLB,, | Performed by: PHYSICIAN ASSISTANT

## 2025-02-05 PROCEDURE — 99999 PR PBB SHADOW E&M-EST. PATIENT-LVL V: CPT | Mod: PBBFAC,,, | Performed by: PHYSICIAN ASSISTANT

## 2025-02-05 PROCEDURE — 99215 OFFICE O/P EST HI 40 MIN: CPT | Mod: S$GLB,,, | Performed by: PHYSICIAN ASSISTANT

## 2025-02-05 PROCEDURE — 1101F PT FALLS ASSESS-DOCD LE1/YR: CPT | Mod: CPTII,S$GLB,, | Performed by: PHYSICIAN ASSISTANT

## 2025-02-05 PROCEDURE — 3288F FALL RISK ASSESSMENT DOCD: CPT | Mod: CPTII,S$GLB,, | Performed by: PHYSICIAN ASSISTANT

## 2025-02-05 PROCEDURE — 1125F AMNT PAIN NOTED PAIN PRSNT: CPT | Mod: CPTII,S$GLB,, | Performed by: PHYSICIAN ASSISTANT

## 2025-02-05 PROCEDURE — 1157F ADVNC CARE PLAN IN RCRD: CPT | Mod: CPTII,S$GLB,, | Performed by: PHYSICIAN ASSISTANT

## 2025-02-05 NOTE — PROGRESS NOTES
"Subjective:      Patient ID: Serenity Epps is a 84 y.o. female.    Vitals:  height is 5' 6" (1.676 m) and weight is 60.5 kg (133 lb 6.1 oz). Her temperature is 98.2 °F (36.8 °C). Her blood pressure is 122/72 and her pulse is 99. Her oxygen saturation is 95%.     Chief Complaint: Follow-up (Patient presents for her HFU from 1/29/25 for left foot cellulitis. Patient is now having diarrhea on the antibiotics, so I recommended the BRAT diet to manage symptoms.)    84 year old female presents for hospital follow up. Patient presented to the ED on 1/29 for worsening left lower extremity edema, erythema and new development of bleeding.  Her  stated he believed that the area had been like this for a few weeks but noted the bleeding to be new.  The patient denied any fevers, chills and states the ankle was uncomfortable but not painful.  The patient was admitted on IV antibiotics. MRI returned negative for evidence of osteomyelitis or drainable abscess. With IV antibiotics and rest, swelling decreased dramatically and redness started to recede. Blood cultures did not have any growth. Urine culture did result with  Enterococcus faecalis UTI. She was discharged with doxycycline and amoxicillin. She has completed the amox. Instructions were to follow up with Podiatry outpatient to ensure healing of her left foot wounds. Unfortunately the physician she was sent to is not in the [facility] system. She has seen Dr. Dawson in the past. Apparently for a cellulitis in the same area a couple of years ago. Will try to help expedite appointment.        Constitution: Negative for chills and fever.   Respiratory:  Negative for shortness of breath.    Gastrointestinal:  Negative for nausea and vomiting.   Genitourinary:  Negative for dysuria and frequency.   Skin:  Positive for color change and erythema.      Objective:     Physical Exam   Constitutional: She does not appear ill. No distress.   Cardiovascular: Normal rate and regular " rhythm.   Pulmonary/Chest: Effort normal. No respiratory distress.   Abdominal: Normal appearance.   Neurological: She is alert.   Skin: erythema         Comments: There is edema and erythema right lower extremity.  This begins just above the ankle and goes down mid way to foot.  There is 1 small scab to the medial side of foot that may have been the nidus for infection.  Other than that there are no open wounds at this time.  No discharge noted on bandaging.   Psychiatric: Her behavior is normal. Mood, judgment and thought content normal.   Nursing note and vitals reviewed.      Assessment:     1. Cellulitis of left lower extremity        Plan:       Cellulitis of left lower extremity  -     Ambulatory referral/consult to Podiatry; Future; Expected date: 02/12/2025    There are no pictures for comparison to see if erythema has improved.  It just still present, however patient states that swelling has gone down and I do not see any open wounds or appreciate any drainage.  Patient has been on a total of 4 days of IV antibiotics while in the ER/hospital as well as an additional couple of days of amoxicillin and she is still taking doxycycline which will give her a total of 10 days of antibiotics.  Discussed with patient that I will continue to follow her closely while she is awaiting podiatry appointment.  I have asked her to monitor her symptoms and contact me if her has been notices any increasing redness or she develops any fever.  Otherwise finish out antibiotics as prescribed.  Discussed risks versus benefits of extended antibiotics.  Patient agrees with close follow up with myself and monitoring of the area.      My total time spent on this encounter was 43 minutes which included  the following activities: preparing to see the patient, performing a medically appropriate and/or evaluation, counseling and educating the patient and family/caregiver, ordering medications, tests, or procedures, referring and  communicating with other healthcare providers, documenting clinical information in the electronic or other health record, and independently interpreting results. This time is independent and non-overlapping.

## 2025-02-10 ENCOUNTER — OFFICE VISIT (OUTPATIENT)
Dept: PODIATRY | Facility: CLINIC | Age: 85
End: 2025-02-10
Payer: MEDICARE

## 2025-02-10 VITALS — BODY MASS INDEX: 21.44 KG/M2 | WEIGHT: 133.38 LBS | HEIGHT: 66 IN

## 2025-02-10 DIAGNOSIS — L03.116 CELLULITIS AND ABSCESS OF LEFT LEG: Primary | ICD-10-CM

## 2025-02-10 DIAGNOSIS — L02.416 CELLULITIS AND ABSCESS OF LEFT LEG: Primary | ICD-10-CM

## 2025-02-10 DIAGNOSIS — L03.116 CELLULITIS OF LEFT LOWER EXTREMITY: ICD-10-CM

## 2025-02-10 PROCEDURE — 10060 I&D ABSCESS SIMPLE/SINGLE: CPT | Mod: LT,S$GLB,, | Performed by: PODIATRIST

## 2025-02-10 PROCEDURE — 87186 SC STD MICRODIL/AGAR DIL: CPT | Performed by: PODIATRIST

## 2025-02-10 PROCEDURE — 87070 CULTURE OTHR SPECIMN AEROBIC: CPT | Performed by: PODIATRIST

## 2025-02-10 PROCEDURE — 1101F PT FALLS ASSESS-DOCD LE1/YR: CPT | Mod: CPTII,S$GLB,, | Performed by: PODIATRIST

## 2025-02-10 PROCEDURE — 1159F MED LIST DOCD IN RCRD: CPT | Mod: CPTII,S$GLB,, | Performed by: PODIATRIST

## 2025-02-10 PROCEDURE — 1160F RVW MEDS BY RX/DR IN RCRD: CPT | Mod: CPTII,S$GLB,, | Performed by: PODIATRIST

## 2025-02-10 PROCEDURE — 1125F AMNT PAIN NOTED PAIN PRSNT: CPT | Mod: CPTII,S$GLB,, | Performed by: PODIATRIST

## 2025-02-10 PROCEDURE — 99999 PR PBB SHADOW E&M-EST. PATIENT-LVL IV: CPT | Mod: PBBFAC,,, | Performed by: PODIATRIST

## 2025-02-10 PROCEDURE — 87075 CULTR BACTERIA EXCEPT BLOOD: CPT | Performed by: PODIATRIST

## 2025-02-10 PROCEDURE — 1157F ADVNC CARE PLAN IN RCRD: CPT | Mod: CPTII,S$GLB,, | Performed by: PODIATRIST

## 2025-02-10 PROCEDURE — 3288F FALL RISK ASSESSMENT DOCD: CPT | Mod: CPTII,S$GLB,, | Performed by: PODIATRIST

## 2025-02-10 PROCEDURE — 87077 CULTURE AEROBIC IDENTIFY: CPT | Performed by: PODIATRIST

## 2025-02-10 PROCEDURE — 99214 OFFICE O/P EST MOD 30 MIN: CPT | Mod: 25,S$GLB,, | Performed by: PODIATRIST

## 2025-02-10 RX ORDER — TRAMADOL HYDROCHLORIDE 50 MG/1
50 TABLET ORAL EVERY 8 HOURS PRN
Qty: 20 TABLET | Refills: 0 | Status: SHIPPED | OUTPATIENT
Start: 2025-02-10

## 2025-02-10 NOTE — PROGRESS NOTES
Subjective:      Patient ID: Serenity Epps is a 84 y.o. female.    Chief Complaint: Recurrent Skin Infections    Serenity is a 84 y.o. female who presents to the clinic for evaluation and treatment of high risk feet. 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 The patient's chief complaint is cellulitis to the lower extremity she was in the hospital end of January with X-rays and MRI, no osteomyelitis was seen on imaging. She was discharged on PO antibiotics and presents for follow up. Pain has persisted and the area is still erythematous,       PCP: Shant Jc MD    2/5/25    Current shoe gear:  Affected Foot: Casual shoes     Unaffected Foot: Casual shoes    History of Trauma: negative      Hemoglobin A1C   Date Value Ref Range Status   01/31/2025 5.0 4.0 - 5.6 % Final     Comment:     Reference Interval:  5.0 - 5.6 Normal   5.7 - 6.4 High Risk   > 6.5 Diabetic      Hgb A1c results are standardized based on the (NGSP) National   Glycohemoglobin Standardization Program.      Hemoglobin A1C levels are related to mean serum/plasma glucose   during the preceding 2-3 months.        12/24/2022 5.3 0.0 - 5.6 % Final     Comment:     Reference Interval:  5.0 - 5.6 Normal   5.7 - 6.4 High Risk   > 6.5 Diabetic      Hgb A1c results are standardized based on the (NGSP) National    Glycohemoglobin Standardization Program.      Hemoglobin A1C levels are related to mean serum/plasma glucose   during the preceding 2-3 months.              Review of Systems   Constitutional: Negative for chills and fever.   Cardiovascular: Positive for leg swelling. Negative for claudication.   Respiratory: Negative for shortness of breath.    Skin: Positive for color change, nail changes and poor wound healing. Negative for itching and rash.   Musculoskeletal: Negative for muscle cramps, muscle weakness and myalgias.   Gastrointestinal: Negative for nausea and vomiting.   Neurological: Negative for focal weakness, loss of balance, numbness and paresthesias.           Objective:      Physical Exam  Constitutional:       General: She is not in acute distress.     Appearance: She is well-developed. She is not diaphoretic.   Cardiovascular:      Pulses:           Dorsalis pedis pulses are 1+ on the right side and 1+ on the left side.        Posterior tibial pulses are 1+ on the right side and 1+ on the left side.      Comments: < 3 sec capillary refill time to toes 1-5 bilateral. Feet are warm to touch proximally with distal cooling b/l. There is no hair growth on the feet and toes b/l. There is 1+ edema b/l. No spider veins or varicosities present b/l.     Musculoskeletal:      Comments: Equinus noted b/l ankles with < 10 deg DF noted. MMT 5/5 in DF/PF/Inv/Ev resistance with no reproduction of pain in any direction. Passive range of motion of ankle and pedal joints is painless b/l.     Skin:     General: Skin is warm and dry.      Coloration: Skin is not pale.      Findings: Erythema and lesion present. No abrasion, bruising, burn, ecchymosis, laceration, petechiae or rash.      Nails: There is no clubbing.      Comments: Skin is thin and atrophic    Left plantar first metatarsal, midfoot and heel all on the left foot there is hyperkeratotic lesions x2    Nails 1-5 bilateral are thick 3-4 mm, long 3-6 mm, and  discolored with subungual debris    Left leg medial aspect proximal to the medial malleolus there is erythema and warmth to touch with noted fluctuance to the area.      Neurological:      Mental Status: She is alert and oriented to person, place, and time.      Sensory: No sensory deficit.      Motor: No tremor, atrophy or abnormal muscle tone.      Comments: Negative tinel sign bilateral.   Psychiatric:         Behavior: Behavior normal.               Assessment:       Encounter Diagnoses   Name Primary?    Cellulitis of left lower extremity     Cellulitis and abscess of left leg Yes         Plan:       Serenity was seen today for recurrent skin infections.    Diagnoses and all orders for this visit:    Cellulitis and abscess of left leg  -     Aerobic culture  -     Culture, Anaerobic    Cellulitis of left lower extremity  -     Ambulatory referral/consult to Podiatry  -     Aerobic culture  -     Culture, Anaerobic      I counseled the patient on her conditions, their implications and medical management.    Left leg suspected abscess, discussed with patient that she has been on antibiotics and it has not improved, I would recommend under local making an incision and draining any fluid and checking for an abscess to the area, she is amendable to this. Upon reviewing the risks/benefits,  the planned procedure, the surgical goal, and the postoperative course for the Lt. Leg incision and drainage, the written consent was signed, timed, and dated by the patient with a witness present.      Bedside incision and drainage: Left leg    Surgeon: Shola Dawson DPM  Assistant: None  Preop Diagnosis: Abscess left leg   Post op diagnosis: Same  Pathology: None  Cultures: Aerobic and Anaerobic  Procedure: After written consent reviewed and signed and placed into chart, there was an area of increased tenderness and erythema with fluctuance concerning for underlying abscess left medial lower leg. The area was anesthetized with 6  cc's 1% lidocaine plain in a local block. A small incision was made with the #15 blade over the area about 1 cm in length and about 10 cc's of purulence was expelled from the wound. Exploration for infection and tissue viability was performed bluntly with a hemostat and deep cultures were taken. Vascularity to the tissue appeared reasonably healthy. The incision was flushed with sterile saline, packed with aquacel Ag packing strips and covered with gauze, wrapped in unna boot compression wrap from base of toes to below knee.    Anesthesia: 6 cc's lidocaine plain 1%.   Hemostasis: direct pressure  EBL: Minimal <2 cc.   Condition: patient tolerated well and was stable after procedure.      Return 1 week wound check up, I will follow cultures and call in an antibiotic according the the culture results    Shola Dawson DPM

## 2025-02-11 ENCOUNTER — HOSPITAL ENCOUNTER (OUTPATIENT)
Dept: CARDIOLOGY | Facility: HOSPITAL | Age: 85
Discharge: HOME | End: 2025-02-11
Attending: INTERNAL MEDICINE
Payer: MEDICARE

## 2025-02-11 ENCOUNTER — CLINICAL SUPPORT (OUTPATIENT)
Dept: CARDIOLOGY | Facility: HOSPITAL | Age: 85
End: 2025-02-11
Payer: MEDICARE

## 2025-02-11 DIAGNOSIS — R00.2 PALPITATIONS: ICD-10-CM

## 2025-02-11 PROCEDURE — 93298 REM INTERROG DEV EVAL SCRMS: CPT | Mod: PO | Performed by: INTERNAL MEDICINE

## 2025-02-11 PROCEDURE — 93298 REM INTERROG DEV EVAL SCRMS: CPT | Mod: 26,,, | Performed by: INTERNAL MEDICINE

## 2025-02-12 ENCOUNTER — TELEPHONE (OUTPATIENT)
Dept: PODIATRY | Facility: CLINIC | Age: 85
End: 2025-02-12
Payer: MEDICARE

## 2025-02-12 NOTE — TELEPHONE ENCOUNTER
Spoke with pt and she v/u that wound cultures are still in process and she will be contacted once clinic receives results

## 2025-02-12 NOTE — TELEPHONE ENCOUNTER
----- Message from Renan sent at 2/12/2025  8:21 AM CST -----  Type: Needs Medical Advice    Who Called:  Ana Maria from [facility] [employer]    Best Call Back Number: [phone]     Additional Information:  Ana Maria from [facility] [employer] calling about results to culture. Please call back to advise, Thanks!

## 2025-02-13 ENCOUNTER — OFFICE VISIT (OUTPATIENT)
Dept: PRIMARY CARE CLINIC | Facility: CLINIC | Age: 85
End: 2025-02-13
Payer: MEDICARE

## 2025-02-13 ENCOUNTER — TELEPHONE (OUTPATIENT)
Dept: PODIATRY | Facility: CLINIC | Age: 85
End: 2025-02-13
Payer: MEDICARE

## 2025-02-13 VITALS
HEART RATE: 97 BPM | TEMPERATURE: 98 F | BODY MASS INDEX: 21.25 KG/M2 | OXYGEN SATURATION: 96 % | WEIGHT: 132.25 LBS | DIASTOLIC BLOOD PRESSURE: 70 MMHG | HEIGHT: 66 IN | SYSTOLIC BLOOD PRESSURE: 120 MMHG

## 2025-02-13 DIAGNOSIS — L03.116 CELLULITIS OF LEFT LOWER EXTREMITY: Primary | ICD-10-CM

## 2025-02-13 LAB
BACTERIA SPEC AEROBE CULT: ABNORMAL
BACTERIA SPEC ANAEROBE CULT: NORMAL

## 2025-02-13 PROCEDURE — 1101F PT FALLS ASSESS-DOCD LE1/YR: CPT | Mod: CPTII,S$GLB,, | Performed by: PHYSICIAN ASSISTANT

## 2025-02-13 PROCEDURE — 99999 PR PBB SHADOW E&M-EST. PATIENT-LVL IV: CPT | Mod: PBBFAC,,, | Performed by: PHYSICIAN ASSISTANT

## 2025-02-13 PROCEDURE — 1125F AMNT PAIN NOTED PAIN PRSNT: CPT | Mod: CPTII,S$GLB,, | Performed by: PHYSICIAN ASSISTANT

## 2025-02-13 PROCEDURE — 99214 OFFICE O/P EST MOD 30 MIN: CPT | Mod: S$GLB,,, | Performed by: PHYSICIAN ASSISTANT

## 2025-02-13 PROCEDURE — 1159F MED LIST DOCD IN RCRD: CPT | Mod: CPTII,S$GLB,, | Performed by: PHYSICIAN ASSISTANT

## 2025-02-13 PROCEDURE — 1157F ADVNC CARE PLAN IN RCRD: CPT | Mod: CPTII,S$GLB,, | Performed by: PHYSICIAN ASSISTANT

## 2025-02-13 PROCEDURE — 3288F FALL RISK ASSESSMENT DOCD: CPT | Mod: CPTII,S$GLB,, | Performed by: PHYSICIAN ASSISTANT

## 2025-02-13 NOTE — PROGRESS NOTES
"Subjective:      Patient ID: Serenity Epps is a 84 y.o. female.    Vitals:  height is 5' 6" (1.676 m) and weight is 60 kg (132 lb 4.4 oz). Her temperature is 98.2 °F (36.8 °C). Her blood pressure is 120/70 and her pulse is 97. Her oxygen saturation is 96%.     Chief Complaint: Follow-up (Patient presents for her 1 week follow up for left leg cellulitis. )    Patient was seen by myself one week ago for cellulitis of left lower extremity. She still had erythema, but per patient swelling had improved. Unfortunately we had no photos to compare. She was still taking antibiotics. She was afebrile. Initial attempt to get her in with podiatry was unsuccessful, so this appointment was made to ensure follow up. Fortunately her Podiatrists office contacted patient and expedited her appointment. She was seen Monday by Dr. Dawson. He noted fluctuance to the area and performed successful I&D with drainage of about 10 cc's of purulent drainage. Cultures are still pending. Patient states the foot feels better. She denies any fever, chills, nausea or vomiting.        Constitution: Negative for chills and fever.   Gastrointestinal:  Negative for nausea and vomiting.      Objective:     Physical Exam   Constitutional: She does not appear ill. No distress.   HENT:   Head: Normocephalic and atraumatic.   Ears:   Right Ear: External ear normal.   Left Ear: External ear normal.   Cardiovascular: Normal rate and regular rhythm.   Pulmonary/Chest: Effort normal. No respiratory distress.   Abdominal: Normal appearance.   Neurological: She is alert.   Psychiatric: Her behavior is normal. Mood, judgment and thought content normal.   Nursing note and vitals reviewed.      Assessment:     1. Cellulitis of left lower extremity        Plan:       Cellulitis of left lower extremity    Has close weekly follow up with Dr. Dawson. Cultures appear to have grown S. Aureus. Sensitivities still pending. Keep all follow ups with Podiatry and we will " continue to follow. Patient has her regular follow up scheduled for 4/3 in our office.    My total time spent on this encounter was 30 minutes which included the following activities: preparing to see the patient, performing a medically appropriate and/or evaluation, counseling and educating the patient and family/caregiver, ordering medications, tests, or procedures, referring and communicating with other healthcare providers, documenting clinical information in the electronic or other health record, and independently interpreting results. This time is independent and non-overlapping.

## 2025-02-13 NOTE — TELEPHONE ENCOUNTER
----- Message from Kathrin sent at 2/13/2025  4:27 PM CST -----   Type:  Needs Medical Advice    Who Called:  PT    Pharmacy name and phone #:     [facility] #[id number] - [place] [address] & [address] [address]  Phone: [phone] Fax: [phone]    [facility][id number] - [place] [address] & [address] [address]  Phone: [phone] Fax: [phone]    [facility] [address] [facility] [address] [place] [address]  Phone: [phone] Fax: [phone]      Would the patient rather a call back or a response via [facility]?  Call  Best Call Back Number: [phone]        Additional Information:  states got of hospital about 2 weeks ago and never got a rx been waiting rx..  Please call back to advise. Thank you!

## 2025-02-14 ENCOUNTER — TELEPHONE (OUTPATIENT)
Dept: PODIATRY | Facility: CLINIC | Age: 85
End: 2025-02-14
Payer: MEDICARE

## 2025-02-14 RX ORDER — DOXYCYCLINE HYCLATE 100 MG
100 TABLET ORAL 2 TIMES DAILY
Qty: 14 TABLET | Refills: 0 | Status: SHIPPED | OUTPATIENT
Start: 2025-02-14 | End: 2025-02-21

## 2025-02-14 NOTE — TELEPHONE ENCOUNTER
----- Message from Vita sent at 2/14/2025  9:56 AM CST -----  Contact: ernesto   Type:  Needs Medical Advice    Who Called: Ernesto CM  Symptoms (please be specific): pt had a wound culture done and needs antibiotics sent to phar below.    Pharmacy name and phone #:    [facility] #[id number] - [place] [address] AT [facility] [address] [address]  Phone: [phone] Fax: [phone]    Would the patient rather a call back or a response via [facility]? Call  Best Call Back Number: [phone] or pts [phone] (home)     Additional Information: please advise and thank you.

## 2025-02-14 NOTE — TELEPHONE ENCOUNTER
----- Message from Vita sent at 2/14/2025  9:56 AM CST -----  Contact: ernesto   Type:  Needs Medical Advice    Who Called: Ernesto CM  Symptoms (please be specific): pt had a wound culture done and needs antibiotics sent to phar below.    Pharmacy name and phone #:    [facility] #[id number] - [place] [address] AT [facility] [address] [address]  Phone: [phone] Fax: [phone]    Would the patient rather a call back or a response via [facility]? Call  Best Call Back Number: [phone] or pts [phone] (home)     Additional Information: please advise and thank you.

## 2025-02-17 ENCOUNTER — LAB VISIT (OUTPATIENT)
Dept: LAB | Facility: HOSPITAL | Age: 85
End: 2025-02-17
Attending: FAMILY MEDICINE
Payer: MEDICARE

## 2025-02-17 ENCOUNTER — OFFICE VISIT (OUTPATIENT)
Dept: PODIATRY | Facility: CLINIC | Age: 85
End: 2025-02-17
Payer: MEDICARE

## 2025-02-17 VITALS — BODY MASS INDEX: 21.25 KG/M2 | WEIGHT: 132.25 LBS | HEIGHT: 66 IN

## 2025-02-17 DIAGNOSIS — E78.2 MIXED HYPERLIPIDEMIA: Chronic | ICD-10-CM

## 2025-02-17 DIAGNOSIS — L03.116 CELLULITIS OF LEFT LOWER EXTREMITY: Primary | ICD-10-CM

## 2025-02-17 DIAGNOSIS — G57.82 NEUROPATHY OF LEFT SURAL NERVE: ICD-10-CM

## 2025-02-17 DIAGNOSIS — L97.922 CHRONIC ULCER OF LEFT LEG WITH FAT LAYER EXPOSED: ICD-10-CM

## 2025-02-17 DIAGNOSIS — I10 PRIMARY HYPERTENSION: ICD-10-CM

## 2025-02-17 LAB
BASOPHILS # BLD AUTO: 0.07 K/UL (ref 0–0.2)
BASOPHILS NFR BLD: 0.9 % (ref 0–1.9)
DIFFERENTIAL METHOD BLD: ABNORMAL
EOSINOPHIL # BLD AUTO: 0.3 K/UL (ref 0–0.5)
EOSINOPHIL NFR BLD: 4.2 % (ref 0–8)
ERYTHROCYTE [DISTWIDTH] IN BLOOD BY AUTOMATED COUNT: 14.1 % (ref 11.5–14.5)
HCT VFR BLD AUTO: 39.3 % (ref 37–48.5)
HGB BLD-MCNC: 12.1 G/DL (ref 12–16)
IMM GRANULOCYTES # BLD AUTO: 0.02 K/UL (ref 0–0.04)
IMM GRANULOCYTES NFR BLD AUTO: 0.3 % (ref 0–0.5)
LYMPHOCYTES # BLD AUTO: 1.8 K/UL (ref 1–4.8)
LYMPHOCYTES NFR BLD: 22.6 % (ref 18–48)
MCH RBC QN AUTO: 26.8 PG (ref 27–31)
MCHC RBC AUTO-ENTMCNC: 30.8 G/DL (ref 32–36)
MCV RBC AUTO: 87 FL (ref 82–98)
MONOCYTES # BLD AUTO: 0.8 K/UL (ref 0.3–1)
MONOCYTES NFR BLD: 10 % (ref 4–15)
NEUTROPHILS # BLD AUTO: 4.8 K/UL (ref 1.8–7.7)
NEUTROPHILS NFR BLD: 62 % (ref 38–73)
NRBC BLD-RTO: 0 /100 WBC
OHS CV AF BURDEN PERCENT: < 1
OHS CV DC REMOTE DEVICE TYPE: NORMAL
PLATELET # BLD AUTO: 371 K/UL (ref 150–450)
PMV BLD AUTO: 11.1 FL (ref 9.2–12.9)
RBC # BLD AUTO: 4.51 M/UL (ref 4–5.4)
WBC # BLD AUTO: 7.78 K/UL (ref 3.9–12.7)

## 2025-02-17 PROCEDURE — 85025 COMPLETE CBC W/AUTO DIFF WBC: CPT | Performed by: FAMILY MEDICINE

## 2025-02-17 PROCEDURE — 80053 COMPREHEN METABOLIC PANEL: CPT | Performed by: FAMILY MEDICINE

## 2025-02-17 PROCEDURE — 36415 COLL VENOUS BLD VENIPUNCTURE: CPT | Mod: PO | Performed by: FAMILY MEDICINE

## 2025-02-17 PROCEDURE — 80061 LIPID PANEL: CPT | Performed by: FAMILY MEDICINE

## 2025-02-17 RX ORDER — GABAPENTIN 400 MG/1
400 CAPSULE ORAL 3 TIMES DAILY
Qty: 270 CAPSULE | Refills: 1 | Status: SHIPPED | OUTPATIENT
Start: 2025-02-17

## 2025-02-17 RX ORDER — DOXYCYCLINE HYCLATE 100 MG
100 TABLET ORAL 2 TIMES DAILY
Qty: 14 TABLET | Refills: 0 | Status: SHIPPED | OUTPATIENT
Start: 2025-02-17 | End: 2025-02-24

## 2025-02-17 NOTE — PROGRESS NOTES
Subjective:      Patient ID: Serenity Epps is a 84 y.o. female.    Chief Complaint: Wound Care    Serenity is a 84 y.o. female who presents to the clinic for evaluation and treatment of high risk feet. 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 The patient's chief complaint is cellulitis to the lower extremity she was in the hospital end of January with X-rays and MRI, no osteomyelitis was seen on imaging. She was discharged on PO antibiotics and presents for follow up. Pain has persisted and the area is still erythematous    2/17/25: patient returns for wound care left leg in unna boot wraps    PCP: Shant Jc MD    2/5/25    Current shoe gear:  Affected Foot: Casual shoes     Unaffected Foot: Casual shoes    History of Trauma: negative      Hemoglobin A1C   Date Value Ref Range Status   01/31/2025 5.0 4.0 - 5.6 % Final     Comment:     Reference Interval:  5.0 - 5.6 Normal   5.7 - 6.4 High Risk   > 6.5 Diabetic      Hgb A1c results are standardized based on the (NGSP) National   Glycohemoglobin Standardization Program.      Hemoglobin A1C levels are related to mean serum/plasma glucose   during the preceding 2-3 months.        12/24/2022 5.3 0.0 - 5.6 % Final     Comment:     Reference Interval:  5.0 - 5.6 Normal   5.7 - 6.4 High Risk   > 6.5 Diabetic      Hgb A1c results are  standardized based on the (NGSP) National   Glycohemoglobin Standardization Program.      Hemoglobin A1C levels are related to mean serum/plasma glucose   during the preceding 2-3 months.              Review of Systems   Constitutional: Negative for chills and fever.   Cardiovascular: Positive for leg swelling. Negative for claudication.   Respiratory: Negative for shortness of breath.    Skin: Positive for color change, nail changes and poor wound healing. Negative for itching and rash.   Musculoskeletal: Negative for muscle cramps, muscle weakness and myalgias.   Gastrointestinal: Negative for nausea and vomiting.   Neurological: Negative for focal weakness, loss of balance, numbness and paresthesias.           Objective:      Physical Exam  Constitutional:       General: She is not in acute distress.     Appearance: She is well-developed. She is not diaphoretic.   Cardiovascular:      Pulses:           Dorsalis pedis pulses are 1+ on the right side and 1+ on the left side.        Posterior tibial pulses are 1+ on the right side and 1+ on the left side.      Comments: < 3 sec capillary refill time to toes 1-5 bilateral. Feet are warm to touch proximally with distal cooling b/l. There is no hair growth on the feet and toes b/l. There is 1+ edema b/l. No spider veins or varicosities present b/l.     Musculoskeletal:      Comments: Equinus noted b/l ankles with < 10 deg DF noted. MMT 5/5 in DF/PF/Inv/Ev resistance with no reproduction of pain in any direction. Passive range of motion of ankle and pedal joints is painless b/l.     Skin:     General: Skin is warm and dry.      Coloration: Skin is not pale.      Findings: Erythema and lesion present. No abrasion, bruising, burn, ecchymosis, laceration, petechiae or rash.      Nails: There is no clubbing.      Comments: Skin is thin and atrophic    Left plantar first metatarsal, midfoot and heel all on the left foot there is hyperkeratotic lesions x2    Nails 1-5  bilateral are thick 3-4 mm, long 3-6 mm, and discolored with subungual debris    Left leg medial aspect proximal to the medial malleolus there is now an open wound where the abscess was drained.   0.6x0.5x0.4 cm with granular base mild erythema and serous drainage.     Neurological:      Mental Status: She is alert and oriented to person, place, and time.      Sensory: No sensory deficit.      Motor: No tremor, atrophy or abnormal muscle tone.      Comments: Negative tinel sign bilateral.   Psychiatric:         Behavior: Behavior normal.               Assessment:       Encounter Diagnoses   Name Primary?    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    Wound cleansed and flushed with saline    She is on doxycycline, continue this for another week    Dressed again in iodosorb, unna boot compression wraps    Return in 1 week for wound care    Shola Dawson DPM

## 2025-02-18 ENCOUNTER — RESULTS FOLLOW-UP (OUTPATIENT)
Dept: PRIMARY CARE CLINIC | Facility: CLINIC | Age: 85
End: 2025-02-18
Payer: MEDICARE

## 2025-02-18 LAB
ALBUMIN SERPL BCP-MCNC: 3.6 G/DL (ref 3.5–5.2)
ALP SERPL-CCNC: 78 U/L (ref 40–150)
ALT SERPL W/O P-5'-P-CCNC: 15 U/L (ref 10–44)
ANION GAP SERPL CALC-SCNC: 12 MMOL/L (ref 8–16)
AST SERPL-CCNC: 26 U/L (ref 10–40)
BILIRUB SERPL-MCNC: 0.4 MG/DL (ref 0.1–1)
BUN SERPL-MCNC: 12 MG/DL (ref 8–23)
CALCIUM SERPL-MCNC: 9.3 MG/DL (ref 8.7–10.5)
CHLORIDE SERPL-SCNC: 105 MMOL/L (ref 95–110)
CHOLEST SERPL-MCNC: 117 MG/DL (ref 120–199)
CHOLEST/HDLC SERPL: 2.3 {RATIO} (ref 2–5)
CO2 SERPL-SCNC: 22 MMOL/L (ref 23–29)
CREAT SERPL-MCNC: 0.7 MG/DL (ref 0.5–1.4)
EST. GFR  (NO RACE VARIABLE): >60 ML/MIN/1.73 M^2
GLUCOSE SERPL-MCNC: 87 MG/DL (ref 70–110)
HDLC SERPL-MCNC: 52 MG/DL (ref 40–75)
HDLC SERPL: 44.4 % (ref 20–50)
LDLC SERPL CALC-MCNC: 52.4 MG/DL (ref 63–159)
NONHDLC SERPL-MCNC: 65 MG/DL
POTASSIUM SERPL-SCNC: 4.3 MMOL/L (ref 3.5–5.1)
PROT SERPL-MCNC: 7.4 G/DL (ref 6–8.4)
SODIUM SERPL-SCNC: 139 MMOL/L (ref 136–145)
TRIGL SERPL-MCNC: 63 MG/DL (ref 30–150)

## 2025-02-24 ENCOUNTER — OFFICE VISIT (OUTPATIENT)
Dept: PODIATRY | Facility: CLINIC | Age: 85
End: 2025-02-24
Payer: MEDICARE

## 2025-02-24 VITALS — WEIGHT: 132.25 LBS | BODY MASS INDEX: 21.25 KG/M2 | HEIGHT: 66 IN

## 2025-02-24 DIAGNOSIS — L97.922 CHRONIC ULCER OF LEFT LEG WITH FAT LAYER EXPOSED: Primary | ICD-10-CM

## 2025-02-24 DIAGNOSIS — L02.416 CELLULITIS AND ABSCESS OF LEFT LEG: ICD-10-CM

## 2025-02-24 DIAGNOSIS — L03.116 CELLULITIS OF LEFT LOWER EXTREMITY: ICD-10-CM

## 2025-02-24 DIAGNOSIS — L03.116 CELLULITIS AND ABSCESS OF LEFT LEG: ICD-10-CM

## 2025-02-24 PROCEDURE — 99999 PR PBB SHADOW E&M-EST. PATIENT-LVL III: CPT | Mod: PBBFAC,,, | Performed by: PODIATRIST

## 2025-02-24 PROCEDURE — 29580 STRAPPING UNNA BOOT: CPT | Mod: LT,S$GLB,, | Performed by: PODIATRIST

## 2025-02-24 PROCEDURE — 99499 UNLISTED E&M SERVICE: CPT | Mod: S$GLB,,, | Performed by: PODIATRIST

## 2025-02-24 RX ORDER — TRAMADOL HYDROCHLORIDE 50 MG/1
50 TABLET ORAL EVERY 12 HOURS PRN
Qty: 14 TABLET | Refills: 0 | Status: SHIPPED | OUTPATIENT
Start: 2025-02-24

## 2025-02-24 NOTE — PROGRESS NOTES
Subjective:      Patient ID: Serenity Epps is a 84 y.o. female.    Chief Complaint: Wound Care    Serenity is a 84 y.o. female who presents to the clinic for evaluation and treatment of high risk feet. 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 The patient's chief complaint is cellulitis to the lower extremity she was in the hospital end of January with X-rays and MRI, no osteomyelitis was seen on imaging. She was discharged on PO antibiotics and presents for follow up. Pain has persisted and the area is still erythematous    2/17/25: patient returns for wound care left leg in unna boot wraps    2/24/25: Patient returns for wound care left leg in the unna boot wraps no new changes      PCP: Shant Jc MD    2/5/25    Current shoe gear:  Affected Foot: Casual shoes     Unaffected Foot: Casual shoes    History of Trauma: negative      Hemoglobin A1C   Date Value Ref Range Status   01/31/2025 5.0 4.0 - 5.6 % Final     Comment:     Reference Interval:  5.0 - 5.6 Normal   5.7 - 6.4 High Risk   > 6.5 Diabetic      Hgb A1c results are standardized based on the (NGSP) National   Glycohemoglobin Standardization Program.      Hemoglobin A1C levels are related to mean serum/plasma glucose   during the preceding 2-3 months.        12/24/2022 5.3 0.0 - 5.6 % Final     Comment:     Reference  Interval:  5.0 - 5.6 Normal   5.7 - 6.4 High Risk   > 6.5 Diabetic      Hgb A1c results are standardized based on the (NGSP) National   Glycohemoglobin Standardization Program.      Hemoglobin A1C levels are related to mean serum/plasma glucose   during the preceding 2-3 months.              Review of Systems   Constitutional: Negative for chills and fever.   Cardiovascular: Positive for leg swelling. Negative for claudication.   Respiratory: Negative for shortness of breath.    Skin: Positive for color change, nail changes and poor wound healing. Negative for itching and rash.   Musculoskeletal: Negative for muscle cramps, muscle weakness and myalgias.   Gastrointestinal: Negative for nausea and vomiting.   Neurological: Negative for focal weakness, loss of balance, numbness and paresthesias.           Objective:      Physical Exam  Constitutional:       General: She is not in acute distress.     Appearance: She is well-developed. She is not diaphoretic.   Cardiovascular:      Pulses:           Dorsalis pedis pulses are 1+ on the right side and 1+ on the left side.        Posterior tibial pulses are 1+ on the right side and 1+ on the left side.      Comments: < 3 sec capillary refill time to toes 1-5 bilateral. Feet are warm to touch proximally with distal cooling b/l. There is no hair growth on the feet and toes b/l. There is 1+ edema b/l. No spider veins or varicosities present b/l.     Musculoskeletal:      Comments: Equinus noted b/l ankles with < 10 deg DF noted. MMT 5/5 in DF/PF/Inv/Ev resistance with no reproduction of pain in any direction. Passive range of motion of ankle and pedal joints is painless b/l.     Skin:     General: Skin is warm and dry.      Coloration: Skin is not pale.      Findings: Erythema and lesion present. No abrasion, bruising, burn, ecchymosis, laceration, petechiae or rash.      Nails: There is no clubbing.      Comments: Skin is thin and atrophic    Left plantar first metatarsal,  midfoot and heel all on the left foot there is hyperkeratotic lesions x2    Nails 1-5 bilateral are thick 3-4 mm, long 3-6 mm, and discolored with subungual debris    Left leg medial aspect proximal to the medial malleolus there is now an open wound where the abscess was drained.   0.6x0.5x0.4 cm with granular base mild erythema and serous drainage.     Neurological:      Mental Status: She is alert and oriented to person, place, and time.      Sensory: No sensory deficit.      Motor: No tremor, atrophy or abnormal muscle tone.      Comments: Negative tinel sign bilateral.   Psychiatric:         Behavior: Behavior normal.               Assessment:       Encounter Diagnoses   Name Primary?    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    Wound cleansed and flushed with saline    She is on doxycycline as prescribed    Dressed again in iodosorb, unna boot compression wraps    Return in 1 week for wound care with  nurse Blank[place] who will put on a elena single layer compression wrap with silver alginate over the wound until I can see her back in 2 weeks    Shola Dawson DPM

## 2025-02-25 ENCOUNTER — RESULTS FOLLOW-UP (OUTPATIENT)
Dept: PODIATRY | Facility: CLINIC | Age: 85
End: 2025-02-25
Payer: MEDICARE

## 2025-02-25 ENCOUNTER — HOSPITAL ENCOUNTER (OUTPATIENT)
Dept: RADIOLOGY | Facility: HOSPITAL | Age: 85
Discharge: HOME | End: 2025-02-25
Attending: PODIATRIST
Payer: MEDICARE

## 2025-02-25 DIAGNOSIS — L03.116 CELLULITIS OF LEFT LOWER EXTREMITY: ICD-10-CM

## 2025-02-25 DIAGNOSIS — L97.922 CHRONIC ULCER OF LEFT LEG WITH FAT LAYER EXPOSED: ICD-10-CM

## 2025-02-25 DIAGNOSIS — I73.9 PERIPHERAL VASCULAR DISEASE: Primary | ICD-10-CM

## 2025-02-25 PROCEDURE — 93925 LOWER EXTREMITY STUDY: CPT | Mod: 26,,, | Performed by: STUDENT IN AN ORGANIZED HEALTH CARE EDUCATION/TRAINING PROGRAM

## 2025-02-25 PROCEDURE — 93922 UPR/L XTREMITY ART 2 LEVELS: CPT | Mod: TC,PO

## 2025-02-25 PROCEDURE — 93922 UPR/L XTREMITY ART 2 LEVELS: CPT | Mod: 26,,, | Performed by: STUDENT IN AN ORGANIZED HEALTH CARE EDUCATION/TRAINING PROGRAM

## 2025-02-25 NOTE — PROGRESS NOTES
Arterial US shows significant blockages on the arteries to her leg which is why she is healing slowly and likely why she had the infection to begin with. I have put in a referral to vascular surgery she should get in with them as soon as possible to be evaluated for her circulation

## 2025-02-25 NOTE — TELEPHONE ENCOUNTER
----- Message from Shola Dawson DPM sent at 2/25/2025 11:05 AM CST -----  Arterial US shows significant blockages on the arteries to her leg which is why she is healing slowly and likely why she had the infection to begin with. I have put in a referral to vascular surgery   she should get in with them as soon as possible to be evaluated for her circulation  ----- Message -----  From: Interface, Rad Results In  Sent: 2/25/2025  10:56 AM CST  To: Shola Dawson DPM

## 2025-02-27 ENCOUNTER — TELEPHONE (OUTPATIENT)
Dept: VASCULAR SURGERY | Facility: CLINIC | Age: 85
End: 2025-02-27
Payer: MEDICARE

## 2025-02-27 ENCOUNTER — TELEPHONE (OUTPATIENT)
Dept: PODIATRY | Facility: CLINIC | Age: 85
End: 2025-02-27
Payer: MEDICARE

## 2025-02-27 NOTE — TELEPHONE ENCOUNTER
----- Message from Ann sent at 2/27/2025 11:18 AM CST -----  Contact: self  Type: Needs Medical AdviceWho Called:  the patient Best Call Back Number: [phone] Information: pt calling about US test results from 2/25   EXTREMITY ARTERY BILATPlease call, does pt need to make an appt? Pt states she can not get in to see a surgeon until 3/22 pt is asking if Dr can help her get in to be seen sooner with Dr Jaime, pt would like someone to also call her back and explain to her what is going on with all the blockage, can someone speak to her in laymen's terms please

## 2025-02-27 NOTE — TELEPHONE ENCOUNTER
----- Message from Arlin sent at 2/27/2025  8:11 AM CST -----  Type:  Needs Medical AdviceWho Called: ptWould the patient rather a call back or a response via [facility]? Please callBe Call Back Number: [phone] Information: needing to set up vascular surgery   Please call back to advise. Thanks!

## 2025-02-27 NOTE — TELEPHONE ENCOUNTER
Spoke with pt and told him his is podiatry clinic but I did give pt the [phone] to call so he can get in touch with vascular .

## 2025-02-27 NOTE — TELEPHONE ENCOUNTER
Pt scheduled 3/20 at 8:20      ----- Message from Shane sent at 2/27/2025  8:30 AM CST -----  Contact: Self  Type: Needs Medical AdviceWho Called:  PatientBe Call Back Number: [phone] Information: Patient is requesting a call back, she has a referral from Dr Dawson and is asking to be seen as soon as possible

## 2025-03-05 ENCOUNTER — CLINICAL SUPPORT (OUTPATIENT)
Dept: PODIATRY | Facility: CLINIC | Age: 85
End: 2025-03-05
Payer: MEDICARE

## 2025-03-05 VITALS — BODY MASS INDEX: 21.25 KG/M2 | WEIGHT: 132.25 LBS | HEIGHT: 66 IN

## 2025-03-05 DIAGNOSIS — L97.922 CHRONIC ULCER OF LEFT LEG WITH FAT LAYER EXPOSED: Primary | ICD-10-CM

## 2025-03-05 DIAGNOSIS — Z48.00 DRESSING CHANGE: ICD-10-CM

## 2025-03-05 PROCEDURE — 87070 CULTURE OTHR SPECIMN AEROBIC: CPT | Performed by: PODIATRIST

## 2025-03-05 PROCEDURE — 87077 CULTURE AEROBIC IDENTIFY: CPT | Performed by: PODIATRIST

## 2025-03-05 PROCEDURE — 87186 SC STD MICRODIL/AGAR DIL: CPT | Performed by: PODIATRIST

## 2025-03-05 PROCEDURE — 99999 PR PBB SHADOW E&M-EST. PATIENT-LVL III: CPT | Mod: PBBFAC,,,

## 2025-03-05 PROCEDURE — 87075 CULTR BACTERIA EXCEPT BLOOD: CPT | Performed by: PODIATRIST

## 2025-03-05 NOTE — PROGRESS NOTES
Pt seen today d/t provider's vacation. Unna boot removed from LLE and replaced with single [employer] compression wrap with wound being covered with NA Alginate and Syd foam. Wound culture collected of LLE d/t 5/10 pain coming from it. 2 pt ids used for pt verification. RTC in one week to f/u with provider

## 2025-03-07 ENCOUNTER — TELEPHONE (OUTPATIENT)
Dept: VASCULAR SURGERY | Facility: CLINIC | Age: 85
End: 2025-03-07
Payer: MEDICARE

## 2025-03-07 LAB — BACTERIA SPEC ANAEROBE CULT: NORMAL

## 2025-03-07 NOTE — TELEPHONE ENCOUNTER
Spoke w/ pt. Offered soonest appt available for 3/13. Appt scheduled. Pt verbaled understanding.      ----- Message from Ann sent at 3/7/2025  8:56 AM CST -----  Contact: self  Type:  Sooner Appointment RequestCaller is requesting a sooner appointment.  Caller declined first available appointment listed below.  Caller will not accept being placed on the waitlist and is requesting a message be sent to doctor.Name of Caller:  the patientWhen is the first available appointment?  3/20 Best Call Back Number:  [phone] Information:  pt called to see if there is any chance she may be seen sooner, pt is not feeling well and also asking someone to please call her

## 2025-03-08 LAB — BACTERIA SPEC AEROBE CULT: ABNORMAL

## 2025-03-09 ENCOUNTER — RESULTS FOLLOW-UP (OUTPATIENT)
Dept: PODIATRY | Facility: CLINIC | Age: 85
End: 2025-03-09

## 2025-03-09 RX ORDER — DOXYCYCLINE HYCLATE 100 MG
100 TABLET ORAL 2 TIMES DAILY
Qty: 14 TABLET | Refills: 0 | Status: SHIPPED | OUTPATIENT
Start: 2025-03-09 | End: 2025-03-16

## 2025-03-10 ENCOUNTER — TELEPHONE (OUTPATIENT)
Dept: PODIATRY | Facility: CLINIC | Age: 85
End: 2025-03-10
Payer: MEDICARE

## 2025-03-10 NOTE — TELEPHONE ENCOUNTER
----- Message from Shola Dawson DPM sent at 3/9/2025  2:26 PM CDT -----  Sent in a new antibiotic to start taking  ----- Message -----  From: [facility]  Sent: 3/7/2025   8:43 AM CDT  To: Shola Dawson DPM

## 2025-03-11 ENCOUNTER — OFFICE VISIT (OUTPATIENT)
Dept: PODIATRY | Facility: CLINIC | Age: 85
End: 2025-03-11
Payer: MEDICARE

## 2025-03-11 VITALS — BODY MASS INDEX: 21.25 KG/M2 | HEIGHT: 66 IN | WEIGHT: 132.25 LBS

## 2025-03-11 DIAGNOSIS — I73.9 PERIPHERAL VASCULAR DISEASE: ICD-10-CM

## 2025-03-11 DIAGNOSIS — L97.922 CHRONIC ULCER OF LEFT LEG WITH FAT LAYER EXPOSED: Primary | ICD-10-CM

## 2025-03-11 DIAGNOSIS — L03.116 CELLULITIS OF LEFT LOWER EXTREMITY: ICD-10-CM

## 2025-03-11 PROCEDURE — 29580 STRAPPING UNNA BOOT: CPT | Mod: LT,S$GLB,, | Performed by: PODIATRIST

## 2025-03-11 PROCEDURE — 99499 UNLISTED E&M SERVICE: CPT | Mod: S$GLB,,, | Performed by: PODIATRIST

## 2025-03-11 PROCEDURE — 99999 PR PBB SHADOW E&M-EST. PATIENT-LVL III: CPT | Mod: PBBFAC,,, | Performed by: PODIATRIST

## 2025-03-11 NOTE — PROGRESS NOTES
Subjective:      Patient ID: Serenity Epps is a 84 y.o. female.    Chief Complaint: Wound Care    Serenity is a 84 y.o. female who presents to the clinic for evaluation and treatment of high risk feet. 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 The patient's chief complaint is cellulitis to the lower extremity she was in the hospital end of January with X-rays and MRI, no osteomyelitis was seen on imaging. She was discharged on PO antibiotics and presents for follow up. Pain has persisted and the area is still erythematous    2/17/25: patient returns for wound care left leg in unna boot wraps    2/24/25: Patient returns for wound care left leg in the unna boot wraps no new changes    3/11/25: patient returns for wound care and unna boot change has an appointment with vascular this Thursday    PCP: Shant Jc MD    2/5/25    Current shoe gear:  Affected Foot: Casual shoes     Unaffected Foot: Casual shoes    History of Trauma: negative      Hemoglobin A1C   Date Value Ref Range Status   01/31/2025 5.0 4.0 - 5.6 % Final     Comment:     Reference Interval:  5.0 - 5.6 Normal   5.7 - 6.4 High Risk   > 6.5 Diabetic      Hgb A1c results are standardized based on the (NGSP) National   Glycohemoglobin Standardization Program.      Hemoglobin A1C levels are related to mean serum/plasma  glucose   during the preceding 2-3 months.        12/24/2022 5.3 0.0 - 5.6 % Final     Comment:     Reference Interval:  5.0 - 5.6 Normal   5.7 - 6.4 High Risk   > 6.5 Diabetic      Hgb A1c results are standardized based on the (NGSP) National   Glycohemoglobin Standardization Program.      Hemoglobin A1C levels are related to mean serum/plasma glucose   during the preceding 2-3 months.              Review of Systems   Constitutional: Negative for chills and fever.   Cardiovascular: Positive for leg swelling. Negative for claudication.   Respiratory: Negative for shortness of breath.    Skin: Positive for color change, nail changes and poor wound healing. Negative for itching and rash.   Musculoskeletal: Negative for muscle cramps, muscle weakness and myalgias.   Gastrointestinal: Negative for nausea and vomiting.   Neurological: Negative for focal weakness, loss of balance, numbness and paresthesias.           Objective:      Physical Exam  Constitutional:       General: She is not in acute distress.     Appearance: She is well-developed. She is not diaphoretic.   Cardiovascular:      Pulses:           Dorsalis pedis pulses are 1+ on the right side and 1+ on the left side.        Posterior tibial pulses are 1+ on the right side and 1+ on the left side.      Comments: < 3 sec capillary refill time to toes 1-5 bilateral. Feet are warm to touch proximally with distal cooling b/l. There is no hair growth on the feet and toes b/l. There is 1+ edema b/l. No spider veins or varicosities present b/l.     Musculoskeletal:      Comments: Equinus noted b/l ankles with < 10 deg DF noted. MMT 5/5 in DF/PF/Inv/Ev resistance with no reproduction of pain in any direction. Passive range of motion of ankle and pedal joints is painless b/l.     Skin:     General: Skin is warm and dry.      Coloration: Skin is not pale.      Findings: Erythema and lesion present. No abrasion, bruising, burn, ecchymosis, laceration, petechiae or rash.       Nails: There is no clubbing.      Comments: Skin is thin and atrophic    Left plantar first metatarsal, midfoot and heel all on the left foot there is hyperkeratotic lesions x2    Nails 1-5 bilateral are thick 3-4 mm, long 3-6 mm, and discolored with subungual debris    Left leg medial aspect proximal to the medial malleolus there is now an open wound where the abscess was drained.   0.6x0.5x0.4 cm with granular base mild erythema and serous drainage.     Neurological:      Mental Status: She is alert and oriented to person, place, and time.      Sensory: No sensory deficit.      Motor: No tremor, atrophy or abnormal muscle tone.      Comments: Negative tinel sign bilateral.   Psychiatric:         Behavior: Behavior normal.               Assessment:       Encounter Diagnoses   Name Primary?    Chronic ulcer of left leg with fat layer exposed Yes    Peripheral vascular disease     Cellulitis of left lower extremity                Plan:       Serenity was seen today for wound care.    Diagnoses and all orders for this visit:    Chronic ulcer of left leg with fat layer exposed    Peripheral vascular disease    Cellulitis of left lower extremity    Art US results  1. Occluded proximal left popliteal artery with distal reconstitution via collateral vessel.  2. Region of near velocity doubling within the left superficial femoral artery, and greater than velocity tripling within the left posterior tibial artery suggestive for hemodynamically significant stenoses.  3. Biphasic and monophasic waveforms throughout the bilateral lower extremity arteries consistent with peripheral arterial disease.  4. Left dorsalis pedis artery was not evaluated due to overlying bandage.  5. Ankle-brachial index of 1.03 on the right and 0.68 on the left.  This report was flagged in Epic as abnormal.         I counseled the patient on her conditions, their implications and medical management.    Wound cleansed and flushed with saline    She is  on doxycycline as prescribed    Dressed again in iodosorb, unna boot compression wraps applied by me    Return in 1 week for wound care     Shola Dawson DPM

## 2025-03-13 ENCOUNTER — INITIAL CONSULT (OUTPATIENT)
Dept: VASCULAR SURGERY | Facility: CLINIC | Age: 85
End: 2025-03-13
Payer: MEDICARE

## 2025-03-13 VITALS
HEIGHT: 66 IN | HEART RATE: 60 BPM | DIASTOLIC BLOOD PRESSURE: 73 MMHG | WEIGHT: 132.25 LBS | BODY MASS INDEX: 21.25 KG/M2 | SYSTOLIC BLOOD PRESSURE: 110 MMHG

## 2025-03-13 DIAGNOSIS — I73.9 PERIPHERAL VASCULAR DISEASE: ICD-10-CM

## 2025-03-13 DIAGNOSIS — I70.219 ATHEROSCLEROSIS OF ARTERY OF EXTREMITY WITH INTERMITTENT CLAUDICATION: ICD-10-CM

## 2025-03-13 DIAGNOSIS — L03.116 CELLULITIS OF LEFT LOWER EXTREMITY: ICD-10-CM

## 2025-03-13 DIAGNOSIS — I73.9 PERIPHERAL VASCULAR DISEASE: Primary | ICD-10-CM

## 2025-03-13 DIAGNOSIS — L97.922 CHRONIC ULCER OF LEFT LEG WITH FAT LAYER EXPOSED: ICD-10-CM

## 2025-03-13 PROCEDURE — 99999 PR PBB SHADOW E&M-EST. PATIENT-LVL III: CPT | Mod: PBBFAC,,, | Performed by: STUDENT IN AN ORGANIZED HEALTH CARE EDUCATION/TRAINING PROGRAM

## 2025-03-13 RX ORDER — LIDOCAINE HYDROCHLORIDE 10 MG/ML
1 INJECTION, SOLUTION EPIDURAL; INFILTRATION; INTRACAUDAL; PERINEURAL ONCE
OUTPATIENT
Start: 2025-03-13 | End: 2025-03-13

## 2025-03-13 NOTE — PROGRESS NOTES
[place] - Cardio Vascular  [facility]  History & Physical    PATIENT NAME: Serenity Epps  MRN: [id number]  TODAY'S DATE: 03/13/2025    SUBJECTIVE:     Chief Complaint: No chief complaint on file.        History of Present Illness:  Serenity Epps is a 84 y.o. female presents with a history of AFib, HTN, HLD, CKD, and a left foot wound.  She presents as a referral for a Wound on left that occurred from  trauma from shoes that she was wearing 6wks ago.  This wound is currently Managed by Dr. Garza, her podiatrist.  She reports that the wound has not been healing appropriately.    She use to smoke but has stopped smoking 20yrs ago.  She has no history of diabetes    Denies any symptoms consistent with claudication. She has bad neuropathy that keeps her up at night in both feet      Review of patient's allergies indicates:   Allergen Reactions    Brimonidine Other (See Comments)     Burning and redness       Past Medical History:   Diagnosis Date    Anxiety     Arthritis     Cataract - Both Eyes     OU done//    CKD (chronic kidney disease), stage II 09/21/2016    pt denies    Diarrhea     Diverticulosis     DVT (deep venous thrombosis)     left leg, after childbirth    Exudative age-related macular degeneration of left eye 02/20/2014    Glaucoma     Hyperlipidemia 12/25/2022    Hypertension     Irritable bowel syndrome     Left foot pain     chronic    Lymphocytic colitis     Macular degeneration     bimonthly intraoccular injections    Osteopenia     Paroxysmal atrial fibrillation 10/10/2023    Presence of Watchman left atrial appendage closure device     Recurrent major depressive disorder 04/27/2018    Rhinitis, chronic     Strabismus     as child    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   Internal small hemorrhoids were found.     COLONOSCOPY  10/03/2012    Dr. Lynn, repeat in 7-8 years for surveillance    COLONOSCOPY N/A 05/02/2018    COLONOSCOPY  05/02/2018    Procedure: COLONOSCOPY;  Surgeon: Toby Lynn Jr., MD;  Location: [facility];  Service: Endoscopy;  Laterality: N/A;    ECHOCARDIOGRAM,TRANSESOPHAGEAL N/A 01/11/2023    Procedure: ECHOCARDIOGRAM,TRANSESOPHAGEAL;  Surgeon: Daron Kinsey MD;  Location: [facility];  Service: Cardiology;  Laterality: N/A;    ECHOCARDIOGRAM,TRANSESOPHAGEAL  1/12/2024    Procedure: (SWETHA) intra-procedure;  Surgeon: Daron Kinsey MD;  Location: [facility];  Service: Cardiology;;    EYE SURGERY Bilateral     Phaco with IOL (cataract extraction), rigth:[id number] 22.0 d//    FOOT HARDWARE REMOVAL Left 06/01/2016    Dr. Hammonds    FOOT SURGERY Left 2014    ERG and open reduction tallo tarsal dislocation (hyprocure implant)    FRACTURE SURGERY Left     HIP    INSERTION OF IMPLANTABLE LOOP RECORDER N/A 01/11/2023    Procedure: INSERTION, IMPLANTABLE LOOP RECORDER;  Surgeon: Daron Kinsey MD;  Location: [facility];  Service: Cardiology;  Laterality: N/A;    INSERTION OF IMPLANTABLE LOOP RECORDER N/A 01/11/2023    Procedure: INSERTION, IMPLANTABLE LOOP RECORDER;  Surgeon: Daron Kinsey MD;  Location: [facility];  Service: Cardiology;  Laterality: N/A;    JOINT REPLACEMENT      KNEE ARTHROPLASTY Right 9/10/2024    Procedure: ARTHROPLASTY, KNEE TOTAL;  Surgeon: Kamaljit Mane MD;  Location: [facility];  Service: Orthopedics;  Laterality: Right;    PARTIAL HYSTERECTOMY      age 38, ovaries conserved    PIP JOINT FUSION Right 06/01/2016    2nd-4th PIP joints of right foot; Dr. Hammonds    TONSILLECTOMY      as  a child    TOTAL KNEE ARTHROPLASTY Left 09/2015    TRANSESOPHAGEAL ECHOCARDIOGRAPHY N/A 4/4/2024    Procedure: ECHOCARDIOGRAM, TRANSESOPHAGEAL;  Surgeon: Daron Kinsey MD;  Location: [facility];  Service: Cardiology;  Laterality: N/A;    UPPER GASTROINTESTINAL ENDOSCOPY  9/26/2006  Shane    Erythema in the lower third of the esophagus (NERD).  Patulous lower esophageal sphincter.   Gastric mucosal atrophy.   PARAG Test  Negative.     VEIN LIGATION  1964    BLE     Family History   Problem Relation Name Age of Onset    Coronary artery disease Mother  78    Glaucoma Mother      Diverticulitis Father      Parkinsonism Brother      Glaucoma Brother      Ankylosing spondylitis Brother      Diabetes Brother      Macular degeneration Brother           twin brother wet mac//    Cancer Brother          prostate    Crohn's disease Brother      Parkinsonism Brother      No Known Problems Maternal Grandmother      No Known Problems Maternal Grandfather      No Known Problems Paternal Grandmother      No Known Problems Paternal Grandfather      Breast cancer Daughter      Cancer Daughter          breast    Amblyopia Neg Hx      Blindness Neg Hx      Cataracts Neg Hx      Hypertension Neg Hx      Strabismus Neg Hx      Stroke Neg Hx      Thyroid disease Neg Hx      Retinal detachment Neg Hx      Celiac disease Neg Hx       Social History[1]     Review of Systems:  ROS    OBJECTIVE:     Vital Signs (Most Recent):  Pulse: 60 (03/13/25 1434)  BP: 110/73 (03/13/25 1434)      Physical Exam:  Physical Exam  Constitutional: Awake and oriented to person, place, and time. Appears well-developed and well-nourished. In no apparent distress.  HEENT: Normocephalic. Pupils normal and conjunctivae normal. Mucous membranes normal, no cyanosis or icterus, trachea central, no pallor or icterus is noted.  Neck: Normal range of motion. Neck supple. No JVD present. No bruit present.   Cardiovascular: Normal rate, regular rhythm and normal heart  sounds. S1, S2. Exam reveals no gallop, clicks, or friction rub. No murmur noted.  Pulmonary/Chest: Effort normal and breath sounds clear to auscultation. No respiratory distress. No wheezes, rales, or coughing present.   Abdominal: Soft. Bowel sounds are normal. There is no tenderness. No rebound tenderness or guarding present. No abdomen pulsations, bruits, or masses noted.   Urinary: No malik catheter present  Skin: Skin is warm and dry.  Extremities: No cyanosis, erythema, clubbing or edema noted at this time. No calf tenderness bilaterally.   Peripheral vascular system: nonpalpable left pedal pulse      Laboratory:  Lab Results   Component Value Date    WBC 7.78 02/17/2025    HGB 12.1 02/17/2025    HCT 39.3 02/17/2025     02/17/2025    CHOL 117 (L) 02/17/2025    TRIG 63 02/17/2025    HDL 52 02/17/2025    ALT 15 02/17/2025    AST 26 02/17/2025     02/17/2025    K 4.3 02/17/2025     02/17/2025    CREATININE 0.7 02/17/2025    BUN 12 02/17/2025    CO2 22 (L) 02/17/2025    TSH 1.609 01/31/2025    INR 2.9 09/26/2024    HGBA1C 5.0 01/31/2025         Diagnostic Results:  US Lower Extrem Arteries Bilat with RADHA (xpd)  Narrative: EXAMINATION:  US ARTERIAL LOWER EXTREMITY BILAT WITH RADHA (XPD)    CLINICAL HISTORY:  Cellulitis of left lower limb    TECHNIQUE:  Bilateral lower extremity arterial duplex ultrasound examination performed. Multiple gray scale and color doppler images were obtained in addition to waveform analysis.  Ankle-brachial indices were calculated.    COMPARISON:  03/05/2021    FINDINGS:  Ankle-brachial index of 1.03 on the right and 0.68 on the left.    The peak systolic velocities on the right are as follows, in centimeters/second:    Common femoral artery: 121, biphasic    Deep femoral artery: 47, biphasic    Superficial femoral artery, proximal: 76, biphasic    Superficial femoral artery, mid portion: 65, biphasic    Superficial femoral artery, distal: 89, biphasic    Popliteal  artery proximal: 52, biphasic    Popliteal artery distal: 43, biphasic    Posterior tibial artery: 53, biphasic    Anterior tibial artery: 46, biphasic    Dorsalis pedis artery: 58, biphasic    The peak systolic velocities on the left are as follows, in centimeters/second:    Common femoral artery: 196, monophasic    Deep femoral artery: 86, biphasic    Superficial femoral artery, proximal: 75, biphasic    Superficial femoral artery, mid portion: 44 and monophasic proximally, 85 and monophasic distally    Superficial femoral artery, distal: 54, monophasic    Popliteal artery proximal: Occluded    Popliteal artery distal: Reconstitution via collateral vessel, 28 and monophasic    Posterior tibial artery: 235, monophasic    Anterior tibial artery: 28, monophasic    Dorsalis pedis artery: Not visualized due to overlying bandage  Impression: 1. Occluded proximal left popliteal artery with distal reconstitution via collateral vessel.  2. Region of near velocity doubling within the left superficial femoral artery, and greater than velocity tripling within the left posterior tibial artery suggestive for hemodynamically significant stenoses.  3. Biphasic and monophasic waveforms throughout the bilateral lower extremity arteries consistent with peripheral arterial disease.  4. Left dorsalis pedis artery was not evaluated due to overlying bandage.  5. Ankle-brachial index of 1.03 on the right and 0.68 on the left.  This report was flagged in Epic as abnormal.    Electronically signed by: Saúl Monsivais  Date:    02/25/2025  Time:    10:54    No results found. However, due to the size of the patient record, not all encounters were searched. Please check Results Review for a complete set of results.       ASSESSMENT/PLAN:     Patient's presentation, history, and exam findings are most consistent with chronic limb threatening ischemia with a foot wound  of the left lower extremity.  The chronic, progressive, nonhealing nature of  this wound leaves their limb at risk for a devastating infection and limb loss. I have reviewed all imaging findings including the arterial US. The arterial US illustrates left popliteal artery occlusion.  I have personally discussed these findings with the patient as well as the etiology of their pathology.      I am concerned that they are at risk for limb loss if intervention is significantly delayed, leaving them with no option for conservative management at this time.  My recommendation is to perform an angiogram of the left lower extremity with plans to intervene on the lesions found on imaging to optimizes perfusion to the extremity in order to relieve pain and reestablish some level of functional independence.  I discuss these recommendations with the patient as well as the risk and benefits with the patient and all their questions were answered.     I did review her laboratory studies in his creatinine is suitable for contrast administration    Plan:  Left lower extremity angiogram with the plans to intervene on her left popliteal artery occlusion        Peripheral vascular disease  -     Ambulatory referral/consult to Vascular Surgery    Chronic ulcer of left leg with fat layer exposed  -     Ambulatory referral/consult to Vascular Surgery    Cellulitis of left lower extremity  -     Ambulatory referral/consult to Vascular Surgery              Dominik Jaime M.D.   [facility]   Date of Service: 2025             [1]   Social History  Tobacco Use    Smoking status: Former     Current packs/day: 0.00     Average packs/day: 2.0 packs/day for 28.0 years (56.0 ttl pk-yrs)     Types: Cigarettes     Start date: 1976     Quit date: 2004     Years since quittin.2    Smokeless tobacco: Never   Substance Use Topics    Alcohol use: Yes     Comment: occ    Drug use: Yes     Types: Benzodiazepines

## 2025-03-15 ENCOUNTER — HOSPITAL ENCOUNTER (OUTPATIENT)
Dept: CARDIOLOGY | Facility: HOSPITAL | Age: 85
Discharge: HOME | End: 2025-03-15
Attending: INTERNAL MEDICINE
Payer: MEDICARE

## 2025-03-15 ENCOUNTER — CLINICAL SUPPORT (OUTPATIENT)
Dept: CARDIOLOGY | Facility: HOSPITAL | Age: 85
End: 2025-03-15
Payer: MEDICARE

## 2025-03-15 DIAGNOSIS — R00.2 PALPITATIONS: ICD-10-CM

## 2025-03-15 PROCEDURE — 93298 REM INTERROG DEV EVAL SCRMS: CPT | Mod: 26,,, | Performed by: INTERNAL MEDICINE

## 2025-03-15 PROCEDURE — 93298 REM INTERROG DEV EVAL SCRMS: CPT | Mod: PO | Performed by: INTERNAL MEDICINE

## 2025-03-17 ENCOUNTER — OFFICE VISIT (OUTPATIENT)
Dept: PODIATRY | Facility: CLINIC | Age: 85
End: 2025-03-17
Payer: MEDICARE

## 2025-03-17 ENCOUNTER — PROCEDURE VISIT (OUTPATIENT)
Dept: OPHTHALMOLOGY | Facility: CLINIC | Age: 85
End: 2025-03-17
Payer: MEDICARE

## 2025-03-17 VITALS — HEIGHT: 66 IN | WEIGHT: 132.25 LBS | BODY MASS INDEX: 21.25 KG/M2

## 2025-03-17 DIAGNOSIS — H35.3134 NONEXUDATIVE AGE-RELATED MACULAR DEGENERATION, BILATERAL, ADVANCED ATROPHIC WITH SUBFOVEAL INVOLVEMENT: Chronic | ICD-10-CM

## 2025-03-17 DIAGNOSIS — L97.922 CHRONIC ULCER OF LEFT LEG WITH FAT LAYER EXPOSED: Primary | ICD-10-CM

## 2025-03-17 DIAGNOSIS — H35.3211 EXUDATIVE AGE-RELATED MACULAR DEGENERATION OF RIGHT EYE WITH ACTIVE CHOROIDAL NEOVASCULARIZATION: Primary | ICD-10-CM

## 2025-03-17 DIAGNOSIS — H35.3221 EXUDATIVE AGE-RELATED MACULAR DEGENERATION OF LEFT EYE WITH ACTIVE CHOROIDAL NEOVASCULARIZATION: Chronic | ICD-10-CM

## 2025-03-17 DIAGNOSIS — I73.9 PERIPHERAL VASCULAR DISEASE: ICD-10-CM

## 2025-03-17 PROCEDURE — 92201 OPSCPY EXTND RTA DRAW UNI/BI: CPT | Mod: S$GLB,,, | Performed by: OPHTHALMOLOGY

## 2025-03-17 PROCEDURE — 1160F RVW MEDS BY RX/DR IN RCRD: CPT | Mod: CPTII,S$GLB,, | Performed by: PODIATRIST

## 2025-03-17 PROCEDURE — 99999 PR PBB SHADOW E&M-EST. PATIENT-LVL III: CPT | Mod: PBBFAC,,, | Performed by: PODIATRIST

## 2025-03-17 PROCEDURE — 1159F MED LIST DOCD IN RCRD: CPT | Mod: CPTII,S$GLB,, | Performed by: PODIATRIST

## 2025-03-17 PROCEDURE — 1126F AMNT PAIN NOTED NONE PRSNT: CPT | Mod: CPTII,S$GLB,, | Performed by: PODIATRIST

## 2025-03-17 PROCEDURE — 3288F FALL RISK ASSESSMENT DOCD: CPT | Mod: CPTII,S$GLB,, | Performed by: PODIATRIST

## 2025-03-17 PROCEDURE — 99213 OFFICE O/P EST LOW 20 MIN: CPT | Mod: S$GLB,,, | Performed by: PODIATRIST

## 2025-03-17 PROCEDURE — 92014 COMPRE OPH EXAM EST PT 1/>: CPT | Mod: S$GLB,,, | Performed by: OPHTHALMOLOGY

## 2025-03-17 PROCEDURE — 92134 CPTRZ OPH DX IMG PST SGM RTA: CPT | Mod: S$GLB,,, | Performed by: OPHTHALMOLOGY

## 2025-03-17 PROCEDURE — 1157F ADVNC CARE PLAN IN RCRD: CPT | Mod: CPTII,S$GLB,, | Performed by: PODIATRIST

## 2025-03-17 PROCEDURE — 1101F PT FALLS ASSESS-DOCD LE1/YR: CPT | Mod: CPTII,S$GLB,, | Performed by: PODIATRIST

## 2025-03-17 NOTE — PROGRESS NOTES
Subjective:      Patient ID: Serenity Epps is a 84 y.o. female.    Chief Complaint: Wound Care    Serenity is a 84 y.o. female who presents to the clinic for evaluation and treatment of high risk feet. 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 The patient's chief complaint is cellulitis to the lower extremity she was in the hospital end of January with X-rays and MRI, no osteomyelitis was seen on imaging. She was discharged on PO antibiotics and presents for follow up. Pain has persisted and the area is still erythematous    2/17/25: patient returns for wound care left leg in unna boot wraps    2/24/25: Patient returns for wound care left leg in the unna boot wraps no new changes    3/11/25: patient returns for wound care and unna boot change has an appointment with vascular this Thursday    3/17/25: Patient returns for wound care left leg, she has an angiogram scheduled for later this week    PCP: Shant Jc MD    2/5/25    Current shoe gear:  Affected Foot: Casual shoes     Unaffected Foot: Casual shoes    History of Trauma: negative      Hemoglobin A1C   Date Value Ref Range Status   01/31/2025 5.0 4.0 - 5.6 % Final     Comment:     Reference Interval:  5.0 - 5.6 Normal   5.7 - 6.4 High Risk   > 6.5 Diabetic      Hgb A1c results are standardized based on the (NGSP) National    Glycohemoglobin Standardization Program.      Hemoglobin A1C levels are related to mean serum/plasma glucose   during the preceding 2-3 months.        12/24/2022 5.3 0.0 - 5.6 % Final     Comment:     Reference Interval:  5.0 - 5.6 Normal   5.7 - 6.4 High Risk   > 6.5 Diabetic      Hgb A1c results are standardized based on the (NGSP) National   Glycohemoglobin Standardization Program.      Hemoglobin A1C levels are related to mean serum/plasma glucose   during the preceding 2-3 months.              Review of Systems   Constitutional: Negative for chills and fever.   Cardiovascular: Positive for leg swelling. Negative for claudication.   Respiratory: Negative for shortness of breath.    Skin: Positive for color change, nail changes and poor wound healing. Negative for itching and rash.   Musculoskeletal: Negative for muscle cramps, muscle weakness and myalgias.   Gastrointestinal: Negative for nausea and vomiting.   Neurological: Negative for focal weakness, loss of balance, numbness and paresthesias.           Objective:      Physical Exam  Constitutional:       General: She is not in acute distress.     Appearance: She is well-developed. She is not diaphoretic.   Cardiovascular:      Pulses:           Dorsalis pedis pulses are 1+ on the right side and 1+ on the left side.        Posterior tibial pulses are 1+ on the right side and 1+ on the left side.      Comments: < 3 sec capillary refill time to toes 1-5 bilateral. Feet are warm to touch proximally with distal cooling b/l. There is no hair growth on the feet and toes b/l. There is 1+ edema b/l. No spider veins or varicosities present b/l.     Musculoskeletal:      Comments: Equinus noted b/l ankles with < 10 deg DF noted. MMT 5/5 in DF/PF/Inv/Ev resistance with no reproduction of pain in any direction. Passive range of motion of ankle and pedal joints is painless b/l.     Skin:     General: Skin is warm and dry.      Coloration: Skin is not pale.       Findings: Erythema and lesion present. No abrasion, bruising, burn, ecchymosis, laceration, petechiae or rash.      Nails: There is no clubbing.      Comments: Skin is thin and atrophic    Left plantar first metatarsal, midfoot and heel all on the left foot there is hyperkeratotic lesions x2    Nails 1-5 bilateral are thick 3-4 mm, long 3-6 mm, and discolored with subungual debris    Left leg medial aspect proximal to the medial malleolus there is now an open wound where the abscess was drained.   0.6x0.5x0.4 cm with granular base mild erythema and serous drainage.     Neurological:      Mental Status: She is alert and oriented to person, place, and time.      Sensory: No sensory deficit.      Motor: No tremor, atrophy or abnormal muscle tone.      Comments: Negative tinel sign bilateral.   Psychiatric:         Behavior: Behavior normal.               Assessment:       Encounter Diagnoses   Name Primary?    Chronic ulcer of left leg with fat layer exposed Yes    Peripheral vascular disease                  Plan:       Serenity was seen today for wound care.    Diagnoses and all orders for this visit:    Chronic ulcer of left leg with fat layer exposed    Peripheral vascular disease      Art US results  1. Occluded proximal left popliteal artery with distal reconstitution via collateral vessel.  2. Region of near velocity doubling within the left superficial femoral artery, and greater than velocity tripling within the left posterior tibial artery suggestive for hemodynamically significant stenoses.  3. Biphasic and monophasic waveforms throughout the bilateral lower extremity arteries consistent with peripheral arterial disease.  4. Left dorsalis pedis artery was not evaluated due to overlying bandage.  5. Ankle-brachial index of 1.03 on the right and 0.68 on the left.  This report was flagged in Epic as abnormal.         I counseled the patient on her conditions, their implications and medical management.    Wound  cleansed and flushed with saline    Dressed again in iodosorb, mepilex border and tubi  compression    Return in 1 week for wound care     Shola Dawson DPM

## 2025-03-17 NOTE — PROGRESS NOTES
HPI     avastin  injection    armd     oct      Additional comments: Avastin  injection  ou      Armd   Dfe   Oct         Srf  os   RPE     Pciol    Dls 12/30/24          Last edited by Linn Mackey on 3/17/2025  8:03 AM.            OCT - activity around fibrosis OU - Improved  Atrophy OU        A/P    1. Wet AMD OS  S/p Avastin OS x 18, Eylea OS  x 14    Persistent SRF OS - improving  With RPE tear OS  Central fibrosis with atrophy - poor central potential  10/17 - increased SRH - will keep at 8 weeks for now  9/18 - stable cicatrix - try observation  12/18 - no activity  6/21 - some heme over cicatrix OS  1/23 - given extensive atrophy, try extension      2. New disease OD  Sx started 9/21  S/p Avastin OD x 8, Eylea OD x 8  3/23 - sig atrophy without activity  9/23 heme OS, but ASx  5/24 increase in activity around fibrosis - pt does notice some changes    Stable x 1 year - try observation.  Stable superior activity OS    3. PCIOL OU  CTR OS - but saw 20/30 with correction, in spite of sub RPE fibrosis      4. POAG OU  Good IOP control on Cosopt, brimonidine, latanoprost OU  Small drance heme OD    5. Floaters OU        12 weeks no  Dilate - see in room(LDFE 3/25)

## 2025-03-18 LAB
OHS CV AF BURDEN PERCENT: < 1
OHS CV DC REMOTE DEVICE TYPE: NORMAL

## 2025-03-21 ENCOUNTER — PATIENT MESSAGE (OUTPATIENT)
Dept: RESEARCH | Facility: HOSPITAL | Age: 85
End: 2025-03-21
Payer: MEDICARE

## 2025-03-22 ENCOUNTER — EXTERNAL HOME HEALTH (OUTPATIENT)
Dept: HOME HEALTH SERVICES | Facility: HOSPITAL | Age: 85
End: 2025-03-22
Payer: MEDICARE

## 2025-03-24 ENCOUNTER — OFFICE VISIT (OUTPATIENT)
Dept: PODIATRY | Facility: CLINIC | Age: 85
End: 2025-03-24
Payer: MEDICARE

## 2025-03-24 VITALS — WEIGHT: 132.25 LBS | BODY MASS INDEX: 21.25 KG/M2 | HEIGHT: 66 IN

## 2025-03-24 DIAGNOSIS — L97.922 CHRONIC ULCER OF LEFT LEG WITH FAT LAYER EXPOSED: Primary | ICD-10-CM

## 2025-03-24 DIAGNOSIS — I73.9 PERIPHERAL VASCULAR DISEASE: ICD-10-CM

## 2025-03-24 PROCEDURE — 1157F ADVNC CARE PLAN IN RCRD: CPT | Mod: CPTII,S$GLB,, | Performed by: PODIATRIST

## 2025-03-24 PROCEDURE — 99999 PR PBB SHADOW E&M-EST. PATIENT-LVL III: CPT | Mod: PBBFAC,,, | Performed by: PODIATRIST

## 2025-03-24 PROCEDURE — 1159F MED LIST DOCD IN RCRD: CPT | Mod: CPTII,S$GLB,, | Performed by: PODIATRIST

## 2025-03-24 PROCEDURE — 99213 OFFICE O/P EST LOW 20 MIN: CPT | Mod: S$GLB,,, | Performed by: PODIATRIST

## 2025-03-24 PROCEDURE — 3288F FALL RISK ASSESSMENT DOCD: CPT | Mod: CPTII,S$GLB,, | Performed by: PODIATRIST

## 2025-03-24 PROCEDURE — 1101F PT FALLS ASSESS-DOCD LE1/YR: CPT | Mod: CPTII,S$GLB,, | Performed by: PODIATRIST

## 2025-03-24 NOTE — PROGRESS NOTES
Subjective:      Patient ID: Serenity Epps is a 84 y.o. female.    Chief Complaint: Wound Care    Serenity is a 84 y.o. female who presents to the clinic for evaluation and treatment of high risk feet. 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 The patient's chief complaint is cellulitis to the lower extremity she was in the hospital end of January with X-rays and MRI, no osteomyelitis was seen on imaging. She was discharged on PO antibiotics and presents for follow up. Pain has persisted and the area is still erythematous    2/17/25: patient returns for wound care left leg in unna boot wraps    2/24/25: Patient returns for wound care left leg in the unna boot wraps no new changes    3/11/25: patient returns for wound care and unna boot change has an appointment with vascular this Thursday    3/17/25: Patient returns for wound care left leg, she has an angiogram scheduled for later this week    3/24/25: patient returns for wound care left leg, angiogram was rescheduled from last week to this friday    PCP: Shant Jc MD    2/5/25    Current shoe gear:  Affected Foot: Casual shoes     Unaffected Foot: Casual shoes    History of Trauma: negative      Hemoglobin A1C   Date Value Ref Range Status   01/31/2025 5.0 4.0 - 5.6 % Final     Comment:     Reference Interval:  5.0 - 5.6 Normal    5.7 - 6.4 High Risk   > 6.5 Diabetic      Hgb A1c results are standardized based on the (NGSP) National   Glycohemoglobin Standardization Program.      Hemoglobin A1C levels are related to mean serum/plasma glucose   during the preceding 2-3 months.        12/24/2022 5.3 0.0 - 5.6 % Final     Comment:     Reference Interval:  5.0 - 5.6 Normal   5.7 - 6.4 High Risk   > 6.5 Diabetic      Hgb A1c results are standardized based on the (NGSP) National   Glycohemoglobin Standardization Program.      Hemoglobin A1C levels are related to mean serum/plasma glucose   during the preceding 2-3 months.              Review of Systems   Constitutional: Negative for chills and fever.   Cardiovascular: Positive for leg swelling. Negative for claudication.   Respiratory: Negative for shortness of breath.    Skin: Positive for color change, nail changes and poor wound healing. Negative for itching and rash.   Musculoskeletal: Negative for muscle cramps, muscle weakness and myalgias.   Gastrointestinal: Negative for nausea and vomiting.   Neurological: Negative for focal weakness, loss of balance, numbness and paresthesias.           Objective:      Physical Exam  Constitutional:       General: She is not in acute distress.     Appearance: She is well-developed. She is not diaphoretic.   Cardiovascular:      Pulses:           Dorsalis pedis pulses are 1+ on the right side and 1+ on the left side.        Posterior tibial pulses are 1+ on the right side and 1+ on the left side.      Comments: < 3 sec capillary refill time to toes 1-5 bilateral. Feet are warm to touch proximally with distal cooling b/l. There is no hair growth on the feet and toes b/l. There is 1+ edema b/l. No spider veins or varicosities present b/l.     Musculoskeletal:      Comments: Equinus noted b/l ankles with < 10 deg DF noted. MMT 5/5 in DF/PF/Inv/Ev resistance with no reproduction of pain in any direction. Passive range of motion of ankle and pedal joints is  painless b/l.     Skin:     General: Skin is warm and dry.      Coloration: Skin is not pale.      Findings: Erythema and lesion present. No abrasion, bruising, burn, ecchymosis, laceration, petechiae or rash.      Nails: There is no clubbing.      Comments: Skin is thin and atrophic    Left plantar first metatarsal, midfoot and heel all on the left foot there is hyperkeratotic lesions x2    Nails 1-5 bilateral are thick 3-4 mm, long 3-6 mm, and discolored with subungual debris    Left leg medial aspect proximal to the medial malleolus there is now an open wound where the abscess was drained.   0.6x0.5x0.4 cm with granular base mild erythema and serous drainage.     Neurological:      Mental Status: She is alert and oriented to person, place, and time.      Sensory: No sensory deficit.      Motor: No tremor, atrophy or abnormal muscle tone.      Comments: Negative tinel sign bilateral.   Psychiatric:         Behavior: Behavior normal.               Assessment:       Encounter Diagnoses   Name Primary?    Chronic ulcer of left leg with fat layer exposed Yes    Peripheral vascular disease                    Plan:       Serenity was seen today for wound care.    Diagnoses and all orders for this visit:    Chronic ulcer of left leg with fat layer exposed    Peripheral vascular disease        Art US results  1. Occluded proximal left popliteal artery with distal reconstitution via collateral vessel.  2. Region of near velocity doubling within the left superficial femoral artery, and greater than velocity tripling within the left posterior tibial artery suggestive for hemodynamically significant stenoses.  3. Biphasic and monophasic waveforms throughout the bilateral lower extremity arteries consistent with peripheral arterial disease.  4. Left dorsalis pedis artery was not evaluated due to overlying bandage.  5. Ankle-brachial index of 1.03 on the right and 0.68 on the left.  This report was flagged in Epic as abnormal.          I counseled the patient on her conditions, their implications and medical management.    Wound cleansed and flushed with saline    Dressed again in iodosorb, mepilex border and tubi  compression    Return in 1 week for wound care     Shola Dawson DPM

## 2025-03-31 ENCOUNTER — OFFICE VISIT (OUTPATIENT)
Dept: PODIATRY | Facility: CLINIC | Age: 85
End: 2025-03-31
Payer: MEDICARE

## 2025-03-31 VITALS — BODY MASS INDEX: 19.24 KG/M2 | HEIGHT: 65 IN | WEIGHT: 115.5 LBS

## 2025-03-31 DIAGNOSIS — I73.9 PERIPHERAL VASCULAR DISEASE: ICD-10-CM

## 2025-03-31 DIAGNOSIS — L97.922 CHRONIC ULCER OF LEFT LEG WITH FAT LAYER EXPOSED: Primary | ICD-10-CM

## 2025-03-31 PROCEDURE — 99499 UNLISTED E&M SERVICE: CPT | Mod: S$GLB,,, | Performed by: PODIATRIST

## 2025-03-31 PROCEDURE — 99999 PR PBB SHADOW E&M-EST. PATIENT-LVL III: CPT | Mod: PBBFAC,,, | Performed by: PODIATRIST

## 2025-03-31 PROCEDURE — 11042 DBRDMT SUBQ TIS 1ST 20SQCM/<: CPT | Mod: LT,S$GLB,, | Performed by: PODIATRIST

## 2025-03-31 NOTE — PROCEDURES
"Wound Debridement    Date/Time: 3/31/2025 10:00 AM    Performed by: Shola Dawson DPM  Authorized by: Shola Dawson DPM    Time out: Immediately prior to procedure a "time out" was called to verify the correct patient, procedure, equipment, support staff and site/side marked as required.    Consent Done?:  Yes (Verbal)    Preparation: Patient was prepped and draped in usual sterile fashion    Local anesthesia used?: No      Wound Details:    Location:  Left leg    Type of Debridement:  Excisional       Length (cm):  0.6       Width (cm):  0.5       Depth (cm):  0.4       Area (sq cm):  0.24       Percent Debrided (%):  100       Total Area Debrided (sq cm):  0.24    Depth of debridement:  Subcutaneous tissue    Devitalized tissue debrided:  Biofilm and Slough    Instruments:  Curette  Bleeding:  Minimal  Hemostasis Achieved: Yes  Method Used:  Pressure  Patient tolerance:  Patient tolerated the procedure well with no immediate complications    "

## 2025-03-31 NOTE — PROGRESS NOTES
Subjective:      Patient ID: Serenity Epps is a 84 y.o. female.    Chief Complaint: Wound Care    Serenity is a 84 y.o. female who presents to the clinic for evaluation and treatment of high risk feet. 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 The patient's chief complaint is cellulitis to the lower extremity she was in the hospital end of January with X-rays and MRI, no osteomyelitis was seen on imaging. She was discharged on PO antibiotics and presents for follow up. Pain has persisted and the area is still erythematous    2/17/25: patient returns for wound care left leg in unna boot wraps    2/24/25: Patient returns for wound care left leg in the unna boot wraps no new changes    3/11/25: patient returns for wound care and unna boot change has an appointment with vascular this Thursday    3/17/25: Patient returns for wound care left leg, she has an angiogram scheduled for later this week    3/24/25: patient returns for wound care left leg, angiogram was rescheduled from last week to this Friday    3/31/25: Patient returns for wound care left leg, had her angiogram last week, appreciate Dr. Moreno's work. Seems to be doing well with that, no new complaints.    PCP: Shant Jc MD    2/5/25    Current shoe gear:  Affected Foot: Casual shoes     Unaffected Foot: Casual  shoes    History of Trauma: negative      Hemoglobin A1C   Date Value Ref Range Status   01/31/2025 5.0 4.0 - 5.6 % Final     Comment:     Reference Interval:  5.0 - 5.6 Normal   5.7 - 6.4 High Risk   > 6.5 Diabetic      Hgb A1c results are standardized based on the (NGSP) National   Glycohemoglobin Standardization Program.      Hemoglobin A1C levels are related to mean serum/plasma glucose   during the preceding 2-3 months.        12/24/2022 5.3 0.0 - 5.6 % Final     Comment:     Reference Interval:  5.0 - 5.6 Normal   5.7 - 6.4 High Risk   > 6.5 Diabetic      Hgb A1c results are standardized based on the (NGSP) National   Glycohemoglobin Standardization Program.      Hemoglobin A1C levels are related to mean serum/plasma glucose   during the preceding 2-3 months.              Review of Systems   Constitutional: Negative for chills and fever.   Cardiovascular: Positive for leg swelling. Negative for claudication.   Respiratory: Negative for shortness of breath.    Skin: Positive for color change, nail changes and poor wound healing. Negative for itching and rash.   Musculoskeletal: Negative for muscle cramps, muscle weakness and myalgias.   Gastrointestinal: Negative for nausea and vomiting.   Neurological: Negative for focal weakness, loss of balance, numbness and paresthesias.           Objective:      Physical Exam  Constitutional:       General: She is not in acute distress.     Appearance: She is well-developed. She is not diaphoretic.   Cardiovascular:      Pulses:           Dorsalis pedis pulses are 1+ on the right side and 1+ on the left side.        Posterior tibial pulses are 1+ on the right side and 1+ on the left side.      Comments: < 3 sec capillary refill time to toes 1-5 bilateral. Feet are warm to touch proximally with distal cooling b/l. There is no hair growth on the feet and toes b/l. There is 1+ edema b/l. No spider veins or varicosities present b/l.     Musculoskeletal:      Comments: Equinus  noted b/l ankles with < 10 deg DF noted. MMT 5/5 in DF/PF/Inv/Ev resistance with no reproduction of pain in any direction. Passive range of motion of ankle and pedal joints is painless b/l.     Skin:     General: Skin is warm and dry.      Coloration: Skin is not pale.      Findings: Erythema and lesion present. No abrasion, bruising, burn, ecchymosis, laceration, petechiae or rash.      Nails: There is no clubbing.      Comments: Skin is thin and atrophic    Left plantar first metatarsal, midfoot and heel all on the left foot there is hyperkeratotic lesions x2    Nails 1-5 bilateral are thick 3-4 mm, long 3-6 mm, and discolored with subungual debris    Left leg medial aspect proximal to the medial malleolus there is now an open wound where the abscess was drained.  0.5x0.4x0.3 post:  0.6x0.5x0.4 cm with granular base mild erythema and serous drainage.     Neurological:      Mental Status: She is alert and oriented to person, place, and time.      Sensory: No sensory deficit.      Motor: No tremor, atrophy or abnormal muscle tone.      Comments: Negative tinel sign bilateral.   Psychiatric:         Behavior: Behavior normal.               Assessment:       Encounter Diagnoses   Name Primary?    Chronic ulcer of left leg with fat layer exposed Yes    Peripheral vascular disease                      Plan:       Serenity was seen today for wound care.    Diagnoses and all orders for this visit:    Chronic ulcer of left leg with fat layer exposed  -     Wound Debridement    Peripheral vascular disease  -     Wound Debridement          Art US results  1. Occluded proximal left popliteal artery with distal reconstitution via collateral vessel.  2. Region of near velocity doubling within the left superficial femoral artery, and greater than velocity tripling within the left posterior tibial artery suggestive for hemodynamically significant stenoses.  3. Biphasic and monophasic waveforms throughout the bilateral lower extremity  arteries consistent with peripheral arterial disease.  4. Left dorsalis pedis artery was not evaluated due to overlying bandage.  5. Ankle-brachial index of 1.03 on the right and 0.68 on the left.  This report was flagged in Epic as abnormal.         I counseled the patient on her conditions, their implications and medical management.    Wound debrided and cleansed and flushed with saline    Dressed again in erick, mepilex border and tubi  compression    Return in 1 week for wound care     Shola Dawson DPM

## 2025-04-01 ENCOUNTER — TELEPHONE (OUTPATIENT)
Dept: PODIATRY | Facility: CLINIC | Age: 85
End: 2025-04-01
Payer: MEDICARE

## 2025-04-01 ENCOUNTER — OFFICE VISIT (OUTPATIENT)
Dept: CARDIOLOGY | Facility: CLINIC | Age: 85
End: 2025-04-01
Payer: MEDICARE

## 2025-04-01 VITALS
WEIGHT: 133.19 LBS | HEART RATE: 61 BPM | SYSTOLIC BLOOD PRESSURE: 139 MMHG | DIASTOLIC BLOOD PRESSURE: 67 MMHG | BODY MASS INDEX: 22.16 KG/M2

## 2025-04-01 DIAGNOSIS — I65.22 STENOSIS OF LEFT INTERNAL CAROTID ARTERY: Chronic | ICD-10-CM

## 2025-04-01 DIAGNOSIS — Z45.09 ENCOUNTER FOR LOOP RECORDER AT END OF BATTERY LIFE: ICD-10-CM

## 2025-04-01 DIAGNOSIS — E78.2 MIXED HYPERLIPIDEMIA: Chronic | ICD-10-CM

## 2025-04-01 DIAGNOSIS — Z95.818 PRESENCE OF WATCHMAN LEFT ATRIAL APPENDAGE CLOSURE DEVICE: ICD-10-CM

## 2025-04-01 DIAGNOSIS — I48.0 PAF (PAROXYSMAL ATRIAL FIBRILLATION): Primary | Chronic | ICD-10-CM

## 2025-04-01 DIAGNOSIS — I10 PRIMARY HYPERTENSION: ICD-10-CM

## 2025-04-01 DIAGNOSIS — I73.9 PERIPHERAL VASCULAR DISEASE, UNSPECIFIED: Chronic | ICD-10-CM

## 2025-04-01 PROCEDURE — 3288F FALL RISK ASSESSMENT DOCD: CPT | Mod: CPTII,S$GLB,,

## 2025-04-01 PROCEDURE — 99214 OFFICE O/P EST MOD 30 MIN: CPT | Mod: S$GLB,,,

## 2025-04-01 PROCEDURE — 1157F ADVNC CARE PLAN IN RCRD: CPT | Mod: CPTII,S$GLB,,

## 2025-04-01 PROCEDURE — 99999 PR PBB SHADOW E&M-EST. PATIENT-LVL III: CPT | Mod: PBBFAC,,,

## 2025-04-01 PROCEDURE — 1101F PT FALLS ASSESS-DOCD LE1/YR: CPT | Mod: CPTII,S$GLB,,

## 2025-04-01 PROCEDURE — 1159F MED LIST DOCD IN RCRD: CPT | Mod: CPTII,S$GLB,,

## 2025-04-01 PROCEDURE — 1126F AMNT PAIN NOTED NONE PRSNT: CPT | Mod: CPTII,S$GLB,,

## 2025-04-01 RX ORDER — SODIUM CHLORIDE 0.9 % (FLUSH) 0.9 %
10 SYRINGE (ML) INJECTION
Status: SHIPPED | OUTPATIENT
Start: 2025-04-01

## 2025-04-01 RX ORDER — SODIUM CHLORIDE 9 MG/ML
INJECTION, SOLUTION INTRAVENOUS ONCE
OUTPATIENT
Start: 2025-04-01 | End: 2025-04-01

## 2025-04-01 NOTE — PROGRESS NOTES
Subjective:    Patient ID:  Serenity Epps is a 84 y.o. female patient here for evaluation No chief complaint on file.    History of Present Illness:     Serenity Epps is a 84 y.o. female who follows with Dr. Kinsey here today for follow up. Last seen in clinic 7/2024. She denies CP, SOB/YOUSSEF, palpitations, dizziness, fatigue, activity intolerance.    Focused Active Problem List includes:  Paroxysmal AF   S/p ILR 1/2023 (implanted for CVA)  Last device check 3/15/25: <1% AF burden.   S/p Watchman 1/2024  Right MCA stroke 1/2023  PAD s/p PTA   BLE angiogram 3/28/25: PTA of L SFA  Carotid artery disease  CTA H/N 2022: 60% LICA  Hypertension  Hyperlipidemia   Former 56-pack-year smoker, quit 2004      Most Recent Echocardiogram Results  Results for orders placed during the hospital encounter of 04/04/24    Transesophageal echo (SWETHA) with possible cardioversion    Interpretation Summary  SWETHA performed for surveillance post Watchman procedure  Watchman device noted in left atrial appendage, well seated, no significant leak appreciated  No thrombus appreciated in left atrium or on Watchman device.  Persistent iatrogenic atrial septal defect was NOT appreciated.      Most Recent Nuclear Stress Test Results  Results for orders placed during the hospital encounter of 08/23/24    Nuclear Stress - Cardiology Interpreted    Interpretation Summary    Normal myocardial perfusion scan. There is no evidence of myocardial ischemia or infarction.    The gated perfusion images showed an ejection fraction of 63% at rest.    There is normal wall motion at rest.    The ECG portion of the study is negative for ischemia.    The patient reported no chest pain during the stress test.    There are no prior studies for comparison.      Most Recent Cardiac PET Stress Test Results  No results found for this or any previous visit.      Most Recent Cardiovascular Angiogram results  Results for orders placed during the hospital encounter of  03/28/25    Cardiac catheterization (Needs Review)  This result has not been signed. Information might be incomplete.    Narrative  Procedure performed in the Invasive Lab  - See Procedure Log link below for nursing documentation  - See OpNote on Surgeries Tab for physician findings  - See Imaging Tab for radiologist dictation      Other Most Recent Cardiology Results  Results for orders placed in visit on 03/15/25    Cardiac device check - Remote      REVIEW OF SYSTEMS: As noted in HPI   CARDIOVASCULAR: No recent chest pain, palpitations, arm/neck/jaw pain, or edema.  RESPIRATORY: No recent fever, cough, SOB.  : No blood in the urine  GI: No reflux, nausea, vomiting, or blood in stool.   MUSCULOSKELETAL: No falls.   NEURO: No headaches, syncope, or dizziness.  EYES: No sudden changes in vision.     Past Medical History:   Diagnosis Date    Anxiety     Arthritis     Cataract - Both Eyes     OU done//    CKD (chronic kidney disease), stage II 09/21/2016    pt denies    Diarrhea     Diverticulosis     DVT (deep venous thrombosis)     left leg, after childbirth    Exudative age-related macular degeneration of left eye 02/20/2014    Glaucoma     Hyperlipidemia 12/25/2022    Hypertension     Irritable bowel syndrome     Left foot pain     chronic    Lymphocytic colitis     Macular degeneration     bimonthly intraoccular injections    Osteopenia     Paroxysmal atrial fibrillation 10/10/2023    Presence of Watchman left atrial appendage closure device     Recurrent major depressive disorder 04/27/2018    Rhinitis, chronic     Strabismus     as child    Stroke due to thrombosis of right middle cerebral artery 12/24/2022    Urinary incontinence      Past Surgical History:   Procedure Laterality Date    ANGIOGRAM, ABDOMINAL AORTA  3/28/2025    Procedure: Abdominal angiogram;  Surgeon: Dominik Jaime MD;  Location: [facility];  Service: Vascular;;    ANGIOGRAPHY OF LOWER EXTREMITY  3/28/2025    Procedure: Bilateral lower  extremity;  Surgeon: Dominik Jaime MD;  Location: [facility];  Service: Vascular;;    ANGIOPLASTY OF PERIPHERAL VESSEL FOR CHRONIC TOTAL OCCLUSION  3/28/2025    Procedure: PTA Lt. SFA;  Surgeon: Dominik Jaime MD;  Location: [facility] CATH;  Service: Vascular;;    APPENDECTOMY      age 38    ATHERECTOMY, PERIPHERAL BLOOD VESSEL  3/28/2025    Procedure: Atherectomy Lt. SFA (Jetstream);  Surgeon: Dominik Jaime MD;  Location: [facility] CATH;  Service: Vascular;;    CATARACT EXTRACTION      OU done//    CATARACT EXTRACTION W/ INTRAOCULAR LENS  IMPLANT, BILATERAL Bilateral 2016    CLOSURE OF LEFT ATRIAL APPENDAGE USING DEVICE  01/12/2024    Procedure: Watchman;  Surgeon: Nellie Gibson MD;  Location: [facility];  Service: Cardiology;;    COLONOSCOPY  9/26/2006  Shane    Diverticulosis.   Internal small hemorrhoids were found.     COLONOSCOPY  10/03/2012    Dr. Lynn, repeat in 7-8 years for surveillance    COLONOSCOPY N/A 05/02/2018    COLONOSCOPY  05/02/2018    Procedure: COLONOSCOPY;  Surgeon: Toby Lynn Jr., MD;  Location: [facility] ENDO;  Service: Endoscopy;  Laterality: N/A;    ECHOCARDIOGRAM,TRANSESOPHAGEAL N/A 01/11/2023    Procedure: ECHOCARDIOGRAM,TRANSESOPHAGEAL;  Surgeon: Daron Kinsey MD;  Location: [facility];  Service: Cardiology;  Laterality: N/A;    ECHOCARDIOGRAM,TRANSESOPHAGEAL  01/12/2024    Procedure: (SWETHA) intra-procedure;  Surgeon: Daron Kinsey MD;  Location: [facility];  Service: Cardiology;;    EYE SURGERY Bilateral     Phaco with IOL (cataract extraction), rigth:[id number] 22.0 d//    FOOT HARDWARE REMOVAL Left 06/01/2016    Dr. Hammonds    FOOT SURGERY Left 2014    ERG and open reduction tallo tarsal dislocation (hyprocure implant)    FRACTURE SURGERY Left     HIP    HYSTERECTOMY      INSERTION OF IMPLANTABLE LOOP RECORDER N/A 01/11/2023    Procedure: INSERTION, IMPLANTABLE LOOP RECORDER;  Surgeon: Daron Kinsey MD;  Location: [facility];  Service: Cardiology;  Laterality: N/A;     INSERTION OF IMPLANTABLE LOOP RECORDER N/A 01/11/2023    Procedure: INSERTION, IMPLANTABLE LOOP RECORDER;  Surgeon: Daron Kinsey MD;  Location: [facility] CATH;  Service: Cardiology;  Laterality: N/A;    IVUS (INTRAVASCULAR ULTRASOUND)  3/28/2025    Procedure: IVUS Lt. SFA;  Surgeon: Dominik Jaime MD;  Location: [facility] CATH;  Service: Vascular;;    JOINT REPLACEMENT      KNEE ARTHROPLASTY Right 09/10/2024    Procedure: ARTHROPLASTY, KNEE TOTAL;  Surgeon: Kamaljit Mane MD;  Location: [facility] OR;  Service: Orthopedics;  Laterality: Right;    PARTIAL HYSTERECTOMY      age 38, ovaries conserved    PIP JOINT FUSION Right 06/01/2016    2nd-4th PIP joints of right foot; Dr. Hammonds    TONSILLECTOMY      as a child    TOTAL KNEE ARTHROPLASTY Left 09/2015    TRANSESOPHAGEAL ECHOCARDIOGRAPHY N/A 04/04/2024    Procedure: ECHOCARDIOGRAM, TRANSESOPHAGEAL;  Surgeon: Daron Kinsey MD;  Location: [facility];  Service: Cardiology;  Laterality: N/A;    UPPER GASTROINTESTINAL ENDOSCOPY  9/26/2006  Shane    Erythema in the lower third of the esophagus (NERD).  Patulous lower esophageal sphincter.   Gastric mucosal atrophy.   PARAG Test  Negative.     VEIN LIGATION  1964    BLE     Social History[1]      Objective      Vitals:    04/01/25 0813   BP: 139/67   Pulse: 61       The ASCVD Risk score (Bakari DK, et al., 2019) failed to calculate for the following reasons:    The 2019 ASCVD risk score is only valid for ages 40 to 79    Risk score cannot be calculated because patient has a medical history suggesting prior/existing ASCVD      LAST EKG  Results for orders placed or performed during the hospital encounter of 08/23/24   SCHEDULED EKG 12-LEAD (to Muse)    Collection Time: 08/23/24  8:43 AM   Result Value Ref Range    QRS Duration 90 ms    [facility] QTC Calculation 458 ms    Narrative    Test Reason : Z01.818    Vent. Rate : 064 BPM     Atrial Rate : 064 BPM     P-R Int : 190 ms          QRS Dur : 090 ms      QT Int : 444 ms        P-R-T Axes : 086 062 052 degrees     QTc Int : 458 ms    Normal sinus rhythm  Normal ECG  When compared with ECG of 12-JAN-2024 06:22,  Nonspecific T wave abnormality now evident in Inferior leads  Nonspecific T wave abnormality now evident in Lateral leads  Confirmed by Masood Martinez MD ([id number]) on 8/23/2024 11:52:00 AM    Referred By: CARRIE BLACK           Confirmed By:Masood Martinez MD     LIPIDS - LAST 2   Lab Results   Component Value Date    CHOL 117 (L) 02/17/2025    CHOL 144 06/03/2024    HDL 52 02/17/2025    HDL 70 06/03/2024    LDLCALC 52.4 (L) 02/17/2025    LDLCALC 65.0 06/03/2024    TRIG 63 02/17/2025    TRIG 45 06/03/2024    CHOLHDL 44.4 02/17/2025    CHOLHDL 48.6 06/03/2024     CARDIAC PROFILE - LAST 2  Lab Results   Component Value Date    TROPONINI <0.012 03/12/2018      CBC - LAST 2  Lab Results   Component Value Date    WBC 10.54 03/28/2025    WBC 7.78 02/17/2025    HGB 12.6 03/28/2025    HGB 12.1 02/17/2025    HCT 39.6 03/28/2025    HCT 39.3 02/17/2025     03/28/2025     02/17/2025     Lab Results   Component Value Date    LABPT 16.8 (H) 01/05/2024    LABPT 14.0 12/25/2022    INR 2.9 09/26/2024    INR 2.8 09/23/2024    APTT 36.1 01/05/2024    APTT 31.9 12/25/2022     CHEMISTRY - LAST 2  Lab Results   Component Value Date     03/28/2025     02/17/2025    K 3.7 03/28/2025    K 4.3 02/17/2025    CO2 26 03/28/2025    CO2 22 (L) 02/17/2025    BUN 12 03/28/2025    BUN 12 02/17/2025    CREATININE 0.52 03/28/2025    CREATININE 0.7 02/17/2025    GLU 91 03/28/2025    GLU 87 02/17/2025    CALCIUM 9.2 03/28/2025    CALCIUM 9.3 02/17/2025    MG 2.0 02/01/2025    MG 1.8 12/25/2022    ALBUMIN 3.6 02/17/2025    ALBUMIN 3.2 (L) 01/30/2025    ALT 15 02/17/2025    ALT 12 01/30/2025    AST 26 02/17/2025    AST 21 01/30/2025      ENDOCRINE - LAST 2  Lab Results   Component Value Date    HGBA1C 5.0 01/31/2025    HGBA1C 5.3 12/24/2022    TSH 1.609 01/31/2025    TSH 1.573 06/03/2024         PHYSICAL EXAM  CONSTITUTIONAL: Well built, well nourished in no apparent distress  NECK: no carotid bruit, no JVD  LUNGS: CTA  CHEST WALL: no tenderness  HEART: regular rate and rhythm, S1, S2 normal, no murmur, click, rub or gallop   ABDOMEN: soft, non-tender; bowel sounds normal; no masses,  no organomegaly  EXTREMITIES: Extremities normal, no edema, no calf tenderness noted  NEURO: AAO X 3    I HAVE REVIEWED :    The vital signs, most recent cardiac testing, and most recent pertinent non-cardiology provider notes.    Current Outpatient Medications   Medication Instructions    aspirin (ECOTRIN) 81 mg, Oral, Daily    atorvastatin (LIPITOR) 40 mg, Oral, Nightly    azelastine (ASTELIN) 137 mcg, Nasal, 2 times daily    BENEFIBER, WHEAT DEXTRIN, ORAL 10 mLs, Every morning    benzonatate (TESSALON) 200 mg, Oral, 3 times daily PRN    BIOTIN ORAL 1 tablet, After lunch    calcium carbonate/vitamin D3 (CALTRATE WITH VITAMIN D3 ORAL) 1 tablet, 2 times daily    celecoxib (CELEBREX) 200 mg, Oral, Daily    clopidogreL (PLAVIX) 75 mg, Oral, Daily    DOCUSATE SODIUM ORAL 1 capsule, 2 times daily    dorzolamide-timolol 2-0.5% (COSOPT) 22.3-6.8 mg/mL ophthalmic solution 1 drop, Both Eyes, 2 times daily    fluticasone propionate (FLONASE ALLERGY RELIEF) 50 mcg, Each Nostril, 2 times daily    gabapentin (NEURONTIN) 400 mg, Oral, 3 times daily    ibuprofen (ADVIL,MOTRIN) 200 mg, Every 4-6 hours PRN    latanoprost 0.005 % ophthalmic solution 1 drop, Both Eyes, Nightly    metoprolol succinate (TOPROL-XL) 25 mg, Oral, 2 times daily    mirtazapine (REMERON) 15 MG tablet Take 1 to 2 tablets by mouth at bedtime    traMADoL (ULTRAM) 50 mg, Oral, Every 12 hours PRN    valsartan (DIOVAN) 80 mg, Oral, Nightly    VIT C/E/ZN/COPPR/LUTEIN/ZEAXAN (PRESERVISION AREDS 2 ORAL) 1 capsule, 2 times daily        Assessment & Plan   Paroxysmal AF  S/p ILR  S/p Watchman  RRR today  Continue metoprolol succinate 25 mg BID  -Schedule ILR removal (per  patient request)    Peripheral vascular disease s/p PTA  No claudication  Continue aspirin 81 mg daily  Continue clopidogrel 75 mg daily    Hypertension  Stable  Continue valsartan 80 mg nightly    Carotid artery disease, left  -Carotid US    Mixed hyperlipidemia  Continue statin          I emphasized the importance of modifying lifestyle related risk factors including tobacco avoidance, limiting alcohol intake, aerobic exercise, weight management and Mediterranean diet.      Follow up in about 6 months (around 10/1/2025).     Uriel Amin NP  [facility]   Office: [phone]       [1]   Social History  Tobacco Use    Smoking status: Former     Current packs/day: 0.00     Average packs/day: 2.0 packs/day for 28.0 years (56.0 ttl pk-yrs)     Types: Cigarettes     Start date: 1976     Quit date: 2004     Years since quittin.2    Smokeless tobacco: Never   Substance Use Topics    Alcohol use: Yes     Comment: occ    Drug use: Yes     Types: Benzodiazepines

## 2025-04-01 NOTE — TELEPHONE ENCOUNTER
----- Message from Lissette sent at 4/1/2025 12:57 PM CDT -----  Contact: Keri w/[facility]  Type: Needs Medical AdviceWho Called:  Keri Symptoms (please be specific):  wound care How long has patient had these symptoms:  needs to know if they are to discharge the pt since they have orders for wound care but pt has been seeing the doctor once a week. Please just call back to advise, with the pt not being able to see can she do what is needed one her own.Best Call Back Number: [phone] Information: thanks she has been trying to reach someone for a couple of days.

## 2025-04-01 NOTE — PATIENT INSTRUCTIONS
Do not hold medications for Loop Removal    OK to eat light breakfast morning of procedure.    Arrive at [facility] for 9:30am

## 2025-04-02 ENCOUNTER — TELEPHONE (OUTPATIENT)
Dept: RESEARCH | Facility: HOSPITAL | Age: 85
End: 2025-04-02
Payer: MEDICARE

## 2025-04-02 NOTE — TELEPHONE ENCOUNTER
Study title: OSCAR APONTE  IRB #: 2024.114        Called to discuss participation into the OSCAR APONTE study, CRC briefly explained overview to both patient and spouse.      Patients voiced understanding and expressed interest in a screening visit following primary care appointment on 3 April 2025. Reminder CPU[employer] message will be sent about appointment.     CRC emailed consent form for review at patient's convenience, and vverified OOP cost with hotline, see attached media.       Juliette, clinical research coordinator  [phone]  [email or web address]

## 2025-04-03 ENCOUNTER — RESEARCH ENCOUNTER (OUTPATIENT)
Dept: RESEARCH | Facility: HOSPITAL | Age: 85
End: 2025-04-03
Payer: MEDICARE

## 2025-04-03 ENCOUNTER — OFFICE VISIT (OUTPATIENT)
Dept: PRIMARY CARE CLINIC | Facility: CLINIC | Age: 85
End: 2025-04-03
Payer: MEDICARE

## 2025-04-03 VITALS
WEIGHT: 132.06 LBS | SYSTOLIC BLOOD PRESSURE: 122 MMHG | DIASTOLIC BLOOD PRESSURE: 68 MMHG | OXYGEN SATURATION: 96 % | HEIGHT: 65 IN | HEART RATE: 63 BPM | BODY MASS INDEX: 22 KG/M2

## 2025-04-03 DIAGNOSIS — G89.29 CHRONIC MUSCULOSKELETAL PAIN: ICD-10-CM

## 2025-04-03 DIAGNOSIS — E78.2 MIXED HYPERLIPIDEMIA: Chronic | ICD-10-CM

## 2025-04-03 DIAGNOSIS — I10 PRIMARY HYPERTENSION: ICD-10-CM

## 2025-04-03 DIAGNOSIS — M79.18 CHRONIC MUSCULOSKELETAL PAIN: ICD-10-CM

## 2025-04-03 DIAGNOSIS — J43.9 PULMONARY EMPHYSEMA, UNSPECIFIED EMPHYSEMA TYPE: Chronic | ICD-10-CM

## 2025-04-03 DIAGNOSIS — F32.5 MAJOR DEPRESSION IN REMISSION: ICD-10-CM

## 2025-04-03 PROBLEM — F32.1 MAJOR DEPRESSIVE DISORDER, SINGLE EPISODE, MODERATE: Chronic | Status: RESOLVED | Noted: 2024-08-08 | Resolved: 2025-04-03

## 2025-04-03 PROCEDURE — 99999 PR PBB SHADOW E&M-EST. PATIENT-LVL IV: CPT | Mod: PBBFAC,,, | Performed by: FAMILY MEDICINE

## 2025-04-03 RX ORDER — TRAMADOL HYDROCHLORIDE 50 MG/1
50 TABLET ORAL EVERY 12 HOURS PRN
Qty: 20 TABLET | Refills: 1 | Status: SHIPPED | OUTPATIENT
Start: 2025-04-03

## 2025-04-03 NOTE — PROGRESS NOTES
Study title: OSCAR APONTE  IRB #: 2024.114      After weighing risks and benefits with PCP, patient and spouse decided not to proceed with the GALLERI cancer screening.     Crc  will leave contact info in this note for any future questions or concersns; neither patient will be contacted again for any protocol related activities.       Juliette Bush, clinical research coordinator  [phone]

## 2025-04-03 NOTE — PROGRESS NOTES
Primary Care Provider Appointment   [facility]       Patient ID: Serenity Epps is a 84 y.o. female.    ASSESSMENT/PLAN by Problem List:    Assessment & Plan    IMPRESSION:  - HTN is stable and well-controlled on current medication regimen.  - Dyslipidemia has improved with atorvastatin 40 mg; most recent LDL is 52, below target.  - Pulmonary emphysema is being monitored clinically; follows with pulmonology.  - Major depressive disorder is in remission; currently not on antidepressants except mirtazapine for insomnia.  - Recent labs show resolved anemia, normal blood count, and normal chemistry panel.  - Started Tramadol for intermittent pain management during flare-ups after considering risks and benefits.  - Evaluated carotid arteries; no audible bruits detected, but carotid US ordered for further assessment.    HYPERTENSION:  - Monitor hypertension as a chronic condition.  - Continue current medications for hypertension management.  - Blood pressure is currently stable and well-controlled on current medication regimen.  - Follow up on carotid ultrasound results.    HIP PAIN:  - Monitor patient's hip pain.  - Prescribed Tramadol for intermittent use during pain flare-ups.  - Educated patient on Tramadol's properties as a mild opioid with low addiction potential.  - Discussed potential side effects of Tramadol, including constipation and sedation.  - Explained risks associated with long-term use of Celebrex, including potential kidney issues and increased bleeding risk, especially for patients over 65.  - Advised patient to report any adverse effects or changes in pain levels.    LEG PAIN:  - Monitor patient's leg pain.  - Prescribed Tramadol for intermittent use during pain flare-ups.  - Educated patient on Tramadol's properties as a mild opioid with low addiction potential.  - Discussed potential side effects of Tramadol, including constipation and sedation.  - Explained risks  associated with long-term use of Celebrex, including potential kidney issues and increased bleeding risk, especially for patients over 65.  - Noted recent angiogram in the leg was successful in clearing blockages.  - Advised patient to report any adverse effects or changes in pain levels.    HYPERLIPIDEMIA:  - Monitor dyslipidemia as a chronic condition.  - Continue atorvastatin 40 mg daily.  - Recent cholesterol levels show improvement, with LDL at 52, below target.  - Discussed importance of maintaining low cholesterol levels to slow arterial plaque buildup.  - Encouraged regular diet and exercise, acknowledging challenges due to osteoarthritis.  - Follow up with lipid panel in 6 months.            CODING, ORDERS, AND ADDITIONAL DOCUMENTATION RELATED TO THIS ENCOUNTER:  (note that the diagnoses and clinical summaries above were generated with the assistance of [employer] software and represents my assessment and plan.  This software does not always generate the precise nor most specific diagnosis codes.  Therefore the specific diagnosis codes and orders for billing purposes are detailed below along with any additional documentation if needed)      1. Primary hypertension    2. Mixed hyperlipidemia    3. Pulmonary emphysema, unspecified emphysema type    4. Major depression in remission    5. Chronic musculoskeletal pain  -     traMADoL (ULTRAM) 50 mg tablet; Take 1 tablet (50 mg total) by mouth every 12 (twelve) hours as needed for Pain.  Dispense: 20 tablet; Refill: 1           Follow Up:  Three months        Subjective:       HPI    Patient is a/an 84 y.o.  female     For complete problem list, past medical history, surgical history, social history, etc., see appropriate section in the electronic medical record    History of Present Illness    CHIEF COMPLAINT:  Ms. Epps presents today for follow up of hypertension and other chronic conditions    CURRENT SYMPTOMS:  She has had cold symptoms for a few  weeks, including sore throat. While she initially noted improvement, she experienced a recurrence of cough and sore throat this morning, though the throat soreness has since subsided. She denies any other associated symptoms.    CHRONIC PAIN:  She experiences pain in hips and legs. Previously prescribed Tramadol by Dr. Garza for foot pain which provided significant relief. She is not currently taking Celebrex.    MEDICAL HISTORY:  Carotid ultrasound in 2022 showed no major blockage. She recently underwent angiogram of leg with vessel cleaning procedure.    COVID HISTORY:  She denies history of COVID-19 infection, including when her  was infected.    LABS:  Recent labs show resolved anemia with normal blood count. Chemistry panel was normal, including electrolytes, kidney function, and calcium. LDL cholesterol has decreased.      ROS:  ENT: +sore throat  Respiratory: +cough  Musculoskeletal: +joint pain, +limb pain       Review of Systems   HENT:  Positive for congestion.    Respiratory:  Negative for cough, shortness of breath and wheezing.    Cardiovascular: Negative.    Gastrointestinal: Negative.    Genitourinary: Negative.    Musculoskeletal:  Positive for arthralgias and gait problem.       Objective     Physical Exam  Vitals reviewed.   Constitutional:       General: She is not in acute distress.     Appearance: She is well-developed. She is not diaphoretic.   HENT:      Head: Normocephalic and atraumatic.      Nose: Congestion present.      Mouth/Throat:      Pharynx: Oropharynx is clear. No oropharyngeal exudate.   Eyes:      General: No scleral icterus.     Conjunctiva/sclera: Conjunctivae normal.   Cardiovascular:      Rate and Rhythm: Normal rate and regular rhythm.      Comments: No peripheral edema  Pulmonary:      Effort: Pulmonary effort is normal. No respiratory distress.      Comments: Few scattered rhonchi, mildly diminished breath sounds, no wheezes or distress  Skin:     Coloration:  "Skin is not jaundiced.   Neurological:      Mental Status: She is alert and oriented to person, place, and time.      Comments:  Antalgic gait.  Using a walker   Psychiatric:         Mood and Affect: Mood normal.         Behavior: Behavior normal.       Vitals:    04/03/25 0741   BP: 122/68   BP Location: Left arm   Patient Position: Sitting   Pulse: 63   SpO2: 96%   Weight: 59.9 kg (132 lb 0.9 oz)   Height: 5' 5" (1.651 m)     Physical Exam                This note was generated with the assistance of ambient listening technology. Verbal consent was obtained by the patient and accompanying visitor(s) for the recording of patient appointment to facilitate this note. I attest to having reviewed and edited the generated note for accuracy, though some syntax or spelling errors may persist. Please contact the author of this note for any clarification.  Parts of the note were also generated with voice recognition software.  Occasionally this software will misinterpreted words or phrases    "

## 2025-04-05 ENCOUNTER — DOCUMENT SCAN (OUTPATIENT)
Dept: HOME HEALTH SERVICES | Facility: HOSPITAL | Age: 85
End: 2025-04-05
Payer: MEDICARE

## 2025-04-07 ENCOUNTER — OFFICE VISIT (OUTPATIENT)
Dept: PODIATRY | Facility: CLINIC | Age: 85
End: 2025-04-07
Payer: MEDICARE

## 2025-04-07 VITALS — WEIGHT: 132.06 LBS | HEIGHT: 65 IN | BODY MASS INDEX: 22 KG/M2

## 2025-04-07 DIAGNOSIS — L97.922 CHRONIC ULCER OF LEFT LEG WITH FAT LAYER EXPOSED: Primary | ICD-10-CM

## 2025-04-07 DIAGNOSIS — I73.9 PERIPHERAL VASCULAR DISEASE: ICD-10-CM

## 2025-04-07 PROCEDURE — 11042 DBRDMT SUBQ TIS 1ST 20SQCM/<: CPT | Mod: S$GLB,,, | Performed by: PODIATRIST

## 2025-04-07 PROCEDURE — 99999 PR PBB SHADOW E&M-EST. PATIENT-LVL III: CPT | Mod: PBBFAC,,, | Performed by: PODIATRIST

## 2025-04-07 PROCEDURE — 99499 UNLISTED E&M SERVICE: CPT | Mod: S$GLB,,, | Performed by: PODIATRIST

## 2025-04-07 NOTE — PROGRESS NOTES
Subjective:      Patient ID: Serenity Epps is a 84 y.o. female.    Chief Complaint: Wound Care    Serenity is a 84 y.o. female who presents to the clinic for evaluation and treatment of high risk feet. 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 The patient's chief complaint is cellulitis to the lower extremity she was in the hospital end of January with X-rays and MRI, no osteomyelitis was seen on imaging. She was discharged on PO antibiotics and presents for follow up. Pain has persisted and the area is still erythematous    2/17/25: patient returns for wound care left leg in unna boot wraps    2/24/25: Patient returns for wound care left leg in the unna boot wraps no new changes    3/11/25: patient returns for wound care and unna boot change has an appointment with vascular this Thursday    3/17/25: Patient returns for wound care left leg, she has an angiogram scheduled for later this week    3/24/25: patient returns for wound care left leg, angiogram was rescheduled from last week to this Friday    3/31/25: Patient returns for wound care left leg, had her angiogram last week, appreciate Dr. Moreno's work. Seems to be doing well with that, no new complaints.    4/7/25: Patient returns for wound care left leg no new complaints, has less pain since her angiogram    PCP: Shant Jc,  MD    2/5/25    Current shoe gear:  Affected Foot: Casual shoes     Unaffected Foot: Casual shoes    History of Trauma: negative      Hemoglobin A1C   Date Value Ref Range Status   01/31/2025 5.0 4.0 - 5.6 % Final     Comment:     Reference Interval:  5.0 - 5.6 Normal   5.7 - 6.4 High Risk   > 6.5 Diabetic      Hgb A1c results are standardized based on the (NGSP) National   Glycohemoglobin Standardization Program.      Hemoglobin A1C levels are related to mean serum/plasma glucose   during the preceding 2-3 months.        12/24/2022 5.3 0.0 - 5.6 % Final     Comment:     Reference Interval:  5.0 - 5.6 Normal   5.7 - 6.4 High Risk   > 6.5 Diabetic      Hgb A1c results are standardized based on the (NGSP) National   Glycohemoglobin Standardization Program.      Hemoglobin A1C levels are related to mean serum/plasma glucose   during the preceding 2-3 months.              Review of Systems   Constitutional: Negative for chills and fever.   Cardiovascular: Positive for leg swelling. Negative for claudication.   Respiratory: Negative for shortness of breath.    Skin: Positive for color change, nail changes and poor wound healing. Negative for itching and rash.   Musculoskeletal: Negative for muscle cramps, muscle weakness and myalgias.   Gastrointestinal: Negative for nausea and vomiting.   Neurological: Negative for focal weakness, loss of balance, numbness and paresthesias.           Objective:      Physical Exam  Constitutional:       General: She is not in acute distress.     Appearance: She is well-developed. She is not diaphoretic.   Cardiovascular:      Pulses:           Dorsalis pedis pulses are 1+ on the right side and 1+ on the left side.        Posterior tibial pulses are 1+ on the right side and 1+ on the left side.      Comments: < 3 sec capillary refill time to toes 1-5 bilateral. Feet are warm to touch proximally with distal cooling b/l. There is no hair growth on the feet and toes b/l. There is 1+ edema  b/l. No spider veins or varicosities present b/l.     Musculoskeletal:      Comments: Equinus noted b/l ankles with < 10 deg DF noted. MMT 5/5 in DF/PF/Inv/Ev resistance with no reproduction of pain in any direction. Passive range of motion of ankle and pedal joints is painless b/l.     Skin:     General: Skin is warm and dry.      Coloration: Skin is not pale.      Findings: Erythema and lesion present. No abrasion, bruising, burn, ecchymosis, laceration, petechiae or rash.      Nails: There is no clubbing.      Comments: Skin is thin and atrophic    Left plantar first metatarsal, midfoot and heel all on the left foot there is hyperkeratotic lesions x2    Nails 1-5 bilateral are thick 3-4 mm, long 3-6 mm, and discolored with subungual debris    Left leg medial aspect proximal to the medial malleolus there is now an open wound where the abscess was drained.  0.6x0.5x0.2 post:  0.7x0.5x0.3 cm with granular base no erythema and serous drainage.     Neurological:      Mental Status: She is alert and oriented to person, place, and time.      Sensory: No sensory deficit.      Motor: No tremor, atrophy or abnormal muscle tone.      Comments: Negative tinel sign bilateral.   Psychiatric:         Behavior: Behavior normal.               Assessment:       Encounter Diagnoses   Name Primary?    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    Wound debrided and cleansed and flushed with saline    Dressed again in erick, mepilex border and tubi  compression    Return in 1 week for wound care     Shola Dawson DPM

## 2025-04-07 NOTE — PROCEDURES
"Wound Debridement    Date/Time: 4/7/2025 8:00 AM    Performed by: Shola Dawson DPM  Authorized by: Shola Dawson DPM    Time out: Immediately prior to procedure a "time out" was called to verify the correct patient, procedure, equipment, support staff and site/side marked as required.    Consent Done?:  Yes (Verbal)    Preparation: Patient was prepped and draped in usual sterile fashion    Local anesthesia used?: No      Wound Details:    Location:  Left leg    Type of Debridement:  Excisional       Length (cm):  0.7       Width (cm):  0.5       Depth (cm):  0.3       Area (sq cm):  0.27       Percent Debrided (%):  100       Total Area Debrided (sq cm):  0.27    Depth of debridement:  Subcutaneous tissue    Devitalized tissue debrided:  Biofilm and Slough    Instruments:  Curette  Bleeding:  Minimal  Hemostasis Achieved: Yes  Patient tolerance:  Patient tolerated the procedure well with no immediate complications    "

## 2025-04-09 ENCOUNTER — HOSPITAL ENCOUNTER (OUTPATIENT)
Dept: RADIOLOGY | Facility: HOSPITAL | Age: 85
Discharge: HOME | End: 2025-04-09
Payer: MEDICARE

## 2025-04-09 ENCOUNTER — RESULTS FOLLOW-UP (OUTPATIENT)
Dept: CARDIOLOGY | Facility: CLINIC | Age: 85
End: 2025-04-09

## 2025-04-09 DIAGNOSIS — I65.22 STENOSIS OF LEFT INTERNAL CAROTID ARTERY: Chronic | ICD-10-CM

## 2025-04-09 PROCEDURE — 93880 EXTRACRANIAL BILAT STUDY: CPT | Mod: TC,PO

## 2025-04-09 PROCEDURE — 93880 EXTRACRANIAL BILAT STUDY: CPT | Mod: 26,,, | Performed by: RADIOLOGY

## 2025-04-13 DIAGNOSIS — J31.0 CHRONIC RHINITIS: ICD-10-CM

## 2025-04-14 ENCOUNTER — OFFICE VISIT (OUTPATIENT)
Dept: PODIATRY | Facility: CLINIC | Age: 85
End: 2025-04-14
Payer: MEDICARE

## 2025-04-14 VITALS — WEIGHT: 132.06 LBS | HEIGHT: 65 IN | BODY MASS INDEX: 22 KG/M2

## 2025-04-14 DIAGNOSIS — L97.922 CHRONIC ULCER OF LEFT LEG WITH FAT LAYER EXPOSED: Primary | ICD-10-CM

## 2025-04-14 DIAGNOSIS — I73.9 PERIPHERAL VASCULAR DISEASE: ICD-10-CM

## 2025-04-14 PROCEDURE — 11042 DBRDMT SUBQ TIS 1ST 20SQCM/<: CPT | Mod: S$GLB,,, | Performed by: PODIATRIST

## 2025-04-14 PROCEDURE — 99999 PR PBB SHADOW E&M-EST. PATIENT-LVL III: CPT | Mod: PBBFAC,,, | Performed by: PODIATRIST

## 2025-04-14 PROCEDURE — 99499 UNLISTED E&M SERVICE: CPT | Mod: S$GLB,,, | Performed by: PODIATRIST

## 2025-04-14 RX ORDER — AZELASTINE 1 MG/ML
1 SPRAY, METERED NASAL 2 TIMES DAILY
Qty: 90 ML | Refills: 3 | Status: SHIPPED | OUTPATIENT
Start: 2025-04-14

## 2025-04-14 NOTE — PROGRESS NOTES
Subjective:      Patient ID: Serenity Epps is a 84 y.o. female.    Chief Complaint: Wound Care    Serenity is a 84 y.o. female who presents to the clinic for evaluation and treatment of high risk feet. 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 The patient's chief complaint is cellulitis to the lower extremity she was in the hospital end of January with X-rays and MRI, no osteomyelitis was seen on imaging. She was discharged on PO antibiotics and presents for follow up. Pain has persisted and the area is still erythematous    2/17/25: patient returns for wound care left leg in unna boot wraps    2/24/25: Patient returns for wound care left leg in the unna boot wraps no new changes    3/11/25: patient returns for wound care and unna boot change has an appointment with vascular this Thursday    3/17/25: Patient returns for wound care left leg, she has an angiogram scheduled for later this week    3/24/25: patient returns for wound care left leg, angiogram was rescheduled from last week to this Friday    3/31/25: Patient returns for wound care left leg, had her angiogram last week, appreciate Dr. Moreno's work. Seems to be doing well with that, no new complaints.    4/7/25: Patient returns for wound care left leg no new complaints, has less pain since her angiogram    4/14/25: Patient returns  for wound care left leg no new complaints    PCP: Shant Jc MD    2/5/25    Current shoe gear:  Affected Foot: Casual shoes     Unaffected Foot: Casual shoes    History of Trauma: negative      Hemoglobin A1C   Date Value Ref Range Status   01/31/2025 5.0 4.0 - 5.6 % Final     Comment:     Reference Interval:  5.0 - 5.6 Normal   5.7 - 6.4 High Risk   > 6.5 Diabetic      Hgb A1c results are standardized based on the (NGSP) National   Glycohemoglobin Standardization Program.      Hemoglobin A1C levels are related to mean serum/plasma glucose   during the preceding 2-3 months.        12/24/2022 5.3 0.0 - 5.6 % Final     Comment:     Reference Interval:  5.0 - 5.6 Normal   5.7 - 6.4 High Risk   > 6.5 Diabetic      Hgb A1c results are standardized based on the (NGSP) National   Glycohemoglobin Standardization Program.      Hemoglobin A1C levels are related to mean serum/plasma glucose   during the preceding 2-3 months.              Review of Systems   Constitutional: Negative for chills and fever.   Cardiovascular: Positive for leg swelling. Negative for claudication.   Respiratory: Negative for shortness of breath.    Skin: Positive for color change, nail changes and poor wound healing. Negative for itching and rash.   Musculoskeletal: Negative for muscle cramps, muscle weakness and myalgias.   Gastrointestinal: Negative for nausea and vomiting.   Neurological: Negative for focal weakness, loss of balance, numbness and paresthesias.           Objective:      Physical Exam  Constitutional:       General: She is not in acute distress.     Appearance: She is well-developed. She is not diaphoretic.   Cardiovascular:      Pulses:           Dorsalis pedis pulses are 1+ on the right side and 1+ on the left side.        Posterior tibial pulses are 1+ on the right side and 1+ on the left side.      Comments: < 3 sec capillary refill time to toes 1-5 bilateral. Feet are warm to touch proximally with distal cooling  b/l. There is no hair growth on the feet and toes b/l. There is 1+ edema b/l. No spider veins or varicosities present b/l.     Musculoskeletal:      Comments: Equinus noted b/l ankles with < 10 deg DF noted. MMT 5/5 in DF/PF/Inv/Ev resistance with no reproduction of pain in any direction. Passive range of motion of ankle and pedal joints is painless b/l.     Skin:     General: Skin is warm and dry.      Coloration: Skin is not pale.      Findings: Erythema and lesion present. No abrasion, bruising, burn, ecchymosis, laceration, petechiae or rash.      Nails: There is no clubbing.      Comments: Skin is thin and atrophic    Left plantar first metatarsal, midfoot and heel all on the left foot there is hyperkeratotic lesions x2    Nails 1-5 bilateral are thick 3-4 mm, long 3-6 mm, and discolored with subungual debris    Left leg medial aspect proximal to the medial malleolus there is an open wound where the abscess was drained.  0.5x0.4x0.2 post:  0.6x0.5x0.3 cm with granular base no erythema and serous drainage.     Neurological:      Mental Status: She is alert and oriented to person, place, and time.      Sensory: No sensory deficit.      Motor: No tremor, atrophy or abnormal muscle tone.      Comments: Negative tinel sign bilateral.   Psychiatric:         Behavior: Behavior normal.               Assessment:       No diagnosis found.                      Plan:       There are no diagnoses linked to this encounter.              I counseled the patient on her conditions, their implications and medical management.    Wound debrided and cleansed and flushed with saline    Dressed again in erick, mepilex border and tubi  compression    Return in 1 week for wound care     Shola Dawson DPM                                        61.8

## 2025-04-14 NOTE — PROCEDURES
"Wound Debridement    Date/Time: 4/14/2025 8:45 AM    Performed by: Shola Dawson DPM  Authorized by: Shola Dawson DPM    Time out: Immediately prior to procedure a "time out" was called to verify the correct patient, procedure, equipment, support staff and site/side marked as required.    Consent Done?:  Yes (Verbal)    Preparation: Patient was prepped and draped in usual sterile fashion    Local anesthesia used?: No      Wound Details:    Location:  Left leg    Type of Debridement:  Excisional       Length (cm):  0.6       Width (cm):  0.5       Depth (cm):  0.3       Area (sq cm):  0.24       Percent Debrided (%):  100       Total Area Debrided (sq cm):  0.24    Depth of debridement:  Subcutaneous tissue    Devitalized tissue debrided:  Slough and Biofilm    Instruments:  Curette  Bleeding:  Minimal  Hemostasis Achieved: Yes  Method Used:  Pressure  Patient tolerance:  Patient tolerated the procedure well with no immediate complications    "

## 2025-04-16 ENCOUNTER — HOSPITAL ENCOUNTER (OUTPATIENT)
Dept: CARDIOLOGY | Facility: HOSPITAL | Age: 85
Discharge: HOME | End: 2025-04-16
Attending: INTERNAL MEDICINE
Payer: MEDICARE

## 2025-04-16 ENCOUNTER — CLINICAL SUPPORT (OUTPATIENT)
Dept: CARDIOLOGY | Facility: HOSPITAL | Age: 85
End: 2025-04-16
Payer: MEDICARE

## 2025-04-16 DIAGNOSIS — R00.2 PALPITATIONS: ICD-10-CM

## 2025-04-16 PROBLEM — Z98.890 HISTORY OF LOOP RECORDER: Status: ACTIVE | Noted: 2025-04-16

## 2025-04-16 PROCEDURE — 93298 REM INTERROG DEV EVAL SCRMS: CPT | Mod: 26,,, | Performed by: INTERNAL MEDICINE

## 2025-04-16 PROCEDURE — 93298 REM INTERROG DEV EVAL SCRMS: CPT | Mod: PO | Performed by: INTERNAL MEDICINE

## 2025-04-17 ENCOUNTER — TELEPHONE (OUTPATIENT)
Dept: CARDIOLOGY | Facility: CLINIC | Age: 85
End: 2025-04-17
Payer: MEDICARE

## 2025-04-17 NOTE — TELEPHONE ENCOUNTER
----- Message from Colby sent at 4/17/2025  9:32 AM CDT -----  Contact: self  Type:  Sooner Appointment RequestCaller is requesting a sooner appointment.  Caller declined first available appointment listed below.  Caller will not accept being placed on the waitlist and is requesting a message be sent to doctor.Name of Caller:  PTWhen is the first available appointment?  N/aSymptoms:  f/uWould the patient rather a call back or a response via [facility]? callBest Call Back Number:  [phone] Additional Information:

## 2025-04-17 NOTE — TELEPHONE ENCOUNTER
Spoke with spouse who states patient is doing well, dressing is clean, dry and intact. Instructed on signs and symptoms of infection to report such as: redness, warmth, pain or purulent drainage. Verbalized understanding.

## 2025-04-21 ENCOUNTER — OFFICE VISIT (OUTPATIENT)
Dept: PODIATRY | Facility: CLINIC | Age: 85
End: 2025-04-21
Payer: MEDICARE

## 2025-04-21 VITALS — BODY MASS INDEX: 22.2 KG/M2 | HEIGHT: 64 IN | WEIGHT: 130.06 LBS

## 2025-04-21 DIAGNOSIS — L97.922 CHRONIC ULCER OF LEFT LEG WITH FAT LAYER EXPOSED: Primary | ICD-10-CM

## 2025-04-21 DIAGNOSIS — I73.9 PERIPHERAL VASCULAR DISEASE: ICD-10-CM

## 2025-04-21 LAB
OHS CV AF BURDEN PERCENT: < 1
OHS CV DC REMOTE DEVICE TYPE: NORMAL

## 2025-04-21 PROCEDURE — 99999 PR PBB SHADOW E&M-EST. PATIENT-LVL III: CPT | Mod: PBBFAC,,, | Performed by: PODIATRIST

## 2025-04-21 PROCEDURE — 11042 DBRDMT SUBQ TIS 1ST 20SQCM/<: CPT | Mod: S$GLB,,, | Performed by: PODIATRIST

## 2025-04-21 PROCEDURE — 99499 UNLISTED E&M SERVICE: CPT | Mod: S$GLB,,, | Performed by: PODIATRIST

## 2025-04-21 NOTE — PROGRESS NOTES
Subjective:      Patient ID: Serenity Epps is a 84 y.o. female.    Chief Complaint: Wound Care    Serenity is a 84 y.o. female who presents to the clinic for evaluation and treatment of high risk feet. 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 The patient's chief complaint is cellulitis to the lower extremity she was in the hospital end of January with X-rays and MRI, no osteomyelitis was seen on imaging. She was discharged on PO antibiotics and presents for follow up. Pain has persisted and the area is still erythematous    2/17/25: patient returns for wound care left leg in unna boot wraps    2/24/25: Patient returns for wound care left leg in the unna boot wraps no new changes    3/11/25: patient returns for wound care and unna boot change has an appointment with vascular this Thursday    3/17/25: Patient returns for wound care left leg, she has an angiogram scheduled for later this week    3/24/25: patient returns for wound care left leg, angiogram was rescheduled from last week to this Friday    3/31/25: Patient returns for wound care left leg, had her angiogram last week, appreciate Dr. Moreno's work. Seems to be doing well with that, no new complaints.    4/7/25: Patient returns for wound care left leg no new complaints, has less pain since her angiogram    4/14/25: Patient returns  for wound care left leg no new complaints    4/21/25: patient returns for wound care left leg doing well overall no new complaints.     PCP: Shant Jc MD    2/5/25    Current shoe gear:  Affected Foot: Casual shoes     Unaffected Foot: Casual shoes    History of Trauma: negative      Hemoglobin A1C   Date Value Ref Range Status   01/31/2025 5.0 4.0 - 5.6 % Final     Comment:     Reference Interval:  5.0 - 5.6 Normal   5.7 - 6.4 High Risk   > 6.5 Diabetic      Hgb A1c results are standardized based on the (NGSP) National   Glycohemoglobin Standardization Program.      Hemoglobin A1C levels are related to mean serum/plasma glucose   during the preceding 2-3 months.        12/24/2022 5.3 0.0 - 5.6 % Final     Comment:     Reference Interval:  5.0 - 5.6 Normal   5.7 - 6.4 High Risk   > 6.5 Diabetic      Hgb A1c results are standardized based on the (NGSP) National   Glycohemoglobin Standardization Program.      Hemoglobin A1C levels are related to mean serum/plasma glucose   during the preceding 2-3 months.              Review of Systems   Constitutional: Negative for chills and fever.   Cardiovascular: Positive for leg swelling. Negative for claudication.   Respiratory: Negative for shortness of breath.    Skin: Positive for color change, nail changes and poor wound healing. Negative for itching and rash.   Musculoskeletal: Negative for muscle cramps, muscle weakness and myalgias.   Gastrointestinal: Negative for nausea and vomiting.   Neurological: Negative for focal weakness, loss of balance, numbness and paresthesias.           Objective:      Physical Exam  Constitutional:       General: She is not in acute distress.     Appearance: She is well-developed. She is not diaphoretic.   Cardiovascular:      Pulses:           Dorsalis pedis pulses are 1+ on the right side and 1+ on the left side.        Posterior tibial pulses are 1+ on the right side and 1+ on the left side.      Comments: < 3 sec capillary  refill time to toes 1-5 bilateral. Feet are warm to touch proximally with distal cooling b/l. There is no hair growth on the feet and toes b/l. There is 1+ edema b/l. No spider veins or varicosities present b/l.     Musculoskeletal:      Comments: Equinus noted b/l ankles with < 10 deg DF noted. MMT 5/5 in DF/PF/Inv/Ev resistance with no reproduction of pain in any direction. Passive range of motion of ankle and pedal joints is painless b/l.     Skin:     General: Skin is warm and dry.      Coloration: Skin is not pale.      Findings: Erythema and lesion present. No abrasion, bruising, burn, ecchymosis, laceration, petechiae or rash.      Nails: There is no clubbing.      Comments: Skin is thin and atrophic    Left plantar first metatarsal, midfoot and heel all on the left foot there is hyperkeratotic lesions x2    Nails 1-5 bilateral are thick 3-4 mm, long 3-6 mm, and discolored with subungual debris    Left leg medial aspect proximal to the medial malleolus there is an open wound where the abscess was drained.  0.5x0.3x0.1 post:  0.6x0.4x0.3 cm with granular base no erythema and serous drainage.     Neurological:      Mental Status: She is alert and oriented to person, place, and time.      Sensory: No sensory deficit.      Motor: No tremor, atrophy or abnormal muscle tone.      Comments: Negative tinel sign bilateral.   Psychiatric:         Behavior: Behavior normal.               Assessment:       Encounter Diagnoses   Name Primary?    Chronic ulcer of left leg with fat layer exposed Yes    Peripheral vascular disease                          Plan:       Serenity was seen today for wound care.    Diagnoses and all orders for this visit:    Chronic ulcer of left leg with fat layer exposed  -     Wound Debridement    Peripheral vascular disease  -     Wound Debridement                  I counseled the patient on her conditions, their implications and medical management.    Wound debrided and cleansed and flushed with  saline    Dressed again in erick, mepilex border and tubi  compression    Return in 1 week for wound care     Shola Dawson DPM

## 2025-04-21 NOTE — PROCEDURES
"Wound Debridement    Date/Time: 4/21/2025 8:00 AM    Performed by: Shola Dawson DPM  Authorized by: Shola Dawson DPM    Time out: Immediately prior to procedure a "time out" was called to verify the correct patient, procedure, equipment, support staff and site/side marked as required.    Consent Done?:  Yes (Verbal)    Preparation: Patient was prepped and draped in usual sterile fashion    Local anesthesia used?: No      Wound Details:    Location:  Left leg    Type of Debridement:  Excisional       Length (cm):  0.6       Width (cm):  0.4       Depth (cm):  0.3       Area (sq cm):  0.19       Percent Debrided (%):  100       Total Area Debrided (sq cm):  0.19    Depth of debridement:  Subcutaneous tissue    Devitalized tissue debrided:  Biofilm and Slough    Instruments:  Curette  Bleeding:  Minimal  Hemostasis Achieved: Yes  Method Used:  Pressure  Patient tolerance:  Patient tolerated the procedure well with no immediate complications    "

## 2025-04-25 ENCOUNTER — TELEPHONE (OUTPATIENT)
Facility: CLINIC | Age: 85
End: 2025-04-25
Payer: MEDICARE

## 2025-04-25 NOTE — TELEPHONE ENCOUNTER
Spoke w/ pt. Pt denies any issues w/ taking medication. Per Dr. Jaime's advice continue taking. Pt agreed and verbalized understanding.      ----- Message from Stoner sent at 4/25/2025  8:29 AM CDT -----  Contact: pt  Type:  Needs Medical AdviceWho Called: ptWould the patient rather a call back or a response via [facility]? Call back Best Call Back Number: [phone] Information: pt is wondering if she is supposed to continue taking clopidogreL (PLAVIX) 75 mg tablet or if she can stop. She is almost finished with her 30 day fill from her Angiogram. Please call back and advise when you can.

## 2025-04-28 ENCOUNTER — OFFICE VISIT (OUTPATIENT)
Dept: PODIATRY | Facility: CLINIC | Age: 85
End: 2025-04-28
Payer: MEDICARE

## 2025-04-28 VITALS — HEIGHT: 64 IN | BODY MASS INDEX: 22.2 KG/M2 | WEIGHT: 130.06 LBS

## 2025-04-28 DIAGNOSIS — I73.9 PERIPHERAL VASCULAR DISEASE: ICD-10-CM

## 2025-04-28 DIAGNOSIS — L97.922 CHRONIC ULCER OF LEFT LEG WITH FAT LAYER EXPOSED: Primary | ICD-10-CM

## 2025-04-28 PROCEDURE — 99999 PR PBB SHADOW E&M-EST. PATIENT-LVL III: CPT | Mod: PBBFAC,,, | Performed by: PODIATRIST

## 2025-04-28 PROCEDURE — 11042 DBRDMT SUBQ TIS 1ST 20SQCM/<: CPT | Mod: LT,S$GLB,, | Performed by: PODIATRIST

## 2025-04-28 PROCEDURE — 99499 UNLISTED E&M SERVICE: CPT | Mod: S$GLB,,, | Performed by: PODIATRIST

## 2025-04-28 RX ORDER — ATORVASTATIN CALCIUM 40 MG/1
40 TABLET, FILM COATED ORAL NIGHTLY
Qty: 100 TABLET | Refills: 1 | Status: SHIPPED | OUTPATIENT
Start: 2025-04-28

## 2025-04-28 NOTE — PROCEDURES
"Wound Debridement    Date/Time: 4/28/2025 8:00 AM    Performed by: Shola Dawson DPM  Authorized by: Shola Dawson DPM    Time out: Immediately prior to procedure a "time out" was called to verify the correct patient, procedure, equipment, support staff and site/side marked as required.    Consent Done?:  Yes (Verbal)    Preparation: Patient was prepped and draped in usual sterile fashion    Local anesthesia used?: No      Wound Details:    Location:  Left leg    Type of Debridement:  Excisional       Length (cm):  0.6       Width (cm):  0.4       Depth (cm):  0.3       Area (sq cm):  0.19       Percent Debrided (%):  100       Total Area Debrided (sq cm):  0.19    Depth of debridement:  Subcutaneous tissue    Devitalized tissue debrided:  Slough and Biofilm    Instruments:  Curette  Bleeding:  Minimal  Hemostasis Achieved: Yes  Method Used:  Pressure  Patient tolerance:  Patient tolerated the procedure well with no immediate complications    "

## 2025-04-28 NOTE — PROGRESS NOTES
Subjective:      Patient ID: Serenity Epps is a 84 y.o. female.    Chief Complaint: Wound Care    Serenity is a 84 y.o. female who presents to the clinic for evaluation and treatment of high risk feet. 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 The patient's chief complaint is cellulitis to the lower extremity she was in the hospital end of January with X-rays and MRI, no osteomyelitis was seen on imaging. She was discharged on PO antibiotics and presents for follow up. Pain has persisted and the area is still erythematous    2/17/25: patient returns for wound care left leg in unna boot wraps    2/24/25: Patient returns for wound care left leg in the unna boot wraps no new changes    3/11/25: patient returns for wound care and unna boot change has an appointment with vascular this Thursday    3/17/25: Patient returns for wound care left leg, she has an angiogram scheduled for later this week    3/24/25: patient returns for wound care left leg, angiogram was rescheduled from last week to this Friday    3/31/25: Patient returns for wound care left leg, had her angiogram last week, appreciate Dr. Moreno's work. Seems to be doing well with that, no new complaints.    4/7/25: Patient returns for wound care left leg no new complaints, has less pain since her angiogram    4/14/25: Patient returns  for wound care left leg no new complaints    4/21/25: patient returns for wound care left leg doing well overall no new complaints.     4/28/25: patient returns for wound care left leg no new complaints.    PCP: Shant Jc MD    2/5/25    Current shoe gear:  Affected Foot: Casual shoes     Unaffected Foot: Casual shoes    History of Trauma: negative      Hemoglobin A1C   Date Value Ref Range Status   01/31/2025 5.0 4.0 - 5.6 % Final     Comment:     Reference Interval:  5.0 - 5.6 Normal   5.7 - 6.4 High Risk   > 6.5 Diabetic      Hgb A1c results are standardized based on the (NGSP) National   Glycohemoglobin Standardization Program.      Hemoglobin A1C levels are related to mean serum/plasma glucose   during the preceding 2-3 months.        12/24/2022 5.3 0.0 - 5.6 % Final     Comment:     Reference Interval:  5.0 - 5.6 Normal   5.7 - 6.4 High Risk   > 6.5 Diabetic      Hgb A1c results are standardized based on the (NGSP) National   Glycohemoglobin Standardization Program.      Hemoglobin A1C levels are related to mean serum/plasma glucose   during the preceding 2-3 months.              Review of Systems   Constitutional: Negative for chills and fever.   Cardiovascular: Positive for leg swelling. Negative for claudication.   Respiratory: Negative for shortness of breath.    Skin: Positive for color change, nail changes and poor wound healing. Negative for itching and rash.   Musculoskeletal: Negative for muscle cramps, muscle weakness and myalgias.   Gastrointestinal: Negative for nausea and vomiting.   Neurological: Negative for focal weakness, loss of balance, numbness and paresthesias.           Objective:      Physical Exam  Constitutional:       General: She is not in acute distress.     Appearance: She is well-developed. She is not diaphoretic.   Cardiovascular:      Pulses:           Dorsalis pedis pulses are 1+ on the right side and 1+ on the left side.        Posterior tibial pulses are 1+ on  the right side and 1+ on the left side.      Comments: < 3 sec capillary refill time to toes 1-5 bilateral. Feet are warm to touch proximally with distal cooling b/l. There is no hair growth on the feet and toes b/l. There is 1+ edema b/l. No spider veins or varicosities present b/l.     Musculoskeletal:      Comments: Equinus noted b/l ankles with < 10 deg DF noted. MMT 5/5 in DF/PF/Inv/Ev resistance with no reproduction of pain in any direction. Passive range of motion of ankle and pedal joints is painless b/l.     Skin:     General: Skin is warm and dry.      Coloration: Skin is not pale.      Findings: Erythema and lesion present. No abrasion, bruising, burn, ecchymosis, laceration, petechiae or rash.      Nails: There is no clubbing.      Comments: Skin is thin and atrophic    Left plantar first metatarsal, midfoot and heel all on the left foot there is hyperkeratotic lesions x2    Nails 1-5 bilateral are thick 3-4 mm, long 3-6 mm, and discolored with subungual debris    Left leg medial aspect proximal to the medial malleolus there is an open wound where the abscess was drained.  0.5x0.3x0.1 post:  0.6x0.4x0.3 cm with granular base no erythema and serous drainage.     Neurological:      Mental Status: She is alert and oriented to person, place, and time.      Sensory: No sensory deficit.      Motor: No tremor, atrophy or abnormal muscle tone.      Comments: Negative tinel sign bilateral.   Psychiatric:         Behavior: Behavior normal.               Assessment:       No diagnosis found.                        Plan:       There are no diagnoses linked to this encounter.                I counseled the patient on her conditions, their implications and medical management.    Wound debrided and cleansed and flushed with saline    Dressed again in erick, mepilex border and tubi  compression    Return in 1 week for wound care     Shola Dawson DPM

## 2025-05-05 ENCOUNTER — OFFICE VISIT (OUTPATIENT)
Dept: PODIATRY | Facility: CLINIC | Age: 85
End: 2025-05-05
Payer: MEDICARE

## 2025-05-05 VITALS — BODY MASS INDEX: 22.2 KG/M2 | WEIGHT: 130.06 LBS | HEIGHT: 64 IN

## 2025-05-05 DIAGNOSIS — L97.922 CHRONIC ULCER OF LEFT LEG WITH FAT LAYER EXPOSED: Primary | ICD-10-CM

## 2025-05-05 DIAGNOSIS — I73.9 PERIPHERAL VASCULAR DISEASE: ICD-10-CM

## 2025-05-05 PROCEDURE — 99999 PR PBB SHADOW E&M-EST. PATIENT-LVL III: CPT | Mod: PBBFAC,,, | Performed by: PODIATRIST

## 2025-05-05 PROCEDURE — 99499 UNLISTED E&M SERVICE: CPT | Mod: S$GLB,,, | Performed by: PODIATRIST

## 2025-05-05 PROCEDURE — 11042 DBRDMT SUBQ TIS 1ST 20SQCM/<: CPT | Mod: S$GLB,,, | Performed by: PODIATRIST

## 2025-05-05 PROCEDURE — 87075 CULTR BACTERIA EXCEPT BLOOD: CPT | Performed by: PODIATRIST

## 2025-05-05 PROCEDURE — 87070 CULTURE OTHR SPECIMN AEROBIC: CPT | Performed by: PODIATRIST

## 2025-05-05 NOTE — PROCEDURES
"Wound Debridement    Date/Time: 5/5/2025 8:45 AM    Performed by: Shola Dawson DPM  Authorized by: Shola Dawson DPM    Time out: Immediately prior to procedure a "time out" was called to verify the correct patient, procedure, equipment, support staff and site/side marked as required.    Consent Done?:  Yes (Verbal)    Preparation: Patient was prepped and draped in usual sterile fashion    Local anesthesia used?: No      Wound Details:    Location:  Left leg    Type of Debridement:  Excisional       Length (cm):  0.5       Width (cm):  0.3       Depth (cm):  0.3       Area (sq cm):  0.12       Percent Debrided (%):  100       Total Area Debrided (sq cm):  0.12    Depth of debridement:  Subcutaneous tissue    Devitalized tissue debrided:  Biofilm, Slough and Necrotic/Eschar    Instruments:  Curette  Bleeding:  Minimal  Hemostasis Achieved: Yes  Method Used:  Pressure  Patient tolerance:  Patient tolerated the procedure well with no immediate complications    "

## 2025-05-05 NOTE — PROGRESS NOTES
Subjective:      Patient ID: Serenity Epps is a 84 y.o. female.    Chief Complaint: Wound Care    Serenity is a 84 y.o. female who presents to the clinic for evaluation and treatment of high risk feet. 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 The patient's chief complaint is cellulitis to the lower extremity she was in the hospital end of January with X-rays and MRI, no osteomyelitis was seen on imaging. She was discharged on PO antibiotics and presents for follow up. Pain has persisted and the area is still erythematous    2/17/25: patient returns for wound care left leg in unna boot wraps    2/24/25: Patient returns for wound care left leg in the unna boot wraps no new changes    3/11/25: patient returns for wound care and unna boot change has an appointment with vascular this Thursday    3/17/25: Patient returns for wound care left leg, she has an angiogram scheduled for later this week    3/24/25: patient returns for wound care left leg, angiogram was rescheduled from last week to this Friday    3/31/25: Patient returns for wound care left leg, had her angiogram last week, appreciate Dr. Moreno's work. Seems to be doing well with that, no new complaints.    4/7/25: Patient returns for wound care left leg no new complaints, has less pain since her angiogram    4/14/25: Patient returns  for wound care left leg no new complaints    4/21/25: patient returns for wound care left leg doing well overall no new complaints.     4/28/25: patient returns for wound care left leg no new complaints.    5/5/25: Patient returns for left leg ulcer care no new complaints    PCP: Shant Jc MD    2/5/25    Current shoe gear:  Affected Foot: Casual shoes     Unaffected Foot: Casual shoes    History of Trauma: negative      Hemoglobin A1C   Date Value Ref Range Status   01/31/2025 5.0 4.0 - 5.6 % Final     Comment:     Reference Interval:  5.0 - 5.6 Normal   5.7 - 6.4 High Risk   > 6.5 Diabetic      Hgb A1c results are standardized based on the (NGSP) National   Glycohemoglobin Standardization Program.      Hemoglobin A1C levels are related to mean serum/plasma glucose   during the preceding 2-3 months.        12/24/2022 5.3 0.0 - 5.6 % Final     Comment:     Reference Interval:  5.0 - 5.6 Normal   5.7 - 6.4 High Risk   > 6.5 Diabetic      Hgb A1c results are standardized based on the (NGSP) National   Glycohemoglobin Standardization Program.      Hemoglobin A1C levels are related to mean serum/plasma glucose   during the preceding 2-3 months.              Review of Systems   Constitutional: Negative for chills and fever.   Cardiovascular: Positive for leg swelling. Negative for claudication.   Respiratory: Negative for shortness of breath.    Skin: Positive for color change, nail changes and poor wound healing. Negative for itching and rash.   Musculoskeletal: Negative for muscle cramps, muscle weakness and myalgias.   Gastrointestinal: Negative for nausea and vomiting.   Neurological: Negative for focal weakness, loss of balance, numbness and paresthesias.           Objective:      Physical Exam  Constitutional:       General: She is not in acute distress.     Appearance: She is well-developed. She is not diaphoretic.   Cardiovascular:      Pulses:           Dorsalis pedis pulses are 1+ on the right side  and 1+ on the left side.        Posterior tibial pulses are 1+ on the right side and 1+ on the left side.      Comments: < 3 sec capillary refill time to toes 1-5 bilateral. Feet are warm to touch proximally with distal cooling b/l. There is no hair growth on the feet and toes b/l. There is 1+ edema b/l. No spider veins or varicosities present b/l.     Musculoskeletal:      Comments: Equinus noted b/l ankles with < 10 deg DF noted. MMT 5/5 in DF/PF/Inv/Ev resistance with no reproduction of pain in any direction. Passive range of motion of ankle and pedal joints is painless b/l.     Skin:     General: Skin is warm and dry.      Coloration: Skin is not pale.      Findings: Erythema and lesion present. No abrasion, bruising, burn, ecchymosis, laceration, petechiae or rash.      Nails: There is no clubbing.      Comments: Skin is thin and atrophic    Left plantar first metatarsal, midfoot and heel all on the left foot there is hyperkeratotic lesions x2    Nails 1-5 bilateral are thick 3-4 mm, long 3-6 mm, and discolored with subungual debris    Left leg medial aspect proximal to the medial malleolus there is an open wound where the abscess was drained.  0.5x0.3x0.1 post:  0.5x0.3x0.3 cm with granular base no erythema and serous drainage with green discoloration on bandage.     Neurological:      Mental Status: She is alert and oriented to person, place, and time.      Sensory: No sensory deficit.      Motor: No tremor, atrophy or abnormal muscle tone.      Comments: Negative tinel sign bilateral.   Psychiatric:         Behavior: Behavior normal.               Assessment:       Encounter Diagnoses   Name Primary?    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    Diagnoses and all orders for this visit:    Chronic ulcer of left leg with fat layer exposed  -     Aerobic culture  -     Culture, Anaerobic  -     Wound  Debridement    Peripheral vascular disease          I counseled the patient on her conditions, their implications and medical management.    Wound debrided and cleansed and flushed with saline    Dressed again in erick, mepilex border and tubi  compression    Culture taken and will start on an antibiotic as needed    Return in 1 week for wound care     Shola Dawson DPM

## 2025-05-09 LAB — BACTERIA SPEC ANAEROBE CULT: NORMAL

## 2025-05-10 LAB — BACTERIA SPEC AEROBE CULT: NO GROWTH

## 2025-05-12 ENCOUNTER — OFFICE VISIT (OUTPATIENT)
Dept: PODIATRY | Facility: CLINIC | Age: 85
End: 2025-05-12
Payer: MEDICARE

## 2025-05-12 VITALS — BODY MASS INDEX: 22.2 KG/M2 | WEIGHT: 130.06 LBS | HEIGHT: 64 IN

## 2025-05-12 DIAGNOSIS — L97.922 CHRONIC ULCER OF LEFT LEG WITH FAT LAYER EXPOSED: Primary | ICD-10-CM

## 2025-05-12 DIAGNOSIS — I73.9 PERIPHERAL VASCULAR DISEASE: ICD-10-CM

## 2025-05-12 PROCEDURE — 11042 DBRDMT SUBQ TIS 1ST 20SQCM/<: CPT | Mod: S$GLB,,, | Performed by: PODIATRIST

## 2025-05-12 PROCEDURE — 99499 UNLISTED E&M SERVICE: CPT | Mod: S$GLB,,, | Performed by: PODIATRIST

## 2025-05-12 PROCEDURE — 99999 PR PBB SHADOW E&M-EST. PATIENT-LVL III: CPT | Mod: PBBFAC,,, | Performed by: PODIATRIST

## 2025-05-12 NOTE — PROGRESS NOTES
Subjective:      Patient ID: Serenity Epps is a 84 y.o. female.    Chief Complaint: Wound Care    Serenity is a 84 y.o. female who presents to the clinic for evaluation and treatment of high risk feet. 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 The patient's chief complaint is cellulitis to the lower extremity she was in the hospital end of January with X-rays and MRI, no osteomyelitis was seen on imaging. She was discharged on PO antibiotics and presents for follow up. Pain has persisted and the area is still erythematous    2/17/25: patient returns for wound care left leg in unna boot wraps    2/24/25: Patient returns for wound care left leg in the unna boot wraps no new changes    3/11/25: patient returns for wound care and unna boot change has an appointment with vascular this Thursday    3/17/25: Patient returns for wound care left leg, she has an angiogram scheduled for later this week    3/24/25: patient returns for wound care left leg, angiogram was rescheduled from last week to this Friday    3/31/25: Patient returns for wound care left leg, had her angiogram last week, appreciate Dr. Moreno's work. Seems to be doing well with that, no new complaints.    4/7/25: Patient returns for wound care left leg no new complaints, has less pain since her angiogram    4/14/25: Patient returns  for wound care left leg no new complaints    4/21/25: patient returns for wound care left leg doing well overall no new complaints.     4/28/25: patient returns for wound care left leg no new complaints.    5/5/25: Patient returns for left leg ulcer care no new complaints    5/12/25: patient returns for left leg ulcer care no new complaints.     PCP: Shant Jc MD    2/5/25    Current shoe gear:  Affected Foot: Casual shoes     Unaffected Foot: Casual shoes    History of Trauma: negative      Hemoglobin A1C   Date Value Ref Range Status   01/31/2025 5.0 4.0 - 5.6 % Final     Comment:     Reference Interval:  5.0 - 5.6 Normal   5.7 - 6.4 High Risk   > 6.5 Diabetic      Hgb A1c results are standardized based on the (NGSP) National   Glycohemoglobin Standardization Program.      Hemoglobin A1C levels are related to mean serum/plasma glucose   during the preceding 2-3 months.        12/24/2022 5.3 0.0 - 5.6 % Final     Comment:     Reference Interval:  5.0 - 5.6 Normal   5.7 - 6.4 High Risk   > 6.5 Diabetic      Hgb A1c results are standardized based on the (NGSP) National   Glycohemoglobin Standardization Program.      Hemoglobin A1C levels are related to mean serum/plasma glucose   during the preceding 2-3 months.              Review of Systems   Constitutional: Negative for chills and fever.   Cardiovascular: Positive for leg swelling. Negative for claudication.   Respiratory: Negative for shortness of breath.    Skin: Positive for color change, nail changes and poor wound healing. Negative for itching and rash.   Musculoskeletal: Negative for muscle cramps, muscle weakness and myalgias.   Gastrointestinal: Negative for nausea and vomiting.   Neurological: Negative for focal weakness, loss of balance, numbness and paresthesias.           Objective:      Physical Exam  Constitutional:       General: She is not in acute distress.     Appearance: She is well-developed. She is not diaphoretic.    Cardiovascular:      Pulses:           Dorsalis pedis pulses are 1+ on the right side and 1+ on the left side.        Posterior tibial pulses are 1+ on the right side and 1+ on the left side.      Comments: < 3 sec capillary refill time to toes 1-5 bilateral. Feet are warm to touch proximally with distal cooling b/l. There is no hair growth on the feet and toes b/l. There is 1+ edema b/l. No spider veins or varicosities present b/l.     Musculoskeletal:      Comments: Equinus noted b/l ankles with < 10 deg DF noted. MMT 5/5 in DF/PF/Inv/Ev resistance with no reproduction of pain in any direction. Passive range of motion of ankle and pedal joints is painless b/l.     Skin:     General: Skin is warm and dry.      Coloration: Skin is not pale.      Findings: Erythema and lesion present. No abrasion, bruising, burn, ecchymosis, laceration, petechiae or rash.      Nails: There is no clubbing.      Comments: Skin is thin and atrophic    Left plantar first metatarsal, midfoot and heel all on the left foot there is hyperkeratotic lesions x2    Nails 1-5 bilateral are thick 3-4 mm, long 3-6 mm, and discolored with subungual debris    Left leg medial aspect proximal to the medial malleolus there is an open wound where the abscess was drained.  0.5x0.3x0.1 post:  0.5x0.3x0.3 cm with granular base no erythema and serous drainage with green discoloration on bandage.     Neurological:      Mental Status: She is alert and oriented to person, place, and time.      Sensory: No sensory deficit.      Motor: No tremor, atrophy or abnormal muscle tone.      Comments: Negative tinel sign bilateral.   Psychiatric:         Behavior: Behavior normal.               Assessment:       Encounter Diagnoses   Name Primary?    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    Diagnoses and all orders for this visit:    Chronic ulcer of left leg with  fat layer exposed    Peripheral vascular disease            I counseled the patient on her conditions, their implications and medical management.    Wound debrided and cleansed and flushed with saline    Dressed again in erick, mepilex border and tubi  compression    Culture taken and will start on an antibiotic as needed    Return in 1 week for wound care     Shola Dawson DPM

## 2025-05-12 NOTE — PROCEDURES
"Wound Debridement    Date/Time: 5/12/2025 8:00 AM    Performed by: Shola Dawson DPM  Authorized by: Shola Dawson DPM    Time out: Immediately prior to procedure a "time out" was called to verify the correct patient, procedure, equipment, support staff and site/side marked as required.    Consent Done?:  Yes (Verbal)    Preparation: Patient was prepped and draped in usual sterile fashion    Local anesthesia used?: No      Wound Details:    Location:  Left leg    Type of Debridement:  Excisional       Length (cm):  0.5       Width (cm):  0.3       Depth (cm):  0.3       Area (sq cm):  0.12       Percent Debrided (%):  100       Total Area Debrided (sq cm):  0.12    Depth of debridement:  Subcutaneous tissue    Devitalized tissue debrided:  Biofilm and Slough    Instruments:  Curette  Bleeding:  Minimal  Hemostasis Achieved: Yes  Method Used:  Pressure  Patient tolerance:  Patient tolerated the procedure well with no immediate complications    "

## 2025-05-20 ENCOUNTER — OFFICE VISIT (OUTPATIENT)
Dept: PODIATRY | Facility: CLINIC | Age: 85
End: 2025-05-20
Payer: MEDICARE

## 2025-05-20 VITALS — BODY MASS INDEX: 22.2 KG/M2 | WEIGHT: 130.06 LBS | HEIGHT: 64 IN

## 2025-05-20 DIAGNOSIS — L97.922 CHRONIC ULCER OF LEFT LEG WITH FAT LAYER EXPOSED: Primary | ICD-10-CM

## 2025-05-20 DIAGNOSIS — I73.9 PERIPHERAL VASCULAR DISEASE: ICD-10-CM

## 2025-05-20 PROCEDURE — 99499 UNLISTED E&M SERVICE: CPT | Mod: S$GLB,,, | Performed by: PODIATRIST

## 2025-05-20 PROCEDURE — 99999 PR PBB SHADOW E&M-EST. PATIENT-LVL III: CPT | Mod: PBBFAC,,, | Performed by: PODIATRIST

## 2025-05-20 PROCEDURE — 11042 DBRDMT SUBQ TIS 1ST 20SQCM/<: CPT | Mod: S$GLB,,, | Performed by: PODIATRIST

## 2025-05-20 NOTE — PROCEDURES
"Wound Debridement    Date/Time: 5/20/2025 8:00 AM    Performed by: Shola Dawson DPM  Authorized by: Shola Dawson DPM    Time out: Immediately prior to procedure a "time out" was called to verify the correct patient, procedure, equipment, support staff and site/side marked as required.    Consent Done?:  Yes (Verbal)    Preparation: Patient was prepped and draped in usual sterile fashion    Local anesthesia used?: No      Wound Details:    Location:  Left leg    Type of Debridement:  Excisional       Length (cm):  0.6       Width (cm):  0.4       Depth (cm):  0.3       Area (sq cm):  0.19       Percent Debrided (%):  100       Total Area Debrided (sq cm):  0.19    Depth of debridement:  Subcutaneous tissue    Devitalized tissue debrided:  Biofilm and Slough    Instruments:  Curette  Bleeding:  Minimal  Hemostasis Achieved: Yes  Method Used:  Pressure  Patient tolerance:  Patient tolerated the procedure well with no immediate complications    "

## 2025-05-20 NOTE — PROGRESS NOTES
Subjective:      Patient ID: Serenity Epps is a 84 y.o. female.    Chief Complaint: Wound Care    Serenity is a 84 y.o. female who presents to the clinic for evaluation and treatment of high risk feet. 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 The patient's chief complaint is cellulitis to the lower extremity she was in the hospital end of January with X-rays and MRI, no osteomyelitis was seen on imaging. She was discharged on PO antibiotics and presents for follow up. Pain has persisted and the area is still erythematous    2/17/25: patient returns for wound care left leg in unna boot wraps    2/24/25: Patient returns for wound care left leg in the unna boot wraps no new changes    3/11/25: patient returns for wound care and unna boot change has an appointment with vascular this Thursday    3/17/25: Patient returns for wound care left leg, she has an angiogram scheduled for later this week    3/24/25: patient returns for wound care left leg, angiogram was rescheduled from last week to this Friday    3/31/25: Patient returns for wound care left leg, had her angiogram last week, appreciate Dr. Moreno's work. Seems to be doing well with that, no new complaints.    4/7/25: Patient returns for wound care left leg no new complaints, has less pain since her angiogram    4/14/25: Patient returns  for wound care left leg no new complaints    4/21/25: patient returns for wound care left leg doing well overall no new complaints.     4/28/25: patient returns for wound care left leg no new complaints.    5/5/25: Patient returns for left leg ulcer care no new complaints    5/12/25: patient returns for left leg ulcer care no new complaints.     5/20/25: Patient returns for left leg ulcer care dressings intact    PCP: Shant Jc MD    2/5/25    Current shoe gear:  Affected Foot: Casual shoes     Unaffected Foot: Casual shoes    History of Trauma: negative      Hemoglobin A1C   Date Value Ref Range Status   01/31/2025 5.0 4.0 - 5.6 % Final     Comment:     Reference Interval:  5.0 - 5.6 Normal   5.7 - 6.4 High Risk   > 6.5 Diabetic      Hgb A1c results are standardized based on the (NGSP) National   Glycohemoglobin Standardization Program.      Hemoglobin A1C levels are related to mean serum/plasma glucose   during the preceding 2-3 months.        12/24/2022 5.3 0.0 - 5.6 % Final     Comment:     Reference Interval:  5.0 - 5.6 Normal   5.7 - 6.4 High Risk   > 6.5 Diabetic      Hgb A1c results are standardized based on the (NGSP) National   Glycohemoglobin Standardization Program.      Hemoglobin A1C levels are related to mean serum/plasma glucose   during the preceding 2-3 months.              Review of Systems   Constitutional: Negative for chills and fever.   Cardiovascular: Positive for leg swelling. Negative for claudication.   Respiratory: Negative for shortness of breath.    Skin: Positive for color change, nail changes and poor wound healing. Negative for itching and rash.   Musculoskeletal: Negative for muscle cramps, muscle weakness and myalgias.   Gastrointestinal: Negative for nausea and vomiting.   Neurological: Negative for focal weakness, loss of balance, numbness and paresthesias.           Objective:      Physical Exam  Constitutional:       General: She is not in acute distress.      Appearance: She is well-developed. She is not diaphoretic.   Cardiovascular:      Pulses:           Dorsalis pedis pulses are 1+ on the right side and 1+ on the left side.        Posterior tibial pulses are 1+ on the right side and 1+ on the left side.      Comments: < 3 sec capillary refill time to toes 1-5 bilateral. Feet are warm to touch proximally with distal cooling b/l. There is no hair growth on the feet and toes b/l. There is 1+ edema b/l. No spider veins or varicosities present b/l.     Musculoskeletal:      Comments: Equinus noted b/l ankles with < 10 deg DF noted. MMT 5/5 in DF/PF/Inv/Ev resistance with no reproduction of pain in any direction. Passive range of motion of ankle and pedal joints is painless b/l.     Skin:     General: Skin is warm and dry.      Coloration: Skin is not pale.      Findings: Erythema and lesion present. No abrasion, bruising, burn, ecchymosis, laceration, petechiae or rash.      Nails: There is no clubbing.      Comments: Skin is thin and atrophic    Left plantar first metatarsal, midfoot and heel all on the left foot there is hyperkeratotic lesions x2    Nails 1-5 bilateral are thick 3-4 mm, long 3-6 mm, and discolored with subungual debris    Left leg medial aspect proximal to the medial malleolus there is an open wound where the abscess was drained.  0.5x0.3x0.1 post:  0.6x0.4x0.3 cm with granular base no erythema and minimal drainage     Neurological:      Mental Status: She is alert and oriented to person, place, and time.      Sensory: No sensory deficit.      Motor: No tremor, atrophy or abnormal muscle tone.      Comments: Negative tinel sign bilateral.   Psychiatric:         Behavior: Behavior normal.               Assessment:       Encounter Diagnoses   Name Primary?    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    Diagnoses and all orders for this visit:    Chronic  ulcer of left leg with fat layer exposed    Peripheral vascular disease              I counseled the patient on her conditions, their implications and medical management.    Wound debrided and cleansed and flushed with saline    Dressed with iodosorb, mepilex border and tubi  compression      Return in 1 week for wound care     Shola Dawson DPM

## 2025-05-22 ENCOUNTER — PATIENT MESSAGE (OUTPATIENT)
Dept: OBSTETRICS AND GYNECOLOGY | Facility: CLINIC | Age: 85
End: 2025-05-22
Payer: MEDICARE

## 2025-05-27 ENCOUNTER — TELEPHONE (OUTPATIENT)
Dept: VASCULAR SURGERY | Facility: CLINIC | Age: 85
End: 2025-05-27
Payer: MEDICARE

## 2025-05-27 ENCOUNTER — OFFICE VISIT (OUTPATIENT)
Dept: PODIATRY | Facility: CLINIC | Age: 85
End: 2025-05-27
Payer: MEDICARE

## 2025-05-27 VITALS — WEIGHT: 130.06 LBS | HEIGHT: 64 IN | BODY MASS INDEX: 22.2 KG/M2

## 2025-05-27 DIAGNOSIS — I73.9 PERIPHERAL VASCULAR DISEASE: Primary | ICD-10-CM

## 2025-05-27 DIAGNOSIS — L97.922 CHRONIC ULCER OF LEFT LEG WITH FAT LAYER EXPOSED: Primary | ICD-10-CM

## 2025-05-27 DIAGNOSIS — I73.9 PERIPHERAL VASCULAR DISEASE: ICD-10-CM

## 2025-05-27 PROCEDURE — 11042 DBRDMT SUBQ TIS 1ST 20SQCM/<: CPT | Mod: S$GLB,,, | Performed by: PODIATRIST

## 2025-05-27 PROCEDURE — 99499 UNLISTED E&M SERVICE: CPT | Mod: S$GLB,,, | Performed by: PODIATRIST

## 2025-05-27 PROCEDURE — 99999 PR PBB SHADOW E&M-EST. PATIENT-LVL III: CPT | Mod: PBBFAC,,, | Performed by: PODIATRIST

## 2025-05-27 NOTE — PROGRESS NOTES
Subjective:      Patient ID: Serenity Epps is a 84 y.o. female.    Chief Complaint: Wound Care    Serenity is a 84 y.o. female who presents to the clinic for evaluation and treatment of high risk feet. 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 The patient's chief complaint is cellulitis to the lower extremity she was in the hospital end of January with X-rays and MRI, no osteomyelitis was seen on imaging. She was discharged on PO antibiotics and presents for follow up. Pain has persisted and the area is still erythematous    2/17/25: patient returns for wound care left leg in unna boot wraps    2/24/25: Patient returns for wound care left leg in the unna boot wraps no new changes    3/11/25: patient returns for wound care and unna boot change has an appointment with vascular this Thursday    3/17/25: Patient returns for wound care left leg, she has an angiogram scheduled for later this week    3/24/25: patient returns for wound care left leg, angiogram was rescheduled from last week to this Friday    3/31/25: Patient returns for wound care left leg, had her angiogram last week, appreciate Dr. Moreno's work. Seems to be doing well with that, no new complaints.    4/7/25: Patient returns for wound care left leg no new complaints, has less pain since her angiogram    4/14/25: Patient returns  for wound care left leg no new complaints    4/21/25: patient returns for wound care left leg doing well overall no new complaints.     4/28/25: patient returns for wound care left leg no new complaints.    5/5/25: Patient returns for left leg ulcer care no new complaints    5/12/25: patient returns for left leg ulcer care no new complaints.     5/20/25: Patient returns for left leg ulcer care dressings intact    5/27/25: Patient returns for left leg ulcer care dressings intact no new complaints    PCP: Shant Jc MD    2/5/25    Current shoe gear:  Affected Foot: Casual shoes     Unaffected Foot: Casual shoes    History of Trauma: negative      Hemoglobin A1C   Date Value Ref Range Status   01/31/2025 5.0 4.0 - 5.6 % Final     Comment:     Reference Interval:  5.0 - 5.6 Normal   5.7 - 6.4 High Risk   > 6.5 Diabetic      Hgb A1c results are standardized based on the (NGSP) National   Glycohemoglobin Standardization Program.      Hemoglobin A1C levels are related to mean serum/plasma glucose   during the preceding 2-3 months.        12/24/2022 5.3 0.0 - 5.6 % Final     Comment:     Reference Interval:  5.0 - 5.6 Normal   5.7 - 6.4 High Risk   > 6.5 Diabetic      Hgb A1c results are standardized based on the (NGSP) National   Glycohemoglobin Standardization Program.      Hemoglobin A1C levels are related to mean serum/plasma glucose   during the preceding 2-3 months.              Review of Systems   Constitutional: Negative for chills and fever.   Cardiovascular: Positive for leg swelling. Negative for claudication.   Respiratory: Negative for shortness of breath.    Skin: Positive for color change, nail changes and poor wound healing. Negative for itching and rash.   Musculoskeletal: Negative for muscle cramps, muscle weakness and myalgias.   Gastrointestinal: Negative for nausea and vomiting.   Neurological: Negative for focal weakness, loss of balance, numbness and paresthesias.           Objective:       Physical Exam  Constitutional:       General: She is not in acute distress.     Appearance: She is well-developed. She is not diaphoretic.   Cardiovascular:      Pulses:           Dorsalis pedis pulses are 1+ on the right side and 1+ on the left side.        Posterior tibial pulses are 1+ on the right side and 1+ on the left side.      Comments: < 3 sec capillary refill time to toes 1-5 bilateral. Feet are warm to touch proximally with distal cooling b/l. There is no hair growth on the feet and toes b/l. There is 1+ edema b/l. No spider veins or varicosities present b/l.     Musculoskeletal:      Comments: Equinus noted b/l ankles with < 10 deg DF noted. MMT 5/5 in DF/PF/Inv/Ev resistance with no reproduction of pain in any direction. Passive range of motion of ankle and pedal joints is painless b/l.     Skin:     General: Skin is warm and dry.      Coloration: Skin is not pale.      Findings: Erythema and lesion present. No abrasion, bruising, burn, ecchymosis, laceration, petechiae or rash.      Nails: There is no clubbing.      Comments: Skin is thin and atrophic    Left plantar first metatarsal, midfoot and heel all on the left foot there is hyperkeratotic lesions x2    Nails 1-5 bilateral are thick 3-4 mm, long 3-6 mm, and discolored with subungual debris    Left leg medial aspect proximal to the medial malleolus there is an open wound where the abscess was drained.  0.5x0.3x0.1 post:  0.6x0.4x0.2 cm with granular base no erythema and minimal drainage     Neurological:      Mental Status: She is alert and oriented to person, place, and time.      Sensory: No sensory deficit.      Motor: No tremor, atrophy or abnormal muscle tone.      Comments: Negative tinel sign bilateral.   Psychiatric:         Behavior: Behavior normal.               Assessment:       Encounter Diagnoses   Name Primary?    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    Wound debrided and cleansed and flushed with saline    Dressed with iodosorb, mepilex border and tubi  compression      Return in 2 weeks for wound care     Shola Dawson DPM

## 2025-05-27 NOTE — TELEPHONE ENCOUNTER
Pt stopped at the clinic to discuss if she needed to continue Plavix. Pt informed of the following recommendations per Dr Jaime (via Secure Chat) - looks like she is still dealing with a wound to the left foot. I have not seen her back in clinic since the angiogram. Lets have her get an arterial ultrasound of the left leg to compare the difference in perfusion since our angiogram. Lets have her continue Plavix until her wound is healed.   Pt verbalized understanding off all.

## 2025-05-27 NOTE — PROCEDURES
"Wound Debridement    Date/Time: 5/27/2025 8:00 AM    Performed by: Shola Dawson DPM  Authorized by: Shola Dawson DPM    Time out: Immediately prior to procedure a "time out" was called to verify the correct patient, procedure, equipment, support staff and site/side marked as required.    Consent Done?:  Yes (Verbal)    Preparation: Patient was prepped and draped in usual sterile fashion    Local anesthesia used?: No      Wound Details:    Location:  Left leg    Type of Debridement:  Excisional       Length (cm):  0.6       Width (cm):  0.4       Depth (cm):  0.2       Area (sq cm):  0.19       Percent Debrided (%):  100       Total Area Debrided (sq cm):  0.19    Depth of debridement:  Subcutaneous tissue    Devitalized tissue debrided:  Biofilm and Slough    Instruments:  Curette  Bleeding:  Minimal  Hemostasis Achieved: Yes  Method Used:  Pressure  Patient tolerance:  Patient tolerated the procedure well with no immediate complications    "

## 2025-06-04 ENCOUNTER — HOSPITAL ENCOUNTER (OUTPATIENT)
Dept: CARDIOLOGY | Facility: HOSPITAL | Age: 85
Discharge: HOME | End: 2025-06-04
Attending: STUDENT IN AN ORGANIZED HEALTH CARE EDUCATION/TRAINING PROGRAM
Payer: MEDICARE

## 2025-06-04 DIAGNOSIS — I73.9 PERIPHERAL VASCULAR DISEASE: ICD-10-CM

## 2025-06-04 LAB
LEFT ANT TIBIAL SYS PSV: 60 CM/S
LEFT CFA PSV: 192 CM/S
LEFT PERONEAL SYS PSV: 58 CM/S
LEFT POPLITEAL PSV: 45 CM/S
LEFT POST TIBIAL SYS PSV: 25 CM/S
LEFT PROFUNDA SYS PSV: 78 CM/S
LEFT SUPER FEMORAL DIST SYS PSV: 61 CM/S
LEFT SUPER FEMORAL MID SYS PSV: 201 CM/S
LEFT SUPER FEMORAL OSTIAL SYS PSV: 108 CM/S
LEFT SUPER FEMORAL PROX SYS PSV: 95 CM/S
LEFT TIB/PER TRUNK SYS PSV: 76 CM/S
OHS CV LEFT LOWER EXTREMITY ABI (NO CALC): 0.88

## 2025-06-04 PROCEDURE — 93926 LOWER EXTREMITY STUDY: CPT | Mod: PO,LT

## 2025-06-09 ENCOUNTER — OFFICE VISIT (OUTPATIENT)
Dept: OPHTHALMOLOGY | Facility: CLINIC | Age: 85
End: 2025-06-09
Payer: MEDICARE

## 2025-06-09 ENCOUNTER — OFFICE VISIT (OUTPATIENT)
Dept: PODIATRY | Facility: CLINIC | Age: 85
End: 2025-06-09
Payer: MEDICARE

## 2025-06-09 VITALS — HEIGHT: 64 IN | WEIGHT: 130.06 LBS | BODY MASS INDEX: 22.2 KG/M2

## 2025-06-09 DIAGNOSIS — H40.1234 BILATERAL LOW-TENSION GLAUCOMA, INDETERMINATE STAGE: ICD-10-CM

## 2025-06-09 DIAGNOSIS — H35.3134 NONEXUDATIVE AGE-RELATED MACULAR DEGENERATION, BILATERAL, ADVANCED ATROPHIC WITH SUBFOVEAL INVOLVEMENT: Primary | Chronic | ICD-10-CM

## 2025-06-09 DIAGNOSIS — I73.9 PERIPHERAL VASCULAR DISEASE: ICD-10-CM

## 2025-06-09 DIAGNOSIS — H35.3211 EXUDATIVE AGE-RELATED MACULAR DEGENERATION OF RIGHT EYE WITH ACTIVE CHOROIDAL NEOVASCULARIZATION: Chronic | ICD-10-CM

## 2025-06-09 DIAGNOSIS — H35.3221 EXUDATIVE AGE-RELATED MACULAR DEGENERATION OF LEFT EYE WITH ACTIVE CHOROIDAL NEOVASCULARIZATION: Chronic | ICD-10-CM

## 2025-06-09 DIAGNOSIS — L97.922 CHRONIC ULCER OF LEFT LEG WITH FAT LAYER EXPOSED: Primary | ICD-10-CM

## 2025-06-09 PROCEDURE — 1159F MED LIST DOCD IN RCRD: CPT | Mod: CPTII,S$GLB,, | Performed by: OPHTHALMOLOGY

## 2025-06-09 PROCEDURE — 11042 DBRDMT SUBQ TIS 1ST 20SQCM/<: CPT | Mod: S$GLB,,, | Performed by: PODIATRIST

## 2025-06-09 PROCEDURE — 87075 CULTR BACTERIA EXCEPT BLOOD: CPT | Performed by: PODIATRIST

## 2025-06-09 PROCEDURE — 99999 PR PBB SHADOW E&M-EST. PATIENT-LVL III: CPT | Mod: PBBFAC,,, | Performed by: OPHTHALMOLOGY

## 2025-06-09 PROCEDURE — 99499 UNLISTED E&M SERVICE: CPT | Mod: S$GLB,,, | Performed by: PODIATRIST

## 2025-06-09 PROCEDURE — 92133 CPTRZD OPH DX IMG PST SGM ON: CPT | Mod: S$GLB,,, | Performed by: OPHTHALMOLOGY

## 2025-06-09 PROCEDURE — 99999 PR PBB SHADOW E&M-EST. PATIENT-LVL III: CPT | Mod: PBBFAC,,, | Performed by: PODIATRIST

## 2025-06-09 PROCEDURE — 92014 COMPRE OPH EXAM EST PT 1/>: CPT | Mod: S$GLB,,, | Performed by: OPHTHALMOLOGY

## 2025-06-09 PROCEDURE — 1157F ADVNC CARE PLAN IN RCRD: CPT | Mod: CPTII,S$GLB,, | Performed by: OPHTHALMOLOGY

## 2025-06-09 PROCEDURE — 1160F RVW MEDS BY RX/DR IN RCRD: CPT | Mod: CPTII,S$GLB,, | Performed by: OPHTHALMOLOGY

## 2025-06-09 PROCEDURE — 92201 OPSCPY EXTND RTA DRAW UNI/BI: CPT | Mod: S$GLB,,, | Performed by: OPHTHALMOLOGY

## 2025-06-09 PROCEDURE — 87070 CULTURE OTHR SPECIMN AEROBIC: CPT | Performed by: PODIATRIST

## 2025-06-09 PROCEDURE — 3288F FALL RISK ASSESSMENT DOCD: CPT | Mod: CPTII,S$GLB,, | Performed by: OPHTHALMOLOGY

## 2025-06-09 PROCEDURE — 1101F PT FALLS ASSESS-DOCD LE1/YR: CPT | Mod: CPTII,S$GLB,, | Performed by: OPHTHALMOLOGY

## 2025-06-09 NOTE — PROCEDURES
"Wound Debridement    Date/Time: 6/9/2025 10:00 AM    Performed by: Shola Dawson DPM  Authorized by: Shola Dawson DPM    Time out: Immediately prior to procedure a "time out" was called to verify the correct patient, procedure, equipment, support staff and site/side marked as required.    Consent Done?:  Yes (Verbal)    Preparation: Patient was prepped and draped in usual sterile fashion    Local anesthesia used?: No      Wound Details:    Location:  Left leg    Type of Debridement:  Excisional       Length (cm):  0.5       Width (cm):  0.4       Depth (cm):  0.2       Area (sq cm):  0.16       Percent Debrided (%):  100       Total Area Debrided (sq cm):  0.16    Depth of debridement:  Subcutaneous tissue    Devitalized tissue debrided:  Biofilm and Slough    Instruments:  Curette  Bleeding:  Minimal  Patient tolerance:  Patient tolerated the procedure well with no immediate complications    "

## 2025-06-09 NOTE — PROGRESS NOTES
HPI     armd      oct  dfe            Additional comments: ARMD   OCT     Dfe    Fuzzy va     Dls 03/17/25          Last edited by Linn Mackey on 6/9/2025  7:22 AM.          OCT - activity around fibrosis OU - Improved  Atrophy OU        A/P    1. Wet AMD OS  S/p Avastin OS x 18, Eylea OS  x 14    Persistent SRF OS - improving  With RPE tear OS  Central fibrosis with atrophy - poor central potential  10/17 - increased SRH - will keep at 8 weeks for now  9/18 - stable cicatrix - try observation  12/18 - no activity  6/21 - some heme over cicatrix OS  1/23 - given extensive atrophy, try extension      2. New disease OD  Sx started 9/21  S/p Avastin OD x 8, Eylea OD x 8  3/23 - sig atrophy without activity  9/23 heme OS, but ASx  5/24 increase in activity around fibrosis - pt does notice some changes    Stable x 1 year  continue  observation.  Stable superior activity OS    3. PCIOL OU  CTR OS - but saw 20/30 with correction, in spite of sub RPE fibrosis      4. POAG OU  Good IOP control on Cosopt, brimonidine, latanoprost OU  Small drance heme OD    5. Floaters OU        4 months OCT and  Dilate - see in room(LDFE 6/25)

## 2025-06-09 NOTE — PROGRESS NOTES
Subjective:      Patient ID: Serenity Epps is a 84 y.o. female.    Chief Complaint: Wound Care    Serenity is a 84 y.o. female who presents to the clinic for evaluation and treatment of high risk feet. 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 The patient's chief complaint is cellulitis to the lower extremity she was in the hospital end of January with X-rays and MRI, no osteomyelitis was seen on imaging. She was discharged on PO antibiotics and presents for follow up. Pain has persisted and the area is still erythematous    2/17/25: patient returns for wound care left leg in unna boot wraps    2/24/25: Patient returns for wound care left leg in the unna boot wraps no new changes    3/11/25: patient returns for wound care and unna boot change has an appointment with vascular this Thursday    3/17/25: Patient returns for wound care left leg, she has an angiogram scheduled for later this week    3/24/25: patient returns for wound care left leg, angiogram was rescheduled from last week to this Friday    3/31/25: Patient returns for wound care left leg, had her angiogram last week, appreciate Dr. Moreno's work. Seems to be doing well with that, no new complaints.    4/7/25: Patient returns for wound care left leg no new complaints, has less pain since her angiogram    4/14/25: Patient returns  for wound care left leg no new complaints    4/21/25: patient returns for wound care left leg doing well overall no new complaints.     4/28/25: patient returns for wound care left leg no new complaints.    5/5/25: Patient returns for left leg ulcer care no new complaints    5/12/25: patient returns for left leg ulcer care no new complaints.     5/20/25: Patient returns for left leg ulcer care dressings intact    5/27/25: Patient returns for left leg ulcer care dressings intact no new complaints    6/9/25: Patient returns left leg ulcer care, dressings intact no new complaints.     PCP: Shant Jc MD    2/5/25    Current shoe gear:  Affected Foot: Casual shoes     Unaffected Foot: Casual shoes    History of Trauma: negative      Hemoglobin A1C   Date Value Ref Range Status   01/31/2025 5.0 4.0 - 5.6 % Final     Comment:     Reference Interval:  5.0 - 5.6 Normal   5.7 - 6.4 High Risk   > 6.5 Diabetic      Hgb A1c results are standardized based on the (NGSP) National   Glycohemoglobin Standardization Program.      Hemoglobin A1C levels are related to mean serum/plasma glucose   during the preceding 2-3 months.        12/24/2022 5.3 0.0 - 5.6 % Final     Comment:     Reference Interval:  5.0 - 5.6 Normal   5.7 - 6.4 High Risk   > 6.5 Diabetic      Hgb A1c results are standardized based on the (NGSP) National   Glycohemoglobin Standardization Program.      Hemoglobin A1C levels are related to mean serum/plasma glucose   during the preceding 2-3 months.              Review of Systems   Constitutional: Negative for chills and fever.   Cardiovascular: Positive for leg swelling. Negative for claudication.   Respiratory: Negative for shortness of breath.    Skin: Positive for color change, nail changes and poor wound healing. Negative for itching and rash.   Musculoskeletal: Negative for muscle cramps, muscle weakness and myalgias.   Gastrointestinal: Negative for nausea and vomiting.   Neurological: Negative for  focal weakness, loss of balance, numbness and paresthesias.           Objective:      Physical Exam  Constitutional:       General: She is not in acute distress.     Appearance: She is well-developed. She is not diaphoretic.   Cardiovascular:      Pulses:           Dorsalis pedis pulses are 1+ on the right side and 1+ on the left side.        Posterior tibial pulses are 1+ on the right side and 1+ on the left side.      Comments: < 3 sec capillary refill time to toes 1-5 bilateral. Feet are warm to touch proximally with distal cooling b/l. There is no hair growth on the feet and toes b/l. There is 1+ edema b/l. No spider veins or varicosities present b/l.     Musculoskeletal:      Comments: Equinus noted b/l ankles with < 10 deg DF noted. MMT 5/5 in DF/PF/Inv/Ev resistance with no reproduction of pain in any direction. Passive range of motion of ankle and pedal joints is painless b/l.     Skin:     General: Skin is warm and dry.      Coloration: Skin is not pale.      Findings: Erythema and lesion present. No abrasion, bruising, burn, ecchymosis, laceration, petechiae or rash.      Nails: There is no clubbing.      Comments: Skin is thin and atrophic    Left plantar first metatarsal, midfoot and heel all on the left foot there is hyperkeratotic lesions x2    Nails 1-5 bilateral are thick 3-4 mm, long 3-6 mm, and discolored with subungual debris    Left leg medial aspect proximal to the medial malleolus there is an open wound where the abscess was drained.  0.4x0.3x0.1 post:  0.5x0.4x0.2 cm with granular base no erythema and minimal drainage     Neurological:      Mental Status: She is alert and oriented to person, place, and time.      Sensory: No sensory deficit.      Motor: No tremor, atrophy or abnormal muscle tone.      Comments: Negative tinel sign bilateral.   Psychiatric:         Behavior: Behavior normal.               Assessment:       No diagnosis found.              Plan:       There are no diagnoses  linked to this encounter.              I counseled the patient on her conditions, their implications and medical management.    Wound debrided and cleansed and flushed with saline    Dressed with iodosorb, mepilex border and tubi  compression      Return in 2 weeks for wound care     Shola Dawson DPM

## 2025-06-12 ENCOUNTER — RESULTS FOLLOW-UP (OUTPATIENT)
Dept: PODIATRY | Facility: CLINIC | Age: 85
End: 2025-06-12

## 2025-06-12 ENCOUNTER — TELEPHONE (OUTPATIENT)
Dept: PODIATRY | Facility: CLINIC | Age: 85
End: 2025-06-12
Payer: MEDICARE

## 2025-06-12 LAB
BACTERIA SPEC AEROBE CULT: ABNORMAL
BACTERIA SPEC ANAEROBE CULT: NORMAL

## 2025-06-12 RX ORDER — DOXYCYCLINE HYCLATE 100 MG
100 TABLET ORAL 2 TIMES DAILY
Qty: 14 TABLET | Refills: 0 | Status: SHIPPED | OUTPATIENT
Start: 2025-06-12 | End: 2025-06-19

## 2025-06-12 NOTE — TELEPHONE ENCOUNTER
Called the patient's wife to provide results per provider's request. Phone went to voicemail and a message was left for a call back.

## 2025-06-12 NOTE — TELEPHONE ENCOUNTER
----- Message from Shola Dawson DPM sent at 6/12/2025  1:09 PM CDT -----  Cultures were positive I sent in another week of antibiotics to take, doxycycline take 1 pill twice daily for 7 days  ----- Message -----  From: Lab, Background User  Sent: 6/11/2025   7:41 AM CDT  To: Shola Dawson DPM

## 2025-06-13 ENCOUNTER — RESULTS FOLLOW-UP (OUTPATIENT)
Dept: VASCULAR SURGERY | Facility: CLINIC | Age: 85
End: 2025-06-13

## 2025-06-13 DIAGNOSIS — F32.1 MAJOR DEPRESSIVE DISORDER, SINGLE EPISODE, MODERATE: ICD-10-CM

## 2025-06-13 RX ORDER — MIRTAZAPINE 15 MG/1
TABLET, FILM COATED ORAL
Qty: 180 TABLET | Refills: 3 | Status: SHIPPED | OUTPATIENT
Start: 2025-06-13

## 2025-06-17 ENCOUNTER — TELEPHONE (OUTPATIENT)
Dept: VASCULAR SURGERY | Facility: CLINIC | Age: 85
End: 2025-06-17
Payer: MEDICARE

## 2025-06-23 ENCOUNTER — OFFICE VISIT (OUTPATIENT)
Dept: PODIATRY | Facility: CLINIC | Age: 85
End: 2025-06-23
Payer: MEDICARE

## 2025-06-23 VITALS — HEIGHT: 64 IN | BODY MASS INDEX: 22.2 KG/M2 | WEIGHT: 130.06 LBS

## 2025-06-23 DIAGNOSIS — I73.9 PERIPHERAL VASCULAR DISEASE: ICD-10-CM

## 2025-06-23 DIAGNOSIS — L97.922 CHRONIC ULCER OF LEFT LEG WITH FAT LAYER EXPOSED: Primary | ICD-10-CM

## 2025-06-23 PROCEDURE — 99499 UNLISTED E&M SERVICE: CPT | Mod: S$GLB,,, | Performed by: PODIATRIST

## 2025-06-23 PROCEDURE — 99999 PR PBB SHADOW E&M-EST. PATIENT-LVL III: CPT | Mod: PBBFAC,,, | Performed by: PODIATRIST

## 2025-06-23 PROCEDURE — 11042 DBRDMT SUBQ TIS 1ST 20SQCM/<: CPT | Mod: S$GLB,,, | Performed by: PODIATRIST

## 2025-06-23 NOTE — PROCEDURES
"Wound Debridement    Date/Time: 6/23/2025 8:00 AM    Performed by: Shola Dawson DPM  Authorized by: Shola Dawson DPM    Time out: Immediately prior to procedure a "time out" was called to verify the correct patient, procedure, equipment, support staff and site/side marked as required.    Consent Done?:  Yes (Verbal)    Preparation: Patient was prepped and draped in usual sterile fashion    Local anesthesia used?: No      Wound Details:    Location:  Left leg    Type of Debridement:  Excisional       Length (cm):  0.4       Width (cm):  0.2       Depth (cm):  0.1       Area (sq cm):  0.06       Percent Debrided (%):  100       Total Area Debrided (sq cm):  0.06    Depth of debridement:  Subcutaneous tissue    Devitalized tissue debrided:  Necrotic/Eschar and Slough  Bleeding:  Minimal  Hemostasis Achieved: Yes  Method Used:  Pressure  Patient tolerance:  Patient tolerated the procedure well with no immediate complications  Specimen Collected: Specimen sent to microbiology    "

## 2025-06-23 NOTE — PROGRESS NOTES
Subjective:      Patient ID: Serenity Epps is a 84 y.o. female.    Chief Complaint: Wound Care    Serenity is a 84 y.o. female who presents to the clinic for evaluation and treatment of high risk feet. 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 The patient's chief complaint is cellulitis to the lower extremity she was in the hospital end of January with X-rays and MRI, no osteomyelitis was seen on imaging. She was discharged on PO antibiotics and presents for follow up. Pain has persisted and the area is still erythematous    2/17/25: patient returns for wound care left leg in unna boot wraps    2/24/25: Patient returns for wound care left leg in the unna boot wraps no new changes    3/11/25: patient returns for wound care and unna boot change has an appointment with vascular this Thursday    3/17/25: Patient returns for wound care left leg, she has an angiogram scheduled for later this week    3/24/25: patient returns for wound care left leg, angiogram was rescheduled from last week to this Friday    3/31/25: Patient returns for wound care left leg, had her angiogram last week, appreciate Dr. Moreno's work. Seems to be doing well with that, no new complaints.    4/7/25: Patient returns for wound care left leg no new complaints, has less pain since her angiogram    4/14/25: Patient returns  for wound care left leg no new complaints    4/21/25: patient returns for wound care left leg doing well overall no new complaints.     4/28/25: patient returns for wound care left leg no new complaints.    5/5/25: Patient returns for left leg ulcer care no new complaints    5/12/25: patient returns for left leg ulcer care no new complaints.     5/20/25: Patient returns for left leg ulcer care dressings intact    5/27/25: Patient returns for left leg ulcer care dressings intact no new complaints    6/9/25: Patient returns left leg ulcer care, dressings intact no new complaints.     6/23/25: Patient returns for left leg ulcer care dressings intact no new complaints.     PCP: Shant Jc MD    2/5/25    Current shoe gear:  Affected Foot: Casual shoes     Unaffected Foot: Casual shoes    History of Trauma: negative      Hemoglobin A1C   Date Value Ref Range Status   01/31/2025 5.0 4.0 - 5.6 % Final     Comment:     Reference Interval:  5.0 - 5.6 Normal   5.7 - 6.4 High Risk   > 6.5 Diabetic      Hgb A1c results are standardized based on the (NGSP) National   Glycohemoglobin Standardization Program.      Hemoglobin A1C levels are related to mean serum/plasma glucose   during the preceding 2-3 months.        12/24/2022 5.3 0.0 - 5.6 % Final     Comment:     Reference Interval:  5.0 - 5.6 Normal   5.7 - 6.4 High Risk   > 6.5 Diabetic      Hgb A1c results are standardized based on the (NGSP) National   Glycohemoglobin Standardization Program.      Hemoglobin A1C levels are related to mean serum/plasma glucose   during the preceding 2-3 months.              Review of Systems   Constitutional: Negative for chills and fever.   Cardiovascular: Positive for leg swelling. Negative for claudication.   Respiratory: Negative for shortness of breath.    Skin: Positive for color change, nail changes and poor wound healing. Negative for itching and rash.   Musculoskeletal: Negative for muscle cramps, muscle weakness and  myalgias.   Gastrointestinal: Negative for nausea and vomiting.   Neurological: Negative for focal weakness, loss of balance, numbness and paresthesias.           Objective:      Physical Exam  Constitutional:       General: She is not in acute distress.     Appearance: She is well-developed. She is not diaphoretic.   Cardiovascular:      Pulses:           Dorsalis pedis pulses are 1+ on the right side and 1+ on the left side.        Posterior tibial pulses are 1+ on the right side and 1+ on the left side.      Comments: < 3 sec capillary refill time to toes 1-5 bilateral. Feet are warm to touch proximally with distal cooling b/l. There is no hair growth on the feet and toes b/l. There is 1+ edema b/l. No spider veins or varicosities present b/l.     Musculoskeletal:      Comments: Equinus noted b/l ankles with < 10 deg DF noted. MMT 5/5 in DF/PF/Inv/Ev resistance with no reproduction of pain in any direction. Passive range of motion of ankle and pedal joints is painless b/l.     Skin:     General: Skin is warm and dry.      Coloration: Skin is not pale.      Findings: Erythema and lesion present. No abrasion, bruising, burn, ecchymosis, laceration, petechiae or rash.      Nails: There is no clubbing.      Comments: Skin is thin and atrophic    Left plantar first metatarsal, midfoot and heel all on the left foot there is hyperkeratotic lesions x2    Nails 1-5 bilateral are thick 3-4 mm, long 3-6 mm, and discolored with subungual debris    Left leg medial aspect proximal to the medial malleolus there is an open wound where the abscess was drained.  0.2x0.1x0.1 post:  0.4x0.2x0.1 cm with granular base no erythema and minimal drainage     Neurological:      Mental Status: She is alert and oriented to person, place, and time.      Sensory: No sensory deficit.      Motor: No tremor, atrophy or abnormal muscle tone.      Comments: Negative tinel sign bilateral.   Psychiatric:         Behavior: Behavior normal.                Assessment:       Encounter Diagnoses   Name Primary?    Chronic ulcer of left leg with fat layer exposed Yes    Peripheral vascular disease                  Plan:       Serenity was seen today for wound care.    Diagnoses and all orders for this visit:    Chronic ulcer of left leg with fat layer exposed  -     Wound Debridement    Peripheral vascular disease  -     Wound Debridement                  I counseled the patient on her conditions, their implications and medical management.    Wound debrided and cleansed and flushed with saline    Dressed with iodosorb, mepilex border and tubi  compression      Return in 2 weeks for wound care     Shola Dawson DPM

## 2025-06-26 ENCOUNTER — OFFICE VISIT (OUTPATIENT)
Facility: CLINIC | Age: 85
End: 2025-06-26
Payer: MEDICARE

## 2025-06-26 VITALS
HEART RATE: 52 BPM | SYSTOLIC BLOOD PRESSURE: 138 MMHG | HEIGHT: 64 IN | WEIGHT: 127.44 LBS | BODY MASS INDEX: 21.76 KG/M2 | DIASTOLIC BLOOD PRESSURE: 61 MMHG

## 2025-06-26 DIAGNOSIS — I73.9 PERIPHERAL VASCULAR DISEASE: Primary | ICD-10-CM

## 2025-06-26 PROCEDURE — 99999 PR PBB SHADOW E&M-EST. PATIENT-LVL III: CPT | Mod: PBBFAC,,, | Performed by: STUDENT IN AN ORGANIZED HEALTH CARE EDUCATION/TRAINING PROGRAM

## 2025-06-26 NOTE — PROGRESS NOTES
[facility]  [facility]  History & Physical    PATIENT NAME: Serenity Epps  MRN: [id number]  TODAY'S DATE: 06/26/2025    SUBJECTIVE:     Chief Complaint: No chief complaint on file.        History of Present Illness:  Serenity Epps is a 84 y.o. female presents with       Review of patient's allergies indicates:   Allergen Reactions    Brimonidine Other (See Comments)     Burning and redness       Past Medical History:   Diagnosis Date    Anxiety     Arthritis     Cataract - Both Eyes     OU done//    CKD (chronic kidney disease), stage II 09/21/2016    pt denies    Diarrhea     Diverticulosis     DVT (deep venous thrombosis)     left leg, after childbirth    Exudative age-related macular degeneration of left eye 02/20/2014    Glaucoma     Hyperlipidemia 12/25/2022    Hypertension     Irritable bowel syndrome     Left foot pain     chronic    Lymphocytic colitis     Macular degeneration     bimonthly intraoccular injections    Osteopenia     Paroxysmal atrial fibrillation 10/10/2023    Presence of Watchman left atrial appendage closure device     Recurrent major depressive disorder 04/27/2018    Rhinitis, chronic     Strabismus     as child    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 SFA;  Surgeon: Dominik Jaime MD;  Location: [facility] CATH;  Service: Vascular;;    APPENDECTOMY      age 38    ATHERECTOMY, PERIPHERAL BLOOD VESSEL  3/28/2025    Procedure: Atherectomy Lt. SFA  (Jetstream);  Surgeon: Dominik Jaime MD;  Location: [facility];  Service: Vascular;;    CATARACT EXTRACTION      OU done//    CATARACT EXTRACTION W/ INTRAOCULAR LENS  IMPLANT, BILATERAL Bilateral 2016    CLOSURE OF LEFT ATRIAL APPENDAGE USING DEVICE  01/12/2024    Procedure: Watchman;  Surgeon: Nellie Gibson MD;  Location: [facility] CATH;  Service: Cardiology;;    COLONOSCOPY  9/26/2006  [facility]    Diverticulosis.   Internal small hemorrhoids were found.     COLONOSCOPY  10/03/2012    Dr. Lynn, repeat in 7-8 years for surveillance    COLONOSCOPY N/A 05/02/2018    COLONOSCOPY  05/02/2018    Procedure: COLONOSCOPY;  Surgeon: Toby Lynn Jr., MD;  Location: [facility];  Service: Endoscopy;  Laterality: N/A;    ECHOCARDIOGRAM,TRANSESOPHAGEAL N/A 01/11/2023    Procedure: ECHOCARDIOGRAM,TRANSESOPHAGEAL;  Surgeon: Daron Kinsey MD;  Location: [facility];  Service: Cardiology;  Laterality: N/A;    ECHOCARDIOGRAM,TRANSESOPHAGEAL  01/12/2024    Procedure: (SWETHA) intra-procedure;  Surgeon: Daron Kinsey MD;  Location: [facility];  Service: Cardiology;;    EYE SURGERY Bilateral     Phaco with IOL (cataract extraction), rigth:[id number] 22.0 d//    FOOT HARDWARE REMOVAL Left 06/01/2016    Dr. Hammonds    FOOT SURGERY Left 2014    ERG and open reduction tallo tarsal dislocation (hyprocure implant)    FRACTURE SURGERY Left     HIP    HYSTERECTOMY      INSERTION OF IMPLANTABLE LOOP RECORDER N/A 01/11/2023    Procedure: INSERTION, IMPLANTABLE LOOP RECORDER;  Surgeon: Daron Kinsey MD;  Location: [facility];  Service: Cardiology;  Laterality: N/A;    INSERTION OF IMPLANTABLE LOOP RECORDER N/A 01/11/2023    Procedure: INSERTION, IMPLANTABLE LOOP RECORDER;  Surgeon: Daron Kinsey MD;  Location: [facility];  Service: Cardiology;  Laterality: N/A;    IVUS (INTRAVASCULAR ULTRASOUND)  3/28/2025    Procedure: IVUS Lt. SFA;  Surgeon: Dominik Jaime MD;  Location: [facility];  Service: Vascular;;    JOINT REPLACEMENT       KNEE ARTHROPLASTY Right 09/10/2024    Procedure: ARTHROPLASTY, KNEE TOTAL;  Surgeon: Kamaljit Mane MD;  Location: [facility] OR;  Service: Orthopedics;  Laterality: Right;    PARTIAL HYSTERECTOMY      age 38, ovaries conserved    PIP JOINT FUSION Right 06/01/2016    2nd-4th PIP joints of right foot; Dr. Hammonds    REMOVAL OF IMPLANTABLE LOOP RECORDER  4/16/2025    Procedure: Removal Loop recorder;  Surgeon: Adonay Pak MD;  Location: [facility];  Service: Cardiology;;    TONSILLECTOMY      as a child    TOTAL KNEE ARTHROPLASTY Left 09/2015    TRANSESOPHAGEAL ECHOCARDIOGRAPHY N/A 04/04/2024    Procedure: ECHOCARDIOGRAM, TRANSESOPHAGEAL;  Surgeon: Daron Kinsey MD;  Location: [facility];  Service: Cardiology;  Laterality: N/A;    UPPER GASTROINTESTINAL ENDOSCOPY  9/26/2006  [facility]    Erythema in the lower third of the esophagus (NERD).  Patulous lower esophageal sphincter.   Gastric mucosal atrophy.   PARAG Test  Negative.     VEIN LIGATION  1964    BLE     Family History   Problem Relation Name Age of Onset    Coronary artery disease Mother  78    Glaucoma Mother      Diverticulitis Father      Parkinsonism Brother      Glaucoma Brother      Ankylosing spondylitis Brother      Diabetes Brother      Macular degeneration Brother           twin brother wet mac//    Cancer Brother          prostate    Crohn's disease Brother      Parkinsonism Brother      No Known Problems Maternal Grandmother      No Known Problems Maternal Grandfather      No Known Problems Paternal Grandmother      No Known Problems Paternal Grandfather      Breast cancer Daughter      Cancer Daughter          breast    Amblyopia Neg Hx      Blindness Neg Hx      Cataracts Neg Hx      Hypertension Neg Hx      Strabismus Neg Hx      Stroke Neg Hx      Thyroid disease Neg Hx      Retinal detachment Neg Hx      Celiac disease Neg Hx       Social History[1]     Review of Systems:  ROS    OBJECTIVE:     Vital Signs (Most Recent):  Pulse: (!)  52 (06/26/25 0903)  BP: 138/61 (06/26/25 0903)      Physical Exam:  Physical Exam  Constitutional: Awake and oriented to person, place, and time. Appears well-developed and well-nourished. In no apparent distress.  HEENT: Normocephalic. Pupils normal and conjunctivae normal. Mucous membranes normal, no cyanosis or icterus, trachea central, no pallor or icterus is noted.  Neck: Normal range of motion. Neck supple. No JVD present. No bruit present.   Cardiovascular: Normal rate, regular rhythm and normal heart sounds. S1, S2. Exam reveals no gallop, clicks, or friction rub. No murmur noted.  Pulmonary/Chest: Effort normal and breath sounds clear to auscultation. No respiratory distress. No wheezes, rales, or coughing present.   Abdominal: Soft. Bowel sounds are normal. There is no tenderness. No rebound tenderness or guarding present. No abdomen pulsations, bruits, or masses noted.   Urinary: No malik catheter present  Skin: Skin is warm and dry.  Extremities: No cyanosis, erythema, clubbing or edema noted at this time. No calf tenderness bilaterally.   Peripheral vascular system: nonpalpable pulse to left foot. Still with wound       Laboratory:  Lab Results   Component Value Date    WBC 10.54 03/28/2025    HGB 12.6 03/28/2025    HCT 39.6 03/28/2025     03/28/2025    CHOL 117 (L) 02/17/2025    TRIG 63 02/17/2025    HDL 52 02/17/2025    ALT 15 02/17/2025    AST 26 02/17/2025     03/28/2025    K 3.7 03/28/2025     03/28/2025    CREATININE 0.52 03/28/2025    BUN 12 03/28/2025    CO2 26 03/28/2025    TSH 1.609 01/31/2025    INR 2.9 09/26/2024    HGBA1C 5.0 01/31/2025         Diagnostic Results:  Posterior Segment OCT Optic Nerve- Both eyes  Oct done-CC    See prog note    No results found. However, due to the size of the patient record, not all encounters were searched. Please check Results Review for a complete set of results.       ASSESSMENT/PLAN:       Patient's presentation, history, and exam  findings are most consistent with chronic limb threatening ischemia with a foot wound of the left lower extremity.  The chronic, progressive, nonhealing nature of this wound leaves their limb at risk for a devastating infection and limb loss. I have reviewed all imaging findings including the left leg. The arterial US illustrates continues decreased perfusion to left foot.  I have personally discussed these findings with the patient as well as the etiology of their pathology.      I am concerned that they are at risk for limb loss if intervention is significantly delayed, leaving them with no option for conservative management at this time.  My recommendation is to perform an angiogram of the left lower extremity with plans to intervene on the lesions found on imaging to optimizes perfusion to the extremity in order to relieve pain and reestablish some level of functional independence.  I discuss these recommendations with the patient as well as the risk and benefits with the patient and all their questions were answered.       Plan:  Left leg angiogram  Cont asa and plavix          Dominik Jaime M.D.   [facility]   Date of Service: 2025             [1]   Social History  Tobacco Use    Smoking status: Former     Current packs/day: 0.00     Average packs/day: 2.0 packs/day for 28.0 years (56.0 ttl pk-yrs)     Types: Cigarettes     Start date: 1976     Quit date: 2004     Years since quittin.4    Smokeless tobacco: Never   Substance Use Topics    Alcohol use: Yes     Comment: occ    Drug use: Yes     Types: Benzodiazepines

## 2025-06-27 ENCOUNTER — TELEPHONE (OUTPATIENT)
Facility: CLINIC | Age: 85
End: 2025-06-27
Payer: MEDICARE

## 2025-06-27 DIAGNOSIS — I73.9 PERIPHERAL VASCULAR DISEASE: Primary | ICD-10-CM

## 2025-06-27 DIAGNOSIS — L03.116 CELLULITIS OF LEFT LOWER EXTREMITY: ICD-10-CM

## 2025-06-27 DIAGNOSIS — I73.9 PVD (PERIPHERAL VASCULAR DISEASE): ICD-10-CM

## 2025-06-27 DIAGNOSIS — L97.922 CHRONIC ULCER OF LEFT LEG WITH FAT LAYER EXPOSED: ICD-10-CM

## 2025-06-27 RX ORDER — LIDOCAINE HYDROCHLORIDE 10 MG/ML
1 INJECTION, SOLUTION EPIDURAL; INFILTRATION; INTRACAUDAL; PERINEURAL ONCE
OUTPATIENT
Start: 2025-06-27 | End: 2025-06-27

## 2025-06-27 RX ORDER — CLOPIDOGREL BISULFATE 75 MG/1
75 TABLET ORAL DAILY
Qty: 30 TABLET | Refills: 11 | Status: SHIPPED | OUTPATIENT
Start: 2025-06-27 | End: 2026-06-27

## 2025-06-27 NOTE — TELEPHONE ENCOUNTER
Spoke w/ pt.    Procedure scheduled for 7/14 @ [facility]   Per Dr. Jaime cont taking asa and plavix  Pt questing refill of plavix. NP stated she is sending now.    Pt agreed and verbalized understanding.

## 2025-07-07 ENCOUNTER — OFFICE VISIT (OUTPATIENT)
Dept: PODIATRY | Facility: CLINIC | Age: 85
End: 2025-07-07
Payer: MEDICARE

## 2025-07-07 VITALS — HEIGHT: 64 IN | BODY MASS INDEX: 21.76 KG/M2 | WEIGHT: 127.44 LBS

## 2025-07-07 DIAGNOSIS — L97.922 CHRONIC ULCER OF LEFT LEG WITH FAT LAYER EXPOSED: Primary | ICD-10-CM

## 2025-07-07 DIAGNOSIS — I73.9 PERIPHERAL VASCULAR DISEASE: ICD-10-CM

## 2025-07-07 PROCEDURE — 11042 DBRDMT SUBQ TIS 1ST 20SQCM/<: CPT | Mod: S$GLB,,, | Performed by: PODIATRIST

## 2025-07-07 PROCEDURE — 99999 PR PBB SHADOW E&M-EST. PATIENT-LVL III: CPT | Mod: PBBFAC,,, | Performed by: PODIATRIST

## 2025-07-07 PROCEDURE — 99499 UNLISTED E&M SERVICE: CPT | Mod: S$GLB,,, | Performed by: PODIATRIST

## 2025-07-07 NOTE — PROCEDURES
"Wound Debridement    Date/Time: 7/7/2025 7:45 AM    Performed by: Shola Dawson DPM  Authorized by: Shola Dawson DPM    Time out: Immediately prior to procedure a "time out" was called to verify the correct patient, procedure, equipment, support staff and site/side marked as required.    Consent Done?:  Yes (Verbal)    Preparation: Patient was prepped and draped in usual sterile fashion    Local anesthesia used?: No      Wound Details:    Location:  Right leg    Type of Debridement:  Excisional       Length (cm):  0.3       Width (cm):  0.2       Depth (cm):  0.1       Area (sq cm):  0.05       Percent Debrided (%):  100       Total Area Debrided (sq cm):  0.05    Depth of debridement:  Subcutaneous tissue    Devitalized tissue debrided:  Necrotic/Eschar and Slough    Instruments:  Curette  Bleeding:  Minimal  Hemostasis Achieved: Yes  Method Used:  Pressure  Patient tolerance:  Patient tolerated the procedure well with no immediate complications    "

## 2025-07-07 NOTE — PROGRESS NOTES
Subjective:      Patient ID: Serenity Epps is a 84 y.o. female.    Chief Complaint: Wound Care    Serenity is a 84 y.o. female who presents to the clinic for evaluation and treatment of high risk feet. 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 The patient's chief complaint is cellulitis to the lower extremity she was in the hospital end of January with X-rays and MRI, no osteomyelitis was seen on imaging. She was discharged on PO antibiotics and presents for follow up. Pain has persisted and the area is still erythematous    2/17/25: patient returns for wound care left leg in unna boot wraps    2/24/25: Patient returns for wound care left leg in the unna boot wraps no new changes    3/11/25: patient returns for wound care and unna boot change has an appointment with vascular this Thursday    3/17/25: Patient returns for wound care left leg, she has an angiogram scheduled for later this week    3/24/25: patient returns for wound care left leg, angiogram was rescheduled from last week to this Friday    3/31/25: Patient returns for wound care left leg, had her angiogram last week, appreciate Dr. Moreno's work. Seems to be doing well with that, no new complaints.    4/7/25: Patient returns for wound care left leg no new complaints, has less pain since her angiogram    4/14/25: Patient returns  for wound care left leg no new complaints    4/21/25: patient returns for wound care left leg doing well overall no new complaints.     4/28/25: patient returns for wound care left leg no new complaints.    5/5/25: Patient returns for left leg ulcer care no new complaints    5/12/25: patient returns for left leg ulcer care no new complaints.     5/20/25: Patient returns for left leg ulcer care dressings intact    5/27/25: Patient returns for left leg ulcer care dressings intact no new complaints    6/9/25: Patient returns left leg ulcer care, dressings intact no new complaints.     6/23/25: Patient returns for left leg ulcer care dressings intact no new complaints.     7/7/25: patient returns for left leg ulcer care no new complaints.    PCP: Shant Jc MD    2/5/25    Current shoe gear:  Affected Foot: Casual shoes     Unaffected Foot: Casual shoes    History of Trauma: negative      Hemoglobin A1C   Date Value Ref Range Status   01/31/2025 5.0 4.0 - 5.6 % Final     Comment:     Reference Interval:  5.0 - 5.6 Normal   5.7 - 6.4 High Risk   > 6.5 Diabetic      Hgb A1c results are standardized based on the (NGSP) National   Glycohemoglobin Standardization Program.      Hemoglobin A1C levels are related to mean serum/plasma glucose   during the preceding 2-3 months.        12/24/2022 5.3 0.0 - 5.6 % Final     Comment:     Reference Interval:  5.0 - 5.6 Normal   5.7 - 6.4 High Risk   > 6.5 Diabetic      Hgb A1c results are standardized based on the (NGSP) National   Glycohemoglobin Standardization Program.      Hemoglobin A1C levels are related to mean serum/plasma glucose   during the preceding 2-3 months.              Review of Systems   Constitutional: Negative for chills and fever.   Cardiovascular: Positive for leg swelling. Negative for claudication.   Respiratory: Negative for shortness of breath.    Skin: Positive for color change, nail changes and poor wound healing. Negative for itching and rash.    Musculoskeletal: Negative for muscle cramps, muscle weakness and myalgias.   Gastrointestinal: Negative for nausea and vomiting.   Neurological: Negative for focal weakness, loss of balance, numbness and paresthesias.           Objective:      Physical Exam  Constitutional:       General: She is not in acute distress.     Appearance: She is well-developed. She is not diaphoretic.   Cardiovascular:      Pulses:           Dorsalis pedis pulses are 1+ on the right side and 1+ on the left side.        Posterior tibial pulses are 1+ on the right side and 1+ on the left side.      Comments: < 3 sec capillary refill time to toes 1-5 bilateral. Feet are warm to touch proximally with distal cooling b/l. There is no hair growth on the feet and toes b/l. There is 1+ edema b/l. No spider veins or varicosities present b/l.     Musculoskeletal:      Comments: Equinus noted b/l ankles with < 10 deg DF noted. MMT 5/5 in DF/PF/Inv/Ev resistance with no reproduction of pain in any direction. Passive range of motion of ankle and pedal joints is painless b/l.     Skin:     General: Skin is warm and dry.      Coloration: Skin is not pale.      Findings: Erythema and lesion present. No abrasion, bruising, burn, ecchymosis, laceration, petechiae or rash.      Nails: There is no clubbing.      Comments: Skin is thin and atrophic    Left plantar first metatarsal, midfoot and heel all on the left foot there is hyperkeratotic lesions x2    Nails 1-5 bilateral are thick 3-4 mm, long 3-6 mm, and discolored with subungual debris    Left leg medial aspect proximal to the medial malleolus there is an open wound where the abscess was drained.  Covered in eschar post:  0.3x0.2x0.1 cm with granular base no erythema and minimal drainage     Neurological:      Mental Status: She is alert and oriented to person, place, and time.      Sensory: No sensory deficit.      Motor: No tremor, atrophy or abnormal muscle tone.      Comments: Negative tinel sign  bilateral.   Psychiatric:         Behavior: Behavior normal.               Assessment:       Encounter Diagnoses   Name Primary?    Chronic ulcer of left leg with fat layer exposed Yes    Peripheral vascular disease                    Plan:       Serenity was seen today for wound care.    Diagnoses and all orders for this visit:    Chronic ulcer of left leg with fat layer exposed    Peripheral vascular disease                    I counseled the patient on her conditions, their implications and medical management.    Wound debrided and cleansed and flushed with saline    Dressed with iodosorb, mepilex border and tubi  compression      Return in 2 weeks for wound care     Shola Dawson DPM

## 2025-07-10 ENCOUNTER — OFFICE VISIT (OUTPATIENT)
Dept: PRIMARY CARE CLINIC | Facility: CLINIC | Age: 85
End: 2025-07-10
Payer: MEDICARE

## 2025-07-10 VITALS
SYSTOLIC BLOOD PRESSURE: 116 MMHG | OXYGEN SATURATION: 96 % | BODY MASS INDEX: 21.22 KG/M2 | WEIGHT: 124.31 LBS | HEIGHT: 64 IN | DIASTOLIC BLOOD PRESSURE: 70 MMHG

## 2025-07-10 DIAGNOSIS — I10 PRIMARY HYPERTENSION: Primary | ICD-10-CM

## 2025-07-10 DIAGNOSIS — Z78.0 OSTEOPENIA AFTER MENOPAUSE: ICD-10-CM

## 2025-07-10 DIAGNOSIS — Z86.73 HISTORY OF ISCHEMIC RIGHT MCA STROKE: Chronic | ICD-10-CM

## 2025-07-10 DIAGNOSIS — E78.2 MIXED HYPERLIPIDEMIA: Chronic | ICD-10-CM

## 2025-07-10 DIAGNOSIS — Z78.0 POST-MENOPAUSAL: ICD-10-CM

## 2025-07-10 DIAGNOSIS — M85.80 OSTEOPENIA AFTER MENOPAUSE: ICD-10-CM

## 2025-07-10 DIAGNOSIS — H40.1233 LOW-TENSION GLAUCOMA OF BOTH EYES, SEVERE STAGE: ICD-10-CM

## 2025-07-10 DIAGNOSIS — I48.0 PAF (PAROXYSMAL ATRIAL FIBRILLATION): Chronic | ICD-10-CM

## 2025-07-10 PROCEDURE — 99999 PR PBB SHADOW E&M-EST. PATIENT-LVL IV: CPT | Mod: PBBFAC,,, | Performed by: FAMILY MEDICINE

## 2025-07-10 RX ORDER — DORZOLAMIDE HYDROCHLORIDE AND TIMOLOL MALEATE 20; 5 MG/ML; MG/ML
1 SOLUTION/ DROPS OPHTHALMIC 2 TIMES DAILY
Qty: 30 ML | Refills: 4 | Status: CANCELLED | OUTPATIENT
Start: 2025-07-10 | End: 2026-07-10

## 2025-07-10 RX ORDER — DORZOLAMIDE HYDROCHLORIDE AND TIMOLOL MALEATE 20; 5 MG/ML; MG/ML
1 SOLUTION/ DROPS OPHTHALMIC 2 TIMES DAILY
Qty: 30 ML | Refills: 4 | Status: SHIPPED | OUTPATIENT
Start: 2025-07-10 | End: 2026-07-10

## 2025-07-10 NOTE — PROGRESS NOTES
Primary Care Provider Appointment   [facility], [place]       Patient ID: Serenity Epps is a 84 y.o. female.    ASSESSMENT/PLAN by Problem List:    Assessment & Plan    IMPRESSION:  - HTN stable and well-controlled on current regimen.  - Dyslipidemia well-controlled on atorvastatin 40 mg.  - History of a-fib, s/p Watchman procedure in January 2024; remains in normal sinus rhythm.  - History of stroke; continued BP control, lipid-lowering therapy, and aspirin 81 mg.  - Considered restarting osteoporosis treatment; previous treatment stopped due to extended use.  - Considered alendronate (Fosamax) as first-line treatment for osteoporosis/osteopenia.    HYPERTENSION:  - Hypertension is currently stable and under satisfactory control with good blood pressure readings.  - Continue current medications and low sodium diet.    ATRIAL FIBRILLATION:  - Serenity appears to remain in normal sinus rhythm with regular heart sounds and normal rate.  - Continue 81mg aspirin and Plavix.  - Serenity had Watchman procedure in January 2024 and loop recorder removed.    PERIPHERAL VASCULAR DISEASE:  - Serenity is scheduled for another angiogram on Monday to address remaining blockage from previous procedure.    HYPERLIPIDEMIA:  - Condition well controlled on atorvastatin 40 mg, which will be continued.    OSTEOPENIA:  - previously on Fosamax she reports for many years and it was stopped for a drug holiday.  Will check a bone density scan anticipate likely needing to resume a bisphosphonate.  Discussed appropriate administration of the medication as well as side effects.  Discussed her dental condition.  She denies any periodontal disease or any active problems.  Will wait for results of bone density and then re-evaluate but anticipate resuming Fosamax    HISTORY OF STROKE:  - Condition currently stable.  - Continue blood pressure control, lipid lowering therapy, and antiplatelet therapy with 81 mg aspirin and  Plavix.    LONG-TERM ASPIRIN USE:  - Serenity continues on daily 81 mg aspirin.  Along with Plavix per Cardiology due to peripheral vascular disease and other ASCVD.  She denies any excessive bleeding or bruising we will continue to monitor    FOLLOW-UP:  - Plan to repeat labs before next follow-up visit in 3 months.           ASSOCIATED ORDERS:  1. Primary hypertension  -     Comprehensive Metabolic Panel; Future; Expected date: 07/10/2025  -     CBC Auto Differential; Future; Expected date: 07/10/2025    2. Mixed hyperlipidemia  -     Lipid Panel; Future; Expected date: 07/10/2025    3. PAF (paroxysmal atrial fibrillation)  Overview:  Seen on loop recorder      4. History of stroke due to thrombosis of right middle cerebral artery  Overview:  Dec 2022  No thrombolytic       5. Osteopenia after menopause  -     DXA Bone Density Axial Skeleton 1 or more sites; Future; Expected date: 07/10/2025    6. Post-menopausal  -     DXA Bone Density Axial Skeleton 1 or more sites; Future; Expected date: 07/10/2025           Follow Up:  Three months          Subjective:       HPI    Patient is a/an 84 y.o.  female     For complete problem list, past medical history, surgical history, social history, etc., see appropriate section in the electronic medical record    History of Present Illness    CHIEF COMPLAINT:  Serenity presents today for follow up    CARDIOVASCULAR:  She has stable hypertension with satisfactory control on current medication regimen and continues adherence to low sodium diet. Her dyslipidemia is well controlled on atorvastatin 40 mg. She has a history of atrial fibrillation, status post Watchman procedure in January 2024, and is currently experiencing normal sinus rhythm with regular rate and no symptomatic arrhythmia. For her history of stroke, she continues daily aspirin 81 mg and Plavix with stable condition.    UPCOMING LOWER EXTREMITY ANGIOGRAM:  She is scheduled for a lower extremity angiogram on Monday with   O. Previous angiogram revealed remaining blockage in the left leg. She anticipates the procedure will help clean up the blockage and potentially assist with wound healing.    OSTEOPOROSIS:  She has a history of osteopenia with previous medication discontinued due to prolonged use. Last bone density scan was performed in January 2023. She currently takes Hubert-F twice daily and vitamin D supplements with good compliance. She is open to potential future medication if bone density scan indicates progression to osteoporosis.    SOCIAL HISTORY:  She has a significant past smoking history of three packs of cigarettes per day for multiple years. She acknowledges potential lung damage from prolonged smoking, though currently asymptomatic with no respiratory symptoms such as wheezing, rattling, or mucus production.       Review of Systems   Respiratory:  Negative for cough, shortness of breath and wheezing.    Cardiovascular: Negative.    Gastrointestinal: Negative.    Genitourinary: Negative.    Musculoskeletal:  Positive for arthralgias and gait problem.   Skin:  Positive for wound (foot, seeing podiatry).       Objective     Physical Exam  Vitals reviewed.   Constitutional:       General: She is not in acute distress.     Appearance: She is well-developed. She is not toxic-appearing.   HENT:      Head: Normocephalic and atraumatic.   Eyes:      General: No scleral icterus.     Conjunctiva/sclera: Conjunctivae normal.   Cardiovascular:      Rate and Rhythm: Regular rhythm. Bradycardia present.      Comments: No peripheral edema  Pulmonary:      Effort: Pulmonary effort is normal. No respiratory distress.      Breath sounds: No wheezing or rales.   Skin:     Coloration: Skin is not jaundiced.   Neurological:      Mental Status: She is alert and oriented to person, place, and time.      Comments:  Antalgic gait.  Using a walker   Psychiatric:         Mood and Affect: Mood normal.         Behavior: Behavior normal.  "      Vitals:    07/10/25 0732   BP: 116/70   Patient Position: Sitting   SpO2: 96%   Weight: 56.4 kg (124 lb 5.4 oz)   Height: 5' 4" (1.626 m)     Physical Exam                    This note was generated with the assistance of ambient listening technology. Verbal consent was obtained by the patient and accompanying visitor(s) for the recording of patient appointment to facilitate this note. I attest to having reviewed and edited the generated note for accuracy, though some syntax or spelling errors may persist. Please contact the author of this note for any clarification.  Parts of the note were also generated with voice recognition software.  Occasionally this software will misinterpreted words or phrases    "

## 2025-07-22 ENCOUNTER — OFFICE VISIT (OUTPATIENT)
Dept: PODIATRY | Facility: CLINIC | Age: 85
End: 2025-07-22
Payer: MEDICARE

## 2025-07-22 VITALS — HEIGHT: 64 IN | WEIGHT: 129.88 LBS | BODY MASS INDEX: 22.17 KG/M2

## 2025-07-22 DIAGNOSIS — I73.9 PERIPHERAL VASCULAR DISEASE: Primary | ICD-10-CM

## 2025-07-22 DIAGNOSIS — Z87.2 HEALED ULCER OF LEFT FOOT: ICD-10-CM

## 2025-07-22 PROCEDURE — 3288F FALL RISK ASSESSMENT DOCD: CPT | Mod: CPTII,S$GLB,, | Performed by: PODIATRIST

## 2025-07-22 PROCEDURE — 1157F ADVNC CARE PLAN IN RCRD: CPT | Mod: CPTII,S$GLB,, | Performed by: PODIATRIST

## 2025-07-22 PROCEDURE — 1126F AMNT PAIN NOTED NONE PRSNT: CPT | Mod: CPTII,S$GLB,, | Performed by: PODIATRIST

## 2025-07-22 PROCEDURE — 1159F MED LIST DOCD IN RCRD: CPT | Mod: CPTII,S$GLB,, | Performed by: PODIATRIST

## 2025-07-22 PROCEDURE — 1160F RVW MEDS BY RX/DR IN RCRD: CPT | Mod: CPTII,S$GLB,, | Performed by: PODIATRIST

## 2025-07-22 PROCEDURE — 1101F PT FALLS ASSESS-DOCD LE1/YR: CPT | Mod: CPTII,S$GLB,, | Performed by: PODIATRIST

## 2025-07-22 PROCEDURE — 99212 OFFICE O/P EST SF 10 MIN: CPT | Mod: S$GLB,,, | Performed by: PODIATRIST

## 2025-07-22 PROCEDURE — 99999 PR PBB SHADOW E&M-EST. PATIENT-LVL III: CPT | Mod: PBBFAC,,, | Performed by: PODIATRIST

## 2025-07-22 NOTE — PROGRESS NOTES
Subjective:      Patient ID: Serenity Epps is a 84 y.o. female.    Chief Complaint: Wound Care    Serenity is a 84 y.o. female who presents to the clinic for evaluation and treatment of high risk feet. 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 The patient's chief complaint is cellulitis to the lower extremity she was in the hospital end of January with X-rays and MRI, no osteomyelitis was seen on imaging. She was discharged on PO antibiotics and presents for follow up. Pain has persisted and the area is still erythematous    2/17/25: patient returns for wound care left leg in unna boot wraps    2/24/25: Patient returns for wound care left leg in the unna boot wraps no new changes    3/11/25: patient returns for wound care and unna boot change has an appointment with vascular this Thursday    3/17/25: Patient returns for wound care left leg, she has an angiogram scheduled for later this week    3/24/25: patient returns for wound care left leg, angiogram was rescheduled from last week to this Friday    3/31/25: Patient returns for wound care left leg, had her angiogram last week, appreciate Dr. Moreno's work. Seems to be doing well with that, no new complaints.    4/7/25: Patient returns for wound care left leg no new complaints, has less pain since her angiogram    4/14/25: Patient returns  for wound care left leg no new complaints    4/21/25: patient returns for wound care left leg doing well overall no new complaints.     4/28/25: patient returns for wound care left leg no new complaints.    5/5/25: Patient returns for left leg ulcer care no new complaints    5/12/25: patient returns for left leg ulcer care no new complaints.     5/20/25: Patient returns for left leg ulcer care dressings intact    5/27/25: Patient returns for left leg ulcer care dressings intact no new complaints    6/9/25: Patient returns left leg ulcer care, dressings intact no new complaints.     6/23/25: Patient returns for left leg ulcer care dressings intact no new complaints.     7/7/25: patient returns for left leg ulcer care no new complaints.    7/22/25: Patient returns for left leg ulcer care no new complaints    PCP: Shant Jc MD    2/5/25    Current shoe gear:  Affected Foot: Casual shoes     Unaffected Foot: Casual shoes    History of Trauma: negative      Hemoglobin A1C   Date Value Ref Range Status   01/31/2025 5.0 4.0 - 5.6 % Final     Comment:     Reference Interval:  5.0 - 5.6 Normal   5.7 - 6.4 High Risk   > 6.5 Diabetic      Hgb A1c results are standardized based on the (NGSP) National   Glycohemoglobin Standardization Program.      Hemoglobin A1C levels are related to mean serum/plasma glucose   during the preceding 2-3 months.        12/24/2022 5.3 0.0 - 5.6 % Final     Comment:     Reference Interval:  5.0 - 5.6 Normal   5.7 - 6.4 High Risk   > 6.5 Diabetic      Hgb A1c results are standardized based on the (NGSP) National   Glycohemoglobin Standardization Program.      Hemoglobin A1C levels are related to mean serum/plasma glucose   during the preceding 2-3 months.              Review of Systems   Constitutional: Negative for chills and fever.   Cardiovascular: Positive for leg swelling. Negative for claudication.   Respiratory: Negative for shortness of breath.    Skin: Positive for color  change, nail changes and poor wound healing. Negative for itching and rash.   Musculoskeletal: Negative for muscle cramps, muscle weakness and myalgias.   Gastrointestinal: Negative for nausea and vomiting.   Neurological: Negative for focal weakness, loss of balance, numbness and paresthesias.           Objective:      Physical Exam  Constitutional:       General: She is not in acute distress.     Appearance: She is well-developed. She is not diaphoretic.   Cardiovascular:      Pulses:           Dorsalis pedis pulses are 1+ on the right side and 1+ on the left side.        Posterior tibial pulses are 1+ on the right side and 1+ on the left side.      Comments: < 3 sec capillary refill time to toes 1-5 bilateral. Feet are warm to touch proximally with distal cooling b/l. There is no hair growth on the feet and toes b/l. There is 1+ edema b/l. No spider veins or varicosities present b/l.     Musculoskeletal:      Comments: Equinus noted b/l ankles with < 10 deg DF noted. MMT 5/5 in DF/PF/Inv/Ev resistance with no reproduction of pain in any direction. Passive range of motion of ankle and pedal joints is painless b/l.     Skin:     General: Skin is warm and dry.      Coloration: Skin is not pale.      Findings: Erythema and lesion present. No abrasion, bruising, burn, ecchymosis, laceration, petechiae or rash.      Nails: There is no clubbing.      Comments: Skin is thin and atrophic    Left plantar first metatarsal, midfoot and heel all on the left foot there is hyperkeratotic lesions x2    Nails 1-5 bilateral are thick 3-4 mm, long 3-6 mm, and discolored with subungual debris    Left leg medial aspect proximal to the medial malleolus  Measurements: 0 x 0 x 0 cm  Periwound: Intact  Drainage: None.  Pus: None.  Malodor: None.  Base:  100% epithelial tissue.  Signs of infection: None.       Neurological:      Mental Status: She is alert and oriented to person, place, and time.      Sensory: No sensory deficit.       Motor: No tremor, atrophy or abnormal muscle tone.      Comments: Negative tinel sign bilateral.   Psychiatric:         Behavior: Behavior normal.               Assessment:       Encounter Diagnoses   Name Primary?    Peripheral vascular disease Yes    Healed ulcer of left foot                      Plan:       Serenity was seen today for wound care.    Diagnoses and all orders for this visit:    Peripheral vascular disease    Healed ulcer of left foot                      I counseled the patient on her conditions, their implications and medical management.    Continue to cover daily to protect the area      Return in 2 weeks  if still healed can stop covering and return to normal    Shola Dawson DPM

## 2025-07-25 ENCOUNTER — TELEPHONE (OUTPATIENT)
Dept: CARDIOLOGY | Facility: CLINIC | Age: 85
End: 2025-07-25
Payer: MEDICARE

## 2025-07-25 NOTE — TELEPHONE ENCOUNTER
Copied from CRM #[id number]. Topic: Medications - Medication Question  >> Jul 25, 2025  1:06 PM Stephanie wrote:  Type:  Needs Medical Advice    Who Called: pt     Would the patient rather a call back or a response via [facility]?  Call back     Best Call Back Number: [phone]    Additional Information: pt would like to know if pt should continue to take the Plavix after the procedure or npt.

## 2025-07-25 NOTE — TELEPHONE ENCOUNTER
Pt asking if she should continue taking clopidogreL (PLAVIX) 75 mg tablet since procedure that was done on 7/14/25. Please advise

## 2025-07-29 DIAGNOSIS — G57.82 NEUROPATHY OF LEFT SURAL NERVE: ICD-10-CM

## 2025-07-29 RX ORDER — GABAPENTIN 400 MG/1
400 CAPSULE ORAL 3 TIMES DAILY
Qty: 270 CAPSULE | Refills: 1 | Status: SHIPPED | OUTPATIENT
Start: 2025-07-29

## 2025-08-05 ENCOUNTER — OFFICE VISIT (OUTPATIENT)
Dept: PODIATRY | Facility: CLINIC | Age: 85
End: 2025-08-05
Payer: MEDICARE

## 2025-08-05 VITALS — WEIGHT: 129.88 LBS | HEIGHT: 64 IN | BODY MASS INDEX: 22.17 KG/M2

## 2025-08-05 DIAGNOSIS — I73.9 PERIPHERAL VASCULAR DISEASE: Primary | ICD-10-CM

## 2025-08-05 DIAGNOSIS — L97.922 CHRONIC ULCER OF LEFT LEG WITH FAT LAYER EXPOSED: ICD-10-CM

## 2025-08-05 PROCEDURE — 87070 CULTURE OTHR SPECIMN AEROBIC: CPT | Performed by: PODIATRIST

## 2025-08-05 PROCEDURE — 11042 DBRDMT SUBQ TIS 1ST 20SQCM/<: CPT | Mod: S$GLB,,, | Performed by: PODIATRIST

## 2025-08-05 PROCEDURE — 99499 UNLISTED E&M SERVICE: CPT | Mod: S$GLB,,, | Performed by: PODIATRIST

## 2025-08-05 PROCEDURE — 87075 CULTR BACTERIA EXCEPT BLOOD: CPT | Performed by: PODIATRIST

## 2025-08-05 PROCEDURE — 99999 PR PBB SHADOW E&M-EST. PATIENT-LVL III: CPT | Mod: PBBFAC,,, | Performed by: PODIATRIST

## 2025-08-05 NOTE — PROGRESS NOTES
Subjective:      Patient ID: Serenity Epps is a 84 y.o. female.    Chief Complaint: Wound Care    Serenity is a 84 y.o. female who presents to the clinic for evaluation and treatment of high risk feet. 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 The patient's chief complaint is cellulitis to the lower extremity she was in the hospital end of January with X-rays and MRI, no osteomyelitis was seen on imaging. She was discharged on PO antibiotics and presents for follow up. Pain has persisted and the area is still erythematous    2/17/25: patient returns for wound care left leg in unna boot wraps    2/24/25: Patient returns for wound care left leg in the unna boot wraps no new changes    3/11/25: patient returns for wound care and unna boot change has an appointment with vascular this Thursday    3/17/25: Patient returns for wound care left leg, she has an angiogram scheduled for later this week    3/24/25: patient returns for wound care left leg, angiogram was rescheduled from last week to this Friday    3/31/25: Patient returns for wound care left leg, had her angiogram last week, appreciate Dr. Moreno's work. Seems to be doing well with that, no new complaints.    4/7/25: Patient returns for wound care left leg no new complaints, has less pain since her angiogram    4/14/25: Patient returns  for wound care left leg no new complaints    4/21/25: patient returns for wound care left leg doing well overall no new complaints.     4/28/25: patient returns for wound care left leg no new complaints.    5/5/25: Patient returns for left leg ulcer care no new complaints    5/12/25: patient returns for left leg ulcer care no new complaints.     5/20/25: Patient returns for left leg ulcer care dressings intact    5/27/25: Patient returns for left leg ulcer care dressings intact no new complaints    6/9/25: Patient returns left leg ulcer care, dressings intact no new complaints.     6/23/25: Patient returns for left leg ulcer care dressings intact no new complaints.     7/7/25: patient returns for left leg ulcer care no new complaints.    7/22/25: Patient returns for left leg ulcer care no new complaints    8/5/25: Patient returns for left leg ulcer that has opened back up in the shower    PCP: Shant Jc MD    2/5/25    Current shoe gear:  Affected Foot: Casual shoes     Unaffected Foot: Casual shoes    History of Trauma: negative      Hemoglobin A1C   Date Value Ref Range Status   01/31/2025 5.0 4.0 - 5.6 % Final     Comment:     Reference Interval:  5.0 - 5.6 Normal   5.7 - 6.4 High Risk   > 6.5 Diabetic      Hgb A1c results are standardized based on the (NGSP) National   Glycohemoglobin Standardization Program.      Hemoglobin A1C levels are related to mean serum/plasma glucose   during the preceding 2-3 months.        12/24/2022 5.3 0.0 - 5.6 % Final     Comment:     Reference Interval:  5.0 - 5.6 Normal   5.7 - 6.4 High Risk   > 6.5 Diabetic      Hgb A1c results are standardized based on the (NGSP) National   Glycohemoglobin Standardization Program.      Hemoglobin A1C levels are related to mean serum/plasma glucose   during the preceding 2-3 months.              Review of Systems   Constitutional: Negative for chills and fever.   Cardiovascular: Positive for leg swelling. Negative for claudication.    Respiratory: Negative for shortness of breath.    Skin: Positive for color change, nail changes and poor wound healing. Negative for itching and rash.   Musculoskeletal: Negative for muscle cramps, muscle weakness and myalgias.   Gastrointestinal: Negative for nausea and vomiting.   Neurological: Negative for focal weakness, loss of balance, numbness and paresthesias.           Objective:      Physical Exam  Constitutional:       General: She is not in acute distress.     Appearance: She is well-developed. She is not diaphoretic.   Cardiovascular:      Pulses:           Dorsalis pedis pulses are 1+ on the right side and 1+ on the left side.        Posterior tibial pulses are 1+ on the right side and 1+ on the left side.      Comments: < 3 sec capillary refill time to toes 1-5 bilateral. Feet are warm to touch proximally with distal cooling b/l. There is no hair growth on the feet and toes b/l. There is 1+ edema b/l. No spider veins or varicosities present b/l.     Musculoskeletal:      Comments: Equinus noted b/l ankles with < 10 deg DF noted. MMT 5/5 in DF/PF/Inv/Ev resistance with no reproduction of pain in any direction. Passive range of motion of ankle and pedal joints is painless b/l.     Skin:     General: Skin is warm and dry.      Coloration: Skin is not pale.      Findings: Erythema and lesion present. No abrasion, bruising, burn, ecchymosis, laceration, petechiae or rash.      Nails: There is no clubbing.      Comments: Skin is thin and atrophic    Left plantar first metatarsal, midfoot and heel all on the left foot there is hyperkeratotic lesions x2    Nails 1-5 bilateral are thick 3-4 mm, long 3-6 mm, and discolored with subungual debris    Left leg medial aspect proximal to the medial malleolus  Measurements: Pre covered in eschar post 0.2 x 0.2 x 0.1 cm  Periwound: Intact  Drainage: None.  Pus: None.  Malodor: None.  Base:  100% epithelial tissue.  Signs of infection: None.      Lateral leg left calf  there is a superficial partial thickness ulcer present with serous drainage    Neurological:      Mental Status: She is alert and oriented to person, place, and time.      Sensory: No sensory deficit.      Motor: No tremor, atrophy or abnormal muscle tone.      Comments: Negative tinel sign bilateral.   Psychiatric:         Behavior: Behavior normal.               Assessment:       Encounter Diagnoses   Name Primary?    Peripheral vascular disease Yes    Chronic ulcer of left leg with fat layer exposed            Plan:       Serenity was seen today for wound care.    Diagnoses and all orders for this visit:    Peripheral vascular disease    Chronic ulcer of left leg with fat layer exposed          I counseled the patient on her conditions, their implications and medical management.    Continue to cover daily to protect the area     Use the tubi  for compression daily      Return in 2 weeks for wound care follow up    Shola Dawson DPM

## 2025-08-05 NOTE — PROCEDURES
"Wound Debridement    Date/Time: 8/5/2025 8:00 AM    Performed by: Shola Dawson DPM  Authorized by: Shola Dawson DPM    Time out: Immediately prior to procedure a "time out" was called to verify the correct patient, procedure, equipment, support staff and site/side marked as required.      Preparation: Patient was prepped and draped in usual sterile fashion    Local anesthesia used?: No      Wound Details:    Location:  Left leg    Type of Debridement:  Excisional       Length (cm):  0.2       Width (cm):  0.2       Depth (cm):  0.1       Area (sq cm):  0.03       Percent Debrided (%):  100       Total Area Debrided (sq cm):  0.03    Depth of debridement:  Subcutaneous tissue    Devitalized tissue debrided:  Necrotic/Eschar and Biofilm    Instruments:  Curette  Bleeding:  Minimal  Hemostasis Achieved: Yes  Method Used:  Pressure  Patient tolerance:  Patient tolerated the procedure well with no immediate complications    "

## 2025-08-07 ENCOUNTER — TELEPHONE (OUTPATIENT)
Dept: PODIATRY | Facility: CLINIC | Age: 85
End: 2025-08-07
Payer: MEDICARE

## 2025-08-07 DIAGNOSIS — L03.116 CELLULITIS OF LEFT LOWER EXTREMITY: Primary | ICD-10-CM

## 2025-08-07 DIAGNOSIS — L97.922 CHRONIC ULCER OF LEFT LEG WITH FAT LAYER EXPOSED: ICD-10-CM

## 2025-08-07 LAB
BACTERIA SPEC AEROBE CULT: ABNORMAL
BACTERIA SPEC ANAEROBE CULT: NORMAL

## 2025-08-07 RX ORDER — DOXYCYCLINE HYCLATE 100 MG
100 TABLET ORAL 2 TIMES DAILY
Qty: 14 TABLET | Refills: 0 | Status: SHIPPED | OUTPATIENT
Start: 2025-08-07 | End: 2025-08-14

## 2025-08-07 NOTE — TELEPHONE ENCOUNTER
Spoke with the patient to provide results per provider's request. She expressed understanding of the message.

## 2025-08-11 ENCOUNTER — OFFICE VISIT (OUTPATIENT)
Dept: PRIMARY CARE CLINIC | Facility: CLINIC | Age: 85
End: 2025-08-11
Payer: MEDICARE

## 2025-08-11 VITALS
BODY MASS INDEX: 22.17 KG/M2 | DIASTOLIC BLOOD PRESSURE: 60 MMHG | HEART RATE: 55 BPM | TEMPERATURE: 98 F | WEIGHT: 129.88 LBS | OXYGEN SATURATION: 95 % | HEIGHT: 64 IN | SYSTOLIC BLOOD PRESSURE: 118 MMHG

## 2025-08-11 DIAGNOSIS — L97.922 NON-PRESSURE CHRONIC ULCER OF LEFT LOWER LEG WITH FAT LAYER EXPOSED: ICD-10-CM

## 2025-08-11 DIAGNOSIS — Z99.89 DEPENDENCE ON OTHER ENABLING MACHINES AND DEVICES: ICD-10-CM

## 2025-08-11 DIAGNOSIS — J43.9 PULMONARY EMPHYSEMA, UNSPECIFIED EMPHYSEMA TYPE: Chronic | ICD-10-CM

## 2025-08-11 DIAGNOSIS — R26.9 ABNORMALITY OF GAIT AND MOBILITY: ICD-10-CM

## 2025-08-11 DIAGNOSIS — I48.0 PAF (PAROXYSMAL ATRIAL FIBRILLATION): Chronic | ICD-10-CM

## 2025-08-11 DIAGNOSIS — Z00.00 ENCOUNTER FOR MEDICARE ANNUAL WELLNESS EXAM: Primary | ICD-10-CM

## 2025-08-11 DIAGNOSIS — H35.3211 EXUDATIVE AGE-RELATED MACULAR DEGENERATION OF RIGHT EYE WITH ACTIVE CHOROIDAL NEOVASCULARIZATION: Chronic | ICD-10-CM

## 2025-08-11 PROCEDURE — 1126F AMNT PAIN NOTED NONE PRSNT: CPT | Mod: CPTII,S$GLB,, | Performed by: PHYSICIAN ASSISTANT

## 2025-08-11 PROCEDURE — 1123F ACP DISCUSS/DSCN MKR DOCD: CPT | Mod: CPTII,S$GLB,, | Performed by: PHYSICIAN ASSISTANT

## 2025-08-11 PROCEDURE — G0439 PPPS, SUBSEQ VISIT: HCPCS | Mod: S$GLB,,, | Performed by: PHYSICIAN ASSISTANT

## 2025-08-11 PROCEDURE — 1170F FXNL STATUS ASSESSED: CPT | Mod: CPTII,S$GLB,, | Performed by: PHYSICIAN ASSISTANT

## 2025-08-11 PROCEDURE — 3288F FALL RISK ASSESSMENT DOCD: CPT | Mod: CPTII,S$GLB,, | Performed by: PHYSICIAN ASSISTANT

## 2025-08-11 PROCEDURE — 1101F PT FALLS ASSESS-DOCD LE1/YR: CPT | Mod: CPTII,S$GLB,, | Performed by: PHYSICIAN ASSISTANT

## 2025-08-11 PROCEDURE — 99999 PR PBB SHADOW E&M-EST. PATIENT-LVL V: CPT | Mod: PBBFAC,,, | Performed by: PHYSICIAN ASSISTANT

## 2025-08-11 PROCEDURE — 1159F MED LIST DOCD IN RCRD: CPT | Mod: CPTII,S$GLB,, | Performed by: PHYSICIAN ASSISTANT

## 2025-08-19 ENCOUNTER — OFFICE VISIT (OUTPATIENT)
Dept: PODIATRY | Facility: CLINIC | Age: 85
End: 2025-08-19
Payer: MEDICARE

## 2025-08-19 VITALS — WEIGHT: 129.88 LBS | HEIGHT: 64 IN | BODY MASS INDEX: 22.17 KG/M2

## 2025-08-19 DIAGNOSIS — L97.922 CHRONIC ULCER OF LEFT LEG WITH FAT LAYER EXPOSED: Primary | ICD-10-CM

## 2025-08-19 DIAGNOSIS — I73.9 PERIPHERAL VASCULAR DISEASE: ICD-10-CM

## 2025-08-19 PROCEDURE — 99999 PR PBB SHADOW E&M-EST. PATIENT-LVL III: CPT | Mod: PBBFAC,,, | Performed by: PODIATRIST

## 2025-08-19 PROCEDURE — 99499 UNLISTED E&M SERVICE: CPT | Mod: S$GLB,,, | Performed by: PODIATRIST

## 2025-08-19 PROCEDURE — 11042 DBRDMT SUBQ TIS 1ST 20SQCM/<: CPT | Mod: S$GLB,,, | Performed by: PODIATRIST

## 2025-09-02 ENCOUNTER — TELEPHONE (OUTPATIENT)
Dept: PODIATRY | Facility: CLINIC | Age: 85
End: 2025-09-02
Payer: MEDICARE

## 2025-09-02 ENCOUNTER — OFFICE VISIT (OUTPATIENT)
Dept: URGENT CARE | Facility: CLINIC | Age: 85
End: 2025-09-02
Payer: MEDICARE

## 2025-09-02 VITALS
DIASTOLIC BLOOD PRESSURE: 68 MMHG | HEART RATE: 58 BPM | OXYGEN SATURATION: 98 % | TEMPERATURE: 98 F | SYSTOLIC BLOOD PRESSURE: 119 MMHG | RESPIRATION RATE: 16 BRPM

## 2025-09-02 DIAGNOSIS — R05.9 COUGH, UNSPECIFIED TYPE: ICD-10-CM

## 2025-09-02 DIAGNOSIS — U07.1 COVID-19 VIRUS DETECTED: ICD-10-CM

## 2025-09-02 DIAGNOSIS — U07.1 COVID-19 VIRUS INFECTION: Primary | ICD-10-CM

## 2025-09-02 LAB
CTP QC/QA: YES
SARS-COV+SARS-COV-2 AG RESP QL IA.RAPID: POSITIVE

## 2025-09-02 PROCEDURE — 99214 OFFICE O/P EST MOD 30 MIN: CPT | Mod: S$GLB,,, | Performed by: NURSE PRACTITIONER

## 2025-09-02 PROCEDURE — 87811 SARS-COV-2 COVID19 W/OPTIC: CPT | Mod: QW,S$GLB,, | Performed by: NURSE PRACTITIONER

## 2025-09-02 RX ORDER — BENZONATATE 200 MG/1
200 CAPSULE ORAL 3 TIMES DAILY PRN
Qty: 30 CAPSULE | Refills: 0 | Status: SHIPPED | OUTPATIENT
Start: 2025-09-02

## 2025-09-03 ENCOUNTER — TELEPHONE (OUTPATIENT)
Dept: PRIMARY CARE CLINIC | Facility: CLINIC | Age: 85
End: 2025-09-03
Payer: MEDICARE

## 2025-09-05 RX ORDER — METOPROLOL SUCCINATE 25 MG/1
25 TABLET, EXTENDED RELEASE ORAL 2 TIMES DAILY
Qty: 180 TABLET | Refills: 3 | Status: SHIPPED | OUTPATIENT
Start: 2025-09-05

## (undated) DEVICE — HANDLE SURG LIGHT NONRIGID

## (undated) DEVICE — DRAPE INCISE IOBAN 2 23X17IN

## (undated) DEVICE — BANDAGE ESMARK 6X12

## (undated) DEVICE — Device

## (undated) DEVICE — PENCIL ROCKER SWITCH 10FT CORD

## (undated) DEVICE — BANDAGE ACE DOUBLE STER 6IN

## (undated) DEVICE — YANKAUER OPEN TIP W/O VENT

## (undated) DEVICE — TOURNIQUET SB QC DP 34X4IN

## (undated) DEVICE — DRESSING XEROFORM 1X8IN

## (undated) DEVICE — SPONGE LAP 18X18 PREWASHED

## (undated) DEVICE — XPERIENCE ADVANCED SURGICAL IRRIGATION

## (undated) DEVICE — TOWEL OR DISP STRL BLUE 4/PK

## (undated) DEVICE — TRAY SKIN SCRUB WET PREMIUM

## (undated) DEVICE — STAPLER SKIN ROTATING HEAD

## (undated) DEVICE — SYS REVOLUTION CEMENT MIXING

## (undated) DEVICE — GLOVE BIOGEL ORTHOPEDIC 7.5

## (undated) DEVICE — 65MM NON RIMMED SPEED PIN

## (undated) DEVICE — ELECTRODE REM PLYHSV RETURN 9

## (undated) DEVICE — KIT SAHARA DRAPE DRAW/LIFT

## (undated) DEVICE — GOWN TOGA SYS PEELWY ZIP 2 XL

## (undated) DEVICE — PAD CAST SPECIALIST STRL 6

## (undated) DEVICE — INTERPULSE SET

## (undated) DEVICE — BLADE SURG STAINLESS STEEL #10

## (undated) DEVICE — SUT 1 36IN COATED VICRYL UN

## (undated) DEVICE — SOL IRR NACL .9% 3000ML

## (undated) DEVICE — DRAPE T EXTRM SURG 121X128X90

## (undated) DEVICE — TIP YANKAUERS BULB NO VENT

## (undated) DEVICE — DRAPE THREE-QTR REINF 53X77IN

## (undated) DEVICE — BNDG COFLEX FOAM LF2 ST 6X5YD

## (undated) DEVICE — SUT 2/0 36IN COATED VICRYL

## (undated) DEVICE — SPONGE COTTON TRAY 4X4IN

## (undated) DEVICE — GLOVE SURG BIOGEL LATEX SZ 7.5

## (undated) DEVICE — SAW BLADE SAGGITAL

## (undated) DEVICE — BLADE SAW SGTL DL STR 25X1.27X

## (undated) DEVICE — STOCKINET TUBULAR 1 PLY 6X60IN

## (undated) DEVICE — COVER TABLE HVY DTY 60X90IN

## (undated) DEVICE — STRAP OR TABLE 5IN X 72IN

## (undated) DEVICE — LINER GLOVE POWDERFREE 8

## (undated) DEVICE — SURGX STERILE ANTIMICROBIAL GEL